# Patient Record
Sex: FEMALE | Race: BLACK OR AFRICAN AMERICAN | NOT HISPANIC OR LATINO | Employment: FULL TIME | URBAN - METROPOLITAN AREA
[De-identification: names, ages, dates, MRNs, and addresses within clinical notes are randomized per-mention and may not be internally consistent; named-entity substitution may affect disease eponyms.]

---

## 2017-01-01 DIAGNOSIS — E11.49 TYPE II OR UNSPECIFIED TYPE DIABETES MELLITUS WITH NEUROLOGICAL MANIFESTATIONS, NOT STATED AS UNCONTROLLED(250.60): Primary | ICD-10-CM

## 2017-01-02 RX ORDER — METFORMIN HYDROCHLORIDE 500 MG/1
TABLET ORAL
Qty: 30 TABLET | Refills: 0 | Status: SHIPPED | OUTPATIENT
Start: 2017-01-02 | End: 2017-01-29 | Stop reason: SDUPTHER

## 2017-01-06 ENCOUNTER — CLINICAL SUPPORT (OUTPATIENT)
Dept: OBSTETRICS AND GYNECOLOGY | Facility: CLINIC | Age: 52
End: 2017-01-06

## 2017-01-06 DIAGNOSIS — Z30.42 ON DEPO-PROVERA FOR CONTRACEPTION: Primary | ICD-10-CM

## 2017-01-06 PROCEDURE — 96372 THER/PROPH/DIAG INJ SC/IM: CPT | Mod: PBBFAC

## 2017-01-06 PROCEDURE — 99212 OFFICE O/P EST SF 10 MIN: CPT | Mod: PBBFAC,25

## 2017-01-06 PROCEDURE — 99999 PR PBB SHADOW E&M-EST. PATIENT-LVL II: CPT | Mod: PBBFAC,,,

## 2017-01-06 RX ADMIN — MEDROXYPROGESTERONE ACETATE 150 MG: 150 INJECTION, SUSPENSION INTRAMUSCULAR at 10:01

## 2017-01-06 NOTE — PROGRESS NOTES
Here for Depo Provera Injection, date due  1/2 - 1/16/17  . Last injection given 10/17/16  . Patient with no current complaints of pain prior to or after injection. Advised to wait 5 minutes. Return between March 24 - April 7 /2017 for next injection.  PATIENT SUPPLIED MEDICATION.  See medication Card for RX information  Order verified, inspected package,storage verified,expiration verified

## 2017-01-22 RX ORDER — HYDROCHLOROTHIAZIDE 25 MG/1
TABLET ORAL
Qty: 30 TABLET | Refills: 0 | Status: SHIPPED | OUTPATIENT
Start: 2017-01-22 | End: 2017-03-01 | Stop reason: SDUPTHER

## 2017-01-29 RX ORDER — METFORMIN HYDROCHLORIDE 500 MG/1
TABLET ORAL
Qty: 30 TABLET | Refills: 0 | Status: SHIPPED | OUTPATIENT
Start: 2017-01-29 | End: 2017-03-04 | Stop reason: SDUPTHER

## 2017-01-30 RX ORDER — POTASSIUM CHLORIDE 750 MG/1
TABLET, EXTENDED RELEASE ORAL
Qty: 60 TABLET | Refills: 0 | Status: SHIPPED | OUTPATIENT
Start: 2017-01-30 | End: 2017-05-16

## 2017-03-01 RX ORDER — HYDROCHLOROTHIAZIDE 25 MG/1
TABLET ORAL
Qty: 30 TABLET | Refills: 0 | Status: SHIPPED | OUTPATIENT
Start: 2017-03-01 | End: 2017-04-02 | Stop reason: SDUPTHER

## 2017-03-04 RX ORDER — METFORMIN HYDROCHLORIDE 500 MG/1
TABLET ORAL
Qty: 30 TABLET | Refills: 0 | Status: SHIPPED | OUTPATIENT
Start: 2017-03-04 | End: 2017-04-02 | Stop reason: SDUPTHER

## 2017-03-13 ENCOUNTER — TELEPHONE (OUTPATIENT)
Dept: INTERNAL MEDICINE | Facility: CLINIC | Age: 52
End: 2017-03-13

## 2017-03-13 NOTE — TELEPHONE ENCOUNTER
----- Message from Faviola Davis sent at 3/13/2017 10:36 AM CDT -----  Contact: Self [phone]  Doctor appointment and lab have been scheduled.  Please link lab orders to the lab appointment.  Date of doctor appointment:    Physical or EP:  05/16  Date of lab appointment:  05/09  Comments:

## 2017-03-22 RX ORDER — LISINOPRIL 10 MG/1
TABLET ORAL
Qty: 90 TABLET | Refills: 0 | Status: SHIPPED | OUTPATIENT
Start: 2017-03-22 | End: 2017-08-22 | Stop reason: SDUPTHER

## 2017-04-03 RX ORDER — HYDROCHLOROTHIAZIDE 25 MG/1
TABLET ORAL
Qty: 30 TABLET | Refills: 0 | Status: SHIPPED | OUTPATIENT
Start: 2017-04-03 | End: 2017-05-09 | Stop reason: SDUPTHER

## 2017-04-03 RX ORDER — METFORMIN HYDROCHLORIDE 500 MG/1
TABLET ORAL
Qty: 30 TABLET | Refills: 0 | Status: SHIPPED | OUTPATIENT
Start: 2017-04-03 | End: 2017-05-27 | Stop reason: SDUPTHER

## 2017-04-07 ENCOUNTER — CLINICAL SUPPORT (OUTPATIENT)
Dept: OBSTETRICS AND GYNECOLOGY | Facility: CLINIC | Age: 52
End: 2017-04-07
Payer: COMMERCIAL

## 2017-04-07 DIAGNOSIS — Z30.42 ON DEPO-PROVERA FOR CONTRACEPTION: Primary | ICD-10-CM

## 2017-04-07 PROCEDURE — 99999 PR PBB SHADOW E&M-EST. PATIENT-LVL II: CPT | Mod: PBBFAC,,,

## 2017-04-07 PROCEDURE — 96372 THER/PROPH/DIAG INJ SC/IM: CPT | Mod: S$GLB,,, | Performed by: OBSTETRICS & GYNECOLOGY

## 2017-04-07 RX ADMIN — MEDROXYPROGESTERONE ACETATE 150 MG: 150 INJECTION, SUSPENSION INTRAMUSCULAR at 09:04

## 2017-04-10 NOTE — PROGRESS NOTES
Here for Depo Provera Injection, date due 3/24 - 4/7/17     . Last injection given   1/6/17 . Patient with no current complaints of pain prior to or after injection. Advised to wait 5 minutes. Return between  June 23 - July 7 /2017 for next injection.  PATIENT SUPPLIED MEDICATION.  See medication Card for RX information  Order verified, inspected package,storage verified,expiration verified      Site - RB

## 2017-05-08 ENCOUNTER — LAB VISIT (OUTPATIENT)
Dept: LAB | Facility: HOSPITAL | Age: 52
End: 2017-05-08
Attending: INTERNAL MEDICINE
Payer: COMMERCIAL

## 2017-05-08 DIAGNOSIS — E11.00 DIABETES MELLITUS WITH HYPEROSMOLARITY: ICD-10-CM

## 2017-05-08 LAB
ESTIMATED AVG GLUCOSE: 134 MG/DL
HBA1C MFR BLD HPLC: 6.3 %

## 2017-05-08 PROCEDURE — 83036 HEMOGLOBIN GLYCOSYLATED A1C: CPT

## 2017-05-08 PROCEDURE — 36415 COLL VENOUS BLD VENIPUNCTURE: CPT | Mod: PO

## 2017-05-09 ENCOUNTER — TELEPHONE (OUTPATIENT)
Dept: INTERNAL MEDICINE | Facility: CLINIC | Age: 52
End: 2017-05-09

## 2017-05-09 RX ORDER — HYDROCHLOROTHIAZIDE 25 MG/1
TABLET ORAL
Qty: 30 TABLET | Refills: 0 | Status: SHIPPED | OUTPATIENT
Start: 2017-05-09 | End: 2017-05-27 | Stop reason: SDUPTHER

## 2017-05-09 NOTE — TELEPHONE ENCOUNTER
----- Message from Milagros Wolf MD sent at 5/8/2017  9:05 PM CDT -----  Please review your lab work and we will discuss at your pending office visit.  Milagros Mcgill

## 2017-05-16 ENCOUNTER — OFFICE VISIT (OUTPATIENT)
Dept: INTERNAL MEDICINE | Facility: CLINIC | Age: 52
End: 2017-05-16
Payer: COMMERCIAL

## 2017-05-16 ENCOUNTER — HOSPITAL ENCOUNTER (OUTPATIENT)
Dept: RADIOLOGY | Facility: HOSPITAL | Age: 52
Discharge: HOME | End: 2017-05-16
Attending: INTERNAL MEDICINE
Payer: COMMERCIAL

## 2017-05-16 VITALS
BODY MASS INDEX: 39.79 KG/M2 | SYSTOLIC BLOOD PRESSURE: 120 MMHG | RESPIRATION RATE: 16 BRPM | DIASTOLIC BLOOD PRESSURE: 76 MMHG | HEART RATE: 68 BPM | HEIGHT: 66 IN | WEIGHT: 247.56 LBS | TEMPERATURE: 98 F

## 2017-05-16 DIAGNOSIS — E53.8 B12 DEFICIENCY: ICD-10-CM

## 2017-05-16 DIAGNOSIS — E51.9 THIAMIN DEFICIENCY: ICD-10-CM

## 2017-05-16 DIAGNOSIS — E55.9 VITAMIN D DEFICIENCY: ICD-10-CM

## 2017-05-16 DIAGNOSIS — E87.6 HYPOKALEMIA: ICD-10-CM

## 2017-05-16 DIAGNOSIS — M79.642 PAIN IN LEFT HAND: ICD-10-CM

## 2017-05-16 DIAGNOSIS — M79.641 PAIN OF RIGHT HAND: ICD-10-CM

## 2017-05-16 DIAGNOSIS — I10 ESSENTIAL HYPERTENSION: ICD-10-CM

## 2017-05-16 DIAGNOSIS — E11.40 CONTROLLED TYPE 2 DIABETES MELLITUS WITH DIABETIC NEUROPATHY, WITHOUT LONG-TERM CURRENT USE OF INSULIN: Primary | ICD-10-CM

## 2017-05-16 PROCEDURE — 1160F RVW MEDS BY RX/DR IN RCRD: CPT | Mod: S$GLB,,, | Performed by: INTERNAL MEDICINE

## 2017-05-16 PROCEDURE — 99999 PR PBB SHADOW E&M-EST. PATIENT-LVL III: CPT | Mod: PBBFAC,,, | Performed by: INTERNAL MEDICINE

## 2017-05-16 PROCEDURE — 3044F HG A1C LEVEL LT 7.0%: CPT | Mod: S$GLB,,, | Performed by: INTERNAL MEDICINE

## 2017-05-16 PROCEDURE — 3078F DIAST BP <80 MM HG: CPT | Mod: S$GLB,,, | Performed by: INTERNAL MEDICINE

## 2017-05-16 PROCEDURE — 73130 X-RAY EXAM OF HAND: CPT | Mod: 26,50,, | Performed by: RADIOLOGY

## 2017-05-16 PROCEDURE — 73130 X-RAY EXAM OF HAND: CPT | Mod: 50,TC,PO

## 2017-05-16 PROCEDURE — 99214 OFFICE O/P EST MOD 30 MIN: CPT | Mod: S$GLB,,, | Performed by: INTERNAL MEDICINE

## 2017-05-16 PROCEDURE — 4010F ACE/ARB THERAPY RXD/TAKEN: CPT | Mod: S$GLB,,, | Performed by: INTERNAL MEDICINE

## 2017-05-16 PROCEDURE — 3074F SYST BP LT 130 MM HG: CPT | Mod: S$GLB,,, | Performed by: INTERNAL MEDICINE

## 2017-05-16 RX ORDER — MEDROXYPROGESTERONE ACETATE 150 MG/ML
INJECTION, SUSPENSION INTRAMUSCULAR
Refills: 2 | COMMUNITY
Start: 2017-04-06 | End: 2017-09-30 | Stop reason: SDUPTHER

## 2017-05-16 NOTE — PROGRESS NOTES
CC: f/u chronic  DM, HTN, and rhinitis sx    50 yo female presents for above  Gastric Sleeve 7/29/2013  Lost ~ 60-70 # initially  Weight 218 (12/2014)  Weight today 247     DM w/ neuropathy, tx w/ metformin  Denies increased thirst or urination  Denies increased numbness  A1C==>6.3    HTN, tx w/ HCTZ 25mg and lisinopril 10mg  Denies HA or dizziness  Hypokalemia 9/2013, @ 3.0===> update pending today  BP today==>110/80    Sinus pressure and congestion  Duration: wks    MEDS: In the MedCard.     REVIEW OF SYSTEMS:   No fever, chill, or night sweats  No chest pain, shortness of breath, PND, or Orthopnea.  No nausea, emesis or diarrhea  No change in bowel or bladder function.   No unusual headaches or focal deficits   ++ hand pain, hard to close hand, especially index finger  Remainder of review negative except as previously noted.     PAST MEDICAL HISTORY:   Hypertension,  Anemia,   Depression,   Sinus, rhinitis,   DM, neuropathy  Dysmetabolic syndrome,   Asthma,   Restless legs     PHYSICAL EXAM:   VS:Stable  GENERAL: She is alert and oriented, in no acute distress. She is well developed, well nourished, conversant and cooperative, pleasant as always.   EYES: Conjunctivae and lids unremarkable. Sclerae anicteric. Pupils are reactive.   ENT: Canal and TMs ++ cerumen, unable to visualize TM's ( pt asx).   Nasal mucosa, turbinates, injected w/clear left  exudate;   oropharynx injected w/o exudate    No stridor, No sinus tenderness  NECK: Supple. No thyromegaly noted.   RESPIRATORY: Efforts unlabored.   LUNGS: Clear to auscultation.   HEART: Regular rate and rhythm. No carotid bruits,   1+ pedal pulse. No edema.   GI: BS+, soft ,NT/ND  MUSCULOSKELETAL: Gait normal.   Hands:decreased ROM, tender over the digitis , 5th >   No clubbing, cyanosis or edema appreciated.   NEURO: WILDER. No tremor noted; , b/l+ Tinel's  SKIN: Warm and dry    IMPRESSION:   DM w/ neuropathy, A1C increased  Hypertension, stable.   Hand pain,  b/l  Allergic rhinitis, chronic  Obesity, gained weight   Cerumen impaction    PLAN:  Taking OTC potassium  Lab-   Vigorous hydration, water best  Continue present meds  Flonase/ocean spray  Mucinex  Hand xrays  Wrist splint to sleep in  Call prn  RTC - 3 mos, EPP w/ lab

## 2017-05-17 ENCOUNTER — TELEPHONE (OUTPATIENT)
Dept: INTERNAL MEDICINE | Facility: CLINIC | Age: 52
End: 2017-05-17

## 2017-05-19 ENCOUNTER — TELEPHONE (OUTPATIENT)
Dept: INTERNAL MEDICINE | Facility: CLINIC | Age: 52
End: 2017-05-19

## 2017-05-19 NOTE — TELEPHONE ENCOUNTER
----- Message from Milagros Wolf MD sent at 5/19/2017  4:11 PM CDT -----  Please note that your xray did not reveal the cause of your pain  If your symptoms are persisting , please advise so an Orthopedic Consult can be generated for further evaluation  Milagros Mcgill

## 2017-05-28 RX ORDER — HYDROCHLOROTHIAZIDE 25 MG/1
TABLET ORAL
Qty: 30 TABLET | Refills: 5 | Status: SHIPPED | OUTPATIENT
Start: 2017-05-28 | End: 2017-11-20 | Stop reason: SDUPTHER

## 2017-05-28 RX ORDER — METFORMIN HYDROCHLORIDE 500 MG/1
TABLET ORAL
Qty: 30 TABLET | Refills: 5 | Status: SHIPPED | OUTPATIENT
Start: 2017-05-28 | End: 2017-11-20 | Stop reason: SDUPTHER

## 2017-06-15 ENCOUNTER — OFFICE VISIT (OUTPATIENT)
Dept: INTERNAL MEDICINE | Facility: CLINIC | Age: 52
End: 2017-06-15
Payer: COMMERCIAL

## 2017-06-15 VITALS
DIASTOLIC BLOOD PRESSURE: 78 MMHG | WEIGHT: 250.69 LBS | SYSTOLIC BLOOD PRESSURE: 132 MMHG | RESPIRATION RATE: 16 BRPM | HEIGHT: 66 IN | HEART RATE: 78 BPM | TEMPERATURE: 98 F | BODY MASS INDEX: 40.29 KG/M2

## 2017-06-15 DIAGNOSIS — J01.00 ACUTE MAXILLARY SINUSITIS, RECURRENCE NOT SPECIFIED: Primary | ICD-10-CM

## 2017-06-15 DIAGNOSIS — I10 HTN, GOAL BELOW 130/80: ICD-10-CM

## 2017-06-15 DIAGNOSIS — J20.9 ACUTE BRONCHITIS, UNSPECIFIED ORGANISM: ICD-10-CM

## 2017-06-15 PROCEDURE — 99214 OFFICE O/P EST MOD 30 MIN: CPT | Mod: 25,S$GLB,, | Performed by: INTERNAL MEDICINE

## 2017-06-15 PROCEDURE — 96372 THER/PROPH/DIAG INJ SC/IM: CPT | Mod: S$GLB,,, | Performed by: INTERNAL MEDICINE

## 2017-06-15 PROCEDURE — 99999 PR PBB SHADOW E&M-EST. PATIENT-LVL III: CPT | Mod: PBBFAC,,, | Performed by: INTERNAL MEDICINE

## 2017-06-15 RX ORDER — LEVOCETIRIZINE DIHYDROCHLORIDE 5 MG/1
5 TABLET, FILM COATED ORAL NIGHTLY
Qty: 30 TABLET | Refills: 3 | Status: SHIPPED | OUTPATIENT
Start: 2017-06-15 | End: 2017-08-29 | Stop reason: SDUPTHER

## 2017-06-15 RX ORDER — AZITHROMYCIN 250 MG/1
TABLET, FILM COATED ORAL
Qty: 6 TABLET | Refills: 0 | Status: SHIPPED | OUTPATIENT
Start: 2017-06-15 | End: 2017-06-20

## 2017-06-15 RX ORDER — TRIAMCINOLONE ACETONIDE 40 MG/ML
40 INJECTION, SUSPENSION INTRA-ARTICULAR; INTRAMUSCULAR
Status: COMPLETED | OUTPATIENT
Start: 2017-06-15 | End: 2017-06-15

## 2017-06-15 RX ORDER — CODEINE PHOSPHATE AND GUAIFENESIN 10; 100 MG/5ML; MG/5ML
5 SOLUTION ORAL 3 TIMES DAILY PRN
Qty: 236 ML | Refills: 0 | Status: SHIPPED | OUTPATIENT
Start: 2017-06-15 | End: 2017-06-25

## 2017-06-15 RX ADMIN — TRIAMCINOLONE ACETONIDE 40 MG: 40 INJECTION, SUSPENSION INTRA-ARTICULAR; INTRAMUSCULAR at 10:06

## 2017-06-15 NOTE — PROGRESS NOTES
Subjective:       Patient ID: Miesha Domínguez is a 51 y.o. female.    Chief Complaint: Cough    HPI   Pt with HTN is here for evaluation of 4 days of worsening sinus/chest congestion, mild productive cough, post nasal drip. Minimal relief with Mucinex and Flonase.   Review of Systems   Constitutional: Negative for activity change, appetite change, chills, diaphoresis, fatigue, fever and unexpected weight change.   HENT: Positive for congestion, postnasal drip, rhinorrhea and sinus pressure. Negative for sneezing, sore throat, trouble swallowing and voice change.    Respiratory: Positive for cough. Negative for shortness of breath and wheezing.    Cardiovascular: Negative for chest pain, palpitations and leg swelling.   Gastrointestinal: Negative for abdominal pain, blood in stool, constipation, diarrhea, nausea and vomiting.   Genitourinary: Negative for dysuria.   Musculoskeletal: Negative for arthralgias and myalgias.   Skin: Negative for rash and wound.   Allergic/Immunologic: Negative for environmental allergies and food allergies.   Hematological: Negative for adenopathy. Does not bruise/bleed easily.       Objective:      Physical Exam   Constitutional: She is oriented to person, place, and time. She appears well-developed and well-nourished. No distress.   HENT:   Head: Normocephalic and atraumatic.   Right Ear: External ear normal.   Left Ear: External ear normal.   Nose: Mucosal edema and rhinorrhea present. Right sinus exhibits maxillary sinus tenderness. Left sinus exhibits maxillary sinus tenderness.   Mouth/Throat: Oropharynx is clear and moist. No oropharyngeal exudate.   Eyes: Conjunctivae and EOM are normal. Pupils are equal, round, and reactive to light. Right eye exhibits no discharge. Left eye exhibits no discharge. No scleral icterus.   Neck: Neck supple. No JVD present.   Cardiovascular: Normal rate, regular rhythm, normal heart sounds and intact distal pulses.    Pulmonary/Chest: Effort  normal and breath sounds normal. No respiratory distress. She has no wheezes. She has no rales.   Abdominal: Soft. Bowel sounds are normal. There is no tenderness.   Musculoskeletal: She exhibits no edema.   Lymphadenopathy:     She has no cervical adenopathy.   Neurological: She is alert and oriented to person, place, and time.   Skin: Skin is warm and dry. No rash noted. She is not diaphoretic. No pallor.       Assessment:       1. Acute maxillary sinusitis, recurrence not specified    2. Acute bronchitis, unspecified organism    3. HTN, goal below 130/80        Plan:    1. Rx Z-Pack, Xyzal, kenalog 40 mg IM and continue Flonase       No decongestants 2/2 HTN   2. Rx Cheratussin AC TID PRN   3. Stable, CPT

## 2017-07-05 ENCOUNTER — CLINICAL SUPPORT (OUTPATIENT)
Dept: OBSTETRICS AND GYNECOLOGY | Facility: CLINIC | Age: 52
End: 2017-07-05
Payer: COMMERCIAL

## 2017-07-05 DIAGNOSIS — R58 BLEEDING: Primary | ICD-10-CM

## 2017-07-05 PROCEDURE — 96372 THER/PROPH/DIAG INJ SC/IM: CPT | Mod: S$GLB,,, | Performed by: OBSTETRICS & GYNECOLOGY

## 2017-07-05 PROCEDURE — 99999 PR PBB SHADOW E&M-EST. PATIENT-LVL II: CPT | Mod: PBBFAC,,,

## 2017-07-05 RX ADMIN — MEDROXYPROGESTERONE ACETATE 150 MG: 150 INJECTION, SUSPENSION INTRAMUSCULAR at 08:07

## 2017-07-05 NOTE — PROGRESS NOTES
Here for Depo Provera Injection, date due  6/23 - 7/7/17   . Last injection given 4/7/17   . Patient with no current complaints of pain prior to or after injection. Advised to wait 5 minutes. Return between Sept 20 - Oct 4   /2017 for next injection.  PATIENT SUPPLIED MEDICATION.  See medication Card for RX information  Order verified, inspected package,storage verified,expiration verified      Site - LB

## 2017-08-21 ENCOUNTER — LAB VISIT (OUTPATIENT)
Dept: LAB | Facility: HOSPITAL | Age: 52
End: 2017-08-21
Attending: INTERNAL MEDICINE
Payer: COMMERCIAL

## 2017-08-21 DIAGNOSIS — E55.9 VITAMIN D DEFICIENCY: ICD-10-CM

## 2017-08-21 DIAGNOSIS — E87.6 HYPOKALEMIA: ICD-10-CM

## 2017-08-21 DIAGNOSIS — E11.40 CONTROLLED TYPE 2 DIABETES MELLITUS WITH DIABETIC NEUROPATHY, WITHOUT LONG-TERM CURRENT USE OF INSULIN: ICD-10-CM

## 2017-08-21 DIAGNOSIS — I10 ESSENTIAL HYPERTENSION: ICD-10-CM

## 2017-08-21 DIAGNOSIS — E51.9 THIAMIN DEFICIENCY: ICD-10-CM

## 2017-08-21 DIAGNOSIS — E53.8 B12 DEFICIENCY: ICD-10-CM

## 2017-08-21 LAB
25(OH)D3+25(OH)D2 SERPL-MCNC: 45 NG/ML
ALBUMIN SERPL BCP-MCNC: 3.3 G/DL
ALP SERPL-CCNC: 78 U/L
ALT SERPL W/O P-5'-P-CCNC: 15 U/L
ANION GAP SERPL CALC-SCNC: 11 MMOL/L
AST SERPL-CCNC: 17 U/L
BASOPHILS # BLD AUTO: 0.03 K/UL
BASOPHILS NFR BLD: 0.3 %
BILIRUB SERPL-MCNC: 0.6 MG/DL
BUN SERPL-MCNC: 15 MG/DL
CALCIUM SERPL-MCNC: 9.8 MG/DL
CHLORIDE SERPL-SCNC: 105 MMOL/L
CHOLEST/HDLC SERPL: 3.8 {RATIO}
CO2 SERPL-SCNC: 25 MMOL/L
CREAT SERPL-MCNC: 0.9 MG/DL
DIFFERENTIAL METHOD: ABNORMAL
EOSINOPHIL # BLD AUTO: 0.1 K/UL
EOSINOPHIL NFR BLD: 0.7 %
ERYTHROCYTE [DISTWIDTH] IN BLOOD BY AUTOMATED COUNT: 15.7 %
EST. GFR  (AFRICAN AMERICAN): >60 ML/MIN/1.73 M^2
EST. GFR  (NON AFRICAN AMERICAN): >60 ML/MIN/1.73 M^2
ESTIMATED AVG GLUCOSE: 126 MG/DL
GLUCOSE SERPL-MCNC: 91 MG/DL
HBA1C MFR BLD HPLC: 6 %
HCT VFR BLD AUTO: 38 %
HDL/CHOLESTEROL RATIO: 26.1 %
HDLC SERPL-MCNC: 222 MG/DL
HDLC SERPL-MCNC: 58 MG/DL
HGB BLD-MCNC: 12.7 G/DL
LDLC SERPL CALC-MCNC: 149 MG/DL
LYMPHOCYTES # BLD AUTO: 3.6 K/UL
LYMPHOCYTES NFR BLD: 33 %
MAGNESIUM SERPL-MCNC: 1.8 MG/DL
MCH RBC QN AUTO: 26.1 PG
MCHC RBC AUTO-ENTMCNC: 33.4 G/DL
MCV RBC AUTO: 78 FL
MONOCYTES # BLD AUTO: 0.7 K/UL
MONOCYTES NFR BLD: 5.9 %
NEUTROPHILS # BLD AUTO: 6.6 K/UL
NEUTROPHILS NFR BLD: 59.8 %
NONHDLC SERPL-MCNC: 164 MG/DL
PLATELET # BLD AUTO: 272 K/UL
PMV BLD AUTO: 11.4 FL
POTASSIUM SERPL-SCNC: 3.4 MMOL/L
PROT SERPL-MCNC: 8.1 G/DL
RBC # BLD AUTO: 4.87 M/UL
SODIUM SERPL-SCNC: 141 MMOL/L
TRIGL SERPL-MCNC: 75 MG/DL
TSH SERPL DL<=0.005 MIU/L-ACNC: 1.03 UIU/ML
VIT B12 SERPL-MCNC: 1444 PG/ML
WBC # BLD AUTO: 10.96 K/UL

## 2017-08-21 PROCEDURE — 80053 COMPREHEN METABOLIC PANEL: CPT

## 2017-08-21 PROCEDURE — 84443 ASSAY THYROID STIM HORMONE: CPT

## 2017-08-21 PROCEDURE — 82306 VITAMIN D 25 HYDROXY: CPT

## 2017-08-21 PROCEDURE — 83036 HEMOGLOBIN GLYCOSYLATED A1C: CPT

## 2017-08-21 PROCEDURE — 85025 COMPLETE CBC W/AUTO DIFF WBC: CPT

## 2017-08-21 PROCEDURE — 84425 ASSAY OF VITAMIN B-1: CPT

## 2017-08-21 PROCEDURE — 80061 LIPID PANEL: CPT

## 2017-08-21 PROCEDURE — 82607 VITAMIN B-12: CPT

## 2017-08-21 PROCEDURE — 83735 ASSAY OF MAGNESIUM: CPT

## 2017-08-21 PROCEDURE — 36415 COLL VENOUS BLD VENIPUNCTURE: CPT | Mod: PO

## 2017-08-22 ENCOUNTER — TELEPHONE (OUTPATIENT)
Dept: INTERNAL MEDICINE | Facility: CLINIC | Age: 52
End: 2017-08-22

## 2017-08-22 RX ORDER — LISINOPRIL 10 MG/1
10 TABLET ORAL DAILY
Qty: 90 TABLET | Refills: 3 | Status: SHIPPED | OUTPATIENT
Start: 2017-08-22 | End: 2018-08-01 | Stop reason: SDUPTHER

## 2017-08-22 NOTE — TELEPHONE ENCOUNTER
----- Message from Milagros Wolf MD sent at 8/21/2017  7:16 PM CDT -----  .Please review your lab work and we will discuss at your pending office visit.  Milagros Mcgill

## 2017-08-23 LAB — VIT B1 SERPL-MCNC: 73 UG/L (ref 38–122)

## 2017-08-25 ENCOUNTER — TELEPHONE (OUTPATIENT)
Dept: INTERNAL MEDICINE | Facility: CLINIC | Age: 52
End: 2017-08-25

## 2017-08-25 NOTE — TELEPHONE ENCOUNTER
----- Message from Milagros Wolf MD sent at 8/24/2017  7:48 PM CDT -----  Please review your lab work and we will discuss at your pending office visit.  Your potassium was low, please increase your potassium rich food intake,   bananas, OJ, and avocados are 3 common foods that contain extra potassium  Milagros Mcgill

## 2017-08-28 ENCOUNTER — TELEPHONE (OUTPATIENT)
Dept: INTERNAL MEDICINE | Facility: CLINIC | Age: 52
End: 2017-08-28

## 2017-08-28 ENCOUNTER — OFFICE VISIT (OUTPATIENT)
Dept: INTERNAL MEDICINE | Facility: CLINIC | Age: 52
End: 2017-08-28
Payer: COMMERCIAL

## 2017-08-28 VITALS
RESPIRATION RATE: 18 BRPM | SYSTOLIC BLOOD PRESSURE: 136 MMHG | DIASTOLIC BLOOD PRESSURE: 86 MMHG | BODY MASS INDEX: 39.28 KG/M2 | WEIGHT: 250.25 LBS | HEART RATE: 97 BPM | OXYGEN SATURATION: 97 % | TEMPERATURE: 98 F | HEIGHT: 67 IN

## 2017-08-28 DIAGNOSIS — I10 ESSENTIAL HYPERTENSION: ICD-10-CM

## 2017-08-28 DIAGNOSIS — E11.40 CONTROLLED TYPE 2 DIABETES MELLITUS WITH DIABETIC NEUROPATHY, WITHOUT LONG-TERM CURRENT USE OF INSULIN: ICD-10-CM

## 2017-08-28 DIAGNOSIS — E11.40 TYPE 2 DIABETES MELLITUS WITH DIABETIC NEUROPATHY, WITHOUT LONG-TERM CURRENT USE OF INSULIN: ICD-10-CM

## 2017-08-28 DIAGNOSIS — Z00.00 ANNUAL PHYSICAL EXAM: Primary | ICD-10-CM

## 2017-08-28 DIAGNOSIS — E78.5 HYPERLIPIDEMIA, UNSPECIFIED HYPERLIPIDEMIA TYPE: ICD-10-CM

## 2017-08-28 DIAGNOSIS — D64.9 ANEMIA, UNSPECIFIED TYPE: Primary | ICD-10-CM

## 2017-08-28 PROCEDURE — 99396 PREV VISIT EST AGE 40-64: CPT | Mod: S$GLB,,, | Performed by: INTERNAL MEDICINE

## 2017-08-28 PROCEDURE — 99999 PR PBB SHADOW E&M-EST. PATIENT-LVL III: CPT | Mod: PBBFAC,,, | Performed by: INTERNAL MEDICINE

## 2017-08-28 RX ORDER — ROSUVASTATIN CALCIUM 20 MG/1
20 TABLET, COATED ORAL DAILY
Qty: 90 TABLET | Refills: 1 | Status: SHIPPED | OUTPATIENT
Start: 2017-08-28 | End: 2018-02-21 | Stop reason: SDUPTHER

## 2017-08-28 NOTE — TELEPHONE ENCOUNTER
----- Message from Socorro Boyd sent at 8/28/2017 10:44 AM CDT -----  Contact:  [phone]  Doctor appointment and lab have been scheduled.  Please link lab orders to the lab appointment.  Date of doctor appointment:  none  Physical or EP:  3 months lab appt per pt  Date of lab appointment:  11-  Comments:     REMINDER to appt coordinator: ## delete this from the message after reading! ##     1) The lab appointment must be at least 3 days from now to allow time for the order to be entered and linked to the appointment.   2) Lab appointment should be scheduled at least 3 days before the doctor appointment.

## 2017-08-28 NOTE — PROGRESS NOTES
CC: Annual PE    51 year-old female presents for PE  Nonsmoker,   Rare social alcohol consumer.     FAMILY HISTORY:   Dad d. 64, lung cancer. He had glaucoma. Heavy Smoker   and worked w/ asbestos   Mom d. 73 of ovarian cancer, she had diabetes and hypertension.   Brother + hypertension, diabetes.   Sister + hypertension, diabetes. Colon polyps    SCREENING TESTS:   Cholesterol, reviewed,- B12 level too high raking 3 x weekly; potassium low @ 3.4,   Supplement 99 mg bid; LDL not @ goal  Colonoscopy, N/A ,No change in bowels or blood in stool   Dr. Charline MAGAÑA.  Mammo, gyn  BMD, on Depo provera  Eye exam, UTD  Dental exam, UTD    VACCINATIONS:  Tdap, 9/20/13   Pneumovax, 2016  Flu, yearly    MEDS: In the MedCard.     REVIEW OF SYSTEMS:   No fever, chill, or night sweats  No chest pain, shortness of breath, PND, or orthopnea.  + cough usually @ night, when lays down after large meal  No change in bowel or bladder function.   No unusual headaches or focal deficits   No weakness in arms or legs or slurred speech.   Remainder of review negative except as previously noted.       PHYSICAL EXAM:   VS: stable  GENERAL: She is alert and oriented, in no acute distress. She is well developed, well nourished, conversant and cooperative, pleasant patient.   EYES: Conjunctivae and lids unremarkable. Sclerae anicteric. Pupils are reactive.   ENT: Canal and TMs unremarkable. Nasal mucosa, turbinates,   oropharynx injected w/o exudate   NECK: Supple. No thyromegaly noted.   RESPIRATORY: Efforts unlabored.   LUNGS: Clear to auscultation.   HEART: Regular rate and rhythm. No carotid bruits, 1+ pedal pulse.   No edema.    ABDOMEN: Bowel sounds present, soft, nontender.   No hepatosplenomegaly.   MUSCULOSKELETAL: Gait normal.   No clubbing, cyanosis or edema appreciated.   NEURO: WILDER. No tremor noted  SKIN: Warm and dry  Feet: monofilament intact; vibratory decreased, no calluses or ulcers    IMPRESSION:   Annual PE  Family history of  ovarian cancer.   DM w/ neuropathy, asx  Hypertension, stable.   Dyslipidemia, LDL not @ goal.  Hypokalemia      PLAN:  Rx rosuvastatin 20mg  Continue present meds  Increase potassium 99 mg BID to 2 BID  Decrease B12 to weekly  Call prn  RTC 3 months

## 2017-08-29 DIAGNOSIS — J01.00 ACUTE MAXILLARY SINUSITIS, RECURRENCE NOT SPECIFIED: ICD-10-CM

## 2017-08-29 DIAGNOSIS — J20.9 ACUTE BRONCHITIS, UNSPECIFIED ORGANISM: ICD-10-CM

## 2017-08-29 RX ORDER — LEVOCETIRIZINE DIHYDROCHLORIDE 5 MG/1
5 TABLET, FILM COATED ORAL NIGHTLY
Qty: 90 TABLET | Refills: 2 | Status: SHIPPED | OUTPATIENT
Start: 2017-08-29 | End: 2018-12-05

## 2017-10-01 RX ORDER — MEDROXYPROGESTERONE ACETATE 150 MG/ML
INJECTION, SUSPENSION INTRAMUSCULAR
Qty: 1 SYRINGE | Refills: 1 | Status: SHIPPED | OUTPATIENT
Start: 2017-10-01 | End: 2017-10-02 | Stop reason: SDUPTHER

## 2017-10-02 ENCOUNTER — OFFICE VISIT (OUTPATIENT)
Dept: OBSTETRICS AND GYNECOLOGY | Facility: CLINIC | Age: 52
End: 2017-10-02
Attending: OBSTETRICS & GYNECOLOGY
Payer: COMMERCIAL

## 2017-10-02 ENCOUNTER — CLINICAL SUPPORT (OUTPATIENT)
Dept: OBSTETRICS AND GYNECOLOGY | Facility: CLINIC | Age: 52
End: 2017-10-02
Payer: COMMERCIAL

## 2017-10-02 ENCOUNTER — LAB VISIT (OUTPATIENT)
Dept: LAB | Facility: OTHER | Age: 52
End: 2017-10-02
Attending: OBSTETRICS & GYNECOLOGY
Payer: COMMERCIAL

## 2017-10-02 VITALS
WEIGHT: 249.81 LBS | SYSTOLIC BLOOD PRESSURE: 136 MMHG | BODY MASS INDEX: 40.15 KG/M2 | DIASTOLIC BLOOD PRESSURE: 84 MMHG | HEIGHT: 66 IN

## 2017-10-02 DIAGNOSIS — N95.1 MENOPAUSAL SYMPTOM: ICD-10-CM

## 2017-10-02 DIAGNOSIS — Z01.419 ENCOUNTER FOR GYNECOLOGICAL EXAMINATION WITHOUT ABNORMAL FINDING: Primary | ICD-10-CM

## 2017-10-02 DIAGNOSIS — Z12.31 SCREENING MAMMOGRAM, ENCOUNTER FOR: ICD-10-CM

## 2017-10-02 DIAGNOSIS — Z30.42 ENCOUNTER FOR SURVEILLANCE OF INJECTABLE CONTRACEPTIVE: ICD-10-CM

## 2017-10-02 LAB
ESTRADIOL SERPL-MCNC: 67 PG/ML
FSH SERPL-ACNC: 20 MIU/ML

## 2017-10-02 PROCEDURE — 99999 PR PBB SHADOW E&M-EST. PATIENT-LVL III: CPT | Mod: PBBFAC,,, | Performed by: OBSTETRICS & GYNECOLOGY

## 2017-10-02 PROCEDURE — 99396 PREV VISIT EST AGE 40-64: CPT | Mod: 25,S$GLB,, | Performed by: OBSTETRICS & GYNECOLOGY

## 2017-10-02 PROCEDURE — 99999 PR PBB SHADOW E&M-EST. PATIENT-LVL II: CPT | Mod: PBBFAC,,,

## 2017-10-02 PROCEDURE — 82670 ASSAY OF TOTAL ESTRADIOL: CPT

## 2017-10-02 PROCEDURE — 83001 ASSAY OF GONADOTROPIN (FSH): CPT

## 2017-10-02 PROCEDURE — 36415 COLL VENOUS BLD VENIPUNCTURE: CPT

## 2017-10-02 PROCEDURE — 96372 THER/PROPH/DIAG INJ SC/IM: CPT | Mod: S$GLB,,, | Performed by: OBSTETRICS & GYNECOLOGY

## 2017-10-02 RX ORDER — MEDROXYPROGESTERONE ACETATE 150 MG/ML
INJECTION, SUSPENSION INTRAMUSCULAR
Qty: 1 SYRINGE | Refills: 3 | Status: SHIPPED | OUTPATIENT
Start: 2017-10-02 | End: 2018-12-05

## 2017-10-02 RX ADMIN — MEDROXYPROGESTERONE ACETATE 150 MG: 150 INJECTION, SUSPENSION INTRAMUSCULAR at 11:10

## 2017-10-02 NOTE — PROGRESS NOTES
Subjective:       Patient ID: Miesha Domínguez is a 51 y.o. female.    Chief Complaint:  Annual Exam and Mammogram      History of Present Illness  HPI    Miesha Domínguez is a 51 y.o. female  here for her annual GYN exam. She reports having had hot flashes during the past year, but they have resolved.   She describes her periods as stopped with Depo x 5 years, mainly to control heavy bleeding,  Denies break through bleeding.   Denies vaginal itching or irritation.  Denies vaginal discharge.  She is sexually active. She has had 1 partner for 26 years .  She uses Depo-Provera for contraception.   History of abnormal pap: No  Last Pap: approximate date  and was normal  Last MMG: normal--routine follow-up in 12 months  Last Colonoscopy:  None  Denies domestic violence. She does feel safe at home.     Past Medical History:   Diagnosis Date    Asthma     as a child    HLD (hyperlipidemia)     HTN (hypertension)     Neuropathy     Type II or unspecified type diabetes mellitus without mention of complication, not stated as uncontrolled      Past Surgical History:   Procedure Laterality Date     SECTION      SLEEVE GASTROPLASTY       Social History     Social History    Marital status:      Spouse name: justin    Number of children: 1    Years of education: N/A     Occupational History     First Student     Social History Main Topics    Smoking status: Never Smoker    Smokeless tobacco: Never Used    Alcohol use Yes      Comment: occassional for Hollidays    Drug use: No    Sexual activity: Yes     Partners: Male     Birth control/ protection: Injection      Comment:  x 26 years: Spouse : Justin     Other Topics Concern    Not on file     Social History Narrative    SOCIAL HISTORY:     , spouse is in good health, .     for [employer],    Daughter,  @[employer], major [employer]; working @ [employer]  "   + exercise, walk, elliptical, Rebecca     Family History   Problem Relation Age of Onset    Cancer Father      d.64 lung -smoker    Hypertension Mother     Diabetes Mother     Ovarian cancer Mother 74    Diabetes Sister     Hypertension Sister     Hypertension Brother     Diabetes Brother     Breast cancer Neg Hx     Colon cancer Neg Hx     Stroke Neg Hx      OB History      Para Term  AB Living    1 1 1     1    SAB TAB Ectopic Multiple Live Births            1          /84   Ht 5' 6" (1.676 m)   Wt 113.3 kg (249 lb 12.5 oz)   BMI 40.32 kg/m²         GYN & OB History  No LMP recorded. Patient has had an injection.   Date of Last Pap: 2016    OB History    Para Term  AB Living   1 1 1     1   SAB TAB Ectopic Multiple Live Births           1      # Outcome Date GA Lbr Jason/2nd Weight Sex Delivery Anes PTL Lv   1 Term      CS-LTranv Spinal N SHIREEN          Review of Systems  Review of Systems   Constitutional: Negative for activity change, appetite change, fatigue and unexpected weight change.   HENT: Negative.    Eyes: Negative for visual disturbance.   Respiratory: Negative for shortness of breath and wheezing.    Cardiovascular: Negative for chest pain, palpitations and leg swelling.   Gastrointestinal: Positive for bloating. Negative for abdominal pain and blood in stool.   Endocrine: Positive for diabetes and hot flashes. Negative for hair loss.   Genitourinary: Negative for decreased libido, dyspareunia and menorrhagia.   Musculoskeletal: Negative for back pain and joint swelling.   Skin:  Negative for no acne and hair changes.   Neurological: Negative for headaches.   Hematological: Does not bruise/bleed easily.   Psychiatric/Behavioral: Negative for depression and sleep disturbance. The patient is not nervous/anxious.    Breast: Negative for breast pain and nipple discharge          Objective:    Physical Exam:   Constitutional: She is oriented to person, " place, and time. She appears well-developed and well-nourished.    HENT:   Head: Normocephalic and atraumatic.    Eyes: EOM are normal. Pupils are equal, round, and reactive to light.    Neck: Normal range of motion. Neck supple.    Cardiovascular: Normal rate and regular rhythm.     Pulmonary/Chest: Effort normal and breath sounds normal.   BREASTS: Symmetrical, no skin changes or visible lesions.  No palpable masses, nipple discharge bilaterally.          Abdominal: Soft. Bowel sounds are normal.     Genitourinary: Pelvic exam was performed with patient supine.   Genitourinary Comments: PELVIC: Normal external genitalia without lesions.  Normal hair distribution.  Adequate perineal body, normal urethral meatus.  Vagina moist and well rugated without lesions or discharge.  Cervix pink, without lesions, discharge or tenderness.  No significant cystocele or rectocele.  Bimanual exam shows uterus to be NOT PALPABLE SECONDARY TO HABITUS, and nontender.  Adnexa without masses or tenderness.               Musculoskeletal: Normal range of motion and moves all extremeties.       Neurological: She is alert and oriented to person, place, and time.    Skin: Skin is warm and dry.    Psychiatric: She has a normal mood and affect.          Assessment:        1. Encounter for gynecological examination without abnormal finding    2. Encounter for surveillance of injectable contraceptive    3. Menopausal symptom    4. Screening mammogram, encounter for               Plan:        1. Encounter for gynecological examination without abnormal finding  COUNSELING:  The patient was counseled today on osteoporosis prevention, calcium supplementation, and regular weight bearing exercise. The patient was also counseled today on ACS PAP guidelines, with recommendations for yearly pelvic exams unless their uterus, cervix, and ovaries were removed for benign reasons; in that case, examinations every 3-5 years are recommended. The patient was  also counseled regarding monthly breast self-examination, routine STD screening for at-risk populations, prophylactic immunizations for transmitted infections such as HPV, Pertussis, or Influenza as appropriate, and yearly mammograms when indicated by ACS guidelines. She was advised to see her primary care physician for all other health maintenance.   FOLLOW-UP with me for next routine visit.           2. Encounter for surveillance of injectable contraceptive    - medroxyPROGESTERone (DEPO-PROVERA) 150 mg/mL Syrg; INJECT 1 ML INTO MUSCLE EVERY 3 MONTHS  Dispense: 1 Syringe; Refill: 3    3. Menopausal symptom    - Follicle stimulating hormone; Future  - Estradiol; Future    4. Screening mammogram, encounter for    - Mammo Digital Screening Bilat with CAD; Future       Return in about 1 year (around 10/2/2018).

## 2017-10-02 NOTE — PROGRESS NOTES
Here for Depo Provera Injection, date due 9/20 - 10/4/17  . Last injection given  7/5/17   . Patient with no current complaints of pain prior to or after injection. Advised to wait 5 minutes. Return between  December 18 - January 1 /2017 for next injection.  PATIENT SUPPLIED MEDICATION.  See medication Card for RX information  Order verified, inspected package,storage verified,expiration verified        Site -  RB

## 2017-10-03 ENCOUNTER — HOSPITAL ENCOUNTER (OUTPATIENT)
Dept: RADIOLOGY | Facility: OTHER | Age: 52
Discharge: HOME | End: 2017-10-03
Attending: OBSTETRICS & GYNECOLOGY
Payer: COMMERCIAL

## 2017-10-03 DIAGNOSIS — Z12.31 SCREENING MAMMOGRAM, ENCOUNTER FOR: ICD-10-CM

## 2017-10-03 PROCEDURE — 77063 BREAST TOMOSYNTHESIS BI: CPT | Mod: 26,,, | Performed by: RADIOLOGY

## 2017-10-03 PROCEDURE — 77067 SCR MAMMO BI INCL CAD: CPT | Mod: TC

## 2017-10-03 PROCEDURE — 77067 SCR MAMMO BI INCL CAD: CPT | Mod: 26,,, | Performed by: RADIOLOGY

## 2017-10-25 ENCOUNTER — LAB VISIT (OUTPATIENT)
Dept: LAB | Facility: HOSPITAL | Age: 52
End: 2017-10-25
Attending: INTERNAL MEDICINE
Payer: COMMERCIAL

## 2017-10-25 ENCOUNTER — OFFICE VISIT (OUTPATIENT)
Dept: INTERNAL MEDICINE | Facility: CLINIC | Age: 52
End: 2017-10-25
Payer: COMMERCIAL

## 2017-10-25 VITALS
TEMPERATURE: 99 F | BODY MASS INDEX: 40.52 KG/M2 | DIASTOLIC BLOOD PRESSURE: 82 MMHG | HEART RATE: 92 BPM | RESPIRATION RATE: 16 BRPM | SYSTOLIC BLOOD PRESSURE: 136 MMHG | WEIGHT: 258.19 LBS | OXYGEN SATURATION: 98 % | HEIGHT: 67 IN

## 2017-10-25 DIAGNOSIS — I49.8 PERIODIC HEART FLUTTER: Primary | ICD-10-CM

## 2017-10-25 DIAGNOSIS — R05.9 COUGH: ICD-10-CM

## 2017-10-25 DIAGNOSIS — I10 ESSENTIAL HYPERTENSION: ICD-10-CM

## 2017-10-25 DIAGNOSIS — I49.8 PERIODIC HEART FLUTTER: ICD-10-CM

## 2017-10-25 DIAGNOSIS — E87.6 HYPOKALEMIA: ICD-10-CM

## 2017-10-25 DIAGNOSIS — E78.5 HYPERLIPIDEMIA, UNSPECIFIED HYPERLIPIDEMIA TYPE: ICD-10-CM

## 2017-10-25 DIAGNOSIS — E11.40 CONTROLLED TYPE 2 DIABETES MELLITUS WITH DIABETIC NEUROPATHY, WITHOUT LONG-TERM CURRENT USE OF INSULIN: ICD-10-CM

## 2017-10-25 LAB
ANION GAP SERPL CALC-SCNC: 7 MMOL/L
BUN SERPL-MCNC: 16 MG/DL
CALCIUM SERPL-MCNC: 9.4 MG/DL
CHLORIDE SERPL-SCNC: 108 MMOL/L
CO2 SERPL-SCNC: 27 MMOL/L
CREAT SERPL-MCNC: 0.9 MG/DL
EST. GFR  (AFRICAN AMERICAN): >60 ML/MIN/1.73 M^2
EST. GFR  (NON AFRICAN AMERICAN): >60 ML/MIN/1.73 M^2
GLUCOSE SERPL-MCNC: 84 MG/DL
MAGNESIUM SERPL-MCNC: 1.7 MG/DL
POTASSIUM SERPL-SCNC: 3.7 MMOL/L
SODIUM SERPL-SCNC: 142 MMOL/L

## 2017-10-25 PROCEDURE — 99999 PR PBB SHADOW E&M-EST. PATIENT-LVL III: CPT | Mod: PBBFAC,,, | Performed by: INTERNAL MEDICINE

## 2017-10-25 PROCEDURE — 99214 OFFICE O/P EST MOD 30 MIN: CPT | Mod: S$GLB,,, | Performed by: INTERNAL MEDICINE

## 2017-10-25 PROCEDURE — 93010 ELECTROCARDIOGRAM REPORT: CPT | Mod: S$GLB,,, | Performed by: INTERNAL MEDICINE

## 2017-10-25 PROCEDURE — 36415 COLL VENOUS BLD VENIPUNCTURE: CPT | Mod: PO

## 2017-10-25 PROCEDURE — 80048 BASIC METABOLIC PNL TOTAL CA: CPT

## 2017-10-25 PROCEDURE — 93005 ELECTROCARDIOGRAM TRACING: CPT | Mod: S$GLB,,, | Performed by: INTERNAL MEDICINE

## 2017-10-25 PROCEDURE — 83735 ASSAY OF MAGNESIUM: CPT

## 2017-10-25 NOTE — PROGRESS NOTES
CC:   Chief Complaint   Patient presents with    Cough     pt reports a fluttering in her throat which makes her cough; and possible heart palpatations.        51 y.o. 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)  Initial flutters in throat and ~sternal notch  Frequency:intermittent  Assoc: Mild cough comes after the flutter, mild HA now- left frontal  Walking in [facility] w/o chest pain or SOB or REYES or lightheaded  Spouse w/ BRIGIDA and w/o mask and pt up last night as he was struggling to breathe and pt was observing him   Denied excess caffeine, usually drinks a cup of coffee in AM  Denied decongestant use  Denied unusual stress  Denied angina, SOB or lightheadedness  + hypoKalemia on last lab, tx potassium 99- 4 daily  Tx: n/a    Co-morbidites:  HTN, tx lisinopril and HCTZ, denied HA or dizziness, BP==.>136/82  DM, tx metformin ,denied hypoglycemia or increased thirst  HLD, tx rosuvastatin 20mg, denied chest pain or claudication        MEDCARD:Reviewed  ROS:  No fever, chills , or night sweats  No visual disturbance or itchy/watery eyes  No ear or sinus pain/pressure  No dysphagia or early satiety  No chest pain or palpitations  No cough or wheezing  No GERD or abdominal pain  No change in bowel or bladder function  No blood in stool or urine  No dysuria or nocturia  No joint swelling or redness  No skin rashes or blisters  No cold or heat intolerance  No increased thirst or urination  No HA or focal deficits  Remainder of review negative except as previously noted    PMHX: Reviewed  PSHX: Reviewed  SHX: Reviewed  FHX: Reviewed    PE:  VSS  GEN: WDWN, A&O, NAD, conversant and co-operative; pleasant as always  EYES: Conj/lids unremarkable, sclera anicteric pupils reactive, EOMI  ENT: Hearing intact; canals w/o cerumen,  TM's unremarkable  Nasal mucosa/turbinates w/o exudate or edema  O/P w/o exudate or edema, mucus membranes moist;  Sinus  nontender  NECK: Supple w/o lymphadenopathy or thyromegaly  RESP: Efforts unlabored, lungs CTAP  CV: Heart RRR, no carotid bruits, 1+ pedal pulses, no LE edema  GI:BS+. Soft, NT/ND, -HSM noted  MSK: Gait normal. No CCE noted  NEURO: WILDER. No tremor noted  SKIN: Warm and dry    IMPRESSION:  Heart palpitations/flutters  Cough, that comes after fluttering  HTN, stable  HypoK  DM, asx  HLD, asx    PLAN:  EKG- NSR  24 hour Holter  Lab-  ++ potassium rich foods  Avoid caffeine  Avoid stress  Call prn  ED acute change in status

## 2017-10-26 ENCOUNTER — TELEPHONE (OUTPATIENT)
Dept: INTERNAL MEDICINE | Facility: CLINIC | Age: 52
End: 2017-10-26

## 2017-10-26 NOTE — TELEPHONE ENCOUNTER
----- Message from Milagros Wolf MD sent at 10/25/2017  9:20 PM CDT -----  Please note that your potassium and magnesium were in the normal range  Please continue your 4 potassium tablets daily  Milagros Mcgill

## 2017-11-02 ENCOUNTER — CLINICAL SUPPORT (OUTPATIENT)
Dept: CARDIOLOGY | Facility: CLINIC | Age: 52
End: 2017-11-02
Payer: COMMERCIAL

## 2017-11-02 DIAGNOSIS — I49.8 PERIODIC HEART FLUTTER: ICD-10-CM

## 2017-11-02 PROCEDURE — 93224 XTRNL ECG REC UP TO 48 HRS: CPT | Mod: S$GLB,,, | Performed by: INTERNAL MEDICINE

## 2017-11-02 NOTE — PROGRESS NOTES
Verified pt name and .  Pt signed consent stated that the monitor will be returned to 8th floor Cardiology at the [facility].  Pt prepped and EKG leads placed.  Pt to be on holter monitor for 24 hours.  Instructions, diary and extra EKG pads provided.

## 2017-11-05 ENCOUNTER — TELEPHONE (OUTPATIENT)
Dept: INTERNAL MEDICINE | Facility: CLINIC | Age: 52
End: 2017-11-05

## 2017-11-05 DIAGNOSIS — R00.2 HEART PALPITATIONS: Primary | ICD-10-CM

## 2017-11-05 NOTE — TELEPHONE ENCOUNTER
Mariam Msg    Please note that your 24 hour Holter did not reveal any worrisome findings  Recommend a Cardiology Consult to see if further testing is needed.  A consult will be placed and our referral co ordinator, Lisy, will call to set up an appointment

## 2017-11-06 NOTE — TELEPHONE ENCOUNTER
----- Message from Milagros Wolf MD sent at 11/5/2017  2:56 PM CST -----  Please note that your 24 hour Holter did not reveal any worrisome findings  Recommend a Cardiology Consult to see if further testing is needed.  A consult will be placed and our referral co ordinator, Lisy, will call to set up an appointment  Milagros Mcgill

## 2017-11-07 ENCOUNTER — OFFICE VISIT (OUTPATIENT)
Dept: CARDIOLOGY | Facility: CLINIC | Age: 52
End: 2017-11-07
Payer: COMMERCIAL

## 2017-11-07 VITALS
DIASTOLIC BLOOD PRESSURE: 86 MMHG | HEART RATE: 86 BPM | HEIGHT: 67 IN | WEIGHT: 254.88 LBS | SYSTOLIC BLOOD PRESSURE: 130 MMHG | BODY MASS INDEX: 40 KG/M2

## 2017-11-07 DIAGNOSIS — R00.2 HEART PALPITATIONS: Primary | ICD-10-CM

## 2017-11-07 DIAGNOSIS — I10 ESSENTIAL HYPERTENSION: ICD-10-CM

## 2017-11-07 DIAGNOSIS — E11.42 DIABETIC PERIPHERAL NEUROPATHY: ICD-10-CM

## 2017-11-07 DIAGNOSIS — E87.6 HYPOKALEMIA: ICD-10-CM

## 2017-11-07 DIAGNOSIS — E11.40 CONTROLLED TYPE 2 DIABETES MELLITUS WITH DIABETIC NEUROPATHY, WITHOUT LONG-TERM CURRENT USE OF INSULIN: ICD-10-CM

## 2017-11-07 DIAGNOSIS — E78.2 MIXED HYPERLIPIDEMIA: ICD-10-CM

## 2017-11-07 DIAGNOSIS — E66.01 MORBID OBESITY: ICD-10-CM

## 2017-11-07 PROCEDURE — 99999 PR PBB SHADOW E&M-EST. PATIENT-LVL III: CPT | Mod: PBBFAC,,, | Performed by: INTERNAL MEDICINE

## 2017-11-07 PROCEDURE — 99204 OFFICE O/P NEW MOD 45 MIN: CPT | Mod: S$GLB,,, | Performed by: INTERNAL MEDICINE

## 2017-11-07 NOTE — LETTER
November 7, 2017      Milagros Wolf MD  [address]  [place] [address]           [place] - Cardiology  [address] [facility] [facility] [address]  Phone: [phone]          Patient: Miesha Domínguez   MR Number: [id number]   YOB: 1965   Date of Visit: 11/7/2017       Dear Dr. Milagros Wolf:    Thank you for referring Miesha Domínguez to me for evaluation. Attached you will find relevant portions of my assessment and plan of care.    If you have questions, please do not hesitate to call me. I look forward to following Miesha Domínguez along with you.    Sincerely,    Beverley To MD    Enclosure  CC:  No Recipients    If you would like to receive this communication electronically, please contact [email or web address] or [phone] to request more information on [employer] Link access.    For providers and/or their staff who would like to refer a patient to [facility], please contact us through our one-stop-shop provider referral line, [facility], at [phone].    If you feel you have received this communication in error or would no longer like to receive these types of communications, please e-mail [email or web address]

## 2017-11-07 NOTE — PROGRESS NOTES
Subjective:   Patient ID:  Miesha Domínguez is a 51 y.o. female who presents for evaulation of Palpitations      HPI: About 3 weeks ago patient experienced fluttering in her neck that lasted few seconds and occurred after being upset with couple of kids on the bus(she is a ).  IT has since resolved and did not recur.  Patient states she drinksa lot of coffee and used inhalers for sinus infection.    She is now feeling well and has no symptoms.    ECG is normal.  Holter is unremarkable.    The 10-year CVD risk score (Ludyino, et al., 2008) is: 12.5%    Values used to calculate the score:      Age: 51 years      Sex: Female      Diabetic: Yes      Tobacco smoker: No      Systolic Blood Pressure: 130 mmHg      Is BP treated: Yes      HDL Cholesterol: 58 mg/dL      Total Cholesterol: 222 mg/dL    Past Medical History:   Diagnosis Date    Asthma     as a child    HLD (hyperlipidemia)     HTN (hypertension)     Neuropathy     Type II or unspecified type diabetes mellitus without mention of complication, not stated as uncontrolled        Past Surgical History:   Procedure Laterality Date     SECTION      SLEEVE GASTROPLASTY         Social History     Social History    Marital status:      Spouse name: himanshu    Number of children: 1    Years of education: N/A     Occupational History     First Student     Social History Main Topics    Smoking status: Never Smoker    Smokeless tobacco: Never Used    Alcohol use Yes      Comment: occassional for Hollidays    Drug use: No    Sexual activity: Yes     Partners: Male     Birth control/ protection: Injection      Comment:  x 26 years: Spouse : Himanshu     Other Topics Concern    None     Social History Narrative    SOCIAL HISTORY:     , spouse is in good health, .     for [employer],    Daughter,  @[employer], major [employer]; working @ [employer]     "+ exercise, walk, elliptical, Rebecca       Review of patient's allergies indicates:  No Known Allergies    Lab Results   Component Value Date     10/25/2017    K 3.7 10/25/2017     10/25/2017    CO2 27 10/25/2017    BUN 16 10/25/2017    CREATININE 0.9 10/25/2017    GLU 84 10/25/2017    HGBA1C 6.0 (H) 08/21/2017    MG 1.7 10/25/2017    AST 17 08/21/2017    ALT 15 08/21/2017    ALBUMIN 3.3 (L) 08/21/2017    PROT 8.1 08/21/2017    BILITOT 0.6 08/21/2017    WBC 10.96 08/21/2017    HGB 12.7 08/21/2017    HCT 38.0 08/21/2017    MCV 78 (L) 08/21/2017     08/21/2017    INR 0.9 07/08/2013    TSH 1.028 08/21/2017    CHOL 222 (H) 08/21/2017    HDL 58 08/21/2017    LDLCALC 149.0 08/21/2017    TRIG 75 08/21/2017       Review of Systems   Constitution: Positive for malaise/fatigue and weight gain.   HENT: Negative.    Eyes: Negative.    Cardiovascular: Positive for palpitations. Negative for chest pain, claudication, dyspnea on exertion, irregular heartbeat, leg swelling, near-syncope and syncope.   Respiratory: Negative.  Negative for cough, shortness of breath, snoring and wheezing.    Endocrine: Negative.  Negative for cold intolerance, heat intolerance, polydipsia, polyphagia and polyuria.   Skin: Negative.    Musculoskeletal: Positive for arthritis and muscle cramps.   Gastrointestinal: Negative.    Genitourinary: Negative.    Neurological: Negative.    Psychiatric/Behavioral: Negative.        Objective:   Physical Exam   Constitutional: She is oriented to person, place, and time. She appears well-developed and well-nourished.   /86   Pulse 86   Ht 5' 7" (1.702 m)   Wt 115.6 kg (254 lb 13.6 oz)   BMI 39.92 kg/m²      HENT:   Head: Normocephalic.   Eyes: Pupils are equal, round, and reactive to light.   Neck: Normal range of motion. Neck supple. Carotid bruit is not present. No thyromegaly present.   Cardiovascular: Normal rate, regular rhythm, normal heart sounds and intact distal pulses.  Exam " reveals no gallop and no friction rub.    No murmur heard.  Pulses:       Carotid pulses are 2+ on the right side, and 2+ on the left side.       Radial pulses are 2+ on the right side, and 2+ on the left side.        Femoral pulses are 2+ on the right side, and 2+ on the left side.       Popliteal pulses are 2+ on the right side, and 2+ on the left side.        Dorsalis pedis pulses are 2+ on the right side, and 2+ on the left side.        Posterior tibial pulses are 2+ on the right side, and 2+ on the left side.   Pulmonary/Chest: Effort normal and breath sounds normal. No respiratory distress. She has no wheezes. She has no rales. She exhibits no tenderness.   Abdominal: Soft. Bowel sounds are normal.   obese   Musculoskeletal: Normal range of motion. She exhibits no edema.   Neurological: She is alert and oriented to person, place, and time.   Skin: Skin is warm and dry.   Psychiatric: She has a normal mood and affect.   Nursing note and vitals reviewed.      Current Outpatient Prescriptions   Medication Sig    CALCIUM CITRATE/VITAMIN D3 (CALCIUM CITRATE + D ORAL) Take 2 tablets by mouth 2 (two) times daily. Calcium citrate 400m g vit d 500 mg    cyanocobalamin, vitamin B-12, (VITAMIN B-12) 2,500 mcg Subl Place 1 tablet under the tongue once a week. Pt taking every other week    hydrochlorothiazide (HYDRODIURIL) 25 MG tablet TAKE ONE TABLET BY MOUTH ONCE DAILY    levocetirizine (XYZAL) 5 MG tablet Take 1 tablet (5 mg total) by mouth every evening.    lisinopril 10 MG tablet Take 1 tablet (10 mg total) by mouth once daily.    medroxyPROGESTERone (DEPO-PROVERA) 150 mg/mL Syrg INJECT 1 ML INTO MUSCLE EVERY 3 MONTHS    metformin (GLUCOPHAGE) 500 MG tablet TAKE ONE TABLET BY MOUTH ONCE DAILY WITH  BREAKFAST    MULTIVITAMIN ORAL Take 1 tablet by mouth once daily. Centrum adult chews ( flavor burst)     potassium 99 mg Tab 2 tablets twice daily (Patient taking differently: Pt taking 4 pills daily.)     rosuvastatin (CRESTOR) 20 MG tablet Take 1 tablet (20 mg total) by mouth once daily.     Current Facility-Administered Medications   Medication    medroxyPROGESTERone (DEPO-PROVERA) syringe 150 mg       Assessment and Plan:     Problem List Items Addressed This Visit        Cardiology Problems    HTN (hypertension)    Relevant Orders    Cardiac treadmill stress test    HLD (hyperlipidemia)    Relevant Orders    Cardiac treadmill stress test       Other    Morbid obesity    Diabetic peripheral neuropathy    Controlled type 2 diabetes mellitus with neurological manifestations    Relevant Orders    Cardiac treadmill stress test    Hypokalemia    Heart palpitations - Primary          Patient's Medications   New Prescriptions    No medications on file   Previous Medications    CALCIUM CITRATE/VITAMIN D3 (CALCIUM CITRATE + D ORAL)    Take 2 tablets by mouth 2 (two) times daily. Calcium citrate 400m g vit d 500 mg    CYANOCOBALAMIN, VITAMIN B-12, (VITAMIN B-12) 2,500 MCG SUBL    Place 1 tablet under the tongue once a week. Pt taking every other week    HYDROCHLOROTHIAZIDE (HYDRODIURIL) 25 MG TABLET    TAKE ONE TABLET BY MOUTH ONCE DAILY    LEVOCETIRIZINE (XYZAL) 5 MG TABLET    Take 1 tablet (5 mg total) by mouth every evening.    LISINOPRIL 10 MG TABLET    Take 1 tablet (10 mg total) by mouth once daily.    MEDROXYPROGESTERONE (DEPO-PROVERA) 150 MG/ML SYRG    INJECT 1 ML INTO MUSCLE EVERY 3 MONTHS    METFORMIN (GLUCOPHAGE) 500 MG TABLET    TAKE ONE TABLET BY MOUTH ONCE DAILY WITH  BREAKFAST    MULTIVITAMIN ORAL    Take 1 tablet by mouth once daily. Centrum adult chews ( flavor burst)     POTASSIUM 99 MG TAB    2 tablets twice daily    ROSUVASTATIN (CRESTOR) 20 MG TABLET    Take 1 tablet (20 mg total) by mouth once daily.   Modified Medications    No medications on file   Discontinued Medications    No medications on file   Suspect anxiety and antihistamine combination and low potasium level played a role in these  symptoms.  Agree with statins due to increase CVD risk.  Continue on the present regimen.  Last stress echo in 2013  Schedule stress ETT and review.  Risk factors modifications discussed.  Weight loss, exercise and possible BRIGIDA work up ( defer to PCP)  Follow  Up with PCP and cardiology as needed.    No Follow-up on file.

## 2017-11-20 RX ORDER — METFORMIN HYDROCHLORIDE 500 MG/1
TABLET ORAL
Qty: 30 TABLET | Refills: 5 | Status: SHIPPED | OUTPATIENT
Start: 2017-11-20 | End: 2017-11-28 | Stop reason: SDUPTHER

## 2017-11-20 RX ORDER — HYDROCHLOROTHIAZIDE 25 MG/1
TABLET ORAL
Qty: 30 TABLET | Refills: 5 | Status: SHIPPED | OUTPATIENT
Start: 2017-11-20 | End: 2018-08-01 | Stop reason: SDUPTHER

## 2017-11-21 ENCOUNTER — CLINICAL SUPPORT (OUTPATIENT)
Dept: CARDIOLOGY | Facility: CLINIC | Age: 52
End: 2017-11-21
Attending: INTERNAL MEDICINE
Payer: COMMERCIAL

## 2017-11-21 ENCOUNTER — LAB VISIT (OUTPATIENT)
Dept: LAB | Facility: HOSPITAL | Age: 52
End: 2017-11-21
Attending: INTERNAL MEDICINE
Payer: COMMERCIAL

## 2017-11-21 DIAGNOSIS — D64.9 ANEMIA, UNSPECIFIED TYPE: ICD-10-CM

## 2017-11-21 DIAGNOSIS — E11.40 TYPE 2 DIABETES MELLITUS WITH DIABETIC NEUROPATHY, WITHOUT LONG-TERM CURRENT USE OF INSULIN: ICD-10-CM

## 2017-11-21 DIAGNOSIS — E78.2 MIXED HYPERLIPIDEMIA: ICD-10-CM

## 2017-11-21 DIAGNOSIS — I10 ESSENTIAL HYPERTENSION: ICD-10-CM

## 2017-11-21 DIAGNOSIS — E78.5 HYPERLIPIDEMIA, UNSPECIFIED HYPERLIPIDEMIA TYPE: ICD-10-CM

## 2017-11-21 DIAGNOSIS — E11.40 CONTROLLED TYPE 2 DIABETES MELLITUS WITH DIABETIC NEUROPATHY, WITHOUT LONG-TERM CURRENT USE OF INSULIN: ICD-10-CM

## 2017-11-21 LAB
ALBUMIN SERPL BCP-MCNC: 3.1 G/DL
ALP SERPL-CCNC: 69 U/L
ALT SERPL W/O P-5'-P-CCNC: 22 U/L
ANION GAP SERPL CALC-SCNC: 7 MMOL/L
AST SERPL-CCNC: 25 U/L
BASOPHILS # BLD AUTO: 0.04 K/UL
BASOPHILS NFR BLD: 0.4 %
BILIRUB SERPL-MCNC: 0.5 MG/DL
BUN SERPL-MCNC: 12 MG/DL
CALCIUM SERPL-MCNC: 9.5 MG/DL
CHLORIDE SERPL-SCNC: 108 MMOL/L
CHOLEST SERPL-MCNC: 134 MG/DL
CHOLEST/HDLC SERPL: 2.9 {RATIO}
CO2 SERPL-SCNC: 26 MMOL/L
CREAT SERPL-MCNC: 0.8 MG/DL
DIASTOLIC DYSFUNCTION: NO
DIFFERENTIAL METHOD: ABNORMAL
EOSINOPHIL # BLD AUTO: 0.1 K/UL
EOSINOPHIL NFR BLD: 1.2 %
ERYTHROCYTE [DISTWIDTH] IN BLOOD BY AUTOMATED COUNT: 15.6 %
EST. GFR  (AFRICAN AMERICAN): >60 ML/MIN/1.73 M^2
EST. GFR  (NON AFRICAN AMERICAN): >60 ML/MIN/1.73 M^2
ESTIMATED AVG GLUCOSE: 131 MG/DL
GLUCOSE SERPL-MCNC: 101 MG/DL
HBA1C MFR BLD HPLC: 6.2 %
HCT VFR BLD AUTO: 38.3 %
HDLC SERPL-MCNC: 46 MG/DL
HDLC SERPL: 34.3 %
HGB BLD-MCNC: 12.3 G/DL
IMM GRANULOCYTES # BLD AUTO: 0.02 K/UL
IMM GRANULOCYTES NFR BLD AUTO: 0.2 %
IRON SERPL-MCNC: 70 UG/DL
LDLC SERPL CALC-MCNC: 76.4 MG/DL
LYMPHOCYTES # BLD AUTO: 3.1 K/UL
LYMPHOCYTES NFR BLD: 31.4 %
MCH RBC QN AUTO: 25.9 PG
MCHC RBC AUTO-ENTMCNC: 32.1 G/DL
MCV RBC AUTO: 81 FL
MONOCYTES # BLD AUTO: 0.7 K/UL
MONOCYTES NFR BLD: 7.2 %
NEUTROPHILS # BLD AUTO: 5.8 K/UL
NEUTROPHILS NFR BLD: 59.6 %
NONHDLC SERPL-MCNC: 88 MG/DL
NRBC BLD-RTO: 0 /100 WBC
PLATELET # BLD AUTO: 247 K/UL
PMV BLD AUTO: 11.4 FL
POTASSIUM SERPL-SCNC: 3.9 MMOL/L
PROT SERPL-MCNC: 7.5 G/DL
RBC # BLD AUTO: 4.75 M/UL
SATURATED IRON: 20 %
SODIUM SERPL-SCNC: 141 MMOL/L
TOTAL IRON BINDING CAPACITY: 346 UG/DL
TRANSFERRIN SERPL-MCNC: 234 MG/DL
TRIGL SERPL-MCNC: 58 MG/DL
WBC # BLD AUTO: 9.73 K/UL

## 2017-11-21 PROCEDURE — 83036 HEMOGLOBIN GLYCOSYLATED A1C: CPT

## 2017-11-21 PROCEDURE — 85025 COMPLETE CBC W/AUTO DIFF WBC: CPT

## 2017-11-21 PROCEDURE — 83540 ASSAY OF IRON: CPT

## 2017-11-21 PROCEDURE — 93015 CV STRESS TEST SUPVJ I&R: CPT | Mod: S$GLB,,, | Performed by: INTERNAL MEDICINE

## 2017-11-21 PROCEDURE — 36415 COLL VENOUS BLD VENIPUNCTURE: CPT | Mod: PO

## 2017-11-21 PROCEDURE — 80061 LIPID PANEL: CPT

## 2017-11-21 PROCEDURE — 80053 COMPREHEN METABOLIC PANEL: CPT

## 2017-11-22 ENCOUNTER — TELEPHONE (OUTPATIENT)
Dept: CARDIOLOGY | Facility: CLINIC | Age: 52
End: 2017-11-22

## 2017-11-22 ENCOUNTER — TELEPHONE (OUTPATIENT)
Dept: INTERNAL MEDICINE | Facility: CLINIC | Age: 52
End: 2017-11-22

## 2017-11-22 NOTE — TELEPHONE ENCOUNTER
----- Message from Beverley To MD sent at 11/21/2017  4:45 PM CST -----  Ms. Domínugez, please review results of your stress test. It was normal. Great news.

## 2017-11-22 NOTE — TELEPHONE ENCOUNTER
----- Message from Milagros Wolf MD sent at 11/21/2017 10:52 PM CST -----  Please review your lab work and we will discuss at your pending office visit.  Milagros Mcgill

## 2017-11-28 ENCOUNTER — OFFICE VISIT (OUTPATIENT)
Dept: INTERNAL MEDICINE | Facility: CLINIC | Age: 52
End: 2017-11-28
Payer: COMMERCIAL

## 2017-11-28 VITALS
RESPIRATION RATE: 16 BRPM | WEIGHT: 252.63 LBS | HEART RATE: 82 BPM | DIASTOLIC BLOOD PRESSURE: 83 MMHG | TEMPERATURE: 99 F | HEIGHT: 67 IN | SYSTOLIC BLOOD PRESSURE: 131 MMHG | BODY MASS INDEX: 39.65 KG/M2

## 2017-11-28 DIAGNOSIS — E11.40 CONTROLLED TYPE 2 DIABETES MELLITUS WITH DIABETIC NEUROPATHY, WITHOUT LONG-TERM CURRENT USE OF INSULIN: Primary | ICD-10-CM

## 2017-11-28 DIAGNOSIS — I10 ESSENTIAL HYPERTENSION: ICD-10-CM

## 2017-11-28 DIAGNOSIS — E78.2 MIXED HYPERLIPIDEMIA: ICD-10-CM

## 2017-11-28 PROCEDURE — 90662 IIV NO PRSV INCREASED AG IM: CPT | Mod: S$GLB,,, | Performed by: INTERNAL MEDICINE

## 2017-11-28 PROCEDURE — 90472 IMMUNIZATION ADMIN EACH ADD: CPT | Mod: S$GLB,,, | Performed by: INTERNAL MEDICINE

## 2017-11-28 PROCEDURE — 90686 IIV4 VACC NO PRSV 0.5 ML IM: CPT | Mod: S$GLB,,, | Performed by: INTERNAL MEDICINE

## 2017-11-28 PROCEDURE — 90471 IMMUNIZATION ADMIN: CPT | Mod: S$GLB,,, | Performed by: INTERNAL MEDICINE

## 2017-11-28 PROCEDURE — 99214 OFFICE O/P EST MOD 30 MIN: CPT | Mod: 25,S$GLB,, | Performed by: INTERNAL MEDICINE

## 2017-11-28 PROCEDURE — 99999 PR PBB SHADOW E&M-EST. PATIENT-LVL III: CPT | Mod: PBBFAC,,, | Performed by: INTERNAL MEDICINE

## 2017-11-28 RX ORDER — METFORMIN HYDROCHLORIDE 500 MG/1
500 TABLET ORAL 2 TIMES DAILY WITH MEALS
Qty: 180 TABLET | Refills: 3 | Status: SHIPPED | OUTPATIENT
Start: 2017-11-28 | End: 2018-06-20 | Stop reason: SDUPTHER

## 2017-11-28 NOTE — PROGRESS NOTES
51 y.o. femalepresents in f/u to diabetes, hypertension, and dyslipidemia  DM w/ neuro tx w/metfomrin 500mg ( pt increased to BID)  Denies increased thirst, urination, or hypoglycemia episodes  A1C ==>6.2    HTN tx w/HCTZ and lisinopril  Denies HA or dizziness  BP today==>131/83    Dyslipidemia tx w/ rosuvastatin 20mg  Denies unusual muscle pain or chest pain  LDL==>76.2    DUB w/ iron defc anemia  tx Medroxy progesterone and iron diet    ROS:  No fever, chills, or night sweats  No blurry vision or visual disturbance  No dysphagia or early satiety  No palpitations or tachycardia  No cough or wheezing  No GERD or abdominal pain  No numbness or focal deficits  Remainder of review negative except as previously noted    PAST MEDICAL HX: Reviewed  PAST SURGICAL HX: Reviewed  SOCIAL HX: Reviewed  FAMILY HX: Reviewed    PHYSICAL EXAM:  VS: As noted  GENERAL: WDWN, A&O, NAD, conversant and co-operative  EYES: Conj/lids unremarkable, sclera anicteric,  NECK: Supple w/o lymphadenopathy or thyromegaly  RESPIRATORY: Efforts are unlabored. Lungs CTAP  CARDIOVASCULAR: Heart RRR. No carotid bruits noted. 1+ pedal pulses. No LE edema  MUSCULOSKELETAL: Gait normal. No CCE  NEUROLOGIC: WILDER. No tremor noted  SKIN: Warm and dry    IMPRESSION:  DM, increased A1C-  HLD, @ goal  HTN, stable  Hx Anemia, iron defic 2/2 DUB    PLAN:  Flu vaccine  Metformin 500mg BID  Diabetic diet  Low salt, low fat diet  Exercise   Iron rich foods  Call prn   RTC 6 mos

## 2017-12-28 ENCOUNTER — CLINICAL SUPPORT (OUTPATIENT)
Dept: OBSTETRICS AND GYNECOLOGY | Facility: CLINIC | Age: 52
End: 2017-12-28
Payer: COMMERCIAL

## 2017-12-28 PROCEDURE — 96372 THER/PROPH/DIAG INJ SC/IM: CPT | Mod: S$GLB,,, | Performed by: OBSTETRICS & GYNECOLOGY

## 2017-12-28 PROCEDURE — 99999 PR PBB SHADOW E&M-EST. PATIENT-LVL II: CPT | Mod: PBBFAC,,,

## 2017-12-28 RX ADMIN — MEDROXYPROGESTERONE ACETATE 150 MG: 150 INJECTION, SUSPENSION INTRAMUSCULAR at 10:12

## 2017-12-28 NOTE — PROGRESS NOTES
Here for Depo Provera Injection, date due  12/18 - 1/1/18 . Last injection given  10/2/17 . Patient with no current complaints of pain prior to or after injection. Advised to wait 5 minutes. Return between March 15 - 29 /2018 for next injection.  PATIENT SUPPLIED MEDICATION.  See medication Card for RX information  Order verified, inspected package,storage verified,expiration verified        Site - [facility]

## 2018-02-21 RX ORDER — ROSUVASTATIN CALCIUM 20 MG/1
TABLET, COATED ORAL
Qty: 90 TABLET | Refills: 1 | Status: SHIPPED | OUTPATIENT
Start: 2018-02-21 | End: 2018-08-20 | Stop reason: SDUPTHER

## 2018-03-29 ENCOUNTER — CLINICAL SUPPORT (OUTPATIENT)
Dept: OBSTETRICS AND GYNECOLOGY | Facility: CLINIC | Age: 53
End: 2018-03-29
Payer: COMMERCIAL

## 2018-03-29 PROCEDURE — 99999 PR PBB SHADOW E&M-EST. PATIENT-LVL II: CPT | Mod: PBBFAC,,,

## 2018-03-29 PROCEDURE — 96372 THER/PROPH/DIAG INJ SC/IM: CPT | Mod: S$GLB,,, | Performed by: OBSTETRICS & GYNECOLOGY

## 2018-03-29 RX ADMIN — MEDROXYPROGESTERONE ACETATE 150 MG: 150 INJECTION, SUSPENSION INTRAMUSCULAR at 09:03

## 2018-03-29 NOTE — PROGRESS NOTES
Here for Depo Provera Injection, date due   3/15 - 3/29/18 . Last injection given   12/28/17 . Patient with no current complaints of pain prior to or after injection. Advised to wait 5 minutes. Return between  June 14 - 28 /2018 for next injection.  PATIENT SUPPLIED MEDICATION.  See medication Card for RX information  Order verified, inspected package,storage verified,expiration verified        Site -  [facility]

## 2018-05-04 DIAGNOSIS — Z12.11 COLON CANCER SCREENING: ICD-10-CM

## 2018-05-07 ENCOUNTER — OFFICE VISIT (OUTPATIENT)
Dept: URGENT CARE | Facility: CLINIC | Age: 53
End: 2018-05-07
Payer: COMMERCIAL

## 2018-05-07 VITALS
BODY MASS INDEX: 39.55 KG/M2 | RESPIRATION RATE: 18 BRPM | DIASTOLIC BLOOD PRESSURE: 84 MMHG | HEART RATE: 104 BPM | TEMPERATURE: 98 F | OXYGEN SATURATION: 98 % | HEIGHT: 67 IN | WEIGHT: 252 LBS | SYSTOLIC BLOOD PRESSURE: 126 MMHG
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
## 2018-05-07 DIAGNOSIS — L08.9 INFECTED INSECT BITE OF LEFT LOWER EXTREMITY, INITIAL ENCOUNTER: Primary | ICD-10-CM

## 2018-05-07 DIAGNOSIS — S80.862A INFECTED INSECT BITE OF LEFT LOWER EXTREMITY, INITIAL ENCOUNTER: Primary | ICD-10-CM

## 2018-05-07 PROCEDURE — 99213 OFFICE O/P EST LOW 20 MIN: CPT | Mod: S$GLB,,, | Performed by: FAMILY MEDICINE

## 2018-05-07 PROCEDURE — 3074F SYST BP LT 130 MM HG: CPT | Mod: CPTII,S$GLB,, | Performed by: FAMILY MEDICINE

## 2018-05-07 PROCEDURE — 3008F BODY MASS INDEX DOCD: CPT | Mod: CPTII,S$GLB,, | Performed by: FAMILY MEDICINE

## 2018-05-07 PROCEDURE — 3079F DIAST BP 80-89 MM HG: CPT | Mod: CPTII,S$GLB,, | Performed by: FAMILY MEDICINE

## 2018-05-07 RX ORDER — SULFAMETHOXAZOLE AND TRIMETHOPRIM 800; 160 MG/1; MG/1
1 TABLET ORAL 2 TIMES DAILY
Qty: 10 TABLET | Refills: 0 | Status: SHIPPED | OUTPATIENT
Start: 2018-05-07 | End: 2018-05-12

## 2018-05-07 RX ORDER — TRIAMCINOLONE ACETONIDE 5 MG/G
CREAM TOPICAL 2 TIMES DAILY
Qty: 15 G | Refills: 1 | Status: SHIPPED | OUTPATIENT
Start: 2018-05-07 | End: 2018-12-05

## 2018-05-07 NOTE — PROGRESS NOTES
"Subjective:       Patient ID: Miesha Domínguez is a 52 y.o. female.    Vitals:  height is 5' 7" (1.702 m) and weight is 114.3 kg (252 lb). Her temperature is 97.8 °F (36.6 °C). Her blood pressure is 126/84 and her pulse is 104. Her respiration is 18 and oxygen saturation is 98%.     Chief Complaint: Insect Bite    Insect bit on left leg.      Insect Bite   This is a new problem. Episode onset: 2 days. The problem occurs constantly. The problem has been gradually worsening. Pertinent negatives include no chills, fever, rash or sore throat. Nothing aggravates the symptoms. She has tried nothing for the symptoms.     Review of Systems   Constitution: Negative for chills and fever.   HENT: Negative for sore throat.    Respiratory: Negative for shortness of breath.    Skin: Positive for itching. Negative for rash.   Musculoskeletal: Negative for joint pain.       Objective:      Physical Exam   Musculoskeletal:        Legs:      Assessment:       1. Infected insect bite of left lower extremity, initial encounter        Plan:         Infected insect bite of left lower extremity, initial encounter  -     sulfamethoxazole-trimethoprim 800-160mg (BACTRIM DS) 800-160 mg Tab; Take 1 tablet by mouth 2 (two) times daily.  Dispense: 10 tablet; Refill: 0  -     triamcinolone acetonide 0.5% (KENALOG) 0.5 % Crea; Apply topically 2 (two) times daily.  Dispense: 15 g; Refill: 1      Follow Up Comments   Make sure that you follow up with your primary care doctor in the next 2-5 days if needed .  Return to the Urgent Care if signs or symptoms change and certainly if you have worsening symptoms go to the nearest emergency department for further evaluation.        "

## 2018-05-07 NOTE — PATIENT INSTRUCTIONS
Insect Bite  Insects most often bite to protect themselves or their nests. Certain bugs, like fleas and mosquitoes, bite to feed. In some cases, the actual bite causes no pain. An itchy red welt or swelling may develop at the site of the bite. Most insect bites do not cause illness. And the itching and swelling most often go away without treatment. However, an infection can develop if the bite is scratched and the skin broken. Rarely, a person may have an allergic reaction to an insect bite.  If a stinger is visible at the bite spot, remove it as quickly as possible, as this can decrease the amount of venom that gets into your body. Scrape it out with a dull edge, such as the edge of a credit card. Try not to squeeze it. Do not try to dig it out, as you may damage the skin and also increase the chance of infection.     To help reduce swelling and itching, apply a cold pack or ice in a zip-top plastic bag wrapped in a thin towel.   Home care  · Your healthcare provider may prescribe over-the-counter medicines to help relieve itching and swelling. Use each medicine according to the directions on the package. If the bite becomes infected, you will need an antibiotic. This may be in pill form taken by mouth or as an ointment or cream put directly on the skin. Be sure to use them exactly as prescribed.  · Bite symptoms usually go away on their own within a week or two.  · To help prevent infection, avoid scratching or picking at the bite.  · To help relieve itching and swelling, apply ice in a zip-top plastic bag wrapped in a thin towel to the bites. Do this for up to 10 minutes at a time. Avoid hot showers or baths as these tend to make itching worse.  · An over-the-counter anti-itch medicine such as calamine lotion or an antihistamine cream may be helpful.  · If you suspect you have insects in your home, talk to a licensed pest-control professional. He or she can inspect your home and tell you how to get rid of bugs  safely.  Follow-up care  Follow up with your healthcare provider, or as advised.  Call 911  Call 911 if any of these occur:  · Trouble breathing or swallowing  · Wheezing  · Feeling like your throat is closing up  · Fainting, loss of consciousness  · Swelling around the face or mouth  When to seek medical advice  Call your healthcare provider right away if any of these occur:  · Fever of 100.4°F (38°C) or higher, or as directed by your healthcare provider  · Signs of infection, such as increased swelling and pain, warmth, red streaks, or drainage from the skin  · Signs of allergic reaction, such as hives, a spreading rash, or throat itching  Date Last Reviewed: 10/1/2016  © 6632-4290 [employer]. [address] [place] [address], [place] [address]. All rights reserved. This information is not intended as a substitute for professional medical care. Always follow your healthcare professional's instructions.      Miesha was seen today for insect bite.    Diagnoses and all orders for this visit:    Infected insect bite of left lower extremity, initial encounter  -     sulfamethoxazole-trimethoprim 800-160mg (BACTRIM DS) 800-160 mg Tab; Take 1 tablet by mouth 2 (two) times daily.  -     triamcinolone acetonide 0.5% (KENALOG) 0.5 % Crea; Apply topically 2 (two) times daily.            Follow Up Comments   Make sure that you follow up with your primary care doctor in the next 2-5 days if needed .  Return to the Urgent Care if signs or symptoms change and certainly if you have worsening symptoms go to the nearest emergency department for further evaluation.     Ariana Edwards MD

## 2018-06-04 ENCOUNTER — CLINICAL SUPPORT (OUTPATIENT)
Dept: OCCUPATIONAL MEDICINE | Facility: CLINIC | Age: 53
End: 2018-06-04

## 2018-06-04 ENCOUNTER — TELEPHONE (OUTPATIENT)
Dept: INTERNAL MEDICINE | Facility: CLINIC | Age: 53
End: 2018-06-04

## 2018-06-04 DIAGNOSIS — Z02.1 ENCOUNTER FOR PRE-EMPLOYMENT EXAMINATION: ICD-10-CM

## 2018-06-04 DIAGNOSIS — Z00.00 ANNUAL PHYSICAL EXAM: Primary | ICD-10-CM

## 2018-06-04 PROCEDURE — 99499 UNLISTED E&M SERVICE: CPT | Mod: S$GLB,,, | Performed by: PREVENTIVE MEDICINE

## 2018-06-04 NOTE — TELEPHONE ENCOUNTER
----- Message from Alison Hooper sent at 6/4/2018 12:27 PM CDT -----  Contact: self  Doctor appointment and lab have been scheduled.  Please link lab orders to the lab appointment.  Date of doctor appointment:  06/25  Physical or EP:  Physical  Date of lab appointment:  06/18  Comments:

## 2018-06-05 ENCOUNTER — TELEPHONE (OUTPATIENT)
Dept: INTERNAL MEDICINE | Facility: CLINIC | Age: 53
End: 2018-06-05

## 2018-06-19 ENCOUNTER — LAB VISIT (OUTPATIENT)
Dept: LAB | Facility: HOSPITAL | Age: 53
End: 2018-06-19
Attending: INTERNAL MEDICINE
Payer: COMMERCIAL

## 2018-06-19 DIAGNOSIS — Z00.00 ANNUAL PHYSICAL EXAM: ICD-10-CM

## 2018-06-19 LAB
ALBUMIN SERPL BCP-MCNC: 3.4 G/DL
ALP SERPL-CCNC: 80 U/L
ALT SERPL W/O P-5'-P-CCNC: 19 U/L
ANION GAP SERPL CALC-SCNC: 10 MMOL/L
AST SERPL-CCNC: 19 U/L
BASOPHILS # BLD AUTO: 0.05 K/UL
BASOPHILS NFR BLD: 0.5 %
BILIRUB SERPL-MCNC: 0.7 MG/DL
BUN SERPL-MCNC: 14 MG/DL
CALCIUM SERPL-MCNC: 9.5 MG/DL
CHLORIDE SERPL-SCNC: 104 MMOL/L
CHOLEST SERPL-MCNC: 129 MG/DL
CHOLEST/HDLC SERPL: 2.4 {RATIO}
CO2 SERPL-SCNC: 26 MMOL/L
CREAT SERPL-MCNC: 0.8 MG/DL
DIFFERENTIAL METHOD: ABNORMAL
EOSINOPHIL # BLD AUTO: 0.1 K/UL
EOSINOPHIL NFR BLD: 1.1 %
ERYTHROCYTE [DISTWIDTH] IN BLOOD BY AUTOMATED COUNT: 15.6 %
EST. GFR  (AFRICAN AMERICAN): >60 ML/MIN/1.73 M^2
EST. GFR  (NON AFRICAN AMERICAN): >60 ML/MIN/1.73 M^2
ESTIMATED AVG GLUCOSE: 160 MG/DL
GLUCOSE SERPL-MCNC: 151 MG/DL
HBA1C MFR BLD HPLC: 7.2 %
HCT VFR BLD AUTO: 39.7 %
HDLC SERPL-MCNC: 53 MG/DL
HDLC SERPL: 41.1 %
HGB BLD-MCNC: 12.6 G/DL
IMM GRANULOCYTES # BLD AUTO: 0.04 K/UL
IMM GRANULOCYTES NFR BLD AUTO: 0.4 %
LDLC SERPL CALC-MCNC: 60.6 MG/DL
LYMPHOCYTES # BLD AUTO: 3.6 K/UL
LYMPHOCYTES NFR BLD: 32.5 %
MCH RBC QN AUTO: 26.3 PG
MCHC RBC AUTO-ENTMCNC: 31.7 G/DL
MCV RBC AUTO: 83 FL
MONOCYTES # BLD AUTO: 0.8 K/UL
MONOCYTES NFR BLD: 7.1 %
NEUTROPHILS # BLD AUTO: 6.5 K/UL
NEUTROPHILS NFR BLD: 58.4 %
NONHDLC SERPL-MCNC: 76 MG/DL
NRBC BLD-RTO: 0 /100 WBC
PLATELET # BLD AUTO: 239 K/UL
PMV BLD AUTO: 11.4 FL
POTASSIUM SERPL-SCNC: 3.6 MMOL/L
PROT SERPL-MCNC: 7.6 G/DL
RBC # BLD AUTO: 4.79 M/UL
SODIUM SERPL-SCNC: 140 MMOL/L
TRIGL SERPL-MCNC: 77 MG/DL
TSH SERPL DL<=0.005 MIU/L-ACNC: 1.71 UIU/ML
WBC # BLD AUTO: 11.06 K/UL

## 2018-06-19 PROCEDURE — 83036 HEMOGLOBIN GLYCOSYLATED A1C: CPT

## 2018-06-19 PROCEDURE — 85025 COMPLETE CBC W/AUTO DIFF WBC: CPT

## 2018-06-19 PROCEDURE — 80053 COMPREHEN METABOLIC PANEL: CPT

## 2018-06-19 PROCEDURE — 36415 COLL VENOUS BLD VENIPUNCTURE: CPT | Mod: PO

## 2018-06-19 PROCEDURE — 80061 LIPID PANEL: CPT

## 2018-06-19 PROCEDURE — 84443 ASSAY THYROID STIM HORMONE: CPT

## 2018-06-20 ENCOUNTER — OFFICE VISIT (OUTPATIENT)
Dept: INTERNAL MEDICINE | Facility: CLINIC | Age: 53
End: 2018-06-20
Payer: COMMERCIAL

## 2018-06-20 VITALS
RESPIRATION RATE: 16 BRPM | TEMPERATURE: 99 F | HEIGHT: 66 IN | DIASTOLIC BLOOD PRESSURE: 86 MMHG | HEART RATE: 93 BPM | SYSTOLIC BLOOD PRESSURE: 124 MMHG | WEIGHT: 263.88 LBS | BODY MASS INDEX: 42.41 KG/M2 | OXYGEN SATURATION: 99 %

## 2018-06-20 DIAGNOSIS — I10 ESSENTIAL HYPERTENSION: ICD-10-CM

## 2018-06-20 DIAGNOSIS — M79.641 PAIN IN BOTH HANDS: ICD-10-CM

## 2018-06-20 DIAGNOSIS — M79.642 PAIN IN BOTH HANDS: ICD-10-CM

## 2018-06-20 DIAGNOSIS — Z12.11 COLON CANCER SCREENING: ICD-10-CM

## 2018-06-20 DIAGNOSIS — E78.2 MIXED HYPERLIPIDEMIA: ICD-10-CM

## 2018-06-20 PROCEDURE — 99214 OFFICE O/P EST MOD 30 MIN: CPT | Mod: S$GLB,,, | Performed by: INTERNAL MEDICINE

## 2018-06-20 PROCEDURE — 3045F PR MOST RECENT HEMOGLOBIN A1C LEVEL 7.0-9.0%: CPT | Mod: CPTII,S$GLB,, | Performed by: INTERNAL MEDICINE

## 2018-06-20 PROCEDURE — 3008F BODY MASS INDEX DOCD: CPT | Mod: CPTII,S$GLB,, | Performed by: INTERNAL MEDICINE

## 2018-06-20 PROCEDURE — 99999 PR PBB SHADOW E&M-EST. PATIENT-LVL III: CPT | Mod: PBBFAC,,, | Performed by: INTERNAL MEDICINE

## 2018-06-20 PROCEDURE — 3074F SYST BP LT 130 MM HG: CPT | Mod: CPTII,S$GLB,, | Performed by: INTERNAL MEDICINE

## 2018-06-20 PROCEDURE — 3079F DIAST BP 80-89 MM HG: CPT | Mod: CPTII,S$GLB,, | Performed by: INTERNAL MEDICINE

## 2018-06-20 RX ORDER — METFORMIN HYDROCHLORIDE 1000 MG/1
1000 TABLET ORAL 2 TIMES DAILY WITH MEALS
Qty: 180 TABLET | Refills: 3 | Status: SHIPPED | OUTPATIENT
Start: 2018-06-20 | End: 2019-02-01

## 2018-06-21 NOTE — PROGRESS NOTES
52 y.o. femalepresents in f/u to diabetes, hypertension, and dyslipidemia  DM w/ neuro tx w/metfomrin 500mg  BID  Denied increased thirst, urination, or hypoglycemia episodes  A1C ==>7.2 ( was 6.2)  Pt reported ++ soda drinks    HTN tx w/HCTZ and lisinopril  Denies HA or dizziness  BP today==>124/86    Dyslipidemia tx w/ rosuvastatin 20mg  Denies unusual muscle pain or chest pain  LDL==>60.6    DUB w/ iron defc anemia  tx Medroxy progesterone and iron diet  H/H- normal range    ROS:  No fever, chills, or night sweats  No blurry vision or visual disturbance  No dysphagia or early satiety  No palpitations or tachycardia  No cough or wheezing  No GERD or abdominal pain  No numbness or focal deficits, except hands,   right hand > left, w/ right hand having paresthesia/pain in Median nn distribution  And left shoulder pain, not w/ ROM,but with certain movements and pin point areas  Remainder of review negative except as previously noted    PAST MEDICAL HX: Reviewed  PAST SURGICAL HX: Reviewed  SOCIAL HX: Reviewed  FAMILY HX: Reviewed    PHYSICAL EXAM:  VS: As noted  GENERAL: WDWN, A&O, NAD, conversant and co-operative; pleasant as always  EYES: Conj/lids unremarkable, sclera anicteric,  NECK: Supple w/o lymphadenopathy or thyromegaly  RESPIRATORY: Efforts are unlabored. Lungs CTAP  CARDIOVASCULAR: Heart RRR. No carotid bruits noted. 1+ pedal pulses. No LE edema  MUSCULOSKELETAL: Gait normal. No CCE  Neck: good ROM, non tender over the neck and upper shoulders  Shoulders Good ROM  NEUROLOGIC: WILDER. No tremor noted  - Tinel's ,neg Phallen's  SKIN: Warm and dry    IMPRESSION:  DM, increased A1C-  HLD, @ goal  HTN, stable  Hx Anemia, iron defic 2/2 DUB  Left shoulder pain  Hand pain, possible CTS    PLAN:  Consult Ortho  Metformin 1,000mg BID  Diabetic diet  Low salt, low fat diet  Exercise   Iron rich foods  C-scope  Call prn   RTC 6 mos

## 2018-06-26 DIAGNOSIS — Z12.11 SPECIAL SCREENING FOR MALIGNANT NEOPLASMS, COLON: Primary | ICD-10-CM

## 2018-06-26 RX ORDER — POLYETHYLENE GLYCOL 3350, SODIUM SULFATE ANHYDROUS, SODIUM BICARBONATE, SODIUM CHLORIDE, POTASSIUM CHLORIDE 236; 22.74; 6.74; 5.86; 2.97 G/4L; G/4L; G/4L; G/4L; G/4L
4 POWDER, FOR SOLUTION ORAL ONCE
Qty: 4000 ML | Refills: 0 | Status: SHIPPED | OUTPATIENT
Start: 2018-06-26 | End: 2018-06-26

## 2018-07-05 ENCOUNTER — ANESTHESIA EVENT (OUTPATIENT)
Dept: ENDOSCOPY | Facility: HOSPITAL | Age: 53
End: 2018-07-05
Payer: COMMERCIAL

## 2018-07-05 NOTE — ANESTHESIA PREPROCEDURE EVALUATION
Past Medical History:   Diagnosis Date    Asthma     as a child    HLD (hyperlipidemia)     HTN (hypertension)     Neuropathy     Type II or unspecified type diabetes mellitus without mention of complication, not stated as uncontrolled      Past Surgical History:   Procedure Laterality Date     SECTION      SLEEVE GASTROPLASTY       Patient Active Problem List   Diagnosis    HTN (hypertension)    HLD (hyperlipidemia)    Morbid obesity    Carpal tunnel syndrome    Diabetic peripheral neuropathy    Chronic allergic rhinitis    Uncontrolled type 2 diabetes mellitus with diabetic neuropathy, without long-term current use of insulin    Hypokalemia    Thiamine deficiency    Vitamin D deficiency    Heart palpitations    Infected insect bite of left leg     2D Echo:  No results found for this or any previous visit.    Please See ROS/PMH and Active Problem List above                                                                                                              2018  Miesha Domínguez is a 52 y.o., female.    Anesthesia Evaluation    I have reviewed the Patient Summary Reports.     I have reviewed the Medications.     Review of Systems  Anesthesia Hx:  No problems with previous Anesthesia Denies Hx of Anesthetic complications  Neg history of prior surgery. Denies Family Hx of Anesthesia complications.   Denies Personal Hx of Anesthesia complications.   Social:  Non-Smoker    Hematology/Oncology:  Hematology Normal   Oncology Normal     EENT/Dental:EENT/Dental Normal   Cardiovascular:   Exercise tolerance: good Hypertension ECG has been reviewed.    Pulmonary:   Asthma Asthma as a child   Renal/:  Renal/ Normal     Hepatic/GI:  Hepatic/GI Normal    Musculoskeletal:  Musculoskeletal Normal    Neurological:  Neurology Normal Denies Neuromuscular Disease.     Endocrine:   Diabetes, well controlled    Dermatological:  Skin Normal    Psych:  Psychiatric Normal            Physical Exam  General:  Well nourished    Airway/Jaw/Neck:  Airway Findings: Mouth Opening: Normal Tongue: Normal  General Airway Assessment: Adult  Mallampati: III  Improves to II with phonation.  TM Distance: Normal, at least 6 cm  Jaw/Neck Findings:     Neck ROM: Normal ROM     Eyes/Ears/Nose:  EYES/EARS/NOSE FINDINGS: Normal   Dental:  Dental Findings: In tact   Chest/Lungs:  Chest/Lungs Findings: Clear to auscultation, Normal Respiratory Rate     Heart/Vascular:  Heart Findings: Rate: Normal  Rhythm: Regular Rhythm  Sounds: Normal  Heart murmur: negative Vascular Findings: Normal    Abdomen:  Abdomen Findings:  Soft, Nontender, Normal     Musculoskeletal:  Musculoskeletal Findings: Normal   Skin:  Skin Findings: Normal    Mental Status:  Mental Status Findings:  Cooperative, Alert and Oriented         Anesthesia Plan  Type of Anesthesia, risks & benefits discussed:  Anesthesia Type:  general  Patient's Preference:   Intra-op Monitoring Plan: standard ASA monitors  Intra-op Monitoring Plan Comments:   Post Op Pain Control Plan:   Post Op Pain Control Plan Comments:   Induction:   IV  Beta Blocker:  Patient is not currently on a Beta-Blocker (No further documentation required).       Informed Consent: Patient understands risks and agrees with Anesthesia plan.  Questions answered. Anesthesia consent signed with patient.  ASA Score: 2     Day of Surgery Review of History & Physical:    H&P update referred to the provider.         Ready For Surgery From Anesthesia Perspective.

## 2018-07-06 ENCOUNTER — HOSPITAL ENCOUNTER (OUTPATIENT)
Facility: HOSPITAL | Age: 53
Discharge: HOME | End: 2018-07-06
Attending: INTERNAL MEDICINE | Admitting: INTERNAL MEDICINE
Payer: COMMERCIAL

## 2018-07-06 ENCOUNTER — ANESTHESIA (OUTPATIENT)
Dept: ENDOSCOPY | Facility: HOSPITAL | Age: 53
End: 2018-07-06
Payer: COMMERCIAL

## 2018-07-06 VITALS
TEMPERATURE: 98 F | WEIGHT: 260 LBS | BODY MASS INDEX: 40.81 KG/M2 | DIASTOLIC BLOOD PRESSURE: 92 MMHG | SYSTOLIC BLOOD PRESSURE: 146 MMHG | RESPIRATION RATE: 16 BRPM | HEART RATE: 86 BPM | OXYGEN SATURATION: 97 % | HEIGHT: 67 IN

## 2018-07-06 DIAGNOSIS — Z12.11 ENCOUNTER FOR SCREENING COLONOSCOPY: ICD-10-CM

## 2018-07-06 DIAGNOSIS — Z12.11 SCREENING FOR COLON CANCER: Primary | ICD-10-CM

## 2018-07-06 LAB
B-HCG UR QL: NEGATIVE
CTP QC/QA: YES
POCT GLUCOSE: 104 MG/DL (ref 70–110)

## 2018-07-06 PROCEDURE — G0121 COLON CA SCRN NOT HI RSK IND: HCPCS | Performed by: INTERNAL MEDICINE

## 2018-07-06 PROCEDURE — G0121 COLON CA SCRN NOT HI RSK IND: HCPCS | Mod: ,,, | Performed by: INTERNAL MEDICINE

## 2018-07-06 PROCEDURE — 37000008 HC ANESTHESIA 1ST 15 MINUTES: Performed by: INTERNAL MEDICINE

## 2018-07-06 PROCEDURE — 81025 URINE PREGNANCY TEST: CPT | Performed by: INTERNAL MEDICINE

## 2018-07-06 PROCEDURE — 25000003 PHARM REV CODE 250: Performed by: INTERNAL MEDICINE

## 2018-07-06 PROCEDURE — 63600175 PHARM REV CODE 636 W HCPCS: Performed by: NURSE ANESTHETIST, CERTIFIED REGISTERED

## 2018-07-06 PROCEDURE — 37000009 HC ANESTHESIA EA ADD 15 MINS: Performed by: INTERNAL MEDICINE

## 2018-07-06 PROCEDURE — 82962 GLUCOSE BLOOD TEST: CPT | Performed by: INTERNAL MEDICINE

## 2018-07-06 PROCEDURE — E9220 PRA ENDO ANESTHESIA: HCPCS | Mod: ,,, | Performed by: NURSE ANESTHETIST, CERTIFIED REGISTERED

## 2018-07-06 RX ORDER — SODIUM CHLORIDE 9 MG/ML
INJECTION, SOLUTION INTRAVENOUS CONTINUOUS
Status: DISCONTINUED | OUTPATIENT
Start: 2018-07-06 | End: 2018-07-06 | Stop reason: HOSPADM

## 2018-07-06 RX ORDER — LIDOCAINE HCL/PF 100 MG/5ML
SYRINGE (ML) INTRAVENOUS
Status: DISCONTINUED | OUTPATIENT
Start: 2018-07-06 | End: 2018-07-06

## 2018-07-06 RX ORDER — PROPOFOL 10 MG/ML
VIAL (ML) INTRAVENOUS CONTINUOUS PRN
Status: DISCONTINUED | OUTPATIENT
Start: 2018-07-06 | End: 2018-07-06

## 2018-07-06 RX ORDER — PROPOFOL 10 MG/ML
VIAL (ML) INTRAVENOUS
Status: DISCONTINUED | OUTPATIENT
Start: 2018-07-06 | End: 2018-07-06

## 2018-07-06 RX ADMIN — PROPOFOL 200 MCG/KG/MIN: 10 INJECTION, EMULSION INTRAVENOUS at 08:07

## 2018-07-06 RX ADMIN — LIDOCAINE HYDROCHLORIDE 50 MG: 20 INJECTION, SOLUTION INTRAVENOUS at 08:07

## 2018-07-06 RX ADMIN — SODIUM CHLORIDE: 0.9 INJECTION, SOLUTION INTRAVENOUS at 07:07

## 2018-07-06 RX ADMIN — PROPOFOL 70 MG: 10 INJECTION, EMULSION INTRAVENOUS at 08:07

## 2018-07-06 NOTE — DISCHARGE INSTRUCTIONS
Colonoscopy     A camera attached to a flexible tube with a viewing lens is used to take video pictures.     Colonoscopy is a test to view the inside of your lower digestive tract (colon and rectum). Sometimes it can show the last part of the small intestine (ileum). During the test, small pieces of tissue may be removed for testing. This is called a biopsy. Small growths, such as polyps, may also be removed.   Why is colonoscopy done?  The test is done to help look for colon cancer. And it can help find the source of abdominal pain, bleeding, and changes in bowel habits. It may be needed once a year, depending on factors such as your:  · Age  · Health history  · Family health history  · Symptoms  · Results from any prior colonoscopy  Risks and possible complications  These include:  · Bleeding               · A puncture or tear in the colon   · Risks of anesthesia  · A cancer lesion not being seen  Getting ready   To prepare for the test:  · Talk with your healthcare provider about the risks of the test (see below). Also ask your healthcare provider about alternatives to the test.  · Tell your healthcare provider about any medicines you take. Also tell him or her about any health conditions you may have.  · Make sure your rectum and colon are empty for the test. Follow the diet and bowel prep instructions exactly. If you dont, the test may need to be rescheduled.  · Plan for a friend or family member to drive you home after the test.     Colonoscopy provides an inside view of the entire colon.     You may discuss the results with your doctor right away or at a future visit.  During the test   The test is usually done in the hospital on an outpatient basis. This means you go home the same day. The procedure takes about 30 minutes. During that time:  · You are given relaxing (sedating) medicine through an IV line. You may be drowsy, or fully asleep.  · The healthcare provider will first give you a physical exam to  check for anal and rectal problems.  · Then the anus is lubricated and the scope inserted.  · If you are awake, you may have a feeling similar to needing to have a bowel movement. You may also feel pressure as air is pumped into the colon. Its OK to pass gas during the procedure.  · Biopsy, polyp removal, or other treatments may be done during the test.  After the test   You may have gas right after the test. It can help to try to pass it to help prevent later bloating. Your healthcare provider may discuss the results with you right away. Or you may need to schedule a follow-up visit to talk about the results. After the test, you can go back to your normal eating and other activities. You may be tired from the sedation and need to rest for a few hours.  Date Last Reviewed: 11/1/2016 © 2000-2017 [employer] [address] [place] [address], [place] [address]. All rights reserved. This information is not intended as a substitute for professional medical care. Always follow your healthcare professional's instructions.

## 2018-07-06 NOTE — [employer] PATIENT INSTRUCTIONS
Discharge Summary/Instructions after an Endoscopic Procedure  Patient Name: Miesha Domínguez  Patient MRN: [id number]  Patient YOB: 1965 Friday, July 06, 2018  Ramez Ramírez MD  RESTRICTIONS:  During your procedure today, you received medications for sedation.  These   medications may affect your judgment, balance and coordination.  Therefore,   for 24 hours, you have the following restrictions:   - DO NOT drive a car, operate machinery, make legal/financial decisions,   sign important papers or drink alcohol.    ACTIVITY:  Today: no heavy lifting, straining or running due to procedural   sedation/anesthesia.  The following day: return to full activity including work.  DIET:  Eat and drink normally unless instructed otherwise.     TREATMENT FOR COMMON SIDE EFFECTS:  - Mild abdominal pain, nausea, belching, bloating or excessive gas:  rest,   eat lightly and use a heating pad.  - Sore Throat: treat with throat lozenges and/or gargle with warm salt   water.  - Because air was used during the procedure, expelling large amounts of air   from your rectum or belching is normal.  - If a bowel prep was taken, you may not have a bowel movement for 1-3 days.    This is normal.  SYMPTOMS TO WATCH FOR AND REPORT TO YOUR PHYSICIAN:  1. Abdominal pain or bloating, other than gas cramps.  2. Chest pain.  3. Back pain.  4. Signs of infection such as: chills or fever occurring within 24 hours   after the procedure.  5. Rectal bleeding, which would show as bright red, maroon, or black stools.   (A tablespoon of blood from the rectum is not serious, especially if   hemorrhoids are present.)  6. Vomiting.  7. Weakness or dizziness.  GO DIRECTLY TO THE NEAREST EMERGENCY ROOM IF YOU HAVE ANY OF THE FOLLOWING:      Difficulty breathing              Chills and/or fever over 101 F   Persistent vomiting and/or vomiting blood   Severe abdominal pain   Severe chest pain   Black, tarry stools   Bleeding- more than one  tablespoon   Any other symptom or condition that you feel may need urgent attention  Your doctor recommends these additional instructions:  If any biopsies were taken, your doctors clinic will contact you in 1 to 2   weeks with any results.  - Discharge patient to home.   - Patient has a contact number available for emergencies.  The signs and   symptoms of potential delayed complications were discussed with the   patient.  Return to normal activities tomorrow.  Written discharge   instructions were provided to the patient.   - Resume previous diet.   - Continue present medications.   - Return to referring physician as previously scheduled.   - Repeat colonoscopy in 10 years for screening purposes.  For questions, problems or results please call your physician - Ramez Ramírez MD at Work:  [phone].  [facility], EMERGENCY ROOM PHONE NUMBER: [phone]  IF A COMPLICATION OR EMERGENCY SITUATION ARISES AND YOU ARE UNABLE TO REACH   YOUR PHYSICIAN - GO DIRECTLY TO THE EMERGENCY ROOM.  Ramez Ramírez MD  7/6/2018 8:23:42 AM  This report has been verified and signed electronically.  [employer]

## 2018-07-06 NOTE — H&P
Short Stay Endoscopy History and Physical    PCP - Danni Wolf MD  Referring Physician - Milagros Wolf MD   [facility]  [place] [id number]    Procedure - Colonoscopy  ASA - per anesthesia  Mallampati - per anesthesia  History of Anesthesia problems - no  Family history Anesthesia problems -  no   Plan of anesthesia - General    HPI  52 y.o. female  Reason for procedure: screening      ROS:  Constitutional: No fevers, chills, No weight loss  CV: No chest pain  Pulm: No cough, No shortness of breath  GI: see HPI    Medical History:  has a past medical history of Asthma; HLD (hyperlipidemia); HTN (hypertension); Neuropathy; and Type II or unspecified type diabetes mellitus without mention of complication, not stated as uncontrolled.    Surgical History:  has a past surgical history that includes  section () and Sleeve Gastroplasty ().    Family History: family history includes Cancer in her father; Diabetes in her brother, mother, and sister; Hypertension in her brother, mother, and sister; Ovarian cancer (age of onset: 74) in her mother.. Otherwise no colon cancer, inflammatory bowel disease, or GI malignancies.    Social History:  reports that she has never smoked. She has never used smokeless tobacco. She reports that she drinks alcohol. She reports that she does not use drugs.    Review of patient's allergies indicates:  No Known Allergies    Medications:   Facility-Administered Medications Prior to Admission   Medication Dose Route Frequency Provider Last Rate Last Dose    medroxyPROGESTERone (DEPO-PROVERA) syringe 150 mg  150 mg Intramuscular Q90 Days Catrina Olivier MD   150 mg at 18 0942     Prescriptions Prior to Admission   Medication Sig Dispense Refill Last Dose    CALCIUM CITRATE/VITAMIN D3 (CALCIUM CITRATE + D ORAL) Take 2 tablets by mouth 2 (two) times daily. Calcium citrate 400m g vit d 500 mg   2018 at Unknown  time    cyanocobalamin, vitamin B-12, (VITAMIN B-12) 2,500 mcg Subl Place 1 tablet under the tongue once a week. Pt taking every other week   7/5/2018 at Unknown time    hydroCHLOROthiazide (HYDRODIURIL) 25 MG tablet TAKE ONE TABLET BY MOUTH ONCE DAILY 30 tablet 5 Past Week at Unknown time    lisinopril 10 MG tablet Take 1 tablet (10 mg total) by mouth once daily. 90 tablet 3 7/5/2018 at Unknown time    metFORMIN (GLUCOPHAGE) 1000 MG tablet Take 1 tablet (1,000 mg total) by mouth 2 (two) times daily with meals. 180 tablet 3 7/5/2018 at Unknown time    MULTIVITAMIN ORAL Take 1 tablet by mouth once daily. Centrum adult chews ( flavor burst)    7/5/2018 at Unknown time    potassium 99 mg Tab 3 tablets twice daily   7/5/2018 at Unknown time    rosuvastatin (CRESTOR) 20 MG tablet TAKE 1 TABLET BY MOUTH EVERY DAY 90 tablet 1 7/5/2018 at Unknown time    levocetirizine (XYZAL) 5 MG tablet Take 1 tablet (5 mg total) by mouth every evening. 90 tablet 2 More than a month at Unknown time    medroxyPROGESTERone (DEPO-PROVERA) 150 mg/mL Syrg INJECT 1 ML INTO MUSCLE EVERY 3 MONTHS 1 Syringe 3 Taking    triamcinolone acetonide 0.5% (KENALOG) 0.5 % Crea Apply topically 2 (two) times daily. 15 g 1        Physical Exam:    Vital Signs:   Vitals:    07/06/18 0720   BP: 139/83   Pulse: 88   Resp: 16   Temp: 97.9 °F (36.6 °C)       General Appearance: Well appearing in no acute distress  Lungs: CTA anteriorly  Heart:  Regular rate, S1, S2 normal, no murmurs heard.  Abdomen: Soft, non tender, non distended with normal bowel sounds, no masses  Extremities: No edema    Labs:  Lab Results   Component Value Date    WBC 11.06 06/19/2018    HGB 12.6 06/19/2018    HCT 39.7 06/19/2018     06/19/2018    CHOL 129 06/19/2018    TRIG 77 06/19/2018    HDL 53 06/19/2018    ALT 19 06/19/2018    AST 19 06/19/2018     06/19/2018    K 3.6 06/19/2018     06/19/2018    CREATININE 0.8 06/19/2018    BUN 14 06/19/2018    CO2 26  06/19/2018    TSH 1.713 06/19/2018    INR 0.9 07/08/2013    HGBA1C 7.2 (H) 06/19/2018       I have explained the risks and benefits of this endoscopic procedure to the patient including but not limited to bleeding, inflammation, infection, perforation, and death.      Ramez Ramírez MD

## 2018-07-06 NOTE — ANESTHESIA POSTPROCEDURE EVALUATION
"Anesthesia Post Evaluation    Patient: Miesha Domínguez    Procedure(s) Performed: Procedure(s) (LRB):  COLONOSCOPY (N/A)    Final Anesthesia Type: general  Patient location during evaluation: PACU  Patient participation: Yes- Able to Participate  Level of consciousness: awake and alert  Post-procedure vital signs: reviewed and stable  Pain management: adequate  Airway patency: patent  PONV status at discharge: No PONV  Anesthetic complications: no      Cardiovascular status: blood pressure returned to baseline  Respiratory status: unassisted  Hydration status: euvolemic  Follow-up not needed.        Visit Vitals  BP (!) 146/92   Pulse 86   Temp 36.7 °C (98.1 °F)   Resp 16   Ht 5' 7" (1.702 m)   Wt 117.9 kg (260 lb)   SpO2 97%   Breastfeeding? No   BMI 40.72 kg/m²       Pain/Melisa Score: Pain Assessment Performed: Yes (7/6/2018  8:57 AM)  Presence of Pain: denies (7/6/2018  8:57 AM)  Melisa Score: 10 (7/6/2018  8:57 AM)      "

## 2018-07-06 NOTE — TRANSFER OF CARE
"Anesthesia Transfer of Care Note    Patient: Miesha Domínguez    Procedure(s) Performed: Procedure(s) (LRB):  COLONOSCOPY (N/A)    Patient location: GI    Anesthesia Type: general    Transport from OR: Transported from OR on room air with adequate spontaneous ventilation    Post pain: adequate analgesia    Post assessment: no apparent anesthetic complications and tolerated procedure well    Post vital signs: stable    Level of consciousness: awake, alert and oriented    Nausea/Vomiting: no nausea/vomiting    Complications: none    Transfer of care protocol was followed      Last vitals:   Visit Vitals  /83   Pulse 88   Temp 36.6 °C (97.9 °F)   Resp 16   Ht 5' 7" (1.702 m)   Wt 117.9 kg (260 lb)   SpO2 98%   Breastfeeding? No   BMI 40.72 kg/m²     "

## 2018-07-12 ENCOUNTER — OFFICE VISIT (OUTPATIENT)
Dept: ORTHOPEDICS | Facility: CLINIC | Age: 53
End: 2018-07-12
Payer: COMMERCIAL

## 2018-07-12 VITALS — HEIGHT: 67 IN | BODY MASS INDEX: 40.81 KG/M2 | WEIGHT: 260 LBS

## 2018-07-12 DIAGNOSIS — G56.00 CARPAL TUNNEL SYNDROME, UNSPECIFIED LATERALITY: Primary | ICD-10-CM

## 2018-07-12 PROCEDURE — 20526 THER INJECTION CARP TUNNEL: CPT | Mod: 50,S$GLB,, | Performed by: ORTHOPAEDIC SURGERY

## 2018-07-12 PROCEDURE — 99203 OFFICE O/P NEW LOW 30 MIN: CPT | Mod: 25,S$GLB,, | Performed by: ORTHOPAEDIC SURGERY

## 2018-07-12 PROCEDURE — 3008F BODY MASS INDEX DOCD: CPT | Mod: CPTII,S$GLB,, | Performed by: ORTHOPAEDIC SURGERY

## 2018-07-12 PROCEDURE — 99999 PR PBB SHADOW E&M-EST. PATIENT-LVL II: CPT | Mod: PBBFAC,,, | Performed by: ORTHOPAEDIC SURGERY

## 2018-07-12 RX ORDER — TRIAMCINOLONE ACETONIDE 40 MG/ML
40 INJECTION, SUSPENSION INTRA-ARTICULAR; INTRAMUSCULAR
Status: COMPLETED | OUTPATIENT
Start: 2018-07-12 | End: 2018-07-12

## 2018-07-12 RX ADMIN — TRIAMCINOLONE ACETONIDE 40 MG: 40 INJECTION, SUSPENSION INTRA-ARTICULAR; INTRAMUSCULAR at 01:07

## 2018-07-12 NOTE — PROGRESS NOTES
INITIAL VISIT HISTORY:  A 52-year-old female presents for evaluation of   bilateral hand symptoms for the past several months.  She has been having some   nocturnal symptoms including numbness both hands and also some stiffness of the   fingers, especially in the morning.  Specifically, she is having pain and   stiffness in the right index finger.    PAST MEDICAL HISTORY:  Significant for asthma, hypertension, type 2 diabetes.    PAST SURGICAL HISTORY:  Includes , sleeve gastroplasty and colonoscopy.    FAMILY HISTORY:  Positive for diabetes, hypertension and cancer.    SOCIAL HISTORY:  The patient does not smoke.  Drinks occasionally.    REVIEW OF SYSTEMS:  Negative fever, chills, rashes.    CURRENT MEDICATIONS:  Reviewed on chart.    ALLERGIES:  None.    PHYSICAL EXAMINATION:  GENERAL:  Well-developed, well-nourished female in no acute distress, alert and   oriented x3.  MUSCULOSKELETAL:  Examination of upper extremities, hands demonstrating some   mild swelling volar compartment wrist, bilateral.  Positive Tinel sign,   bilateral.  Range of motion in wrist and fingers full.   strength slightly   decreased, especially on the right hand.  She has some limited flexion of the   right index finger and tenderness over the A1 pulley.  There is no discrete   triggering, but she has pain with any forced flexion to the digit.  Sensation   intact.    IMPRESSION:  1.  Probable bilateral carpal tunnel syndrome.  2.  Early triggering, right index finger.    PLAN:  I explained the nature of problem to the patient.  Recommended that we   try some injections today.  After pause for timeout, she identified each wrist   injected bilaterally with combination of Kenalog 20 mg, 0.5 mL Xylocaine,   sterile technique.  She tolerated the procedure well without complications.    I have also recommended she change her wrist splint from daytime use to   nighttime use and we have also ordered a nerve conduction study of both  hands to   check for carpal tunnel syndrome.    I think the injection will help the triggering as well, which is early, but we   will keep an eye on that.  Follow up after the nerve test is complete.      BISI  dd: 07/12/2018 13:40:38 (CDT)  td: 07/13/2018 08:37:32 (CDT)  Doc ID   #[id number]  Job ID #[id number]    CC:

## 2018-07-12 NOTE — LETTER
July 12, 2018        Milagros Wolf MD  [address] [facility] [place] [address]             [facility]  [address] [place] [address] [address]  [place] [address]  Phone: [phone]   Patient: Miesha Domínguez   MR Number: [id number]   YOB: 1965   Date of Visit: 7/12/2018       Dear Dr. Wolf:    Thank you for referring Miesha Domínguez to me for evaluation. Below are the relevant portions of my assessment and plan of care.            If you have questions, please do not hesitate to call me. I look forward to following Miesha along with you.    Sincerely,      Stevo Dasilva Jr., MD           CC  No Recipients

## 2018-07-13 ENCOUNTER — TELEPHONE (OUTPATIENT)
Dept: ENDOSCOPY | Facility: HOSPITAL | Age: 53
End: 2018-07-13

## 2018-07-24 ENCOUNTER — DOCUMENTATION ONLY (OUTPATIENT)
Dept: ORTHOPEDICS | Facility: CLINIC | Age: 53
End: 2018-07-24

## 2018-07-24 NOTE — PROGRESS NOTES
[facility] faxed document over stating they called to schedule nerve test but pt states not wanting to have procedure done due to pain.

## 2018-08-01 RX ORDER — LISINOPRIL 10 MG/1
TABLET ORAL
Qty: 90 TABLET | Refills: 3 | Status: SHIPPED | OUTPATIENT
Start: 2018-08-01 | End: 2019-02-01 | Stop reason: ALTCHOICE

## 2018-08-01 RX ORDER — HYDROCHLOROTHIAZIDE 25 MG/1
TABLET ORAL
Qty: 90 TABLET | Refills: 3 | Status: SHIPPED | OUTPATIENT
Start: 2018-08-01 | End: 2019-10-31 | Stop reason: SDUPTHER

## 2018-08-20 RX ORDER — ROSUVASTATIN CALCIUM 20 MG/1
TABLET, COATED ORAL
Qty: 90 TABLET | Refills: 1 | Status: SHIPPED | OUTPATIENT
Start: 2018-08-20 | End: 2019-02-22 | Stop reason: SDUPTHER

## 2018-12-05 ENCOUNTER — OFFICE VISIT (OUTPATIENT)
Dept: INTERNAL MEDICINE | Facility: CLINIC | Age: 53
End: 2018-12-05
Payer: COMMERCIAL

## 2018-12-05 VITALS
SYSTOLIC BLOOD PRESSURE: 126 MMHG | HEIGHT: 67 IN | WEIGHT: 258.63 LBS | TEMPERATURE: 99 F | DIASTOLIC BLOOD PRESSURE: 82 MMHG | BODY MASS INDEX: 40.59 KG/M2 | HEART RATE: 78 BPM

## 2018-12-05 DIAGNOSIS — B97.89 VIRAL SINUSITIS: Primary | ICD-10-CM

## 2018-12-05 DIAGNOSIS — J32.9 VIRAL SINUSITIS: Primary | ICD-10-CM

## 2018-12-05 DIAGNOSIS — J20.9 ACUTE BRONCHITIS, UNSPECIFIED ORGANISM: ICD-10-CM

## 2018-12-05 DIAGNOSIS — I10 HTN, GOAL BELOW 130/80: ICD-10-CM

## 2018-12-05 PROCEDURE — 96372 THER/PROPH/DIAG INJ SC/IM: CPT | Mod: S$GLB,,, | Performed by: INTERNAL MEDICINE

## 2018-12-05 PROCEDURE — 99214 OFFICE O/P EST MOD 30 MIN: CPT | Mod: 25,S$GLB,, | Performed by: INTERNAL MEDICINE

## 2018-12-05 PROCEDURE — 99999 PR PBB SHADOW E&M-EST. PATIENT-LVL III: CPT | Mod: PBBFAC,,, | Performed by: INTERNAL MEDICINE

## 2018-12-05 PROCEDURE — 3079F DIAST BP 80-89 MM HG: CPT | Mod: CPTII,S$GLB,, | Performed by: INTERNAL MEDICINE

## 2018-12-05 PROCEDURE — 3074F SYST BP LT 130 MM HG: CPT | Mod: CPTII,S$GLB,, | Performed by: INTERNAL MEDICINE

## 2018-12-05 PROCEDURE — 3008F BODY MASS INDEX DOCD: CPT | Mod: CPTII,S$GLB,, | Performed by: INTERNAL MEDICINE

## 2018-12-05 RX ORDER — LEVOCETIRIZINE DIHYDROCHLORIDE 5 MG/1
5 TABLET, FILM COATED ORAL NIGHTLY
Qty: 30 TABLET | Refills: 11 | Status: SHIPPED | OUTPATIENT
Start: 2018-12-05 | End: 2020-09-24

## 2018-12-05 RX ORDER — CODEINE PHOSPHATE AND GUAIFENESIN 10; 100 MG/5ML; MG/5ML
5 SOLUTION ORAL 3 TIMES DAILY PRN
Qty: 236 ML | Refills: 0 | Status: SHIPPED | OUTPATIENT
Start: 2018-12-05 | End: 2018-12-15

## 2018-12-05 RX ORDER — TRIAMCINOLONE ACETONIDE 40 MG/ML
40 INJECTION, SUSPENSION INTRA-ARTICULAR; INTRAMUSCULAR
Status: COMPLETED | OUTPATIENT
Start: 2018-12-05 | End: 2018-12-05

## 2018-12-05 RX ADMIN — TRIAMCINOLONE ACETONIDE 40 MG: 40 INJECTION, SUSPENSION INTRA-ARTICULAR; INTRAMUSCULAR at 09:12

## 2018-12-05 NOTE — PROGRESS NOTES
Subjective:       Patient ID: Miesha Domínguez is a 52 y.o. female.    Chief Complaint: Cough (nyquil not working)    HPI   Pt with HTN is here for evaluation of 1 wk of persistent sinus/chest congestion, mild productive cough, post nasal drip, sore throat. Minimal relief with Mucinex, Nyquil and Flonase.   Review of Systems   Constitutional: Positive for fatigue. Negative for activity change, appetite change, chills, diaphoresis, fever and unexpected weight change.   HENT: Positive for congestion, postnasal drip, rhinorrhea, sinus pressure, sinus pain and sore throat. Negative for sneezing, trouble swallowing and voice change.    Respiratory: Positive for cough. Negative for shortness of breath and wheezing.    Cardiovascular: Negative for chest pain, palpitations and leg swelling.   Gastrointestinal: Negative for abdominal pain, blood in stool, constipation, diarrhea, nausea and vomiting.   Genitourinary: Negative for dysuria.   Musculoskeletal: Negative for arthralgias and myalgias.   Skin: Negative for rash and wound.   Allergic/Immunologic: Negative for environmental allergies and food allergies.   Hematological: Negative for adenopathy. Does not bruise/bleed easily.       Objective:      Physical Exam   Constitutional: She is oriented to person, place, and time. She appears well-developed and well-nourished. No distress.   HENT:   Head: Normocephalic and atraumatic.   Right Ear: External ear normal.   Left Ear: External ear normal.   Nose: Mucosal edema and rhinorrhea present.   Mouth/Throat: Oropharynx is clear and moist. No oropharyngeal exudate.   Eyes: Conjunctivae and EOM are normal. Pupils are equal, round, and reactive to light. Right eye exhibits no discharge. Left eye exhibits no discharge. No scleral icterus.   Neck: Neck supple. No JVD present.   Cardiovascular: Normal rate, regular rhythm, normal heart sounds and intact distal pulses.   Pulmonary/Chest: Effort normal and breath sounds normal. No  respiratory distress. She has no wheezes. She has no rales.   Musculoskeletal: She exhibits no edema.   Lymphadenopathy:     She has no cervical adenopathy.   Neurological: She is alert and oriented to person, place, and time.   Skin: Skin is warm and dry. No rash noted. She is not diaphoretic. No pallor.       Assessment:       1. Viral sinusitis    2. Acute bronchitis, unspecified organism    3. HTN, goal below 130/80        Plan:    1. Rx Xyzal, Kenalog 40 mg IM and continue Flonase       No decongestants 2/2 HTN   2. Rx Cheratussin AC TID PRN   3. Stable, CPT

## 2019-01-04 DIAGNOSIS — E11.9 TYPE 2 DIABETES MELLITUS WITHOUT COMPLICATION: ICD-10-CM

## 2019-01-25 ENCOUNTER — TELEPHONE (OUTPATIENT)
Dept: INTERNAL MEDICINE | Facility: CLINIC | Age: 54
End: 2019-01-25

## 2019-01-25 DIAGNOSIS — I10 ESSENTIAL HYPERTENSION: ICD-10-CM

## 2019-01-25 DIAGNOSIS — E78.2 MIXED HYPERLIPIDEMIA: Primary | ICD-10-CM

## 2019-01-25 NOTE — TELEPHONE ENCOUNTER
----- Message from Wild Tesfaye sent at 1/25/2019  2:20 PM CST -----  Doctor appointment and lab have been scheduled.  Please link lab orders to the lab appointment.  Date of doctor appointment:  2/1  Physical or EP:  Physical  Date of lab appointment:  1/29  Comments:

## 2019-01-29 ENCOUNTER — LAB VISIT (OUTPATIENT)
Dept: LAB | Facility: HOSPITAL | Age: 54
End: 2019-01-29
Attending: INTERNAL MEDICINE
Payer: COMMERCIAL

## 2019-01-29 DIAGNOSIS — E11.9 TYPE 2 DIABETES MELLITUS WITHOUT COMPLICATION: ICD-10-CM

## 2019-01-29 DIAGNOSIS — E78.2 MIXED HYPERLIPIDEMIA: ICD-10-CM

## 2019-01-29 DIAGNOSIS — I10 ESSENTIAL HYPERTENSION: ICD-10-CM

## 2019-01-29 LAB
ALBUMIN SERPL BCP-MCNC: 3.6 G/DL
ALP SERPL-CCNC: 103 U/L
ALT SERPL W/O P-5'-P-CCNC: 18 U/L
ANION GAP SERPL CALC-SCNC: 12 MMOL/L
AST SERPL-CCNC: 19 U/L
BILIRUB SERPL-MCNC: 0.6 MG/DL
BUN SERPL-MCNC: 14 MG/DL
CALCIUM SERPL-MCNC: 9.7 MG/DL
CHLORIDE SERPL-SCNC: 106 MMOL/L
CHOLEST SERPL-MCNC: 151 MG/DL
CHOLEST/HDLC SERPL: 2.4 {RATIO}
CO2 SERPL-SCNC: 23 MMOL/L
CREAT SERPL-MCNC: 0.7 MG/DL
EST. GFR  (AFRICAN AMERICAN): >60 ML/MIN/1.73 M^2
EST. GFR  (NON AFRICAN AMERICAN): >60 ML/MIN/1.73 M^2
ESTIMATED AVG GLUCOSE: 131 MG/DL
GLUCOSE SERPL-MCNC: 92 MG/DL
HBA1C MFR BLD HPLC: 6.2 %
HDLC SERPL-MCNC: 64 MG/DL
HDLC SERPL: 42.4 %
LDLC SERPL CALC-MCNC: 74.8 MG/DL
NONHDLC SERPL-MCNC: 87 MG/DL
POTASSIUM SERPL-SCNC: 4.1 MMOL/L
PROT SERPL-MCNC: 8 G/DL
SODIUM SERPL-SCNC: 141 MMOL/L
TRIGL SERPL-MCNC: 61 MG/DL

## 2019-01-29 PROCEDURE — 80061 LIPID PANEL: CPT

## 2019-01-29 PROCEDURE — 83036 HEMOGLOBIN GLYCOSYLATED A1C: CPT

## 2019-01-29 PROCEDURE — 80053 COMPREHEN METABOLIC PANEL: CPT

## 2019-01-29 PROCEDURE — 36415 COLL VENOUS BLD VENIPUNCTURE: CPT | Mod: PO

## 2019-02-01 ENCOUNTER — TELEPHONE (OUTPATIENT)
Dept: INTERNAL MEDICINE | Facility: CLINIC | Age: 54
End: 2019-02-01

## 2019-02-01 ENCOUNTER — OFFICE VISIT (OUTPATIENT)
Dept: INTERNAL MEDICINE | Facility: CLINIC | Age: 54
End: 2019-02-01
Payer: COMMERCIAL

## 2019-02-01 VITALS
HEIGHT: 67 IN | HEART RATE: 84 BPM | BODY MASS INDEX: 40.45 KG/M2 | RESPIRATION RATE: 18 BRPM | OXYGEN SATURATION: 97 % | WEIGHT: 257.69 LBS | DIASTOLIC BLOOD PRESSURE: 78 MMHG | SYSTOLIC BLOOD PRESSURE: 120 MMHG | TEMPERATURE: 99 F

## 2019-02-01 DIAGNOSIS — E55.9 VITAMIN D DEFICIENCY: ICD-10-CM

## 2019-02-01 DIAGNOSIS — E53.8 DISORDER OF VITAMIN B12: ICD-10-CM

## 2019-02-01 DIAGNOSIS — I10 ESSENTIAL HYPERTENSION: ICD-10-CM

## 2019-02-01 DIAGNOSIS — E51.9 THIAMINE DEFICIENCY: ICD-10-CM

## 2019-02-01 DIAGNOSIS — E78.2 MIXED HYPERLIPIDEMIA: ICD-10-CM

## 2019-02-01 PROCEDURE — 3074F PR MOST RECENT SYSTOLIC BLOOD PRESSURE < 130 MM HG: ICD-10-PCS | Mod: CPTII,S$GLB,, | Performed by: INTERNAL MEDICINE

## 2019-02-01 PROCEDURE — 3078F PR MOST RECENT DIASTOLIC BLOOD PRESSURE < 80 MM HG: ICD-10-PCS | Mod: CPTII,S$GLB,, | Performed by: INTERNAL MEDICINE

## 2019-02-01 PROCEDURE — 3074F SYST BP LT 130 MM HG: CPT | Mod: CPTII,S$GLB,, | Performed by: INTERNAL MEDICINE

## 2019-02-01 PROCEDURE — 99999 PR PBB SHADOW E&M-EST. PATIENT-LVL III: ICD-10-PCS | Mod: PBBFAC,,, | Performed by: INTERNAL MEDICINE

## 2019-02-01 PROCEDURE — 3008F PR BODY MASS INDEX (BMI) DOCUMENTED: ICD-10-PCS | Mod: CPTII,S$GLB,, | Performed by: INTERNAL MEDICINE

## 2019-02-01 PROCEDURE — 3044F HG A1C LEVEL LT 7.0%: CPT | Mod: CPTII,S$GLB,, | Performed by: INTERNAL MEDICINE

## 2019-02-01 PROCEDURE — 3008F BODY MASS INDEX DOCD: CPT | Mod: CPTII,S$GLB,, | Performed by: INTERNAL MEDICINE

## 2019-02-01 PROCEDURE — 90471 FLU VACCINE (QUAD) GREATER THAN OR EQUAL TO 3YO PRESERVATIVE FREE IM: ICD-10-PCS | Mod: S$GLB,,, | Performed by: INTERNAL MEDICINE

## 2019-02-01 PROCEDURE — 99214 OFFICE O/P EST MOD 30 MIN: CPT | Mod: 25,S$GLB,, | Performed by: INTERNAL MEDICINE

## 2019-02-01 PROCEDURE — 90686 IIV4 VACC NO PRSV 0.5 ML IM: CPT | Mod: S$GLB,,, | Performed by: INTERNAL MEDICINE

## 2019-02-01 PROCEDURE — 99999 PR PBB SHADOW E&M-EST. PATIENT-LVL III: CPT | Mod: PBBFAC,,, | Performed by: INTERNAL MEDICINE

## 2019-02-01 PROCEDURE — 99214 PR OFFICE/OUTPT VISIT, EST, LEVL IV, 30-39 MIN: ICD-10-PCS | Mod: 25,S$GLB,, | Performed by: INTERNAL MEDICINE

## 2019-02-01 PROCEDURE — 3044F PR MOST RECENT HEMOGLOBIN A1C LEVEL <7.0%: ICD-10-PCS | Mod: CPTII,S$GLB,, | Performed by: INTERNAL MEDICINE

## 2019-02-01 PROCEDURE — 90471 IMMUNIZATION ADMIN: CPT | Mod: S$GLB,,, | Performed by: INTERNAL MEDICINE

## 2019-02-01 PROCEDURE — 3078F DIAST BP <80 MM HG: CPT | Mod: CPTII,S$GLB,, | Performed by: INTERNAL MEDICINE

## 2019-02-01 PROCEDURE — 90686 FLU VACCINE (QUAD) GREATER THAN OR EQUAL TO 3YO PRESERVATIVE FREE IM: ICD-10-PCS | Mod: S$GLB,,, | Performed by: INTERNAL MEDICINE

## 2019-02-01 RX ORDER — METFORMIN HYDROCHLORIDE 1000 MG/1
1000 TABLET ORAL
Qty: 90 TABLET | Refills: 3
Start: 2019-02-01 | End: 2019-10-01 | Stop reason: SDUPTHER

## 2019-02-01 RX ORDER — AMLODIPINE BESYLATE 5 MG/1
5 TABLET ORAL DAILY
Qty: 90 TABLET | Refills: 1 | Status: SHIPPED | OUTPATIENT
Start: 2019-02-01 | End: 2019-08-03 | Stop reason: SDUPTHER

## 2019-02-01 NOTE — TELEPHONE ENCOUNTER
----- Message from Milagros Wolf MD sent at 1/31/2019  4:31 PM CST -----  Please review your lab work and we will discuss at your pending office visit.  Milagros Mcgill

## 2019-02-01 NOTE — PROGRESS NOTES
53 y.o. femalepresents in f/u to diabetes, hypertension, and dyslipidemia  DM w/ neuro tx w/metfomrin 1,000mg(pt was taking  BID  Till developed diarrhea then decreased to one daily   After ~ one month), decreased intake soda drinks  Denied increased thirst, urination, or hypoglycemia episodes  A1C ==>6.2 ( was 7.2)    HTN tx w/HCTZ( ave 1/qoweek) and lisinopril  Denied HA or dizziness  ++ petra horse in legs, increased banana and OJ  BP today==>120/78    Dyslipidemia tx w/ rosuvastatin 20mg  Denies unusual muscle pain or chest pain  LDL==>74.8    DUB w/ iron defc anemia  tx Medroxy progesterone ( pt stopped last injection , 4/2018) and iron rich diet and occ Geritol  H/H- normal range    LUE, throbbing in upper arm  Exacerbates w/ driving ( )  Tx: hot shower/heating pad- temporary relief  ++ CTS in left, tx: wears support at night on both hands    ROS:  No fever, chills, or night sweats  + PND, occ off balance, + face congestion and earspressure( hx ++ear wax)  No blurry vision or visual disturbance  No dysphagia or early satiety  No palpitations or tachycardia  No cough or wheezing  No GERD or abdominal pain  No numbness or focal deficits, except hands, and LUE   exacerbated and right improved  Remainder of review negative except as previously noted    PAST MEDICAL HX: Reviewed  PAST SURGICAL HX: Reviewed  SOCIAL HX: Reviewed  FAMILY HX: Reviewed    PHYSICAL EXAM:  VS: As noted  GENERAL: WDWN, A&O, NAD, conversant and co-operative; pleasant as always  EYES: Conj/lids unremarkable, sclera anicteric,  NECK: Supple w/o lymphadenopathy or thyromegaly  RESPIRATORY: Efforts are unlabored. Lungs CTAP  CARDIOVASCULAR: Heart RRR. No carotid bruits noted. 1+ pedal pulses. No LE edema  MUSCULOSKELETAL: Gait normal. No CCE  Neck: good ROM, non tender over the neck and upper shoulders  Shoulders Good ROM  NEUROLOGIC: WILDER. No tremor noted   +  Tinel's   SKIN: Warm and dry    IMPRESSION:  DM w/ neuropathy,  decreased A1C-  HLD, @ goal  HTN, stable  CTS, left > right    PLAN:  Continue wrist splint use  Flu vaccine  D/c ACE-I  Rx amlodipine 5mg  Diabetic diet  Low salt, low fat diet  Exercise   Iron rich foods  Call prn   RTC 6 mos

## 2019-02-11 ENCOUNTER — TELEPHONE (OUTPATIENT)
Dept: INTERNAL MEDICINE | Facility: CLINIC | Age: 54
End: 2019-02-11

## 2019-02-11 NOTE — TELEPHONE ENCOUNTER
----- Message from Moira López sent at 2/11/2019  2:36 PM CST -----  Contact: self/[phone]  Patient called in regards needing to talk with Dr Mcgill about medication Contraiv to help her with her issue. Please call and advise. Thank you

## 2019-02-11 NOTE — TELEPHONE ENCOUNTER
Spoke to pt. Pt advised that Dr. Mcgill does not prescribe contrave, which is a weight loss medication. Pt advised that she would have to see bariatric medicine.  Pt stated that she has had bariatric surgery before. She stated that her current insurance does not cover bariatric medicine.

## 2019-02-20 ENCOUNTER — OFFICE VISIT (OUTPATIENT)
Dept: FAMILY MEDICINE | Facility: CLINIC | Age: 54
End: 2019-02-20
Payer: COMMERCIAL

## 2019-02-20 ENCOUNTER — TELEPHONE (OUTPATIENT)
Dept: INTERNAL MEDICINE | Facility: CLINIC | Age: 54
End: 2019-02-20

## 2019-02-20 VITALS
DIASTOLIC BLOOD PRESSURE: 76 MMHG | TEMPERATURE: 98 F | WEIGHT: 260.13 LBS | BODY MASS INDEX: 41.8 KG/M2 | SYSTOLIC BLOOD PRESSURE: 124 MMHG | HEIGHT: 66 IN

## 2019-02-20 DIAGNOSIS — E66.01 MORBID OBESITY WITH BMI OF 40.0-44.9, ADULT: Primary | ICD-10-CM

## 2019-02-20 PROCEDURE — 99999 PR PBB SHADOW E&M-EST. PATIENT-LVL III: ICD-10-PCS | Mod: PBBFAC,,, | Performed by: INTERNAL MEDICINE

## 2019-02-20 PROCEDURE — 99214 OFFICE O/P EST MOD 30 MIN: CPT | Mod: S$GLB,,, | Performed by: INTERNAL MEDICINE

## 2019-02-20 PROCEDURE — 99999 PR PBB SHADOW E&M-EST. PATIENT-LVL III: CPT | Mod: PBBFAC,,, | Performed by: INTERNAL MEDICINE

## 2019-02-20 PROCEDURE — 99214 PR OFFICE/OUTPT VISIT, EST, LEVL IV, 30-39 MIN: ICD-10-PCS | Mod: S$GLB,,, | Performed by: INTERNAL MEDICINE

## 2019-02-20 PROCEDURE — 3074F SYST BP LT 130 MM HG: CPT | Mod: CPTII,S$GLB,, | Performed by: INTERNAL MEDICINE

## 2019-02-20 PROCEDURE — 3078F PR MOST RECENT DIASTOLIC BLOOD PRESSURE < 80 MM HG: ICD-10-PCS | Mod: CPTII,S$GLB,, | Performed by: INTERNAL MEDICINE

## 2019-02-20 PROCEDURE — 3008F BODY MASS INDEX DOCD: CPT | Mod: CPTII,S$GLB,, | Performed by: INTERNAL MEDICINE

## 2019-02-20 PROCEDURE — 3074F PR MOST RECENT SYSTOLIC BLOOD PRESSURE < 130 MM HG: ICD-10-PCS | Mod: CPTII,S$GLB,, | Performed by: INTERNAL MEDICINE

## 2019-02-20 PROCEDURE — 3078F DIAST BP <80 MM HG: CPT | Mod: CPTII,S$GLB,, | Performed by: INTERNAL MEDICINE

## 2019-02-20 PROCEDURE — 3008F PR BODY MASS INDEX (BMI) DOCUMENTED: ICD-10-PCS | Mod: CPTII,S$GLB,, | Performed by: INTERNAL MEDICINE

## 2019-02-20 NOTE — PROGRESS NOTES
Subjective:        Patient ID: Miesha Domínguez is a 53 y.o. female.    Chief Complaint: Weight Loss (Contrave)    HPI   Miesha Domínguez presents to discuss weight loss medication.  Pt had gastric sleeve surgery 6 years ago.  She feels in the past 1.5 years she has gradually been gaining the weight back due to food cravings and snacking.  She still eats a high protein diet, takes her bariatric vitamins.  She recently cut out cold drinks because her A1c was high.  She exercises at the gym regularly (walking on treadmill, elliptical, weight training).  She endorses snacking.    In the past she has tried the following meds/diets: SlimFast, Metabolite, Adipex (palpitations, increased thirst).    LMP 4/2018    Review of Systems   Constitutional: Negative for activity change and unexpected weight change.   HENT: Negative for hearing loss, rhinorrhea and trouble swallowing.    Eyes: Negative for discharge and visual disturbance.   Respiratory: Negative for chest tightness and wheezing.    Cardiovascular: Negative for chest pain and palpitations.   Gastrointestinal: Negative for blood in stool, constipation, diarrhea and vomiting.   Endocrine: Negative for polydipsia.   Genitourinary: Negative for difficulty urinating, dysuria, hematuria and menstrual problem.   Musculoskeletal: Negative for arthralgias, joint swelling and neck pain.   Neurological: Negative for weakness and headaches.   Psychiatric/Behavioral: Negative for confusion and dysphoric mood.           Objective:        Vitals:    02/20/19 0936   BP: 124/76   Temp: 97.7 °F (36.5 °C)     Physical Exam   Constitutional: She is oriented to person, place, and time. She appears well-developed and well-nourished. No distress.   Cardiovascular: Normal rate, regular rhythm and normal heart sounds.   No murmur heard.  Pulmonary/Chest: Effort normal and breath sounds normal. No respiratory distress.   Neurological: She is alert and oriented to person, place, and time.    Skin: Skin is warm and dry.   Psychiatric: She has a normal mood and affect. Her behavior is normal.   Vitals reviewed.          Assessment:         1. Morbid obesity with BMI of 40.0-44.9, adult              Plan:         Miesha was seen today for weight loss.    Diagnoses and all orders for this visit:    Morbid obesity with BMI of 40.0-44.9, adult: We discussed different options for weight loss medication including Adipex, Diethylpropion, Contrave, Qsymia.  - UPT negative.  Will start with Contrave, titrate up as tolerated.  Reviewed potential SEs including risk of getting pregnant on weight loss medication.  Pt is using condoms for bc, understands if she thinks she is or does get pregnant, she needs to stop taking the medication.  - Continue with regular exercise  - Follow up in 6 weeks, sooner if any significant SEs from mediction  -     naltrexone-bupropion (CONTRAVE) 8-90 mg TbSR; Take 1 tab PO qAM x 1 week then 1 tab BID x 1 week then 2 tabs qAM and 1 tab qPM x 1 week then 2 tabs BID  -     POCT urine pregnancy          Follow-up in about 6 weeks (around 4/3/2019) for weight loss.    Patient Instructions   CONTRAVE:  - Start 1 tab every morning for 1 week then  - 1 tab in the morning and 1 tab in the evening for 1 week then  - 2 tabs in the morning and 1 tab in the evening for 1 week then  - 2 tabs in the morning and 2 tabs in the evening

## 2019-02-20 NOTE — PATIENT INSTRUCTIONS
CONTRAVE:  - Start 1 tab every morning for 1 week then  - 1 tab in the morning and 1 tab in the evening for 1 week then  - 2 tabs in the morning and 1 tab in the evening for 1 week then  - 2 tabs in the morning and 2 tabs in the evening

## 2019-02-22 RX ORDER — ROSUVASTATIN CALCIUM 20 MG/1
20 TABLET, COATED ORAL DAILY
Qty: 90 TABLET | Refills: 1 | Status: SHIPPED | OUTPATIENT
Start: 2019-02-22 | End: 2019-08-21 | Stop reason: SDUPTHER

## 2019-04-04 ENCOUNTER — OFFICE VISIT (OUTPATIENT)
Dept: FAMILY MEDICINE | Facility: CLINIC | Age: 54
End: 2019-04-04
Payer: COMMERCIAL

## 2019-04-04 VITALS
DIASTOLIC BLOOD PRESSURE: 82 MMHG | BODY MASS INDEX: 40.18 KG/M2 | TEMPERATURE: 98 F | SYSTOLIC BLOOD PRESSURE: 108 MMHG | WEIGHT: 250 LBS | HEIGHT: 66 IN | RESPIRATION RATE: 20 BRPM

## 2019-04-04 DIAGNOSIS — E66.01 MORBID OBESITY WITH BMI OF 40.0-44.9, ADULT: Primary | ICD-10-CM

## 2019-04-04 PROCEDURE — 99999 PR PBB SHADOW E&M-EST. PATIENT-LVL III: CPT | Mod: PBBFAC,,, | Performed by: INTERNAL MEDICINE

## 2019-04-04 PROCEDURE — 3079F PR MOST RECENT DIASTOLIC BLOOD PRESSURE 80-89 MM HG: ICD-10-PCS | Mod: CPTII,S$GLB,, | Performed by: INTERNAL MEDICINE

## 2019-04-04 PROCEDURE — 99213 PR OFFICE/OUTPT VISIT, EST, LEVL III, 20-29 MIN: ICD-10-PCS | Mod: S$GLB,,, | Performed by: INTERNAL MEDICINE

## 2019-04-04 PROCEDURE — 99213 OFFICE O/P EST LOW 20 MIN: CPT | Mod: S$GLB,,, | Performed by: INTERNAL MEDICINE

## 2019-04-04 PROCEDURE — 3008F BODY MASS INDEX DOCD: CPT | Mod: CPTII,S$GLB,, | Performed by: INTERNAL MEDICINE

## 2019-04-04 PROCEDURE — 3074F PR MOST RECENT SYSTOLIC BLOOD PRESSURE < 130 MM HG: ICD-10-PCS | Mod: CPTII,S$GLB,, | Performed by: INTERNAL MEDICINE

## 2019-04-04 PROCEDURE — 99999 PR PBB SHADOW E&M-EST. PATIENT-LVL III: ICD-10-PCS | Mod: PBBFAC,,, | Performed by: INTERNAL MEDICINE

## 2019-04-04 PROCEDURE — 3074F SYST BP LT 130 MM HG: CPT | Mod: CPTII,S$GLB,, | Performed by: INTERNAL MEDICINE

## 2019-04-04 PROCEDURE — 3008F PR BODY MASS INDEX (BMI) DOCUMENTED: ICD-10-PCS | Mod: CPTII,S$GLB,, | Performed by: INTERNAL MEDICINE

## 2019-04-04 PROCEDURE — 3079F DIAST BP 80-89 MM HG: CPT | Mod: CPTII,S$GLB,, | Performed by: INTERNAL MEDICINE

## 2019-04-04 NOTE — PROGRESS NOTES
Subjective:        Patient ID: Miesha Domínguez is a 53 y.o. female.    Chief Complaint: Weight Check    HPI   Miesha Domínguez presents for f/u weight loss.  Pt has been taking Contrave for 6 weeks.  She reports a bad taste in her mouth for a few hours after taking the medication.  It usually resolves with drinking water or eating something.  No other adverse effects noted.  She drinks ~2 16oz bottles of water/day.  She has been walking or going to the gym daily.    Review of Systems  as per HPI      Objective:        Vitals:    04/04/19 0931   BP: 108/82   Resp: 20   Temp: 97.8 °F (36.6 °C)     Physical Exam   Constitutional: She is oriented to person, place, and time. She appears well-developed and well-nourished. No distress.   Cardiovascular: Normal rate, regular rhythm and normal heart sounds.   No murmur heard.  Pulmonary/Chest: Effort normal and breath sounds normal. No respiratory distress.   Neurological: She is alert and oriented to person, place, and time.   Skin: Skin is warm and dry.   Psychiatric: She has a normal mood and affect. Her behavior is normal.   Vitals reviewed.          Assessment:         1. Morbid obesity with BMI of 40.0-44.9, adult              Plan:         Miesha was seen today for weight check.    Diagnoses and all orders for this visit:    Morbid obesity with BMI of 40.0-44.9, adult: Pt has lost 10# since starting Contrave.  Side effect is currently tolerable, will continue current dose of 2 tabs BID.  Continue with regular physical activity.  Follow up in 4 months.  - Pt used a coupon for her initial 2 Rxs.  She will let me know if she's not able to continue getting the medication at the lower cost; if not, we had talked about switching to Qsymia.  -     naltrexone-bupropion (CONTRAVE) 8-90 mg TbSR; Take 2 tablets by mouth 2 (two) times daily.          Follow up in about 4 months (around 8/4/2019) for weight loss.

## 2019-04-14 ENCOUNTER — PATIENT MESSAGE (OUTPATIENT)
Dept: FAMILY MEDICINE | Facility: CLINIC | Age: 54
End: 2019-04-14

## 2019-04-14 DIAGNOSIS — E66.01 MORBID OBESITY: Primary | ICD-10-CM

## 2019-05-02 DIAGNOSIS — E66.01 MORBID OBESITY: ICD-10-CM

## 2019-05-03 RX ORDER — PHENTERMINE AND TOPIRAMATE 3.75; 23 MG/1; MG/1
CAPSULE, EXTENDED RELEASE ORAL
Qty: 14 CAPSULE | Refills: 0 | OUTPATIENT
Start: 2019-05-03

## 2019-05-29 ENCOUNTER — PATIENT MESSAGE (OUTPATIENT)
Dept: ADMINISTRATIVE | Facility: HOSPITAL | Age: 54
End: 2019-05-29

## 2019-05-30 ENCOUNTER — LAB VISIT (OUTPATIENT)
Dept: LAB | Facility: HOSPITAL | Age: 54
End: 2019-05-30
Attending: INTERNAL MEDICINE
Payer: COMMERCIAL

## 2019-05-30 ENCOUNTER — OFFICE VISIT (OUTPATIENT)
Dept: PRIMARY CARE CLINIC | Facility: CLINIC | Age: 54
End: 2019-05-30
Payer: COMMERCIAL

## 2019-05-30 VITALS
OXYGEN SATURATION: 99 % | DIASTOLIC BLOOD PRESSURE: 80 MMHG | HEART RATE: 78 BPM | SYSTOLIC BLOOD PRESSURE: 140 MMHG | WEIGHT: 248.44 LBS | TEMPERATURE: 98 F | BODY MASS INDEX: 41.39 KG/M2 | HEIGHT: 65 IN

## 2019-05-30 DIAGNOSIS — N95.1 PERIMENOPAUSAL: ICD-10-CM

## 2019-05-30 DIAGNOSIS — N92.6 IRREGULAR PERIODS: ICD-10-CM

## 2019-05-30 DIAGNOSIS — E66.01 MORBID OBESITY WITH BMI OF 40.0-44.9, ADULT: Primary | ICD-10-CM

## 2019-05-30 LAB
ESTRADIOL SERPL-MCNC: 23 PG/ML
FSH SERPL-ACNC: 87.2 MIU/ML
LH SERPL-ACNC: 32.3 MIU/ML

## 2019-05-30 PROCEDURE — 3008F BODY MASS INDEX DOCD: CPT | Mod: CPTII,S$GLB,, | Performed by: INTERNAL MEDICINE

## 2019-05-30 PROCEDURE — 82670 ASSAY OF TOTAL ESTRADIOL: CPT

## 2019-05-30 PROCEDURE — 36415 COLL VENOUS BLD VENIPUNCTURE: CPT | Mod: PN

## 2019-05-30 PROCEDURE — 99999 PR PBB SHADOW E&M-EST. PATIENT-LVL IV: ICD-10-PCS | Mod: PBBFAC,,, | Performed by: INTERNAL MEDICINE

## 2019-05-30 PROCEDURE — 3077F SYST BP >= 140 MM HG: CPT | Mod: CPTII,S$GLB,, | Performed by: INTERNAL MEDICINE

## 2019-05-30 PROCEDURE — 99214 OFFICE O/P EST MOD 30 MIN: CPT | Mod: S$GLB,,, | Performed by: INTERNAL MEDICINE

## 2019-05-30 PROCEDURE — 3079F DIAST BP 80-89 MM HG: CPT | Mod: CPTII,S$GLB,, | Performed by: INTERNAL MEDICINE

## 2019-05-30 PROCEDURE — 83002 ASSAY OF GONADOTROPIN (LH): CPT

## 2019-05-30 PROCEDURE — 99999 PR PBB SHADOW E&M-EST. PATIENT-LVL IV: CPT | Mod: PBBFAC,,, | Performed by: INTERNAL MEDICINE

## 2019-05-30 PROCEDURE — 3077F PR MOST RECENT SYSTOLIC BLOOD PRESSURE >= 140 MM HG: ICD-10-PCS | Mod: CPTII,S$GLB,, | Performed by: INTERNAL MEDICINE

## 2019-05-30 PROCEDURE — 83001 ASSAY OF GONADOTROPIN (FSH): CPT

## 2019-05-30 PROCEDURE — 3079F PR MOST RECENT DIASTOLIC BLOOD PRESSURE 80-89 MM HG: ICD-10-PCS | Mod: CPTII,S$GLB,, | Performed by: INTERNAL MEDICINE

## 2019-05-30 PROCEDURE — 99214 PR OFFICE/OUTPT VISIT, EST, LEVL IV, 30-39 MIN: ICD-10-PCS | Mod: S$GLB,,, | Performed by: INTERNAL MEDICINE

## 2019-05-30 PROCEDURE — 3008F PR BODY MASS INDEX (BMI) DOCUMENTED: ICD-10-PCS | Mod: CPTII,S$GLB,, | Performed by: INTERNAL MEDICINE

## 2019-05-30 RX ORDER — TOPIRAMATE 100 MG/1
100 TABLET, FILM COATED ORAL 2 TIMES DAILY
Qty: 60 TABLET | Refills: 0 | Status: SHIPPED | OUTPATIENT
Start: 2019-05-30 | End: 2019-07-22 | Stop reason: SDUPTHER

## 2019-05-30 RX ORDER — PHENTERMINE HYDROCHLORIDE 15 MG/1
15 CAPSULE ORAL EVERY MORNING
Qty: 30 CAPSULE | Refills: 0 | Status: SHIPPED | OUTPATIENT
Start: 2019-05-30 | End: 2019-07-30

## 2019-05-30 NOTE — PROGRESS NOTES
"Subjective:        Patient ID: Miesha Domínguez is a 53 y.o. female.    Chief Complaint: Weight Loss (follow up)    HPI   Miesha Domínguez presents for f/u weight loss.  Pt switched from Contrave to Qsymia ~6 weeks ago due to not tolerating bad taste in mouth from the former.  She denies adverse effects from the Qsymia.  She is drinking a lot more water and notices her appetite is decreased.  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    Pt also notes having a period last month for the first time in 2 years.  Pt had been on depo for contraception and menorrhagia.  Her last dose of depo was 4/2017.  Last month she had spotting on and off for a few days followed by a "regular" period flow.  She endorses hot flashes and sweats.    Review of Systems  as per HPI      Objective:        Vitals:    05/30/19 0934   BP: (!) 140/80   Pulse: 78   Temp: 98.3 °F (36.8 °C)     Physical Exam   Constitutional: She is oriented to person, place, and time. She appears well-developed and well-nourished. No distress.   Cardiovascular: Normal rate, regular rhythm and normal heart sounds.   Pulmonary/Chest: Effort normal and breath sounds normal. No respiratory distress.   Neurological: She is alert and oriented to person, place, and time.   Skin: Skin is warm and dry.   Vitals reviewed.          Assessment:         1. Morbid obesity with BMI of 40.0-44.9, adult    2. Irregular periods    3. Perimenopausal              Plan:         Miesha was seen today for weight loss.    Diagnoses and all orders for this visit:    Morbid obesity with BMI of 40.0-44.9, adult: Finish current dose of Qsymia then continue titrating up.  She will establish care with bariatric medicine in 2 months, sooner if any side effects or issues.  -     phentermine-topiramate (QSYMIA) 11.25-69 mg CM24; Take 1 capsule by mouth every morning. for 14 days  -     phentermine 15 MG capsule; Take 1 capsule (15 mg " total) by mouth every morning.  -     topiramate (TOPAMAX) 100 MG tablet; Take 1 tablet (100 mg total) by mouth 2 (two) times daily.  -     Ambulatory consult to Bariatric Medicine    Irregular periods  Perimenopausal: Pt has appt with gyn next week.  Will check hormones today.  -     Estradiol; Future  -     Follicle stimulating hormone; Future  -     Luteinizing hormone; Future        Follow up in about 2 months (around 7/30/2019) for weight loss with Dr. Christine.

## 2019-06-03 ENCOUNTER — OFFICE VISIT (OUTPATIENT)
Dept: OBSTETRICS AND GYNECOLOGY | Facility: CLINIC | Age: 54
End: 2019-06-03
Attending: OBSTETRICS & GYNECOLOGY
Payer: COMMERCIAL

## 2019-06-03 VITALS
WEIGHT: 250.69 LBS | SYSTOLIC BLOOD PRESSURE: 130 MMHG | DIASTOLIC BLOOD PRESSURE: 80 MMHG | HEIGHT: 65 IN | BODY MASS INDEX: 41.77 KG/M2

## 2019-06-03 DIAGNOSIS — Z12.4 PAP SMEAR FOR CERVICAL CANCER SCREENING: ICD-10-CM

## 2019-06-03 DIAGNOSIS — Z01.419 ENCOUNTER FOR GYNECOLOGICAL EXAMINATION WITHOUT ABNORMAL FINDING: Primary | ICD-10-CM

## 2019-06-03 DIAGNOSIS — N95.0 POSTMENOPAUSAL BLEEDING: ICD-10-CM

## 2019-06-03 DIAGNOSIS — Z12.31 SCREENING MAMMOGRAM, ENCOUNTER FOR: ICD-10-CM

## 2019-06-03 PROCEDURE — 3079F PR MOST RECENT DIASTOLIC BLOOD PRESSURE 80-89 MM HG: ICD-10-PCS | Mod: CPTII,S$GLB,, | Performed by: OBSTETRICS & GYNECOLOGY

## 2019-06-03 PROCEDURE — 99396 PR PREVENTIVE VISIT,EST,40-64: ICD-10-PCS | Mod: S$GLB,,, | Performed by: OBSTETRICS & GYNECOLOGY

## 2019-06-03 PROCEDURE — 3075F PR MOST RECENT SYSTOLIC BLOOD PRESS GE 130-139MM HG: ICD-10-PCS | Mod: CPTII,S$GLB,, | Performed by: OBSTETRICS & GYNECOLOGY

## 2019-06-03 PROCEDURE — 99396 PREV VISIT EST AGE 40-64: CPT | Mod: S$GLB,,, | Performed by: OBSTETRICS & GYNECOLOGY

## 2019-06-03 PROCEDURE — 3075F SYST BP GE 130 - 139MM HG: CPT | Mod: CPTII,S$GLB,, | Performed by: OBSTETRICS & GYNECOLOGY

## 2019-06-03 PROCEDURE — 3079F DIAST BP 80-89 MM HG: CPT | Mod: CPTII,S$GLB,, | Performed by: OBSTETRICS & GYNECOLOGY

## 2019-06-03 PROCEDURE — 87624 HPV HI-RISK TYP POOLED RSLT: CPT

## 2019-06-03 PROCEDURE — 99999 PR PBB SHADOW E&M-EST. PATIENT-LVL III: CPT | Mod: PBBFAC,,, | Performed by: OBSTETRICS & GYNECOLOGY

## 2019-06-03 PROCEDURE — 88175 CYTOPATH C/V AUTO FLUID REDO: CPT

## 2019-06-03 PROCEDURE — 99999 PR PBB SHADOW E&M-EST. PATIENT-LVL III: ICD-10-PCS | Mod: PBBFAC,,, | Performed by: OBSTETRICS & GYNECOLOGY

## 2019-06-03 NOTE — PROGRESS NOTES
Subjective:       Patient ID: Miesha Domínguez is a 53 y.o. female.    Chief Complaint:  Well Woman and Vaginal Bleeding      History of Present Illness  HPI    Miesha Domínguez is a 53 y.o. female  here for her annual GYN exam.  She is also concerned about having had a period in April after not having a period for at least 2 years. She last had a Depo Provera injection in 2018. She does report having night sweats /hot flashes for the past 6-9 months.She had hormone levels done recently which showed:    FSH: 87  Estradiol : 23    She describes her periods as stopped at least 2 years ago,    denies break through bleeding.   denies vaginal itching or irritation.  Denies vaginal discharge.  She is sexually active. She has had 1 partner for 31 years .     History of abnormal pap: No  Last Pap: approximate date  and was normal  Last MMG: normal--routine follow-up in 12 months  Last Colonoscopy:  colonoscopy 1 years ago without abnormalities.  denies domestic violence. She does feel safe at home.     Past Medical History:   Diagnosis Date    Asthma     as a child    HLD (hyperlipidemia)     HTN (hypertension)     Neuropathy     Type II or unspecified type diabetes mellitus without mention of complication, not stated as uncontrolled      Past Surgical History:   Procedure Laterality Date     SECTION      COLONOSCOPY N/A 2018    Performed by Ramez [facility]rez [facility] at [facility] ENDO (4TH FLR)    EGD (ESOPHAGOGASTRODUODENOSCOPY) N/A 2013    Performed by Sami Francis MD at [facility] OR 2ND FLR    GASTRECTOMY, SLEEVE, LAPAROSCOPIC N/A 2013    Performed by [facility] at [facility] OR 2ND FLR    SLEEVE GASTROPLASTY       Social History     Socioeconomic History    Marital status:      Spouse name: himanshu    Number of children: 1    Years of education: Not on file    Highest education level: Not on file   Occupational History    Occupation:  "     Employer: [employer]   Social Needs    Financial resource strain: Not on file    Food insecurity:     Worry: Not on file     Inability: Not on file    Transportation needs:     Medical: Not on file     Non-medical: Not on file   Tobacco Use    Smoking status: Never Smoker    Smokeless tobacco: Never Used   Substance and Sexual Activity    Alcohol use: Yes     Comment: occassional for Hollidays    Drug use: No    Sexual activity: Yes     Partners: Male     Birth control/protection: None     Comment:  x 28 years: Spouse : Justin   Lifestyle    Physical activity:     Days per week: Not on file     Minutes per session: Not on file    Stress: Not on file   Relationships    Social connections:     Talks on phone: Not on file     Gets together: Not on file     Attends Spiritism service: Not on file     Active member of club or organization: Not on file     Attends meetings of clubs or organizations: Not on file     Relationship status: Not on file   Other Topics Concern    Not on file   Social History Narrative    SOCIAL HISTORY:     , spouse is in good health, .     for [employer],    Daughter,  @[employer], major [employer]; working @ [employer]    + exercise, walk, elliptical, Rebecca     Family History   Problem Relation Age of Onset    Cancer Father         d.64 lung -smoker    Hypertension Mother     Diabetes Mother     Ovarian cancer Mother 74    Diabetes Sister     Hypertension Sister     Hypertension Brother     Diabetes Brother     Breast cancer Neg Hx     Colon cancer Neg Hx     Stroke Neg Hx     Heart attack Neg Hx     Heart disease Neg Hx     Heart failure Neg Hx     Hyperlipidemia Neg Hx      OB History        1    Para   1    Term   1            AB        Living   1       SAB        TAB        Ectopic        Multiple        Live Births   1                 /80   Ht 5' 5" (1.651 m)   Wt 113.7 " kg (250 lb 10.6 oz)   LMP 2019   BMI 41.71 kg/m²         GYN & OB History  Patient's last menstrual period was 2019.   Date of Last Pap: 2016    OB History    Para Term  AB Living   1 1 1     1   SAB TAB Ectopic Multiple Live Births           1      # Outcome Date GA Lbr Jason/2nd Weight Sex Delivery Anes PTL Lv   1 Term      CS-LTranv Spinal N SHIREEN       Review of Systems  Review of Systems   Constitutional: Negative for activity change, appetite change, fatigue and unexpected weight change.   HENT: Negative.    Eyes: Negative for visual disturbance.   Respiratory: Negative for shortness of breath and wheezing.    Cardiovascular: Negative for chest pain, palpitations and leg swelling.   Gastrointestinal: Negative for abdominal pain, bloating and blood in stool.   Endocrine: Positive for diabetes and hot flashes. Negative for hair loss.   Genitourinary: Positive for hot flashes and postmenopausal bleeding. Negative for decreased libido and dyspareunia.   Musculoskeletal: Negative for back pain and joint swelling.   Integumentary:  Negative for acne, hair changes and nipple discharge.   Neurological: Negative for headaches.   Hematological: Does not bruise/bleed easily.   Psychiatric/Behavioral: Negative for depression and sleep disturbance. The patient is not nervous/anxious.    Breast: Negative for mastodynia and nipple discharge          Objective:      Physical Exam:   Constitutional: She is oriented to person, place, and time. She appears well-developed and well-nourished.    HENT:   Head: Normocephalic and atraumatic.    Eyes: Pupils are equal, round, and reactive to light. EOM are normal.    Neck: Normal range of motion. Neck supple.    Cardiovascular: Normal rate and regular rhythm.     Pulmonary/Chest: Effort normal and breath sounds normal.   BREASTS:  no mass, no tenderness, no deformity and no retraction. Right breast exhibits no inverted nipple, no mass, no nipple discharge,  no skin change, no tenderness, no bleeding and no swelling. Left breast exhibits no inverted nipple, no mass, no nipple discharge, no skin change, no tenderness, no bleeding and no swelling. Breasts are symmetrical.              Abdominal: Soft. Bowel sounds are normal.     Genitourinary: Pelvic exam was performed with patient supine.   Genitourinary Comments: PELVIC: Normal external genitalia without lesions.  Normal hair distribution.  Adequate perineal body, normal urethral meatus.  Vagina moist and well rugated without lesions or discharge.  Cervix pink, without lesions, discharge or tenderness.  No significant cystocele or rectocele.  Bimanual exam shows uterus to be NOT READILY PALPABLE SECONDARY TO HABITUS,  mobile and nontender.  Adnexa without masses or tenderness.               Musculoskeletal: Normal range of motion and moves all extremeties.       Neurological: She is alert and oriented to person, place, and time.    Skin: Skin is warm and dry.    Psychiatric: She has a normal mood and affect.              Assessment:        1. Encounter for gynecological examination without abnormal finding    2. Pap smear for cervical cancer screening    3. Screening mammogram, encounter for    4. Postmenopausal bleeding                Plan:        1. Encounter for gynecological examination without abnormal finding  COUNSELING:  The patient was counseled today on regular weight bearing exercise. Patient was counseled today on the new ACS guidelines for cervical cytology screening as well as the current recommendations for breast cancer screening. Counseling session lasted approximately 10 minutes, and all her questions were answered. She was advised to see her primary care physician for all other health maintenance.   FOLLOW-UP with me for next routine visit.         2. Pap smear for cervical cancer screening      - Liquid-based pap smear, screening  - HPV High Risk Genotypes, PCR    3. Screening mammogram, encounter  for      - Mammo Digital Screening Bilat w/ [facility]; Future    4. Postmenopausal bleeding  DISCUSSED NEED FOR ENDOMETRIAL SAMPLING.  - US Pelvis Comp with Transvag NON-OB (xpd; Future       Follow up in about 1 year (around 6/3/2020).

## 2019-06-03 NOTE — PATIENT INSTRUCTIONS
Endometrial Biopsy    Endometrial biopsy is a procedure used to study the endometrium (lining of the uterus). It is usually done in your health care providers office. During the biopsy, small tissue samples are taken from inside the uterus. These are then sent to a lab for study. If any problems are found, you and your health care provider will discuss treatment options. The biopsy usually takes only a few minutes, and you can often go back to your normal routine as soon as the procedure is over.  Reasons for the procedure  Endometrial biopsy may help pinpoint the cause of certain problems. These include:  · Bleeding after menopause  · Heavy or irregular menstrual periods  · Bleeding associated with hormone therapy  · Prolonged bleeding  · Abnormal Pap test results  · Having certain types of cancer  · Trouble getting pregnant (fertility problems)  What are the risks?  Problems with endometrial biopsy are rare, but can include:  · Bleeding  · Infection  · Damage to the uterine wall (very rare)  Getting ready for the procedure  Your health care provider will ask about your health and any medicines you take, like blood thinners. Before your biopsy, you may have tests to make sure youre not pregnant or have an infection. You may also be asked to sign a consent form. A day or 2 before the procedure:   · Avoid using creams or other vaginal medicines.  · Avoid douching.  · Ask your health care provider if you should take pain medicines shortly before the test.  During the biopsy  During the biopsy, you will likely experience the following:  · You will be asked to lie on an exam table with your knees bent, just as you do for a Pap test.  · You may have a brief pelvic exam. An instrument called a speculum is then inserted into the vagina to hold it open.  · An antiseptic solution may be applied to the cervix. The cervix may also be numbed with an anesthetic or dilated to widen the opening.  · A small tube is passed  through the cervix into the uterus.  · It is normal to feel some cramping when the tube is inserted. But tell your health care provider if you have severe cramping or are very uncomfortable.  · Using mild suction, samples are taken from the uterine lining. You may feel pinching or additional cramping when this is done.  · The tube and speculum are then removed and the samples are sent to a lab for study.  After the procedure  After the procedure, you may experience the following:  · If you feel lightheaded or dizzy, you can rest on the table until youre ready to get dressed.  · For a few hours, you may feel some mild cramping. This can usually be relieved with over-the-counter pain medicines.  · You may have some bleeding for a few days. Use pads instead of tampons.  · Dont douche or use any vaginal medicines unless your health care provider says its OK.  · Ask your health care provider when its OK to have sex again.  Follow-up care  It will take about a week for the biopsy results to come back from the lab. Then you and your health care provider can discuss the results. These may show that no treatment is required. Or, you may be scheduled for a follow-up appointment and more tests. If your biopsy was done for fertility problems, be sure to record the day when your next period begins.     Call your health care provider   Contact your health care provider if you have any of the following:  · Heavy bleeding (more than a pad an hour for 2 hours).  · Severe cramping or increasing pain.  · Fever over 100.4°F (38.0°C)  · Foul-smelling or unusual vaginal discharge.   Date Last Reviewed: 5/10/2015  © 6135-0931 [employer]. [address] [place] [address], [place] [address]. All rights reserved. This information is not intended as a substitute for professional medical care. Always follow your healthcare professional's instructions.

## 2019-06-04 ENCOUNTER — OCCUPATIONAL HEALTH (OUTPATIENT)
Dept: URGENT CARE | Facility: CLINIC | Age: 54
End: 2019-06-04

## 2019-06-04 ENCOUNTER — TELEPHONE (OUTPATIENT)
Dept: PRIMARY CARE CLINIC | Facility: CLINIC | Age: 54
End: 2019-06-04

## 2019-06-04 DIAGNOSIS — Z02.89 ENCOUNTER FOR EXAMINATION REQUIRED BY DEPARTMENT OF TRANSPORTATION (DOT): Primary | ICD-10-CM

## 2019-06-04 PROCEDURE — 99499 PHYSICAL, RECERT DOT/CDL: ICD-10-PCS | Mod: S$GLB,,, | Performed by: NURSE PRACTITIONER

## 2019-06-04 PROCEDURE — 99499 UNLISTED E&M SERVICE: CPT | Mod: S$GLB,,, | Performed by: NURSE PRACTITIONER

## 2019-06-04 NOTE — TELEPHONE ENCOUNTER
----- Message from Lorenza Xiao sent at 6/4/2019 12:49 PM CDT -----  Contact: [facility]   Pharmacy is calling to clarify an RX.  RX name:  phentermine-topiramate (QSYMIA) 11.25-69 mg CM24 & topiramate (TOPAMAX) 100 MG tablet  What do they need to clarify:  Is the patient supposed to be prescribed both medications  Comments:

## 2019-06-04 NOTE — TELEPHONE ENCOUNTER
Spoke with Eleni at [facility] and read directly from Dr. Mary's note from 5/30/19 what the patient is supposed to be taking.

## 2019-06-06 LAB
HPV HR 12 DNA CVX QL NAA+PROBE: NEGATIVE
HPV16 AG SPEC QL: NEGATIVE
HPV18 DNA SPEC QL NAA+PROBE: NEGATIVE

## 2019-06-11 ENCOUNTER — HOSPITAL ENCOUNTER (OUTPATIENT)
Dept: RADIOLOGY | Facility: OTHER | Age: 54
Discharge: HOME | End: 2019-06-11
Attending: OBSTETRICS & GYNECOLOGY
Payer: COMMERCIAL

## 2019-06-11 DIAGNOSIS — Z12.31 SCREENING MAMMOGRAM, ENCOUNTER FOR: ICD-10-CM

## 2019-06-11 DIAGNOSIS — N95.0 POSTMENOPAUSAL BLEEDING: ICD-10-CM

## 2019-06-11 PROCEDURE — 76830 US PELVIS COMP WITH TRANSVAG NON-OB (XPD): ICD-10-PCS | Mod: 26,,, | Performed by: RADIOLOGY

## 2019-06-11 PROCEDURE — 76830 TRANSVAGINAL US NON-OB: CPT | Mod: 26,,, | Performed by: RADIOLOGY

## 2019-06-11 PROCEDURE — 76856 US EXAM PELVIC COMPLETE: CPT | Mod: 26,,, | Performed by: RADIOLOGY

## 2019-06-11 PROCEDURE — 77067 SCR MAMMO BI INCL CAD: CPT | Mod: 26,,, | Performed by: RADIOLOGY

## 2019-06-11 PROCEDURE — 77067 SCR MAMMO BI INCL CAD: CPT | Mod: TC

## 2019-06-11 PROCEDURE — 76830 TRANSVAGINAL US NON-OB: CPT | Mod: TC

## 2019-06-11 PROCEDURE — 77063 MAMMO DIGITAL SCREENING BILAT WITH TOMOSYNTHESIS_CAD: ICD-10-PCS | Mod: 26,,, | Performed by: RADIOLOGY

## 2019-06-11 PROCEDURE — 76856 US PELVIS COMP WITH TRANSVAG NON-OB (XPD): ICD-10-PCS | Mod: 26,,, | Performed by: RADIOLOGY

## 2019-06-11 PROCEDURE — 77067 MAMMO DIGITAL SCREENING BILAT WITH TOMOSYNTHESIS_CAD: ICD-10-PCS | Mod: 26,,, | Performed by: RADIOLOGY

## 2019-06-11 PROCEDURE — 77063 BREAST TOMOSYNTHESIS BI: CPT | Mod: 26,,, | Performed by: RADIOLOGY

## 2019-06-13 ENCOUNTER — PROCEDURE VISIT (OUTPATIENT)
Dept: OBSTETRICS AND GYNECOLOGY | Facility: CLINIC | Age: 54
End: 2019-06-13
Attending: OBSTETRICS & GYNECOLOGY
Payer: COMMERCIAL

## 2019-06-13 VITALS
WEIGHT: 252.88 LBS | HEIGHT: 65 IN | DIASTOLIC BLOOD PRESSURE: 70 MMHG | SYSTOLIC BLOOD PRESSURE: 130 MMHG | BODY MASS INDEX: 42.13 KG/M2

## 2019-06-13 DIAGNOSIS — N76.0 VULVOVAGINITIS: ICD-10-CM

## 2019-06-13 DIAGNOSIS — N95.0 POSTMENOPAUSAL BLEEDING: Primary | ICD-10-CM

## 2019-06-13 LAB
B-HCG UR QL: NEGATIVE
CTP QC/QA: YES

## 2019-06-13 PROCEDURE — 81025 POCT URINE PREGNANCY: ICD-10-PCS | Mod: S$GLB,,, | Performed by: OBSTETRICS & GYNECOLOGY

## 2019-06-13 PROCEDURE — 88305 TISSUE EXAM BY PATHOLOGIST: CPT | Performed by: PATHOLOGY

## 2019-06-13 PROCEDURE — 88305 TISSUE EXAM BY PATHOLOGIST: CPT | Mod: 26,,, | Performed by: PATHOLOGY

## 2019-06-13 PROCEDURE — 58100 BIOPSY OF UTERUS LINING: CPT | Mod: S$GLB,,, | Performed by: OBSTETRICS & GYNECOLOGY

## 2019-06-13 PROCEDURE — 88305 TISSUE SPECIMEN TO PATHOLOGY, OBSTETRICS/GYNECOLOGY: ICD-10-PCS | Mod: 26,,, | Performed by: PATHOLOGY

## 2019-06-13 PROCEDURE — 58100 ENDOMETRIAL BIOPSY: ICD-10-PCS | Mod: S$GLB,,, | Performed by: OBSTETRICS & GYNECOLOGY

## 2019-06-13 PROCEDURE — 81025 URINE PREGNANCY TEST: CPT | Mod: S$GLB,,, | Performed by: OBSTETRICS & GYNECOLOGY

## 2019-06-13 RX ORDER — FLUCONAZOLE 150 MG/1
150 TABLET ORAL ONCE
Qty: 2 TABLET | Refills: 0 | Status: SHIPPED | OUTPATIENT
Start: 2019-06-13 | End: 2019-06-13

## 2019-06-13 NOTE — PROCEDURES
Endometrial biopsy  Date/Time: 2019 9:44 AM  Performed by: Catrina Olivier MD  Authorized by: Catrina Olivier MD   Preparation: Patient was prepped and draped in the usual sterile fashion.  Local anesthesia used: no    Anesthesia:  Local anesthesia used: no    Sedation:  Patient sedated: no    Patient tolerance: Patient tolerated the procedure well with no immediate complications  Comments: CC: ENDOMETRIAL BIOPSLUIS EDUARDO Domínguez is a 53 y.o. female  presents for an endometrial biopsy secondary to perimenopausal bleeding. Has been on Depo for many years for menorrhagia, no cycle in years. Stopped Depo May 2018. Had bleeding April 5 x 7 days.   FSH 87and Estradiol 23    U/S:US PELVIS COMP WITH TRANSVAG NON-OB (XPD)    CLINICAL HISTORY:  Postmenopausal bleeding    TECHNIQUE:  Transabdominal sonography of the pelvis was performed, followed by transvaginal sonography to better evaluate the uterus and ovaries.    COMPARISON:  None.    FINDINGS:  There is a smooth marginated anteverted uterus measuring 7.1 x 3.4 x 4.3 cm.  No uterine masses to suggest fibroids.  Endometrial stripe measures 6.1 mm and is within normal limits.    The left ovary measures 2.4 x 1.3 x 2.9 cm.  No cystic or solid masses or abnormal adnexal fluid collections.    The right ovary was not visualized on the current examination.  There is no significant free fluid in the cul-de-sac.    Impression      1. Nonvisualization of the right ovary on this examination.  2. Pelvic ultrasound otherwise is unremarkable.      Electronically signed by: Wellington Carpenter MD  Date: 2019  Time: 10:00         Last Resulted: 19 10:00 Order Details View Encounter Lab and Collection Details Routing Result History        External Result Report     External Result Report  Narrative       EXAMINATION:  US PELVIS COMP WITH TRANSVAG NON-OB (XPD)    CLINICAL HISTORY:  Postmenopausal bleeding    TECHNIQUE:  Transabdominal sonography of the  pelvis was performed, followed by transvaginal sonography to better evaluate the uterus and ovaries.    COMPARISON:  None.    FINDINGS:  There is a smooth marginated anteverted uterus measuring 7.1 x 3.4 x 4.3 cm.  No uterine masses to suggest fibroids.  Endometrial stripe measures 6.1 mm and is within normal limits.    The left ovary measures 2.4 x 1.3 x 2.9 cm.  No cystic or solid masses or abnormal adnexal fluid collections.    The right ovary was not visualized on the current examination.  There is no significant free fluid in the cul-de-sac.  Impression         1. Nonvisualization of the right ovary on this examination.  2. Pelvic ultrasound otherwise is unremarkable.               UPT is negative.    PRE ENDOMETRIAL BIOPSY COUNSELING:  The patient was informed of the risk of bleeding, infection, uterine perforation and pain and that the test will rule-out endometrial cancer with accuracy greater than 95%. She was counseled on the alternatives to endometrial biopsy and agrees to proceed.    TIME OUT PERFORMED.  The cervix was visualized with a speculum and prepped with betadine  A sterile endometrial pipelle was passed without difficulty to a depth of 8 cm.   adequate Endometrial tissue was obtained.    The specimen was placed in formalyn and sent to Pathology for histology evaluation.  THe patient tolerated the procedure well.          ASSESSMENT and PLAN  Postmenopausal bleeding  (primary encounter diagnosis)  Plan: POCT Urine Pregnancy, Tissue Specimen To         Pathology, Obstetrics/Gynecology, Endometrial         biopsy    Vulvovaginitis  Plan: fluconazole (DIFLUCAN) 150 MG Tab      POST ENDOMETRIAL BIOPSY COUNSELING:  Manage post biopsy cramping with NSAIDs or Tylenol.  Expect spotting or light bleeding for a few days.  Report bleeding heavier than a period, fever > 101.0 F, worsening pain or a foul smelling vaginal discharge.    FOLLOW-UP: Pending biopsy results.  To call us in several days to discuss  biopsy results and treatment plan.

## 2019-06-25 ENCOUNTER — TELEPHONE (OUTPATIENT)
Dept: OBSTETRICS AND GYNECOLOGY | Facility: CLINIC | Age: 54
End: 2019-06-25

## 2019-06-25 DIAGNOSIS — N95.0 PMB (POSTMENOPAUSAL BLEEDING): Primary | ICD-10-CM

## 2019-06-25 DIAGNOSIS — N84.0 ENDOMETRIAL POLYP: ICD-10-CM

## 2019-06-25 NOTE — TELEPHONE ENCOUNTER
Patient called back and ask that Dr. Olivier give her a call , she says she missed her call this morning.  Please call patient

## 2019-06-25 NOTE — TELEPHONE ENCOUNTER
Please schedule her for Preop Visits , last week of July. I have scheduled her for Hysteroscopy with D&C for AUg 2

## 2019-07-18 ENCOUNTER — TELEPHONE (OUTPATIENT)
Dept: INTERNAL MEDICINE | Facility: CLINIC | Age: 54
End: 2019-07-18

## 2019-07-18 DIAGNOSIS — E55.9 VITAMIN D DEFICIENCY: ICD-10-CM

## 2019-07-18 DIAGNOSIS — D64.9 ANEMIA, UNSPECIFIED TYPE: ICD-10-CM

## 2019-07-18 DIAGNOSIS — I10 ESSENTIAL HYPERTENSION: ICD-10-CM

## 2019-07-18 DIAGNOSIS — Z00.00 ANNUAL PHYSICAL EXAM: Primary | ICD-10-CM

## 2019-07-18 DIAGNOSIS — E53.8 DISORDER OF VITAMIN B12: ICD-10-CM

## 2019-07-18 NOTE — TELEPHONE ENCOUNTER
I entered all orders including b12 and d because I didn't see those orders still in order tab.    When I went to link orders I found the b12 and d already linked.    So I cancelled my b12 and d duplicate orders.

## 2019-07-18 NOTE — TELEPHONE ENCOUNTER
----- Message from Lorenza Xiao sent at 7/18/2019  9:38 AM CDT -----  Contact: self  or  [phone]  Doctor appointment and lab have been scheduled.  Please link lab orders to the lab appointment.  Date of doctor appointment:  8/5  Date of lab appointment: 7/29  Physical or EP: EP  Comments: Pt has orders for Vitamin D & B12 already linked to  lab appt. Patient also states she may need an A1C

## 2019-07-22 DIAGNOSIS — E66.01 MORBID OBESITY WITH BMI OF 40.0-44.9, ADULT: ICD-10-CM
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
## 2019-07-23 ENCOUNTER — PATIENT MESSAGE (OUTPATIENT)
Dept: BARIATRICS | Facility: CLINIC | Age: 54
End: 2019-07-23

## 2019-07-23 ENCOUNTER — PATIENT MESSAGE (OUTPATIENT)
Dept: PRIMARY CARE CLINIC | Facility: CLINIC | Age: 54
End: 2019-07-23

## 2019-07-23 ENCOUNTER — PATIENT MESSAGE (OUTPATIENT)
Dept: INTERNAL MEDICINE | Facility: CLINIC | Age: 54
End: 2019-07-23

## 2019-07-23 RX ORDER — TOPIRAMATE 100 MG/1
100 TABLET, FILM COATED ORAL 2 TIMES DAILY
Qty: 60 TABLET | Refills: 0 | Status: SHIPPED | OUTPATIENT
Start: 2019-07-23 | End: 2019-07-30

## 2019-07-23 RX ORDER — PHENTERMINE HYDROCHLORIDE 15 MG/1
15 CAPSULE ORAL EVERY MORNING
Qty: 30 CAPSULE | Refills: 0 | OUTPATIENT
Start: 2019-07-23

## 2019-07-23 NOTE — TELEPHONE ENCOUNTER
Unfortunately neither Moshe nor I treat with Adipex.  I have refilled her Topamax.  Please follow up with a new PCP is planned.

## 2019-07-26 ENCOUNTER — PATIENT OUTREACH (OUTPATIENT)
Dept: ADMINISTRATIVE | Facility: OTHER | Age: 54
End: 2019-07-26

## 2019-07-29 ENCOUNTER — OFFICE VISIT (OUTPATIENT)
Dept: OBSTETRICS AND GYNECOLOGY | Facility: CLINIC | Age: 54
End: 2019-07-29
Attending: OBSTETRICS & GYNECOLOGY
Payer: COMMERCIAL

## 2019-07-29 ENCOUNTER — ANESTHESIA EVENT (OUTPATIENT)
Dept: SURGERY | Facility: OTHER | Age: 54
End: 2019-07-29
Payer: COMMERCIAL

## 2019-07-29 ENCOUNTER — HOSPITAL ENCOUNTER (OUTPATIENT)
Dept: PREADMISSION TESTING | Facility: OTHER | Age: 54
Discharge: HOME | End: 2019-07-29
Attending: OBSTETRICS & GYNECOLOGY
Payer: COMMERCIAL

## 2019-07-29 VITALS
WEIGHT: 240 LBS | RESPIRATION RATE: 16 BRPM | BODY MASS INDEX: 38.57 KG/M2 | OXYGEN SATURATION: 98 % | HEIGHT: 66 IN | HEART RATE: 74 BPM | TEMPERATURE: 98 F | SYSTOLIC BLOOD PRESSURE: 130 MMHG | DIASTOLIC BLOOD PRESSURE: 75 MMHG

## 2019-07-29 VITALS
HEIGHT: 65 IN | SYSTOLIC BLOOD PRESSURE: 144 MMHG | BODY MASS INDEX: 40.37 KG/M2 | DIASTOLIC BLOOD PRESSURE: 92 MMHG | WEIGHT: 242.31 LBS

## 2019-07-29 DIAGNOSIS — Z01.818 PREOPERATIVE EXAM FOR GYNECOLOGIC SURGERY: Primary | ICD-10-CM

## 2019-07-29 DIAGNOSIS — N92.4 ABNORMAL PERIMENOPAUSAL BLEEDING: ICD-10-CM

## 2019-07-29 PROCEDURE — 99999 PR PBB SHADOW E&M-EST. PATIENT-LVL III: CPT | Mod: PBBFAC,,, | Performed by: OBSTETRICS & GYNECOLOGY

## 2019-07-29 PROCEDURE — 3008F BODY MASS INDEX DOCD: CPT | Mod: CPTII,S$GLB,, | Performed by: OBSTETRICS & GYNECOLOGY

## 2019-07-29 PROCEDURE — 3078F PR MOST RECENT DIASTOLIC BLOOD PRESSURE < 80 MM HG: ICD-10-PCS | Mod: CPTII,S$GLB,, | Performed by: OBSTETRICS & GYNECOLOGY

## 2019-07-29 PROCEDURE — 3078F DIAST BP <80 MM HG: CPT | Mod: CPTII,S$GLB,, | Performed by: OBSTETRICS & GYNECOLOGY

## 2019-07-29 PROCEDURE — 99213 PR OFFICE/OUTPT VISIT, EST, LEVL III, 20-29 MIN: ICD-10-PCS | Mod: S$GLB,,, | Performed by: OBSTETRICS & GYNECOLOGY

## 2019-07-29 PROCEDURE — 99999 PR PBB SHADOW E&M-EST. PATIENT-LVL III: ICD-10-PCS | Mod: PBBFAC,,, | Performed by: OBSTETRICS & GYNECOLOGY

## 2019-07-29 PROCEDURE — 3008F PR BODY MASS INDEX (BMI) DOCUMENTED: ICD-10-PCS | Mod: CPTII,S$GLB,, | Performed by: OBSTETRICS & GYNECOLOGY

## 2019-07-29 PROCEDURE — 3074F SYST BP LT 130 MM HG: CPT | Mod: CPTII,S$GLB,, | Performed by: OBSTETRICS & GYNECOLOGY

## 2019-07-29 PROCEDURE — 3074F PR MOST RECENT SYSTOLIC BLOOD PRESSURE < 130 MM HG: ICD-10-PCS | Mod: CPTII,S$GLB,, | Performed by: OBSTETRICS & GYNECOLOGY

## 2019-07-29 PROCEDURE — 99213 OFFICE O/P EST LOW 20 MIN: CPT | Mod: S$GLB,,, | Performed by: OBSTETRICS & GYNECOLOGY

## 2019-07-29 RX ORDER — LIDOCAINE HYDROCHLORIDE 10 MG/ML
0.5 INJECTION, SOLUTION EPIDURAL; INFILTRATION; INTRACAUDAL; PERINEURAL ONCE
Status: CANCELLED | OUTPATIENT
Start: 2019-07-29 | End: 2019-07-29

## 2019-07-29 RX ORDER — FLUCONAZOLE 150 MG/1
TABLET ORAL
Refills: 0 | COMMUNITY
Start: 2019-06-13 | End: 2019-07-29 | Stop reason: CLARIF

## 2019-07-29 RX ORDER — SODIUM CHLORIDE, SODIUM LACTATE, POTASSIUM CHLORIDE, CALCIUM CHLORIDE 600; 310; 30; 20 MG/100ML; MG/100ML; MG/100ML; MG/100ML
INJECTION, SOLUTION INTRAVENOUS CONTINUOUS
Status: CANCELLED | OUTPATIENT
Start: 2019-07-29

## 2019-07-29 RX ORDER — ACETAMINOPHEN 500 MG
1000 TABLET ORAL
Status: CANCELLED | OUTPATIENT
Start: 2019-07-29 | End: 2019-07-29

## 2019-07-29 RX ORDER — PREGABALIN 75 MG/1
75 CAPSULE ORAL
Status: CANCELLED | OUTPATIENT
Start: 2019-07-29 | End: 2019-07-29

## 2019-07-29 RX ORDER — FAMOTIDINE 20 MG/1
20 TABLET, FILM COATED ORAL
Status: CANCELLED | OUTPATIENT
Start: 2019-07-29 | End: 2019-07-29

## 2019-07-29 RX ORDER — MIDAZOLAM HYDROCHLORIDE 5 MG/ML
5 INJECTION INTRAMUSCULAR; INTRAVENOUS
Status: CANCELLED | OUTPATIENT
Start: 2019-07-29 | End: 2019-07-29

## 2019-07-29 NOTE — PROGRESS NOTES
C.C Pre-op Exam      HPI : Miesha Domínguez is a 53 y.o. female  female who presents for preop exam for a Hysteroscopy with D&C scheduled on   .     Past Medical History:   Diagnosis Date    Asthma     as a child    HLD (hyperlipidemia)     HTN (hypertension)     Neuropathy     Type II or unspecified type diabetes mellitus without mention of complication, not stated as uncontrolled      Past Surgical History:   Procedure Laterality Date     SECTION      COLONOSCOPY N/A 2018    Performed by Ramez Ramírez MD at [facility] ENDO (4TH FLR)    EGD (ESOPHAGOGASTRODUODENOSCOPY) N/A 2013    Performed by Sami Francis MD at [facility] OR 2ND FLR    GASTRECTOMY, SLEEVE, LAPAROSCOPIC N/A 2013    Performed by Sami Francis MD at [facility] OR 2ND FLR    SLEEVE GASTROPLASTY       Family History   Problem Relation Age of Onset    Cancer Father         d.64 lung -smoker    Hypertension Mother     Diabetes Mother     Ovarian cancer Mother 74    Diabetes Sister     Hypertension Sister     Hypertension Brother     Diabetes Brother     Breast cancer Neg Hx     Colon cancer Neg Hx     Stroke Neg Hx     Heart attack Neg Hx     Heart disease Neg Hx     Heart failure Neg Hx     Hyperlipidemia Neg Hx      OB History        1    Para   1    Term   1            AB        Living   1       SAB        TAB        Ectopic        Multiple        Live Births   1               Review of patient's allergies indicates:  No Known Allergies    Current Outpatient Medications:     amLODIPine (NORVASC) 5 MG tablet, Take 1 tablet (5 mg total) by mouth once daily., Disp: 90 tablet, Rfl: 1    CALCIUM CITRATE/VITAMIN D3 (CALCIUM CITRATE + D ORAL), Take 2 tablets by mouth 2 (two) times daily. Calcium citrate 400m g vit d 500 mg, Disp: , Rfl:     cyanocobalamin, vitamin B-12, (VITAMIN B-12) 2,500 mcg Subl, Place 1 tablet under the tongue once a week. Pt  taking every other week, Disp: , Rfl:     fluconazole (DIFLUCAN) 150 MG Tab, TAKE 1 TABLET (150 MG TOTAL) BY MOUTH ONCE. AND REPEAT IN 3 DAYS IF NOT IMPROVING. FOR 1 DOSE, Disp: , Rfl: 0    hydroCHLOROthiazide (HYDRODIURIL) 25 MG tablet, TAKE ONE TABLET BY MOUTH ONCE DAILY, Disp: 90 tablet, Rfl: 3    levocetirizine (XYZAL) 5 MG tablet, Take 1 tablet (5 mg total) by mouth every evening., Disp: 30 tablet, Rfl: 11    metFORMIN (GLUCOPHAGE) 1000 MG tablet, Take 1 tablet (1,000 mg total) by mouth daily with breakfast., Disp: 90 tablet, Rfl: 3    MULTIVITAMIN ORAL, Take 1 tablet by mouth once daily. Centrum adult chews ( flavor burst) , Disp: , Rfl:     phentermine 15 MG capsule, Take 1 capsule (15 mg total) by mouth every morning., Disp: 30 capsule, Rfl: 0    potassium 99 mg Tab, 3 tablets twice daily, Disp: , Rfl:     rosuvastatin (CRESTOR) 20 MG tablet, Take 1 tablet (20 mg total) by mouth once daily., Disp: 90 tablet, Rfl: 1    topiramate (TOPAMAX) 100 MG tablet, Take 1 tablet (100 mg total) by mouth 2 (two) times daily., Disp: 60 tablet, Rfl: 0  Social History     Socioeconomic History    Marital status:      Spouse name: justin    Number of children: 1    Years of education: Not on file    Highest education level: Not on file   Occupational History    Occupation:      Employer: [employer]   Social Needs    Financial resource strain: Not on file    Food insecurity:     Worry: Not on file     Inability: Not on file    Transportation needs:     Medical: Not on file     Non-medical: Not on file   Tobacco Use    Smoking status: Never Smoker    Smokeless tobacco: Never Used   Substance and Sexual Activity    Alcohol use: Yes     Comment: occassional for Hollidays    Drug use: No    Sexual activity: Yes     Partners: Male     Birth control/protection: None     Comment:  x 28 years: Spouse : Justin   Lifestyle    Physical activity:     Days per week: Not on file      "Minutes per session: Not on file    Stress: Not on file   Relationships    Social connections:     Talks on phone: Not on file     Gets together: Not on file     Attends Congregational service: Not on file     Active member of club or organization: Not on file     Attends meetings of clubs or organizations: Not on file     Relationship status: Not on file   Other Topics Concern    Not on file   Social History Narrative    SOCIAL HISTORY:     , spouse is in good health, .     for [employer],    Daughter,  @[employer], major [employer]; working @ [employer]    + exercise, walk, elliptical, Rebecca       Last pap June 2019: Normal  Last pathology: proliferative endometrium with fragments of polyp  Last ultrasound :  EXAMINATION:  US PELVIS COMP WITH TRANSVAG NON-OB (XPD)    CLINICAL HISTORY:  Postmenopausal bleeding    TECHNIQUE:  Transabdominal sonography of the pelvis was performed, followed by transvaginal sonography to better evaluate the uterus and ovaries.    COMPARISON:  None.    FINDINGS:  There is a smooth marginated anteverted uterus measuring 7.1 x 3.4 x 4.3 cm.  No uterine masses to suggest fibroids.  Endometrial stripe measures 6.1 mm and is within normal limits.    The left ovary measures 2.4 x 1.3 x 2.9 cm.  No cystic or solid masses or abnormal adnexal fluid collections.    The right ovary was not visualized on the current examination.  There is no significant free fluid in the cul-de-sac.      Impression       1. Nonvisualization of the right ovary on this examination.  2. Pelvic ultrasound otherwise is unremarkable.      Electronically signed by: Wellington Carpenter MD  Date: 06/11/2019  Time: 10:00         BP (!) 144/92   Ht 5' 5" (1.651 m)   Wt 109.9 kg (242 lb 4.6 oz)   BMI 40.32 kg/m²     ROS:    GENERAL: Denies weight gain or weight loss. Feeling well overall.   SKIN: Denies rash or lesions.   HEAD: Denies head injury or headache.   NODES: Denies enlarged lymph " nodes.   CHEST: Denies chest pain or shortness of breath.   CARDIOVASCULAR: Denies palpitations or left sided chest pain.   ABDOMEN: No abdominal pain, constipation, diarrhea, nausea, vomiting or rectal bleeding.   URINARY: No frequency, dysuria, hematuria, or burning on urination.  REPRODUCTIVE: See HPI.   BREASTS: The patient performs breast self-examination and denies pain, lumps, or nipple discharge.   HEMATOLOGIC: No easy bruisability or excessive bleeding with the exception of menstrual cycles.  MUSCULOSKELETAL: Denies joint pain or swelling.   NEUROLOGIC: Denies syncope or weakness.   PSYCHIATRIC: Denies depression, anxiety or mood swings.    PHYSICAL EXAM:    APPEARANCE: Well nourished, well developed, in no acute distress.  AFFECT: WNL, alert and oriented x 3  SKIN: No acne or hirsutism  NECK: Neck symmetric without masses or thyromegaly  NODES: No inguinal, cervical, axillary, or femoral lymph node enlargement  CHEST: Good respiratory effect  ABDOMEN: Soft.  No tenderness or masses.  No hepatosplenomegaly.  No hernias.  PELVIC: Normal external genitalia without lesions.  Normal hair distribution.  Adequate perineal body, normal urethral meatus.  Vagina moist and well rugated without lesions or discharge.  Cervix pink, without lesions, discharge or tenderness.  No significant cystocele or rectocele.  Bimanual exam shows uterus to be normal size, regular, mobile and nontender.  Adnexa without masses or tenderness.    EXTREMITIES: No edema.    ASSESSMENT & PLAN:    Miesha was seen today for pre-op exam and postmenopausal bleeding.    Diagnoses and all orders for this visit:    Preoperative exam for gynecologic surgery  -     Vital signs; Standing  -     Chlorhexidine (CHG) 2% Wipes; Standing  -     Notify Physician - Potential Need of Opioid Reversal; Standing  -     Chlorohexidine Gluconate Bath; Standing  -     Full code; Standing  -     Place in Outpatient; Standing  -     POCT glucose; Standing  -      Diet clear liquid; Standing  -     Place DANIEL hose; Standing  -     Place sequential compression device; Standing    Abnormal perimenopausal bleeding  -     Vital signs; Standing  -     Chlorhexidine (CHG) 2% Wipes; Standing  -     Notify Physician - Potential Need of Opioid Reversal; Standing  -     Chlorohexidine Gluconate Bath; Standing  -     Full code; Standing  -     Place in Outpatient; Standing  -     POCT glucose; Standing  -     Diet clear liquid; Standing  -     Place DANIEL hose; Standing  -     Place sequential compression device; Standing    Other orders  -     IP VTE LOW RISK PATIENT; Standing  -     Ambulate; Standing         I have discussed the risks, benefits, indications, and alternatives of the procedure in detail.  The patient verbalizes her understanding.  All questions answered.  Consents signed.  The patient agrees to proceed to proceed as planned.

## 2019-07-29 NOTE — ANESTHESIA PREPROCEDURE EVALUATION
Past Medical History:   Diagnosis Date    Asthma     as a child    HLD (hyperlipidemia)     HTN (hypertension)     Neuropathy     Type II or unspecified type diabetes mellitus without mention of complication, not stated as uncontrolled      Past Surgical History:   Procedure Laterality Date     SECTION      COLONOSCOPY N/A 2018    Performed by Ramez aRmírez MD at [facility] ENDO (4TH FLR)    EGD (ESOPHAGOGASTRODUODENOSCOPY) N/A 2013    Performed by Sami Francis MD at [facility] OR 2ND FLR    GASTRECTOMY, SLEEVE, LAPAROSCOPIC N/A 2013    Performed by Sami Francis MD at [facility] OR 2ND FLR    SLEEVE GASTROPLASTY       Patient Active Problem List   Diagnosis    HTN (hypertension)    HLD (hyperlipidemia)    Morbid obesity    Carpal tunnel syndrome    Diabetic peripheral neuropathy    Chronic allergic rhinitis    Uncontrolled type 2 diabetes mellitus with diabetic neuropathy, without long-term current use of insulin    Hypokalemia    Thiamine deficiency    Vitamin D deficiency    Heart palpitations    Infected insect bite of left leg    Encounter for screening colonoscopy     2D Echo:  No results found for this or any previous visit.    Please See ROS/PMH and Active Problem List above                                                                                                              2019  Miesha Domínguez is a 53 y.o., female.    Anesthesia Evaluation    I have reviewed the Patient Summary Reports.    I have reviewed the Nursing Notes.   I have reviewed the Medications.     Review of Systems  Anesthesia Hx:  No problems with previous Anesthesia Denies Hx of Anesthetic complications  Neg history of prior surgery. Denies Family Hx of Anesthesia complications.   Denies Personal Hx of Anesthesia complications.   Social:  Non-Smoker, Social Alcohol Use    Hematology/Oncology:  Hematology Normal   Oncology Normal     EENT/Dental:   chronic  allergic rhinitis   Cardiovascular:   Exercise tolerance: good Hypertension hyperlipidemia ECG has been reviewed. H/O palpitations.   Pulmonary:   Asthma asymptomatic Asthma as a child   Renal/:  Renal/ Normal     Hepatic/GI:  Hepatic/GI Normal    Musculoskeletal:  Musculoskeletal Normal    Neurological:   Neuromuscular Disease, CTS  Peripheral Neuropathy    Endocrine:   Diabetes, poorly controlled, type 2    Dermatological:  Skin Normal    Psych:  Psychiatric Normal           Physical Exam  General:  Morbid Obesity    Airway/Jaw/Neck:  Airway Findings: Mouth Opening: Normal Tongue: Normal  General Airway Assessment: Adult  Mallampati: III  Improves to II with phonation.  TM Distance: Normal, at least 6 cm  Jaw/Neck Findings:     Neck ROM: Normal ROM     Eyes/Ears/Nose:  EYES/EARS/NOSE FINDINGS: Normal   Dental:  Dental Findings: In tact   Chest/Lungs:  Chest/Lungs Findings: Clear to auscultation, Normal Respiratory Rate     Heart/Vascular:  Heart Findings: Rate: Normal  Rhythm: Regular Rhythm  Sounds: Normal  Heart murmur: negative Vascular Findings: Normal    Abdomen:  Abdomen Findings:  Soft, Nontender, Normal     Musculoskeletal:  Musculoskeletal Findings: Normal   Skin:  Skin Findings: Normal    Mental Status:  Mental Status Findings:  Cooperative, Alert and Oriented         Anesthesia Plan  Type of Anesthesia, risks & benefits discussed:  Anesthesia Type:  general  Patient's Preference:   Intra-op Monitoring Plan: standard ASA monitors  Intra-op Monitoring Plan Comments:   Post Op Pain Control Plan: per primary service following discharge from PACU  Post Op Pain Control Plan Comments:   Induction:   IV  Beta Blocker:  Patient is not currently on a Beta-Blocker (No further documentation required).       Informed Consent: Patient understands risks and agrees with Anesthesia plan.  Questions answered. Anesthesia consent signed with patient.  ASA Score: 3     Day of Surgery Review of History & Physical:    H&P  update referred to the surgeon.     Anesthesia Plan Notes: CBC and BMP from today are in Epic.        Ready For Surgery From Anesthesia Perspective.

## 2019-07-29 NOTE — DISCHARGE INSTRUCTIONS
PRE-ADMIT TESTING -  [phone]    [address]  [place] [facility]          Your surgery has been scheduled at [facility]. We are pleased to have the opportunity to serve you. For Further Information please call [phone].    On the day of surgery please report to the Information Desk on the 1st floor.    · CONTACT YOUR PHYSICIAN'S OFFICE THE DAY PRIOR TO YOUR SURGERY TO OBTAIN YOUR ARRIVAL TIME.     · The evening before surgery do not eat anything after 9 p.m. ( this includes hard candy, chewing gum and mints).  You may only have GATORADE, POWERADE AND WATER  from 9 p.m. until you leave your home.   DO NOT DRINK ANY LIQUIDS ON THE WAY TO THE HOSPITAL.      SPECIAL MEDICATION INSTRUCTIONS: TAKE medications checked off by the Anesthesiologist on your Medication List.    Angiogram Patients: Take medications as instructed by your physician, including aspirin.     Surgery Patients:    If you take ASPIRIN - Your PHYSICIAN/SURGEON will need to inform you IF/OR when you need to stop taking aspirin prior to your surgery.     Do Not take any medications containing IBUPROFEN.  Do Not Wear any make-up or dark nail polish   (especially eye make-up) to surgery. If you come to surgery with makeup on you will be required to remove the makeup or nail polish.    Do not shave your surgical area at least 5 days prior to your surgery. The surgical prep will be performed at the hospital according to Infection Control regulations.    Leave all valuables at home.   Do Not wear any jewelry or watches, including any metal in body piercings. Jewelry must be removed prior to coming to the hospital.  There is a possibility that rings that are unable to be removed may be cut off if they are on the surgical extremity.    Contact Lens must be removed before surgery. Either do not wear the contact lens or bring a case and solution for storage.  Please bring a container for eyeglasses or dentures as required.  Bring  any paperwork your physician has provided, such as consent forms,  history and physicals, doctor's orders, etc.   Bring comfortable clothes that are loose fitting to wear upon discharge. Take into consideration the type of surgery being performed.  Maintain your diet as advised per your physician the day prior to surgery.      Adequate rest the night before surgery is advised.   Park in the Parking lot behind the hospital or in the [facility] across the street from the parking lot. Parking is complimentary.  If you will be discharged the same day as your procedure, please arrange for a responsible adult to drive you home or to accompany you if traveling by taxi.   YOU WILL NOT BE PERMITTED TO DRIVE OR TO LEAVE THE HOSPITAL ALONE AFTER SURGERY.   It is strongly recommended that you arrange for someone to remain with you for the first 24 hrs following your surgery.    The Surgeon will speak to your family/visitor after your surgery regarding the outcome of your surgery and post op care.  The Surgeon may speak to you after your surgery, but there is a possibility you may not remember the details.  Please check with your family members regarding the conversation with the Surgeon.      Thank you for your cooperation.  The Staff of [facility].                Bathing Instructions with Hibiclens     Shower the evening before and morning of your procedure with Hibiclens:   Wash your face with water and your regular face wash/soap   Apply Hibiclens directly on your skin or on a wet washcloth and wash gently. When showering: Move away from the shower stream when applying Hibiclens to avoid rinsing off too soon.   Rinse thoroughly with warm water   Do not dilute Hibiclens         Dry off as usual, do not use any deodorant, powder, body lotions, perfume, after shave or cologne.

## 2019-07-29 NOTE — H&P (VIEW-ONLY)
C.C Pre-op Exam      HPI : Miesha Domínguez is a 53 y.o. female  female who presents for preop exam for a Hysteroscopy with D&C scheduled on  .     Past Medical History:   Diagnosis Date    Asthma     as a child    HLD (hyperlipidemia)     HTN (hypertension)     Neuropathy     Type II or unspecified type diabetes mellitus without mention of complication, not stated as uncontrolled      Past Surgical History:   Procedure Laterality Date     SECTION      COLONOSCOPY N/A 2018    Performed by Ramez Ramírez MD at [facility] ENDO (4TH FLR)    EGD (ESOPHAGOGASTRODUODENOSCOPY) N/A 2013    Performed by Sami Francis MD at [facility] OR 2ND FLR    GASTRECTOMY, SLEEVE, LAPAROSCOPIC N/A 2013    Performed by Sami Francis MD at [facility] OR 2ND FLR    SLEEVE GASTROPLASTY       Family History   Problem Relation Age of Onset    Cancer Father         d.64 lung -smoker    Hypertension Mother     Diabetes Mother     Ovarian cancer Mother 74    Diabetes Sister     Hypertension Sister     Hypertension Brother     Diabetes Brother     Breast cancer Neg Hx     Colon cancer Neg Hx     Stroke Neg Hx     Heart attack Neg Hx     Heart disease Neg Hx     Heart failure Neg Hx     Hyperlipidemia Neg Hx      OB History        1    Para   1    Term   1            AB        Living   1       SAB        TAB        Ectopic        Multiple        Live Births   1               Review of patient's allergies indicates:  No Known Allergies    Current Outpatient Medications:     amLODIPine (NORVASC) 5 MG tablet, Take 1 tablet (5 mg total) by mouth once daily., Disp: 90 tablet, Rfl: 1    CALCIUM CITRATE/VITAMIN D3 (CALCIUM CITRATE + D ORAL), Take 2 tablets by mouth 2 (two) times daily. Calcium citrate 400m g vit d 500 mg, Disp: , Rfl:     cyanocobalamin, vitamin B-12, (VITAMIN B-12) 2,500 mcg Subl, Place 1 tablet under the tongue once a week. Pt taking  every other week, Disp: , Rfl:     fluconazole (DIFLUCAN) 150 MG Tab, TAKE 1 TABLET (150 MG TOTAL) BY MOUTH ONCE. AND REPEAT IN 3 DAYS IF NOT IMPROVING. FOR 1 DOSE, Disp: , Rfl: 0    hydroCHLOROthiazide (HYDRODIURIL) 25 MG tablet, TAKE ONE TABLET BY MOUTH ONCE DAILY, Disp: 90 tablet, Rfl: 3    levocetirizine (XYZAL) 5 MG tablet, Take 1 tablet (5 mg total) by mouth every evening., Disp: 30 tablet, Rfl: 11    metFORMIN (GLUCOPHAGE) 1000 MG tablet, Take 1 tablet (1,000 mg total) by mouth daily with breakfast., Disp: 90 tablet, Rfl: 3    MULTIVITAMIN ORAL, Take 1 tablet by mouth once daily. Centrum adult chews ( flavor burst) , Disp: , Rfl:     phentermine 15 MG capsule, Take 1 capsule (15 mg total) by mouth every morning., Disp: 30 capsule, Rfl: 0    potassium 99 mg Tab, 3 tablets twice daily, Disp: , Rfl:     rosuvastatin (CRESTOR) 20 MG tablet, Take 1 tablet (20 mg total) by mouth once daily., Disp: 90 tablet, Rfl: 1    topiramate (TOPAMAX) 100 MG tablet, Take 1 tablet (100 mg total) by mouth 2 (two) times daily., Disp: 60 tablet, Rfl: 0  Social History     Socioeconomic History    Marital status:      Spouse name: justin    Number of children: 1    Years of education: Not on file    Highest education level: Not on file   Occupational History    Occupation:      Employer: [employer]   Social Needs    Financial resource strain: Not on file    Food insecurity:     Worry: Not on file     Inability: Not on file    Transportation needs:     Medical: Not on file     Non-medical: Not on file   Tobacco Use    Smoking status: Never Smoker    Smokeless tobacco: Never Used   Substance and Sexual Activity    Alcohol use: Yes     Comment: occassional for Hollidays    Drug use: No    Sexual activity: Yes     Partners: Male     Birth control/protection: None     Comment:  x 28 years: Spouse : Justin   Lifestyle    Physical activity:     Days per week: Not on file     Minutes  "per session: Not on file    Stress: Not on file   Relationships    Social connections:     Talks on phone: Not on file     Gets together: Not on file     Attends Yarsani service: Not on file     Active member of club or organization: Not on file     Attends meetings of clubs or organizations: Not on file     Relationship status: Not on file   Other Topics Concern    Not on file   Social History Narrative    SOCIAL HISTORY:     , spouse is in good health, .     for [employer],    Daughter,  @[email or web address], major Hotel [employer]; working @ [employer]    + exercise, walk, elliptical, Rebecca       Last pap Janell 3,2019: Normal, Neg HR HPV    Last pathology 6/13/2019:FINAL PATHOLOGIC DIAGNOSIS  Endometrium, biopsy:  - Weakly proliferative endometrium with focal features suggestive endometrial polyp .  - Negative for atypia and malignancy  Last ultrasound   EXAMINATION:  US PELVIS COMP WITH TRANSVAG NON-OB (XPD)    CLINICAL HISTORY:  Postmenopausal bleeding    TECHNIQUE:  Transabdominal sonography of the pelvis was performed, followed by transvaginal sonography to better evaluate the uterus and ovaries.    COMPARISON:  None.    FINDINGS:  There is a smooth marginated anteverted uterus measuring 7.1 x 3.4 x 4.3 cm.  No uterine masses to suggest fibroids.  Endometrial stripe measures 6.1 mm and is within normal limits.    The left ovary measures 2.4 x 1.3 x 2.9 cm.  No cystic or solid masses or abnormal adnexal fluid collections.    The right ovary was not visualized on the current examination.  There is no significant free fluid in the cul-de-sac.      Impression       1. Nonvisualization of the right ovary on this examination.  2. Pelvic ultrasound otherwise is unremarkable.           BP (!) 144/92   Ht 5' 5" (1.651 m)   Wt 109.9 kg (242 lb 4.6 oz)   BMI 40.32 kg/m²     ROS:    GENERAL: Denies weight gain or weight loss. Feeling well overall.   SKIN: Denies rash or lesions.   HEAD: " Denies head injury or headache.   NODES: Denies enlarged lymph nodes.   CHEST: Denies chest pain or shortness of breath.   CARDIOVASCULAR: Denies palpitations or left sided chest pain.   ABDOMEN: No abdominal pain, constipation, diarrhea, nausea, vomiting or rectal bleeding.   URINARY: No frequency, dysuria, hematuria, or burning on urination.  REPRODUCTIVE: See HPI.   BREASTS: The patient performs breast self-examination and denies pain, lumps, or nipple discharge.   HEMATOLOGIC: No easy bruisability or excessive bleeding with the exception of menstrual cycles.  MUSCULOSKELETAL: Denies joint pain or swelling.   NEUROLOGIC: Denies syncope or weakness.   PSYCHIATRIC: Denies depression, anxiety or mood swings.    PHYSICAL EXAM:    APPEARANCE: Well nourished, well developed, in no acute distress.  AFFECT: WNL, alert and oriented x 3  SKIN: No acne or hirsutism  NECK: Neck symmetric without masses or thyromegaly  NODES: No inguinal, cervical, axillary, or femoral lymph node enlargement  CHEST: Good respiratory effect  ABDOMEN: Soft.  No tenderness or masses.  No hepatosplenomegaly.  No hernias.  PELVIC: Normal external genitalia without lesions.  Normal hair distribution.  Adequate perineal body, normal urethral meatus.  Vagina moist and well rugated without lesions or discharge.  Cervix pink, without lesions, discharge or tenderness.  No significant cystocele or rectocele.  Bimanual exam shows uterus to be normal size, regular, mobile and nontender.  Adnexa without masses or tenderness.    EXTREMITIES: No edema.    ASSESSMENT & PLAN:    Miesha was seen today for pre-op exam and postmenopausal bleeding.    Diagnoses and all orders for this visit:    Preoperative exam for gynecologic surgery    Abnormal perimenopausal bleeding         I have discussed the risks, benefits, indications, and alternatives of the procedure in detail.  The patient verbalizes her understanding.  All questions answered.  Consents signed.  The  patient agrees to proceed to proceed as planned.

## 2019-07-29 NOTE — H&P
C.C Pre-op Exam      HPI : Miesha Domínguez is a 53 y.o. female  female who presents for preop exam for a Hysteroscopy with D&C scheduled on  .     Past Medical History:   Diagnosis Date    Asthma     as a child    HLD (hyperlipidemia)     HTN (hypertension)     Neuropathy     Type II or unspecified type diabetes mellitus without mention of complication, not stated as uncontrolled      Past Surgical History:   Procedure Laterality Date     SECTION      COLONOSCOPY N/A 2018    Performed by Ramez Ramírez MD at [facility] ENDO (4TH FLR)    EGD (ESOPHAGOGASTRODUODENOSCOPY) N/A 2013    Performed by Sami Francis MD at [facility] OR 2ND FLR    GASTRECTOMY, SLEEVE, LAPAROSCOPIC N/A 2013    Performed by Sami Francis MD at [facility] OR 2ND FLR    SLEEVE GASTROPLASTY       Family History   Problem Relation Age of Onset    Cancer Father         d.64 lung -smoker    Hypertension Mother     Diabetes Mother     Ovarian cancer Mother 74    Diabetes Sister     Hypertension Sister     Hypertension Brother     Diabetes Brother     Breast cancer Neg Hx     Colon cancer Neg Hx     Stroke Neg Hx     Heart attack Neg Hx     Heart disease Neg Hx     Heart failure Neg Hx     Hyperlipidemia Neg Hx      OB History        1    Para   1    Term   1            AB        Living   1       SAB        TAB        Ectopic        Multiple        Live Births   1               Review of patient's allergies indicates:  No Known Allergies    Current Outpatient Medications:     amLODIPine (NORVASC) 5 MG tablet, Take 1 tablet (5 mg total) by mouth once daily., Disp: 90 tablet, Rfl: 1    CALCIUM CITRATE/VITAMIN D3 (CALCIUM CITRATE + D ORAL), Take 2 tablets by mouth 2 (two) times daily. Calcium citrate 400m g vit d 500 mg, Disp: , Rfl:     cyanocobalamin, vitamin B-12, (VITAMIN B-12) 2,500 mcg Subl, Place 1 tablet under the tongue once a week. Pt taking  every other week, Disp: , Rfl:     fluconazole (DIFLUCAN) 150 MG Tab, TAKE 1 TABLET (150 MG TOTAL) BY MOUTH ONCE. AND REPEAT IN 3 DAYS IF NOT IMPROVING. FOR 1 DOSE, Disp: , Rfl: 0    hydroCHLOROthiazide (HYDRODIURIL) 25 MG tablet, TAKE ONE TABLET BY MOUTH ONCE DAILY, Disp: 90 tablet, Rfl: 3    levocetirizine (XYZAL) 5 MG tablet, Take 1 tablet (5 mg total) by mouth every evening., Disp: 30 tablet, Rfl: 11    metFORMIN (GLUCOPHAGE) 1000 MG tablet, Take 1 tablet (1,000 mg total) by mouth daily with breakfast., Disp: 90 tablet, Rfl: 3    MULTIVITAMIN ORAL, Take 1 tablet by mouth once daily. Centrum adult chews ( flavor burst) , Disp: , Rfl:     phentermine 15 MG capsule, Take 1 capsule (15 mg total) by mouth every morning., Disp: 30 capsule, Rfl: 0    potassium 99 mg Tab, 3 tablets twice daily, Disp: , Rfl:     rosuvastatin (CRESTOR) 20 MG tablet, Take 1 tablet (20 mg total) by mouth once daily., Disp: 90 tablet, Rfl: 1    topiramate (TOPAMAX) 100 MG tablet, Take 1 tablet (100 mg total) by mouth 2 (two) times daily., Disp: 60 tablet, Rfl: 0  Social History     Socioeconomic History    Marital status:      Spouse name: justin    Number of children: 1    Years of education: Not on file    Highest education level: Not on file   Occupational History    Occupation:      Employer: [employer]   Social Needs    Financial resource strain: Not on file    Food insecurity:     Worry: Not on file     Inability: Not on file    Transportation needs:     Medical: Not on file     Non-medical: Not on file   Tobacco Use    Smoking status: Never Smoker    Smokeless tobacco: Never Used   Substance and Sexual Activity    Alcohol use: Yes     Comment: occassional for Hollidays    Drug use: No    Sexual activity: Yes     Partners: Male     Birth control/protection: None     Comment:  x 28 years: Spouse : Justin   Lifestyle    Physical activity:     Days per week: Not on file     Minutes  "per session: Not on file    Stress: Not on file   Relationships    Social connections:     Talks on phone: Not on file     Gets together: Not on file     Attends Buddhist service: Not on file     Active member of club or organization: Not on file     Attends meetings of clubs or organizations: Not on file     Relationship status: Not on file   Other Topics Concern    Not on file   Social History Narrative    SOCIAL HISTORY:     , spouse is in good health, .     for [employer],    Daughter,  @[employer], major Hotel [employer]ism; working @ [employer]    + exercise, walk, elliptical, Rebecca       Last pap Janell 3,2019: Normal, Neg HR HPV    Last pathology 6/13/2019:FINAL PATHOLOGIC DIAGNOSIS  Endometrium, biopsy:  - Weakly proliferative endometrium with focal features suggestive endometrial polyp .  - Negative for atypia and malignancy  Last ultrasound   EXAMINATION:  US PELVIS COMP WITH TRANSVAG NON-OB (XPD)    CLINICAL HISTORY:  Postmenopausal bleeding    TECHNIQUE:  Transabdominal sonography of the pelvis was performed, followed by transvaginal sonography to better evaluate the uterus and ovaries.    COMPARISON:  None.    FINDINGS:  There is a smooth marginated anteverted uterus measuring 7.1 x 3.4 x 4.3 cm.  No uterine masses to suggest fibroids.  Endometrial stripe measures 6.1 mm and is within normal limits.    The left ovary measures 2.4 x 1.3 x 2.9 cm.  No cystic or solid masses or abnormal adnexal fluid collections.    The right ovary was not visualized on the current examination.  There is no significant free fluid in the cul-de-sac.      Impression       1. Nonvisualization of the right ovary on this examination.  2. Pelvic ultrasound otherwise is unremarkable.           BP (!) 144/92   Ht 5' 5" (1.651 m)   Wt 109.9 kg (242 lb 4.6 oz)   BMI 40.32 kg/m²     ROS:    GENERAL: Denies weight gain or weight loss. Feeling well overall.   SKIN: Denies rash or lesions.   HEAD: " Denies head injury or headache.   NODES: Denies enlarged lymph nodes.   CHEST: Denies chest pain or shortness of breath.   CARDIOVASCULAR: Denies palpitations or left sided chest pain.   ABDOMEN: No abdominal pain, constipation, diarrhea, nausea, vomiting or rectal bleeding.   URINARY: No frequency, dysuria, hematuria, or burning on urination.  REPRODUCTIVE: See HPI.   BREASTS: The patient performs breast self-examination and denies pain, lumps, or nipple discharge.   HEMATOLOGIC: No easy bruisability or excessive bleeding with the exception of menstrual cycles.  MUSCULOSKELETAL: Denies joint pain or swelling.   NEUROLOGIC: Denies syncope or weakness.   PSYCHIATRIC: Denies depression, anxiety or mood swings.    PHYSICAL EXAM:    APPEARANCE: Well nourished, well developed, in no acute distress.  AFFECT: WNL, alert and oriented x 3  SKIN: No acne or hirsutism  NECK: Neck symmetric without masses or thyromegaly  NODES: No inguinal, cervical, axillary, or femoral lymph node enlargement  CHEST: Good respiratory effect  ABDOMEN: Soft.  No tenderness or masses.  No hepatosplenomegaly.  No hernias.  PELVIC: Normal external genitalia without lesions.  Normal hair distribution.  Adequate perineal body, normal urethral meatus.  Vagina moist and well rugated without lesions or discharge.  Cervix pink, without lesions, discharge or tenderness.  No significant cystocele or rectocele.  Bimanual exam shows uterus to be normal size, regular, mobile and nontender.  Adnexa without masses or tenderness.    EXTREMITIES: No edema.    ASSESSMENT & PLAN:    Miesha was seen today for pre-op exam and postmenopausal bleeding.    Diagnoses and all orders for this visit:    Preoperative exam for gynecologic surgery    Abnormal perimenopausal bleeding         I have discussed the risks, benefits, indications, and alternatives of the procedure in detail.  The patient verbalizes her understanding.  All questions answered.  Consents signed.  The  patient agrees to proceed to proceed as planned.

## 2019-07-30 ENCOUNTER — TELEPHONE (OUTPATIENT)
Dept: INTERNAL MEDICINE | Facility: CLINIC | Age: 54
End: 2019-07-30

## 2019-07-30 ENCOUNTER — OFFICE VISIT (OUTPATIENT)
Dept: BARIATRICS | Facility: CLINIC | Age: 54
End: 2019-07-30
Payer: COMMERCIAL

## 2019-07-30 VITALS
BODY MASS INDEX: 38.83 KG/M2 | DIASTOLIC BLOOD PRESSURE: 84 MMHG | HEART RATE: 84 BPM | SYSTOLIC BLOOD PRESSURE: 118 MMHG | WEIGHT: 241.63 LBS | HEIGHT: 66 IN

## 2019-07-30 DIAGNOSIS — E66.01 CLASS 2 SEVERE OBESITY WITH BODY MASS INDEX (BMI) OF 35 TO 39.9 WITH SERIOUS COMORBIDITY: Primary | ICD-10-CM

## 2019-07-30 DIAGNOSIS — E66.01 MORBID OBESITY WITH BMI OF 40.0-44.9, ADULT: ICD-10-CM

## 2019-07-30 DIAGNOSIS — Z98.84 S/P LAPAROSCOPIC SLEEVE GASTRECTOMY: ICD-10-CM

## 2019-07-30 PROCEDURE — 99999 PR PBB SHADOW E&M-EST. PATIENT-LVL III: ICD-10-PCS | Mod: PBBFAC,,, | Performed by: INTERNAL MEDICINE

## 2019-07-30 PROCEDURE — 99244 OFF/OP CNSLTJ NEW/EST MOD 40: CPT | Mod: S$GLB,,, | Performed by: INTERNAL MEDICINE

## 2019-07-30 PROCEDURE — 99244 PR OFFICE CONSULTATION,LEVEL IV: ICD-10-PCS | Mod: S$GLB,,, | Performed by: INTERNAL MEDICINE

## 2019-07-30 PROCEDURE — 99999 PR PBB SHADOW E&M-EST. PATIENT-LVL III: CPT | Mod: PBBFAC,,, | Performed by: INTERNAL MEDICINE

## 2019-07-30 RX ORDER — TOPIRAMATE 100 MG/1
100 TABLET, FILM COATED ORAL NIGHTLY
Qty: 90 TABLET | Refills: 1 | Status: SHIPPED | OUTPATIENT
Start: 2019-07-30 | End: 2019-11-19 | Stop reason: SDUPTHER

## 2019-07-30 RX ORDER — TOPIRAMATE 100 MG/1
100 TABLET, FILM COATED ORAL NIGHTLY
Qty: 90 TABLET | Refills: 1 | Status: SHIPPED | OUTPATIENT
Start: 2019-07-30 | End: 2019-07-30 | Stop reason: SDUPTHER

## 2019-07-30 NOTE — PATIENT INSTRUCTIONS
Start Ozempic once a week. Start with 0.25mg once a week x 4 weeks, then 0.5 mg weekly.     Decrease portions as soon as you start Ozempic. Some nausea in the first 2 weeks is not unusual.     If you get pain across the upper abdomen and around to your back, please call the office.     - To lose weight you want to cut 100% starchy carbohydrates out of your diet (bread, rice, pasta, potatoes, granola, flour, corn, peas, oatmeal, grits, tortillas, crackers, chips) and get  grams of protein.  Aim for 100 grams of protein and 1000 calories daily.    - thrdPlace Eliel (code 22108)      - No soda, sweet tea, juices, sports drinks or lemonade     -Exercise daily. Continue.     - Premier Protein (Chocolate, Bananas & Cream, Strawberries & Cream, Vanilla) Jeremiah or [employer]    - Syntrax [place] from [employer], www.bariatricadGamer Guidesage.com, www.bariatricchoice.com. (LACTOSE FREE)    - Silver Peak Systemsro - Amazon.com (LACTOSE FREE)    - Veggetti Pro from [employer], [employer], [employer] Beyond    - www.pinterest.com (cauliflower, cloud bread, quest bar cookies, eggplant, zucchini, zucchini noodles, crustless quiche, no carb meals, taco lettuce boats)    - http://thegildaingthomasgface.INFUSD.com/    5-Day Menu Plan: 800-1000 Calories    DAY 1     Breakfast  4 slices deli turkey  Small apple    Snack  ¼ cup almonds    Lunch  Lean hamburger tash  1 cup green beans    Dinner  2 skinless chicken thighs  1 cup cooked carrots    Snack  SF jello    DAY 2    Breakfast  2 eggs with 2 tbsp salsa    Snack  2 slices deli turkey  ½ cup Peaches canned (in its own juice)    Lunch  [place]: Deli chicken and mustard   Pear cup (no sugar added)    Dinner  Baked fish  1 cup cooked yellow squash    Snack  SF popsicle            DAY 3    Breakfast   Protein drink: 1 scoop whey powder + 6oz soy milk    Snack  ¼ cup almonds    Lunch  Tuna Salad: 3oz canned tuna in water, 1 egg, and 1 tsp light finn)  Pineapple cup (no sugar added)    Dinner  Baked chicken  breast  1 cup grilled eggplant    Snack  8oz Chocolate Soy Milk      DAY 4    Breakfast  2 eggs, turkey sausage tash    Snack  4 slices deli turkey    Lunch    Snack  1/4 cup almonds  Peach cup (no sugar added)    Dinner  3oz baked pork chop  1 cup cooked green beans        DAY 5    Breakfast  Protein drink: 1 scoop whey powder + 6oz soy milk    Snack   ¼ cup almonds  1 apple    Lunch  1 cup Chicken salad: (3oz canned chicken in water, 1 egg, 1 tsp light finn)    Snack  4 slices deli turkey  Fruit cup (no sugar added)    Dinner  Omelet: 2 eggs, grilled shrimp, bell pepper and onion        Meal Ideas for Regular Bariatric Diet  *Recipes and products available at www.bariatriceating.com      Breakfast: (15-20g protein)    - Egg white omelet: 2 egg whites or ½ cup Egg Beaters. (Optional proteins: cheese, shrimp, black beans, chicken, sliced turkey) (Optional veggies: tomatoes, salsa, spinach, mushrooms, onions, green peppers, or small slice avocado)     - Egg and sausage: 1 egg or ¼ cup Egg Beaters (any variety), with 1 judy or 2 links of Turkey sausage or Veggie breakfast sausage (Wicron or [employer])    - Crust-less breakfast quiche: To make a glass pie dish, mix 4oz part skim Ricotta, 1 cup skim milk, and 2 eggs as your base. Add protein: shredded cheese, sliced lean ham or turkey, turkey mcgraw/sausage. Add veggies: tomato, onion, green onion, mushroom, green pepper, spinach, etc.    - Yogurt parfait: Mix 1 - 6oz container Dannon Light N Fit vanilla yogurt, with ¼ cup Kashi Go Lean cereal    - Cottage cheese and fruit: ½ cup part-skim cottage cheese or ricotta cheese topped with fresh fruit or sugar free preserves     - Tawana Kiser's Vanilla Egg custard* (add 2 Tbsp instant coffee granules to make Cappuccino Custard*)    - Hi-Protein café latte (skim milk, decaf coffee, 1 scoop protein powder). Optional to add Sugar free syrup or extract flavoring.    Lunch: (20-30g protein)    - ½ cup Black bean soup  (Homemade or Progresso), with ¼ cup shredded low-fat cheese. Top with chopped tomato or fresh salsa.     - Lean deli turkey breast and low-fat sliced cheese, mustard or light finn to moisten, rolled up together, or wrapped in a Humza lettuce leaf    - Chicken salad made from dinner leftovers, moisten with low-fat salad dressing or light finn. Also try leftover salmon, shrimp, tuna or boiled eggs. Serve ½ cup over dark green salad    - Fat-free canned refried beans, topped with ¼ cup shredded low-fat cheese. Top with chopped tomato or fresh salsa.     - Greek salad: Top mixed greens with 1-2oz grilled chicken, tomatoes, red onions, 2-3 kalamata olives, and sprinkle lightly with feta cheese. Spritz with Balsamic vinegar to taste.     - Crust-less lunch quiche: To make a glass pie dish, mix 4oz part skim Ricotta, 1 cup skim milk, and 2 eggs as your base. Add protein: shredded cheese, sliced lean ham or turkey, shrimp, chicken. Add veggies: tomato, onion, green onion, mushroom, green pepper, spinach, artichoke, broccoli, etc.    - Pizza bake: tomato sauce, low-fat shredded mozzarella and turkey pepperoni or Marshallese mcgraw. Add any veggies.    - Cucumber crab bites: Spread ¼ cup crab dip (lump crabmeat + light cream cheese and green onions) over sliced cucumber.     - Chicken with light spinach and artichoke dip*: Puree in : 6oz cooked and drained spinach, 2 cloves garlic, 1 can cannelloni beans, ½ cup chopped green onions, 1 can drained artichoke hearts (not marinated in oil), lemon juice and basil. Mix in 2oz chopped up chicken.    Supper: (20-30g protein)    - Serve grilled fish over dark green salad tossed with low-fat dressing, served with grilled asparagus rawls     - Rotisserie chicken salad: served with sliced strawberries, walnuts, fat-free feta cheese crumbles and 1 tbsp Oneals Own Light Raspberry [place] Vinaigrette    - Shrimp cocktail: Dip cold boiled shrimp in homemade low-sugar cocktail  sauce (1/2 cup Gavi One Carb ketchup, 2 tbsp horseradish, 1/4 tsp hot sauce, 1 tsp Worcestershire sauce, 1 tbsp freshly-squeezed lemon juice). Serve with dark green salad, walnuts, and crumbled blue cheese drizzled with olive oil and Balsamic vinegar    - Tuna Melt: Spread tuna salad onto 2 thick slices of tomato. Top with low-fat cheese and broil until cheese is melted. May also be made with chicken salad of shrimp salad. [place] with different types of cheeses.    - Homemade low-fat Chili using extra lean ground beef or ground turkey. Top with shredded cheese and salsa as desired. May add dollop fat-free sour cream if desired    - Dinner Omelet with shrimp or chicken and onion, green peppers and chives.    - No noodle lasagna: Use sliced zucchini or eggplant in place of noodles.  Layer with part skim ricotta cheese and low sugar meat sauce (use very lean ground beef or ground turkey).    - Mexican chicken bake: Bake chunks of chicken breast or thigh with taco seasoning, Pace brand enchilada sauce, green onions and low-fat cheese. Serve with ¼ cup black beans or fat free refried beans topped with chopped tomatoes or salsa.    - Iliana frozen meatballs, simmered in Classico Marinara sauce. Different flavors of salsa or spaghetti sauce create different dishes! Sprinkle with parmesan cheese. Serve with grilled or steamed veggies, or a dark green salad.    - Simmer boneless skinless chicken thigh chunks in Classico Marinara sauce or roasted salsa until tender with chopped onion, bell pepper, garlic, mushrooms, spinach, etc.     - Hamburger, without the bun, dressed the way you like. Served with grilled or steamed veggies.    - Eggplant parmesan: Bake slices of eggplant at 350 degrees for 15 minutes. Layer tomato sauce, sliced eggplant and low-fat mozzarella cheese in a baking dish and cover with foil. Bake 30-40 more minutes or until bubbly. Uncover and bake at 400 degrees for about 15 more minutes, or until  top is slightly crisp.    - Fish tacos: grilled/baked white fish, wrapped in Humza lettuce leaf, topped with salsa, shredded low-fat cheese, and light coleslaw.    Snacks: (100-200 calories; >5g protein)    - 1 low-fat cheese stick with 8 cherry tomatoes or 1 serving fresh fruit  - 4 thin slices fat-free turkey breast and 1 slice low-fat cheese  - 4 thin slices fat-free honey ham with wedge of melon  - 1/4 cup unsalted nuts with ½ cup fruit  - 6-oz container Dannon Light n Fit vanilla yogurt, topped with 1oz unsalted nuts         - apple, celery or baby carrots spread with 2 Tbsp natural peanut butter or almond butter   - apple slices with 1 oz slice low-fat cheese  - celery, cucumber, bell pepper or baby carrots dipped in ¼ cup hummus bean spread or light spinach and artichoke dip (*recipe in lunch section)  - 100 calorie bag microwave light popcorn with 3 tbsp grated parmesan cheese  - Ulysses Links Beef Steak - 14g protein! (similar to beef jerky)  - 2 wedges [place] - Light Herb & Garlic Cheese with sliced cucumber or green bell pepper  - 1/2 cup low-fat cottage cheese with ¼ cup fruit or ¼ cup salsa  - RTD Protein drinks: Atkins, Low Carb Slim Fast, EAS light, Muscle Milk Light, etc.  - Homemade Protein drinks: GNC Soy95, Isopure, Nectar, UNJURY, Whey Gourmet, etc. Mix 1 scoop powder with 8oz skim/1% milk or light soymilk.  - Protein bars: Atkins, EAS, Pure Protein, Think Thin, Detour, etc. Must have 0-4 grams sugar - Read the label.    Takeout Options: No more than twice/week  Deli - Salads (no pasta or rice), meats, cheeses. Roasted chicken. Lox (salmon)    Mexican - Platters which don't include tortillas, chips, or rice. Go easy on the beans. Example: Fajitas without the tortillas. Ask the  not to bring chips to the table if they are too tempting.    Greek - Meat or fish and vegetable, but no bread or rice. Including hummus, baba ganoush, etc, is OK. Most sit-down Greek restaurants can provide  you with cucumber slices for dipping instead of mary bread.    Fast Food (Avoid as much as possible) - Salads (no croutons and limit salad dressing to 2 tbsp), grilled chicken sandwich without the bun and ask for no finn. Danyas low fat chili or Taco Bell pintos and cheese.    BBQ - The meats are fine if you ask for sauces on the side, but most of the traditional side dishes are loaded with carbs. Bala slaw, baked beans and BBQ sauce are typically made with sugar.    Chinese - Nothing deep-fried, no rice or noodles. Many Chinese sauces have starch and sugar in them, so you'll have to use your judgement. If you find that these sauces trigger cravings, or cause Dumping, you can ask for the sauce to be made without sugar or just use soy sauce.          Lactose-free & Artificial Sweetener-free Protein Powders    Remember, your protein shake should have less than 4 grams of sugar per serving. For whey powders, choose 100% whey pro isolate, not a blend. Blends add creatine in as a filler.    Natural Zero-calorie sweeteners  ? Stevia  ? Truvia  ? Swerve (http://www.Videoflot/about-swerve/)     Lactose-free Protein Powders  ? % Egg Protein (Vanilla & Chocolate)  ? GNC Pro Performance 100% Soy Isolate (Vanilla & Chocolate)  ? Bluebonnet Protein Powder  ? Garden of Life ® raw organic protein  ? Isopure ® low carb protein powder  ? NutraBio ® 100% whey protein isolate  ? NutraBio ® organic plant protein - vegan  ? Now ® pea protein - vegan  ? Orgain ® Organic protein powder  ? Syntrax ® Nectar    Lactose-free Ready to drink Protein Shakes  ? Muscle Milk  ? GNC Total Lean Lean Shake 25  ? Isopure ® zero carb   ? Protein 2 O    Artificial Sweetener-free Protein Powders  ? Pure Protein Natural Whey Protein Powder (Vanilla, Chocolate & Strawberry)  ? Ozzie Young Whey Pro Isolate (sweetened w/ stevia) (Vanilla, Chocolate, Strawberry, Pina Coloda & Tropical Dreamsicle)  ? Nature's Best Natural Isopure - Unflavored  (zero carb, found at [facility])    Lactose & Artificial-Sweetener-Free Protein Powders  ? Ozzie Hector Egg White Protein (sweetened w/ stevia)  ? Garden of Life ® raw organic protein  ? Syntrax ® Nectar

## 2019-07-30 NOTE — LETTER
[facility]  [address]  [facility] [address]  Phone: [phone]  Fax: [phone] July 30, 2019      Winsome Mary MD  [address]  [facility] [address]    Patient: Miesha Domínguez   MR Number: [id number]   YOB: 1965   Date of Visit: 7/30/2019     Dear Dr. Mary:    Thank you for referring Miesha Domínguez to me for evaluation. Below are the relevant portions of my assessment and plan of care.    Ms. Domínguez's assessment is as follows:    1. Class 2 severe obesity with body mass index (BMI) of 35 to 39.9 with serious comorbidity    2. Morbid obesity with BMI of 40.0-44.9, adult    3. Uncontrolled type 2 diabetes mellitus with diabetic neuropathy, without long-term current use of insulin    4. S/P laparoscopic sleeve gastrectomy        PLAN:  1. Class 2 severe obesity with body mass index (BMI) of 35 to 39.9 with serious comorbidity  To lose weight you want to cut 100% starchy carbohydrates out of your diet (bread, rice, pasta, potatoes, granola, flour, corn, peas, oatmeal, grits, tortillas, crackers, chips) and get  grams of protein.  Aim for 100 grams of protein and 1000 calories daily.     - Orthogem Eliel (code 97148)     - No soda, sweet tea, juices, sports drinks or lemonade      -Exercise daily. Continue.      2. Morbid obesity with BMI of 40.0-44.9, adult  - topiramate (TOPAMAX) 100 MG tablet; Take 1 tablet (100 mg total) by mouth every evening.  Dispense: 90 tablet; Refill: 1     3. Uncontrolled type 2 diabetes mellitus with diabetic neuropathy, without long-term current use of insulin  - semaglutide (OZEMPIC) 0.25 mg or 0.5 mg(2 mg/1.5 mL) PnIj; Inject 0.5 mg into the skin every 7 days.  Dispense: 1.5 mL; Refill: 3      Start Ozempic once a week. Start with 0.25mg once a week x 4 weeks, then 0.5 mg weekly.      Decrease portions as soon as you start Ozempic. Some nausea in the first 2 weeks is not unusual.      If you get pain  across the upper abdomen and around to your back, please call the office.     4. S/p sleeve gastrectomy  Continue bariatric diet for life.   800-1000 ben menu and meal ideas given.   Lactose free shake list given.     If you have questions, please do not hesitate to call me. I look forward to following Miesha along with you.    Sincerely,      Huma Christine MD   Section of Bariatric Surgery  Department of Surgery  [facility]    AGF/yecenia

## 2019-07-30 NOTE — LETTER
August 1, 2019      Winsome Mary MD  [address]  [facility] [address]           [facility]  [address]  [facility] [address]  Phone: [phone]  Fax: [phone]          Patient: Miesha Domínguez   MR Number: [id number]   YOB: 1965   Date of Visit: 7/30/2019       Dear Dr. Winsome Mary:    Thank you for referring Miesha Domínguez to me for evaluation. Attached you will find relevant portions of my assessment and plan of care.    If you have questions, please do not hesitate to call me. I look forward to following Miesha Domínguez along with you.    Sincerely,    Huma Christine MD    Enclosure  CC:  No Recipients    If you would like to receive this communication electronically, please contact [email or web address] or [phone] to request more information on [facility] access.    For providers and/or their staff who would like to refer a patient to [facility], please contact us through our one-stop-shop provider referral line, [facility], at [phone].    If you feel you have received this communication in error or would no longer like to receive these types of communications, please e-mail [email or web address]

## 2019-07-30 NOTE — TELEPHONE ENCOUNTER
----- Message from Milagros Wolf MD sent at 7/29/2019  9:05 PM CDT -----  Please review your lab work and we will discuss at your pending office visit.  Milagros Mcgill

## 2019-08-01 ENCOUNTER — TELEPHONE (OUTPATIENT)
Dept: INTERNAL MEDICINE | Facility: CLINIC | Age: 54
End: 2019-08-01

## 2019-08-01 NOTE — TELEPHONE ENCOUNTER
----- Message from Lorenza Xiao sent at 8/1/2019 10:06 AM CDT -----  Contact: self   Patient would like to get test results  Name of test (lab, mammo, etc.):   Fasting Lab  Date of test:  7/29  Ordering provider: Nano  Where was the test performed:  [facility]  Would the patient rather a call back or a response via [facility]?:  Call back  Comments:  Patient states she is having a procedure tomorrow & would like to go over the results

## 2019-08-01 NOTE — TELEPHONE ENCOUNTER
Lab unremarkable, she is iron deficient but she is not anemic; will review in more detail @ 8/5 appt

## 2019-08-01 NOTE — TELEPHONE ENCOUNTER
Spoke to pt. Pt stated that she is scheduled for a cystoscopy to remove endometrial polyps tomorrow. She stated that she would like her lab results to make sure there are no problems to expect with the anesthesia.  I inquired if the physician requested a clearance for anesthesia from Dr. Mcgill.  She stated that there was no request. She stated that she wanted to be sure all was well. She stated that she is still planning on keeping her f/u with Dr. Mcgill on Monday, 8/5/19.    Please advise.

## 2019-08-02 ENCOUNTER — HOSPITAL ENCOUNTER (OUTPATIENT)
Facility: OTHER | Age: 54
Discharge: HOME | End: 2019-08-02
Attending: OBSTETRICS & GYNECOLOGY | Admitting: OBSTETRICS & GYNECOLOGY
Payer: COMMERCIAL

## 2019-08-02 ENCOUNTER — ANESTHESIA (OUTPATIENT)
Dept: SURGERY | Facility: OTHER | Age: 54
End: 2019-08-02
Payer: COMMERCIAL

## 2019-08-02 VITALS
BODY MASS INDEX: 38.57 KG/M2 | WEIGHT: 240 LBS | OXYGEN SATURATION: 97 % | HEART RATE: 80 BPM | DIASTOLIC BLOOD PRESSURE: 73 MMHG | TEMPERATURE: 98 F | SYSTOLIC BLOOD PRESSURE: 122 MMHG | HEIGHT: 66 IN | RESPIRATION RATE: 16 BRPM

## 2019-08-02 DIAGNOSIS — N92.4 ABNORMAL PERIMENOPAUSAL BLEEDING: ICD-10-CM

## 2019-08-02 DIAGNOSIS — Z01.818 PREOPERATIVE EXAM FOR GYNECOLOGIC SURGERY: ICD-10-CM

## 2019-08-02 DIAGNOSIS — Z98.890 S/P D&C (STATUS POST DILATION AND CURETTAGE): Primary | ICD-10-CM

## 2019-08-02 PROBLEM — N84.0 ENDOMETRIAL POLYP: Status: ACTIVE | Noted: 2019-08-02

## 2019-08-02 LAB
B-HCG UR QL: NEGATIVE
CTP QC/QA: YES
POCT GLUCOSE: 88 MG/DL (ref 70–110)

## 2019-08-02 PROCEDURE — 81025 URINE PREGNANCY TEST: CPT | Performed by: SPECIALIST

## 2019-08-02 PROCEDURE — 36000706: Performed by: OBSTETRICS & GYNECOLOGY

## 2019-08-02 PROCEDURE — 36000707: Performed by: OBSTETRICS & GYNECOLOGY

## 2019-08-02 PROCEDURE — 82962 GLUCOSE BLOOD TEST: CPT | Performed by: OBSTETRICS & GYNECOLOGY

## 2019-08-02 PROCEDURE — 25000003 PHARM REV CODE 250: Performed by: SPECIALIST

## 2019-08-02 PROCEDURE — 63600175 PHARM REV CODE 636 W HCPCS: Performed by: ANESTHESIOLOGY

## 2019-08-02 PROCEDURE — 88305 TISSUE EXAM BY PATHOLOGIST: CPT | Performed by: PATHOLOGY

## 2019-08-02 PROCEDURE — 71000033 HC RECOVERY, INTIAL HOUR: Performed by: OBSTETRICS & GYNECOLOGY

## 2019-08-02 PROCEDURE — 37000008 HC ANESTHESIA 1ST 15 MINUTES: Performed by: OBSTETRICS & GYNECOLOGY

## 2019-08-02 PROCEDURE — 63600175 PHARM REV CODE 636 W HCPCS: Performed by: NURSE ANESTHETIST, CERTIFIED REGISTERED

## 2019-08-02 PROCEDURE — 88305 TISSUE SPECIMEN TO PATHOLOGY - SURGERY: ICD-10-PCS | Mod: 26,,, | Performed by: PATHOLOGY

## 2019-08-02 PROCEDURE — 71000015 HC POSTOP RECOV 1ST HR: Performed by: OBSTETRICS & GYNECOLOGY

## 2019-08-02 PROCEDURE — 58558 HYSTEROSCOPY BIOPSY: CPT | Mod: ,,, | Performed by: OBSTETRICS & GYNECOLOGY

## 2019-08-02 PROCEDURE — 63600175 PHARM REV CODE 636 W HCPCS: Performed by: SPECIALIST

## 2019-08-02 PROCEDURE — 71000016 HC POSTOP RECOV ADDL HR: Performed by: OBSTETRICS & GYNECOLOGY

## 2019-08-02 PROCEDURE — 58558 PR HYSTEROSCOPY,W/ENDO BX: ICD-10-PCS | Mod: ,,, | Performed by: OBSTETRICS & GYNECOLOGY

## 2019-08-02 PROCEDURE — 88305 TISSUE EXAM BY PATHOLOGIST: CPT | Mod: 26,,, | Performed by: PATHOLOGY

## 2019-08-02 PROCEDURE — 25000003 PHARM REV CODE 250: Performed by: NURSE ANESTHETIST, CERTIFIED REGISTERED

## 2019-08-02 PROCEDURE — 37000009 HC ANESTHESIA EA ADD 15 MINS: Performed by: OBSTETRICS & GYNECOLOGY

## 2019-08-02 PROCEDURE — 27201423 OPTIME MED/SURG SUP & DEVICES STERILE SUPPLY: Performed by: OBSTETRICS & GYNECOLOGY

## 2019-08-02 PROCEDURE — C1782 MORCELLATOR: HCPCS | Performed by: OBSTETRICS & GYNECOLOGY

## 2019-08-02 RX ORDER — IBUPROFEN 600 MG/1
600 TABLET ORAL EVERY 6 HOURS PRN
Status: DISCONTINUED | OUTPATIENT
Start: 2019-08-02 | End: 2019-08-02 | Stop reason: HOSPADM

## 2019-08-02 RX ORDER — MIDAZOLAM HYDROCHLORIDE 5 MG/ML
5 INJECTION INTRAMUSCULAR; INTRAVENOUS
Status: DISCONTINUED | OUTPATIENT
Start: 2019-08-02 | End: 2019-08-02 | Stop reason: HOSPADM

## 2019-08-02 RX ORDER — ONDANSETRON 2 MG/ML
4 INJECTION INTRAMUSCULAR; INTRAVENOUS DAILY PRN
Status: DISCONTINUED | OUTPATIENT
Start: 2019-08-02 | End: 2019-08-02 | Stop reason: HOSPADM

## 2019-08-02 RX ORDER — GLYCOPYRROLATE 0.2 MG/ML
INJECTION INTRAMUSCULAR; INTRAVENOUS
Status: DISCONTINUED | OUTPATIENT
Start: 2019-08-02 | End: 2019-08-02

## 2019-08-02 RX ORDER — PREGABALIN 75 MG/1
75 CAPSULE ORAL
Status: COMPLETED | OUTPATIENT
Start: 2019-08-02 | End: 2019-08-02

## 2019-08-02 RX ORDER — SODIUM CHLORIDE 0.9 % (FLUSH) 0.9 %
3 SYRINGE (ML) INJECTION
Status: DISCONTINUED | OUTPATIENT
Start: 2019-08-02 | End: 2019-08-02 | Stop reason: HOSPADM

## 2019-08-02 RX ORDER — PROPOFOL 10 MG/ML
VIAL (ML) INTRAVENOUS
Status: DISCONTINUED | OUTPATIENT
Start: 2019-08-02 | End: 2019-08-02

## 2019-08-02 RX ORDER — LIDOCAINE HYDROCHLORIDE 10 MG/ML
0.5 INJECTION, SOLUTION EPIDURAL; INFILTRATION; INTRACAUDAL; PERINEURAL ONCE
Status: DISCONTINUED | OUTPATIENT
Start: 2019-08-02 | End: 2019-08-02 | Stop reason: HOSPADM

## 2019-08-02 RX ORDER — HYDROCODONE BITARTRATE AND ACETAMINOPHEN 5; 325 MG/1; MG/1
1 TABLET ORAL EVERY 4 HOURS PRN
Status: DISCONTINUED | OUTPATIENT
Start: 2019-08-02 | End: 2019-08-02 | Stop reason: HOSPADM

## 2019-08-02 RX ORDER — SODIUM CHLORIDE, SODIUM LACTATE, POTASSIUM CHLORIDE, CALCIUM CHLORIDE 600; 310; 30; 20 MG/100ML; MG/100ML; MG/100ML; MG/100ML
INJECTION, SOLUTION INTRAVENOUS CONTINUOUS
Status: DISCONTINUED | OUTPATIENT
Start: 2019-08-02 | End: 2019-08-02 | Stop reason: HOSPADM

## 2019-08-02 RX ORDER — ONDANSETRON 8 MG/1
8 TABLET, ORALLY DISINTEGRATING ORAL EVERY 8 HOURS PRN
Status: DISCONTINUED | OUTPATIENT
Start: 2019-08-02 | End: 2019-08-02 | Stop reason: HOSPADM

## 2019-08-02 RX ORDER — ACETAMINOPHEN 500 MG
1000 TABLET ORAL
Status: COMPLETED | OUTPATIENT
Start: 2019-08-02 | End: 2019-08-02

## 2019-08-02 RX ORDER — FENTANYL CITRATE 50 UG/ML
INJECTION, SOLUTION INTRAMUSCULAR; INTRAVENOUS
Status: DISCONTINUED | OUTPATIENT
Start: 2019-08-02 | End: 2019-08-02

## 2019-08-02 RX ORDER — LIDOCAINE HCL/PF 100 MG/5ML
SYRINGE (ML) INTRAVENOUS
Status: DISCONTINUED | OUTPATIENT
Start: 2019-08-02 | End: 2019-08-02

## 2019-08-02 RX ORDER — ONDANSETRON HYDROCHLORIDE 2 MG/ML
INJECTION, SOLUTION INTRAMUSCULAR; INTRAVENOUS
Status: DISCONTINUED | OUTPATIENT
Start: 2019-08-02 | End: 2019-08-02

## 2019-08-02 RX ORDER — IBUPROFEN 600 MG/1
600 TABLET ORAL 3 TIMES DAILY
Qty: 60 TABLET | Refills: 0 | Status: SHIPPED | OUTPATIENT
Start: 2019-08-02 | End: 2021-05-20

## 2019-08-02 RX ORDER — DIPHENHYDRAMINE HYDROCHLORIDE 50 MG/ML
25 INJECTION INTRAMUSCULAR; INTRAVENOUS EVERY 4 HOURS PRN
Status: DISCONTINUED | OUTPATIENT
Start: 2019-08-02 | End: 2019-08-02 | Stop reason: HOSPADM

## 2019-08-02 RX ORDER — MEPERIDINE HYDROCHLORIDE 25 MG/ML
12.5 INJECTION INTRAMUSCULAR; INTRAVENOUS; SUBCUTANEOUS ONCE AS NEEDED
Status: DISCONTINUED | OUTPATIENT
Start: 2019-08-02 | End: 2019-08-02 | Stop reason: HOSPADM

## 2019-08-02 RX ORDER — OXYCODONE HYDROCHLORIDE 5 MG/1
5 TABLET ORAL
Status: DISCONTINUED | OUTPATIENT
Start: 2019-08-02 | End: 2019-08-02 | Stop reason: HOSPADM

## 2019-08-02 RX ORDER — HYDROMORPHONE HYDROCHLORIDE 2 MG/ML
0.4 INJECTION, SOLUTION INTRAMUSCULAR; INTRAVENOUS; SUBCUTANEOUS EVERY 5 MIN PRN
Status: DISCONTINUED | OUTPATIENT
Start: 2019-08-02 | End: 2019-08-02 | Stop reason: HOSPADM

## 2019-08-02 RX ORDER — DIPHENHYDRAMINE HCL 25 MG
25 CAPSULE ORAL EVERY 4 HOURS PRN
Status: DISCONTINUED | OUTPATIENT
Start: 2019-08-02 | End: 2019-08-02 | Stop reason: HOSPADM

## 2019-08-02 RX ORDER — FAMOTIDINE 20 MG/1
20 TABLET, FILM COATED ORAL
Status: COMPLETED | OUTPATIENT
Start: 2019-08-02 | End: 2019-08-02

## 2019-08-02 RX ADMIN — HYDROMORPHONE HYDROCHLORIDE 0.4 MG: 2 INJECTION, SOLUTION INTRAMUSCULAR; INTRAVENOUS; SUBCUTANEOUS at 09:08

## 2019-08-02 RX ADMIN — CARBOXYMETHYLCELLULOSE SODIUM 2 DROP: 2.5 SOLUTION/ DROPS OPHTHALMIC at 08:08

## 2019-08-02 RX ADMIN — FENTANYL CITRATE 100 MCG: 50 INJECTION, SOLUTION INTRAMUSCULAR; INTRAVENOUS at 08:08

## 2019-08-02 RX ADMIN — ACETAMINOPHEN 1000 MG: 500 TABLET, FILM COATED ORAL at 07:08

## 2019-08-02 RX ADMIN — PROPOFOL 200 MG: 10 INJECTION, EMULSION INTRAVENOUS at 08:08

## 2019-08-02 RX ADMIN — FAMOTIDINE 20 MG: 20 TABLET, FILM COATED ORAL at 07:08

## 2019-08-02 RX ADMIN — PREGABALIN 75 MG: 75 CAPSULE ORAL at 07:08

## 2019-08-02 RX ADMIN — PROMETHAZINE HYDROCHLORIDE 6.25 MG: 25 INJECTION INTRAMUSCULAR; INTRAVENOUS at 12:08

## 2019-08-02 RX ADMIN — ONDANSETRON 4 MG: 2 INJECTION, SOLUTION INTRAMUSCULAR; INTRAVENOUS at 08:08

## 2019-08-02 RX ADMIN — LIDOCAINE HYDROCHLORIDE 60 MG: 20 INJECTION, SOLUTION INTRAVENOUS at 08:08

## 2019-08-02 RX ADMIN — GLYCOPYRROLATE 0.2 MG: 0.2 INJECTION, SOLUTION INTRAMUSCULAR; INTRAVENOUS at 08:08

## 2019-08-02 RX ADMIN — SODIUM CHLORIDE, SODIUM LACTATE, POTASSIUM CHLORIDE, AND CALCIUM CHLORIDE: 600; 310; 30; 20 INJECTION, SOLUTION INTRAVENOUS at 08:08

## 2019-08-02 NOTE — INTERVAL H&P NOTE
The patient has been examined and the H&P has been reviewed:    I concur with the findings and no changes have occurred since H&P was written.    Anesthesia/Surgery risks, benefits and alternative options discussed and understood by patient/family.          Active Hospital Problems    Diagnosis  POA    Preoperative exam for gynecologic surgery [Z01.818]  Not Applicable      Resolved Hospital Problems   No resolved problems to display.

## 2019-08-02 NOTE — OP NOTE
Operative Report    Procedure: Dilation and Curettage, Hysteroscopy    Date of Surgery 08/02/2019    Surgeon:  Dr. Catrina Olivier    Assistant: Dr. Niraj Jackson, PGY-3    Pre-Op Diagnosis:      * PMB (postmenopausal bleeding) [N95.0]     * Endometrial polyp [N84.0]    Post-op Diagnosis:  Same    EBL: 5 mL     IVF: 750 mL    UOP: 30 mL     Specimen:    Start     Ordered     08/02/19 0920   Specimen to Pathology - Surgery  Once     Comments:  1.Endometrial polyp2. Endometrial shavings      Start Status     08/02/19 0920 Collected (08/02/19 0920) Order ID: [id number]       08/02/19 0920         Findings:     1. Normal appearing external genitalia and vagina. Small, atrophic cervix with stenosis of the cervical os. Cervix descended to 3 cm from the the vaginal introitus with gentle traction on the single-tooth tenaculum   2. Serial dilation with Annette dilators to accommodate 6mm hysteroscope.   3. Endometrial polyp arising from the left cornual area near the left tubal ostium, morcellated with Truclear Soft Tissue Shaver Mini.Specimen sent to pathology.   4. Sharp curettage done and specimen sent to pathology.   5. Hysteroscopic fluid input 260mL, deficit 100mL, resection time 22 seconds.     Procedure in Detail:  The patient was taken to the Operating Room where general anesthesia was obtained. She was then was placed in the dorsal lithotomy position with her legs in the yellowfin stirrups. The patient was then prepped and draped in the normal sterile fashion and the bladder was emptied with in-and-out catheterization. The weighted speculum was placed in the posterior aspect of the vagina and the right angle retractor was placed in the anterior aspect of the vagina. The cervix was visualized and a single-tooth tenaculum was used to grasp the anterior aspect of the cervix. Annette dilators were used to dilate the cervix and the hysteroscope was then inserted into the cervical os and and avanced through the cervical canal  until the uterine cavity was directly visualized. Bilateral ostea were visualized at that time and appeared normal but for an endometrial polyp arising from the left cornual area near the left tubal ostium. The Truclear device was introduced, and the polyp was morcellated with Truclear Soft Tissue Shaver Mini. The tissue specimen was sent to pathology.  The hysteroscope was then removed and sharp currettage of the uterus was performed and specimens were sent to pathology for evaluation. The single tooth tenaculum was then removed and the cervix was noted to be hemostatic. Sponge and lap counts were correct x 2.  The patient tolerated the procedure well and was taken to recovery in stable condition.    Niraj Jackson M.D.  Obstetrics & Gynecology  PGY-3

## 2019-08-02 NOTE — TRANSFER OF CARE
"Anesthesia Transfer of Care Note    Patient: Miesha Domínguez    Procedure(s) Performed: Procedure(s) (LRB):  [id number] (N/A)    Patient location: PACU    Anesthesia Type: general    Transport from OR: Transported from OR on 2-3 L/min O2 by NC with adequate spontaneous ventilation    Post pain: adequate analgesia    Post assessment: no apparent anesthetic complications    Post vital signs: stable    Level of consciousness: awake and alert    Nausea/Vomiting: no nausea/vomiting    Complications: none    Transfer of care protocol was followed      Last vitals:   Visit Vitals  /83 (BP Location: Right arm, Patient Position: Lying)   Pulse 82   Temp 36.6 °C (97.9 °F) (Oral)   Resp 16   Ht 5' 6" (1.676 m)   Wt 108.9 kg (240 lb)   SpO2 99%   Breastfeeding? No   BMI 38.74 kg/m²     "

## 2019-08-02 NOTE — ANESTHESIA POSTPROCEDURE EVALUATION
Anesthesia Post Evaluation    Patient: Miesha Domínguez    Procedure(s) Performed: Procedure(s) (LRB):  [id number] (N/A)    Final Anesthesia Type: general  Patient location during evaluation: PACU  Patient participation: Yes- Able to Participate  Level of consciousness: awake and alert  Post-procedure vital signs: reviewed and stable  Pain management: adequate  Airway patency: patent  PONV status at discharge: No PONV  Anesthetic complications: no      Cardiovascular status: blood pressure returned to baseline  Respiratory status: unassisted and room air  Hydration status: euvolemic  Follow-up not needed.          Vitals Value Taken Time   /71 8/2/2019  9:49 AM   Temp 36.7 °C (98 °F) 8/2/2019  9:16 AM   Pulse 88 8/2/2019  9:58 AM   Resp 16 8/2/2019  9:16 AM   SpO2 100 % 8/2/2019  9:58 AM   Vitals shown include unvalidated device data.      Event Time     Out of Recovery 10:00:14          Pain/Melisa Score: Pain Rating Prior to Med Admin: 4 (8/2/2019  9:29 AM)  Pain Rating Post Med Admin: 2 (states tolerable) (8/2/2019  9:46 AM)  Melisa Score: 9 (8/2/2019  9:46 AM)

## 2019-08-02 NOTE — DISCHARGE SUMMARY
[facility]  Brief Operative Note     SUMMARY     Surgery Date: 8/2/2019     Surgeon(s) and Role:     * Catrina Olivier MD - Primary    Assisting Surgeon: Niraj Jackson MD PGY3    Pre-op Diagnosis:  PMB (postmenopausal bleeding) [N95.0]  Endometrial polyp [N84.0]    Post-op Diagnosis:  Post-Op Diagnosis Codes:     * PMB (postmenopausal bleeding) [N95.0]     * Endometrial polyp [N84.0]    Procedure(s) (LRB):  [id number] (N/A)    Anesthesia: General    Description of the findings of the procedure:   1. Normal appearing external genitalia and vagina. Small, atrophic cervix with stenosis of the cervical os.   2. Serial dilation with Annette dilators to accommodate 6mm hysteroscope.   3. Endometrial polyp arising from the left cornual area near the left tubal ostium, morcellated with Truclear Soft Tissue Shaver Mini.Specimen sent to pathology.   4. Sharp curettage done and specimen sent to pathology.   5. Hysteroscopic fluid input 260mL, deficit 100mL, resection time 22 seconds.     Estimated Blood Loss: 5 mL         Specimens:   Specimen (12h ago, onward)    Start     Ordered    08/02/19 0920  Specimen to Pathology - Surgery  Once     Comments:  1.Endometrial polyp2. Endometrial shavings     Start Status     08/02/19 0920 Collected (08/02/19 0920) Order ID: [id number]       08/02/19 0920        Discharge Note    SUMMARY     Admit Date: 8/2/2019    Discharge Date and Time:  08/02/2019 9:26 AM    Hospital Course (synopsis of major diagnoses, care, treatment, and services provided during the course of the hospital stay):   Patient was admitted for the above mentioned procedure.  Procedure was performed without difficulty.  Please see operative report for further details.  She was transferred to recovery in stable condition and discharged home the same day.      Final Diagnosis: Post-Op Diagnosis Codes:     * PMB (postmenopausal bleeding) [N95.0]     * Endometrial polyp  [N84.0]    Disposition: Home or Self Care    Follow Up/Patient Instructions: See below    Medications:  Reconciled Home Medications:      Medication List      START taking these medications    ibuprofen 600 MG tablet  Commonly known as:  ADVIL,MOTRIN  Take 1 tablet (600 mg total) by mouth 3 (three) times daily.        CONTINUE taking these medications    amLODIPine 5 MG tablet  Commonly known as:  NORVASC  Take 1 tablet (5 mg total) by mouth once daily.     CALCIUM CITRATE + D ORAL  Take 2 tablets by mouth 2 (two) times daily. Calcium citrate 400m g vit d 500 mg     hydroCHLOROthiazide 25 MG tablet  Commonly known as:  HYDRODIURIL  TAKE ONE TABLET BY MOUTH ONCE DAILY     levocetirizine 5 MG tablet  Commonly known as:  XYZAL  Take 1 tablet (5 mg total) by mouth every evening.     metFORMIN 1000 MG tablet  Commonly known as:  GLUCOPHAGE  Take 1 tablet (1,000 mg total) by mouth daily with breakfast.     MULTIVITAMIN ORAL  Take 1 tablet by mouth once daily. Centrum adult chews ( flavor burst)     potassium 99 mg Tab  3 tablets twice daily     rosuvastatin 20 MG tablet  Commonly known as:  CRESTOR  Take 1 tablet (20 mg total) by mouth once daily.     topiramate 100 MG tablet  Commonly known as:  TOPAMAX  Take 1 tablet (100 mg total) by mouth every evening.     VITAMIN B-12 2,500 mcg Subl  Generic drug:  cyanocobalamin (vitamin B-12)  Place 1 tablet under the tongue once a week. Pt taking every other week          Discharge Procedure Orders   Diet general     Call MD for:  temperature >100.4     Call MD for:  severe uncontrolled pain     Call MD for:   Scheduling Instructions: Heavy vaginal bleeding, soaking though more than 1 heavy pad per hour.     Activity as tolerated   Scheduling Instructions: Nothing in the vagina for 2 weeks - No douching, tampons, or sex.     Follow-up Information     Catrina Olivier MD. Schedule an appointment as soon as possible for a visit in 6 weeks.    Specialties:  Obstetrics, Obstetrics  and Gynecology  Why:  Post-Op  Contact information:  [address] [address]  [phone]               Niraj Jackson M.D. PGY-3   Obstetrics & Gynecology

## 2019-08-02 NOTE — DISCHARGE INSTRUCTIONS

## 2019-08-04 ENCOUNTER — PATIENT MESSAGE (OUTPATIENT)
Dept: INTERNAL MEDICINE | Facility: CLINIC | Age: 54
End: 2019-08-04

## 2019-08-04 RX ORDER — AMLODIPINE BESYLATE 5 MG/1
TABLET ORAL
Qty: 90 TABLET | Refills: 1 | Status: SHIPPED | OUTPATIENT
Start: 2019-08-04 | End: 2020-01-13

## 2019-08-04 NOTE — PROGRESS NOTES
53 y.o. femalepresents in f/u to diabetes, hypertension, and dyslipidemia    Of note, pt had D&C last Friday 2/2 DUB  H/H ==> 13.1/40.8    DM w/ neuro tx w/metfomrin 1,000mg(pt was taking  BID  Till developed diarrhea then decreased to one daily   After ~ one month), decreased intake soda drinks  Denied increased thirst, urination, or hypoglycemia episodes  ++ burning , B/L feet , primarily @ night  ++ calf tightness @ night, occ cramping  A1C ==> 5.9    HTN tx w/HCTZ( ave 1/qoweek) and amldipine 5mg   Denied HA or dizziness  BP today==>111/70    Dyslipidemia tx w/ rosuvastatin 20mg  Denies unusual muscle pain or chest pain  LDL==>66.4      ROS:  No fever, chills, or night sweats  No blurry vision or visual disturbance  No dysphagia or early satiety  No palpitations or tachycardia  No cough or wheezing  No GERD or abdominal pain  Answers for HPI/ROS submitted by the patient on 8/4/2019   activity change: No  unexpected weight change: No  neck pain: No  hearing loss: No  rhinorrhea: No  trouble swallowing: No  eye discharge: No  visual disturbance: No  chest tightness: No  wheezing: No  chest pain: No  palpitations: No  blood in stool: No  constipation: No  vomiting: No  diarrhea: No  polydipsia: No  polyuria: No  difficulty urinating: No  hematuria: No  menstrual problem: No  dysuria: No  joint swelling: No  arthralgias: No  headaches: No  weakness: No  confusion: No  dysphoric mood: No    Remainder of review negative except as previously noted    PAST MEDICAL HX: Reviewed  PAST SURGICAL HX: Reviewed  SOCIAL HX: Reviewed  FAMILY HX: Reviewed    PHYSICAL EXAM:  VS: As noted  GENERAL: WDWN, A&O, NAD, conversant and co-operative; pleasant as always  EYES: Conj/lids unremarkable, sclera anicteric,  NECK: Supple w/o lymphadenopathy or thyromegaly  RESPIRATORY: Efforts are unlabored. Lungs CTAP  CARDIOVASCULAR: Heart RRR. No carotid bruits noted. 1+ pedal pulses. No LE edema  MUSCULOSKELETAL: Gait normal. No CCE  Calves:  NT, no erythema or edema or warmth  Feet : no lesions  NEUROLOGIC: WILDER. No tremor noted  SKIN: Warm and dry    IMPRESSION:  DM w/ neuropathy, decreased A1C-but exacerbation of neuropathy  HLD, @ goal  HTN, stable  B12 elevation  Nocturnal leg cramps        PLAN:  Hold B12 x 1 week  Then restart @ 2 x weekly  Calcium PM  Diabetic diet  Low salt, low fat diet  Exercise   Iron rich foods  Call prn   RTC 6 mos

## 2019-08-05 ENCOUNTER — OFFICE VISIT (OUTPATIENT)
Dept: INTERNAL MEDICINE | Facility: CLINIC | Age: 54
End: 2019-08-05
Payer: COMMERCIAL

## 2019-08-05 VITALS
HEART RATE: 80 BPM | WEIGHT: 240.5 LBS | BODY MASS INDEX: 38.65 KG/M2 | OXYGEN SATURATION: 98 % | TEMPERATURE: 99 F | HEIGHT: 66 IN | DIASTOLIC BLOOD PRESSURE: 70 MMHG | SYSTOLIC BLOOD PRESSURE: 111 MMHG

## 2019-08-05 DIAGNOSIS — R20.2 PARESTHESIAS: ICD-10-CM

## 2019-08-05 DIAGNOSIS — E78.2 MIXED HYPERLIPIDEMIA: ICD-10-CM

## 2019-08-05 DIAGNOSIS — G47.62 NOCTURNAL LEG CRAMPS: ICD-10-CM

## 2019-08-05 DIAGNOSIS — I10 ESSENTIAL HYPERTENSION: ICD-10-CM

## 2019-08-05 PROCEDURE — 3074F PR MOST RECENT SYSTOLIC BLOOD PRESSURE < 130 MM HG: ICD-10-PCS | Mod: CPTII,S$GLB,, | Performed by: INTERNAL MEDICINE

## 2019-08-05 PROCEDURE — 3008F BODY MASS INDEX DOCD: CPT | Mod: CPTII,S$GLB,, | Performed by: INTERNAL MEDICINE

## 2019-08-05 PROCEDURE — 99214 OFFICE O/P EST MOD 30 MIN: CPT | Mod: S$GLB,,, | Performed by: INTERNAL MEDICINE

## 2019-08-05 PROCEDURE — 3044F PR MOST RECENT HEMOGLOBIN A1C LEVEL <7.0%: ICD-10-PCS | Mod: CPTII,S$GLB,, | Performed by: INTERNAL MEDICINE

## 2019-08-05 PROCEDURE — 3008F PR BODY MASS INDEX (BMI) DOCUMENTED: ICD-10-PCS | Mod: CPTII,S$GLB,, | Performed by: INTERNAL MEDICINE

## 2019-08-05 PROCEDURE — 3078F DIAST BP <80 MM HG: CPT | Mod: CPTII,S$GLB,, | Performed by: INTERNAL MEDICINE

## 2019-08-05 PROCEDURE — 3074F SYST BP LT 130 MM HG: CPT | Mod: CPTII,S$GLB,, | Performed by: INTERNAL MEDICINE

## 2019-08-05 PROCEDURE — 3078F PR MOST RECENT DIASTOLIC BLOOD PRESSURE < 80 MM HG: ICD-10-PCS | Mod: CPTII,S$GLB,, | Performed by: INTERNAL MEDICINE

## 2019-08-05 PROCEDURE — 99214 PR OFFICE/OUTPT VISIT, EST, LEVL IV, 30-39 MIN: ICD-10-PCS | Mod: S$GLB,,, | Performed by: INTERNAL MEDICINE

## 2019-08-05 PROCEDURE — 99999 PR PBB SHADOW E&M-EST. PATIENT-LVL III: CPT | Mod: PBBFAC,,, | Performed by: INTERNAL MEDICINE

## 2019-08-05 PROCEDURE — 3044F HG A1C LEVEL LT 7.0%: CPT | Mod: CPTII,S$GLB,, | Performed by: INTERNAL MEDICINE

## 2019-08-05 PROCEDURE — 99999 PR PBB SHADOW E&M-EST. PATIENT-LVL III: ICD-10-PCS | Mod: PBBFAC,,, | Performed by: INTERNAL MEDICINE

## 2019-08-05 RX ORDER — AMLODIPINE BESYLATE 5 MG/1
5 TABLET ORAL DAILY
Qty: 90 TABLET | Refills: 1 | OUTPATIENT
Start: 2019-08-05

## 2019-08-05 RX ORDER — GABAPENTIN 300 MG/1
300 CAPSULE ORAL NIGHTLY
Qty: 90 CAPSULE | Refills: 1 | Status: SHIPPED | OUTPATIENT
Start: 2019-08-05 | End: 2019-10-08 | Stop reason: SDUPTHER

## 2019-08-05 NOTE — MEDICAL/APP STUDENT
Subjective:       Patient ID: Miesha Domínguez is a 53 y.o. female.    Chief Complaint: Follow-up (6 month) and Foot Problem (both feet. cramping feeling)    HPI: Miesha Domínguez is a 52 yo F with a PMH of T2DM and peripheral neuropathy, hypertension, and hyperlipidemia who presents for 6 month follow up. She reports intermittent cramping and tightness with burning in her feet and calves. She says it is worst at night when she is going to sleep. Her diabetes has been well controlled with Metformin 500mg, and her HbA1c has been trending down over the last few visits from 7.2 -> 6.2 -> 5.9% as of 19. She also underwent hysteroscopy with D+C for a endometrial polyp on 19, pathology is in process. She reported some abdominal cramping and vaginal bleeding immediately after the procedure but this has resolved. She had a negative pap smear and mammogram in , and a normal colonoscopy in . She denies any recent illnesses, fevers, chest pain, SOB, abdominal pain, changes in urinary or bowel habit.    Past Medical History:   Diagnosis Date    Asthma     as a child    HLD (hyperlipidemia)     HTN (hypertension)     Neuropathy     Type II or unspecified type diabetes mellitus without mention of complication, not stated as uncontrolled      Past Surgical History:   Procedure Laterality Date     SECTION      COLONOSCOPY N/A 2018    Performed by Ramez Ram[facility]rez MD at [facility] ENDO (4TH FLR)    EGD (ESOPHAGOGASTRODUODENOSCOPY) N/A 2013    Performed by Sami Francis MD at [facility] OR 2ND FLR    GASTRECTOMY, SLEEVE, LAPAROSCOPIC N/A 2013    Performed by [facility] at [facility] OR 2ND FLR    SLEEVE GASTROPLASTY       Current Outpatient Medications on File Prior to Visit   Medication Sig Dispense Refill    amLODIPine (NORVASC) 5 MG tablet TAKE 1 TABLET BY MOUTH ONCE DAILY 90 tablet 1    CALCIUM CITRATE/VITAMIN D3 (CALCIUM CITRATE + D ORAL) Take 2 tablets by  mouth 2 (two) times daily. Calcium citrate 400m g vit d 500 mg      cyanocobalamin, vitamin B-12, (VITAMIN B-12) 2,500 mcg Subl Place 1 tablet under the tongue once a week. Pt taking every other week      hydroCHLOROthiazide (HYDRODIURIL) 25 MG tablet TAKE ONE TABLET BY MOUTH ONCE DAILY 90 tablet 3    ibuprofen (ADVIL,MOTRIN) 600 MG tablet Take 1 tablet (600 mg total) by mouth 3 (three) times daily. 60 tablet 0    levocetirizine (XYZAL) 5 MG tablet Take 1 tablet (5 mg total) by mouth every evening. 30 tablet 11    metFORMIN (GLUCOPHAGE) 1000 MG tablet Take 1 tablet (1,000 mg total) by mouth daily with breakfast. 90 tablet 3    MULTIVITAMIN ORAL Take 1 tablet by mouth once daily. Centrum adult chews ( flavor burst)       potassium 99 mg Tab 3 tablets twice daily      rosuvastatin (CRESTOR) 20 MG tablet Take 1 tablet (20 mg total) by mouth once daily. 90 tablet 1    topiramate (TOPAMAX) 100 MG tablet Take 1 tablet (100 mg total) by mouth every evening. 90 tablet 1     No current facility-administered medications on file prior to visit.      Review of patient's allergies indicates:  No Known Allergies  Family History   Problem Relation Age of Onset    Cancer Father         d.64 lung -smoker    Hypertension Mother     Diabetes Mother     Ovarian cancer Mother 74    Diabetes Sister     Hypertension Sister     Hypertension Brother     Diabetes Brother     Breast cancer Neg Hx     Colon cancer Neg Hx     Stroke Neg Hx     Heart attack Neg Hx     Heart disease Neg Hx     Heart failure Neg Hx     Hyperlipidemia Neg Hx      Review of Systems   Constitutional: Negative for chills, fever and unexpected weight change.   HENT: Negative for congestion, ear pain and sore throat.    Eyes: Negative for pain, redness and visual disturbance.   Respiratory: Negative for cough and shortness of breath.    Cardiovascular: Negative for chest pain, palpitations and leg swelling.   Gastrointestinal: Positive for  constipation. Negative for abdominal pain, diarrhea, nausea and vomiting.   Endocrine: Negative for cold intolerance and heat intolerance.   Genitourinary: Negative for difficulty urinating, frequency, hematuria, urgency and vaginal bleeding.   Musculoskeletal:        Pt notes cramping and tightness with burning sensation in bilateral feet and calves   Neurological: Negative for weakness and headaches.   Psychiatric/Behavioral: Negative.        Objective:       Vitals:    08/05/19 0944   BP: 111/70   Pulse: 80   Temp: 99.1 °F (37.3 °C)       Physical Exam   Constitutional: She is oriented to person, place, and time. She appears well-developed and well-nourished.   HENT:   Head: Normocephalic and atraumatic.   Neck: Normal range of motion. Neck supple.   Cardiovascular: Normal rate, regular rhythm, normal heart sounds and intact distal pulses.   Pulmonary/Chest: Effort normal and breath sounds normal.   Abdominal: Soft. Bowel sounds are normal. She exhibits no distension. There is no tenderness.   Musculoskeletal: Normal range of motion.   Neurological: She is alert and oriented to person, place, and time.   Skin: Skin is warm and dry.   Psychiatric: She has a normal mood and affect. Her behavior is normal.       Assessment:       1. Uncontrolled type 2 diabetes mellitus with diabetic neuropathy, without long-term current use of insulin    2. Mixed hyperlipidemia    3. Essential hypertension        Plan:       - Continue Metformin 500mg once per day  - Start gabapentin 300mg at nighttime for peripheral neuropathic pain, instructed patient to call clinic in 2 weeks to evaluate if medication is helping. Also encouraged to take calcium tablets  - B12 level noted to be high, advised patient to withhold dose for a week and resume twice a week

## 2019-08-21 RX ORDER — ROSUVASTATIN CALCIUM 20 MG/1
TABLET, COATED ORAL
Qty: 90 TABLET | Refills: 2 | Status: SHIPPED | OUTPATIENT
Start: 2019-08-21 | End: 2020-05-24

## 2019-09-10 DIAGNOSIS — E11.9 TYPE 2 DIABETES MELLITUS WITHOUT COMPLICATION: ICD-10-CM

## 2019-09-26 ENCOUNTER — PATIENT MESSAGE (OUTPATIENT)
Dept: BARIATRICS | Facility: CLINIC | Age: 54
End: 2019-09-26

## 2019-09-30 ENCOUNTER — TELEPHONE (OUTPATIENT)
Dept: BARIATRICS | Facility: CLINIC | Age: 54
End: 2019-09-30

## 2019-09-30 NOTE — TELEPHONE ENCOUNTER
----- Message from Callie Mason sent at 9/30/2019 11:47 AM CDT -----  Contact: pt   Pt called want to know if you can change her Ozempic to a 90 day supply instead of a 30 day due to her insurance will charge higher for 30 days.  Please advise, pt call back # [phone]

## 2019-09-30 NOTE — TELEPHONE ENCOUNTER
Returned patient call. , stated she attempted to  her new Rx.Ozempic 0.25 mg. The price came up to $241. Pt stated. She contacted her insurance company. They have inform her. Rx.Ozempic. Has to be prescribed as a 90 day supply. I informed. Ms. Domínguez. I will get this information over to Dr. Christine.

## 2019-10-01 RX ORDER — METFORMIN HYDROCHLORIDE 1000 MG/1
1000 TABLET ORAL
Qty: 90 TABLET | Refills: 3 | Status: SHIPPED | OUTPATIENT
Start: 2019-10-01 | End: 2020-08-18

## 2019-10-07 ENCOUNTER — PATIENT MESSAGE (OUTPATIENT)
Dept: INTERNAL MEDICINE | Facility: CLINIC | Age: 54
End: 2019-10-07

## 2019-10-07 ENCOUNTER — TELEPHONE (OUTPATIENT)
Dept: INTERNAL MEDICINE | Facility: CLINIC | Age: 54
End: 2019-10-07

## 2019-10-07 DIAGNOSIS — E78.2 MIXED HYPERLIPIDEMIA: ICD-10-CM

## 2019-10-07 NOTE — TELEPHONE ENCOUNTER
----- Message from Francy Rosales sent at 10/7/2019  8:31 AM CDT -----  Contact: patient [phone]  Patient had a call reminding her that she is due for a follow up. She said that she has had her 6 month appt and is confused about when she is supposed to come in. She is due for an A1c.

## 2019-10-07 NOTE — TELEPHONE ENCOUNTER
Spoke to pt. Pt advised that she is due for her 6 month f/u in February.  F/u scheduled.  Labs ordered and scheduled.

## 2019-10-08 RX ORDER — GABAPENTIN 300 MG/1
600 CAPSULE ORAL NIGHTLY
Qty: 180 CAPSULE | Refills: 1
Start: 2019-10-08 | End: 2019-10-21 | Stop reason: SDUPTHER

## 2019-10-21 DIAGNOSIS — G47.62 NOCTURNAL LEG CRAMPS: Primary | ICD-10-CM

## 2019-10-21 RX ORDER — GABAPENTIN 300 MG/1
600 CAPSULE ORAL NIGHTLY
Qty: 180 CAPSULE | Refills: 1 | Status: SHIPPED | OUTPATIENT
Start: 2019-10-21 | End: 2019-10-21 | Stop reason: SDUPTHER

## 2019-10-21 RX ORDER — GABAPENTIN 300 MG/1
600 CAPSULE ORAL NIGHTLY
Qty: 180 CAPSULE | Refills: 1 | Status: SHIPPED | OUTPATIENT
Start: 2019-10-21 | End: 2020-09-04

## 2019-10-31 RX ORDER — HYDROCHLOROTHIAZIDE 25 MG/1
25 TABLET ORAL DAILY
Qty: 90 TABLET | Refills: 3 | Status: SHIPPED | OUTPATIENT
Start: 2019-10-31 | End: 2020-09-24

## 2019-11-16 ENCOUNTER — PATIENT OUTREACH (OUTPATIENT)
Dept: ADMINISTRATIVE | Facility: OTHER | Age: 54
End: 2019-11-16

## 2019-11-19 ENCOUNTER — OFFICE VISIT (OUTPATIENT)
Dept: BARIATRICS | Facility: CLINIC | Age: 54
End: 2019-11-19
Payer: COMMERCIAL

## 2019-11-19 VITALS
HEART RATE: 81 BPM | BODY MASS INDEX: 34.26 KG/M2 | WEIGHT: 213.19 LBS | SYSTOLIC BLOOD PRESSURE: 110 MMHG | HEIGHT: 66 IN | DIASTOLIC BLOOD PRESSURE: 70 MMHG

## 2019-11-19 DIAGNOSIS — E66.9 OBESITY, CLASS I, BMI 30.0-34.9 (SEE ACTUAL BMI): Primary | ICD-10-CM

## 2019-11-19 PROCEDURE — 3044F PR MOST RECENT HEMOGLOBIN A1C LEVEL <7.0%: ICD-10-PCS | Mod: CPTII,S$GLB,, | Performed by: INTERNAL MEDICINE

## 2019-11-19 PROCEDURE — 3008F BODY MASS INDEX DOCD: CPT | Mod: CPTII,S$GLB,, | Performed by: INTERNAL MEDICINE

## 2019-11-19 PROCEDURE — 3008F PR BODY MASS INDEX (BMI) DOCUMENTED: ICD-10-PCS | Mod: CPTII,S$GLB,, | Performed by: INTERNAL MEDICINE

## 2019-11-19 PROCEDURE — 3074F PR MOST RECENT SYSTOLIC BLOOD PRESSURE < 130 MM HG: ICD-10-PCS | Mod: CPTII,S$GLB,, | Performed by: INTERNAL MEDICINE

## 2019-11-19 PROCEDURE — 3044F HG A1C LEVEL LT 7.0%: CPT | Mod: CPTII,S$GLB,, | Performed by: INTERNAL MEDICINE

## 2019-11-19 PROCEDURE — 3074F SYST BP LT 130 MM HG: CPT | Mod: CPTII,S$GLB,, | Performed by: INTERNAL MEDICINE

## 2019-11-19 PROCEDURE — 99214 OFFICE O/P EST MOD 30 MIN: CPT | Mod: S$GLB,,, | Performed by: INTERNAL MEDICINE

## 2019-11-19 PROCEDURE — 99214 PR OFFICE/OUTPT VISIT, EST, LEVL IV, 30-39 MIN: ICD-10-PCS | Mod: S$GLB,,, | Performed by: INTERNAL MEDICINE

## 2019-11-19 PROCEDURE — 3078F DIAST BP <80 MM HG: CPT | Mod: CPTII,S$GLB,, | Performed by: INTERNAL MEDICINE

## 2019-11-19 PROCEDURE — 99999 PR PBB SHADOW E&M-EST. PATIENT-LVL III: CPT | Mod: PBBFAC,,, | Performed by: INTERNAL MEDICINE

## 2019-11-19 PROCEDURE — 99999 PR PBB SHADOW E&M-EST. PATIENT-LVL III: ICD-10-PCS | Mod: PBBFAC,,, | Performed by: INTERNAL MEDICINE

## 2019-11-19 PROCEDURE — 3078F PR MOST RECENT DIASTOLIC BLOOD PRESSURE < 80 MM HG: ICD-10-PCS | Mod: CPTII,S$GLB,, | Performed by: INTERNAL MEDICINE

## 2019-11-19 RX ORDER — TOPIRAMATE 100 MG/1
100 TABLET, FILM COATED ORAL NIGHTLY
Qty: 90 TABLET | Refills: 1 | Status: SHIPPED | OUTPATIENT
Start: 2019-11-19 | End: 2019-11-22 | Stop reason: SDUPTHER

## 2019-11-19 NOTE — PATIENT INSTRUCTIONS
Continue ozempic and topiramate.     To lose weight you want to cut 100% starchy carbohydrates out of your diet (bread, rice, pasta, potatoes, granola, flour, corn, peas, oatmeal, grits, tortillas, crackers, chips) and get  grams of protein.  Aim for 100 grams of protein and 1000 calories daily.    - [employer] (code [id number])      - No soda, sweet tea, juices, sports drinks or lemonade     -Exercise daily. Continue.     5-Day Menu Plan: 800-1000 Calories    DAY 1     Breakfast  1 boiled egg  Small apple    Snack  ¼ cup almonds    Lunch  Morning star tash  1 cup green beans    Dinner  3 oz salmon  1 cup cooked carrots    Snack  SF jello    DAY 2    Breakfast  2 eggs with 2 tbsp salsa    Snack  6 oz greek yogurt  ½ cup Peaches canned (in its own juice)    Lunch  [place]:Tuna and mustard   Pear cup (no sugar added)    Dinner  Baked fish  1 cup cooked yellow squash    Snack   popsicle            DAY 3    Breakfast   Protein drink: 1 scoop whey powder + 6oz soy milk    Snack  ¼ cup almonds    Lunch  Tuna Salad: 3oz canned tuna in water, 1 egg, and 1 tsp light finn)  Pineapple cup (no sugar added)    Dinner  Baked shrimp  1 cup grilled eggplant    Snack  8oz Chocolate Soy Milk      DAY 4    Breakfast  2 eggs, morning star tash    Snack  1 cheese stick    Lunch    Snack  1/4 cup almonds  Peach cup (no sugar added)    Dinner  3oz baked fish  1 cup cooked green beans          DAY 5    Breakfast  Protein drink: 1 scoop whey powder + 6oz soy milk    Snack   ¼ cup almonds  1 apple    Lunch  1 cup tuna salad: (3oz canned tuna in water, 1 egg, 1 tsp light finn)    Snack  3 oz greek yogurt  Fruit cup (no sugar added)    Dinner  Omelet: 2 eggs, grilled shrimp, bell pepper and onion          Helpful tips to survive the holidays:  - Dont save yourself for the meal: Make sure you continue to eat high protein small meals every 3-4 hours to ensure to do not become over-hungry. Avoid temptation by not showing up to a  holiday party or gathering hungry.   - Plan ahead. Bring a dish to a party if you know there may not be an appropriate option.   - Choose sugar-free drinks: Stick to water or other sugar-free beverages and make sure you are getting 6-8 cups of fluid each day (but not with meals!). Avoid alcohol, carbonated beverages, and high-fat/high-sugar beverages like hot chocolate and eggnog. Try sugar-free hot cocoa made with skim milk or water, or sugar-free spiced tea to add some holiday flair to your beverage (see sugar-free mulled cider recipe below)  - Take your time: Eat mindfully. Dont graze on food throughout the day. Sit down to enjoy your small meals. Chew slowly and thoroughly. Cut your food into small pieces before eating.  - Listen to your body: Stop eating as soon as you feel full. Do not feel pressured to try certain (or all) foods or to eat all of the food on your plate. Listen to your hunger cues.   - REMEMBER: Make your holidays about spending time with family and friends instead of focusing gatherings around food.  - Keep up your exercise routine: Make sure you continue to get regular exercise throughout the holiday season. Encourage friends and family to be active by taking a walk together after a meal, to look at holiday lights, or to window-shop.    Good Holiday Meal Options:  - Roasted Turkey, NO skin. Use low sodium broth instead of gravy.   - Stuffed Bell Peppers made WITHOUT bread crumbs or Rice. Try using parmesan cheese instead  - Gumbo, NO rice. Try picking out mostly the meat/seafood and vegetables with little broth.   - Green Bean Casserole made with 98% fat free cream of mushroom soup and crushed almonds/pecans instead of fried onions  - Side salad w/ low fat dressing. Try a different kind of salad maybe use Kale or spinach.   - Roasted non-starchy vegetables like brussel sprouts, broccoli, green beans, zucchini, butternut squash, cauliflower  - Cauliflower Mash (steam or roast cauliflower,  puree w/ low fat cheese, dash of fat free milk and 2-3 sprays of I cant believe its not butter spray. Add garlic powder and black pepper to season). Use Low sodium broth instead of gravy.   - Try Loaded Cauliflower Mash (Make cauliflower like above cauliflower mash. Top with diced turkey mcgraw, ¼ cup low fat cheddar cheese and bake @ 350* F for 5-10 minutes, until cheese is melted. Top with minced chives, black pepper and garlic to taste).   - Homemade cranberry sauce using Splenda or another alternative sweetener. Boil fresh cranberries and add splenda to taste. Boil until cranberries break open and then simmer until it reaches the consistency you want (less time for more watery sauce and simmer for longer to create a thicker sauce).   - Deviled eggs: make using low fat finn, mustard, DILL relish (not sweet relish).   - Vegetable tray w/ Greek yogurt Ranch Dip. Mix 1 packet of [place] ranch dip mix w/ 16 oz low fat plain greek yogurt.     Good Holiday Dessert Options:  - High protein Pumpkin Cheesecake (see recipe below)  - Pumpkin Whip (see recipe below)  - Quest Apple Pie or Cinnamon Roll flavored protein bar (warm in microwave for 10-15 seconds)  - Eggnog Protein shake (see recipe below)  - Fresh fruit w/ low fat cheese  - Sugar-free Jello Parfaits. Layer Red and Green sugar-free jello in cups and top w/ 2 tbsp Sugar-free cool-whip    Pumpkin Cheesecake    8 ounces fat free cream cheese, softened   2 scoops of vanilla protein powder (<4 g sugar per serving)   ¼ tsp Fine salt   2 eggs, at room temperature   1/3 cup fat free sour cream  1/3 cup fat free half and half  1 15 -ounce can pure pumpkin puree   1 tablespoon pumpkin pie spice, plus more for dusting   Unsalted nuts, crushed  *Add splenda to taste    Directions     1. Preheat the oven to 300 degrees F. Line 18 muffin cups with paper liners. Sprinkle 1 tsp crushed unsalted nuts at the bottom of each of muffin cup liner.     2. In a large bowl, beat  the cream cheese, vanilla protein powder and 1/4 teaspoon fine salt on medium-high speed until smooth and creamy, 2 to 3 minutes. Scrape down the sides, reduce speed to low and beat in the eggs, 1 at a time, until combined. Beat in 1/3 cup fat free sour cream and fat free half and half. Stir in the pumpkin puree and pumpkin pie spice until smooth. Divide evenly among cookie-lined paper cups, filling almost all the way to the top.     3. Bake until the filling is just set, 40 to 45 minutes. A sharp knife inserted into the center will come out moist, but clean. Cool completely in tins on a wire rack. Refrigerate until cold, 4 hours, or overnight. Top with a dusting of pumpkin pie spice.    Recipe altered from the following recipe: http://www.[employer]/recipes/food-network-danya/mini-pumpkin-cheesecakes-recipe.print.html?oc=linkback    Pumpkin Whip    Box of sugar-free vanilla pudding  Can of pumpkin puree  Pumpkin Pie spice (sprinkle to taste)  ½ cup of sugar-free Cool Whip    Directions:  Make sugar-free pudding according to package directions using fat free or 1% milk. Stir in pumpkin and cool whip. Add pumpkin pie spice to taste.     Egg Nog Protein shake    8 oz skim or 1% milk  1 scoop vanilla protein powder  1 tbsp sugar-free vanilla pudding mix  ½ tsp butter flavor extract  ½ tsp rum extract  ½ tsp cinnamon     Shake together or blend with ice and serve.     Sugar-Free Mulled Cider    3 oz diet cran-apple juice  6 oz water  1 packet sugar-free apple cider mix  ½ tsp apple pie spice  ½ tsp butter flavor extract  1 tbsp Sugar-free Syrup    Mix together. Warm if needed and serve w/ orange wedge and cinnamon stick.

## 2019-11-19 NOTE — PROGRESS NOTES
"Subjective:       Patient ID: Miesha Domínguez is a 53 y.o. female.    Chief Complaint: Follow-up    Pt here today for follow-up. Has lost 28 lbs.Continued topiramate that she was already on. Started ozempic. She went to support group as well. Only SE was constipation at first. Used miralax with good relief.   BS had been in 90s fasting.    Follow-up   Associated symptoms include myalgias. Pertinent negatives include no arthralgias, chest pain, chills or fever.     Review of Systems   Constitutional: Negative for chills and fever.   Respiratory: Negative for shortness of breath.         Denies Snoring   Cardiovascular: Negative for chest pain and leg swelling.   Gastrointestinal: Negative for constipation and diarrhea.        Denies GERD   Genitourinary: Positive for menstrual problem. Negative for difficulty urinating.        Recent  bleeding. bx with polyps.    Musculoskeletal: Positive for myalgias. Negative for arthralgias and back pain.   Neurological: Negative for dizziness and light-headedness.   Psychiatric/Behavioral: Negative for dysphoric mood. The patient is not nervous/anxious.        Objective:     /70   Pulse 81   Ht 5' 6" (1.676 m)   Wt 96.7 kg (213 lb 3 oz)   BMI 34.41 kg/m²     Physical Exam   Constitutional: She is oriented to person, place, and time. She appears well-developed. No distress.   Obese   HENT:   Head: Normocephalic and atraumatic.   Eyes: Pupils are equal, round, and reactive to light. EOM are normal. No scleral icterus.   Neck: Normal range of motion. Neck supple.   Cardiovascular: Normal rate.   Pulmonary/Chest: Effort normal.   Musculoskeletal: Normal range of motion. She exhibits no edema.   Neurological: She is alert and oriented to person, place, and time. No cranial nerve deficit.   Skin: Skin is warm and dry. No erythema.   Psychiatric: She has a normal mood and affect. Her behavior is normal. Judgment normal.   Vitals reviewed.      Assessment:       1. " Obesity, Class I, BMI 30.0-34.9 (see actual BMI)    2. Uncontrolled type 2 diabetes mellitus with diabetic neuropathy, without long-term current use of insulin        Plan:           Miesha was seen today for follow-up.    Diagnoses and all orders for this visit:    Obesity, Class I, BMI 30.0-34.9 (see actual BMI)  -     topiramate (TOPAMAX) 100 MG tablet; Take 1 tablet (100 mg total) by mouth every evening.    Uncontrolled type 2 diabetes mellitus with diabetic neuropathy, without long-term current use of insulin  -     semaglutide (OZEMPIC) 0.25 mg or 0.5 mg(2 mg/1.5 mL) PnIj; Inject 0.5 mg into the skin every 7 days.           To lose weight you want to cut 100% starchy carbohydrates out of your diet (bread, rice, pasta, potatoes, granola, flour, corn, peas, oatmeal, grits, tortillas, crackers, chips) and get  grams of protein.  Aim for 100 grams of protein and 1000 calories daily.    - Austral 3D Eliel (code [id number])      - No soda, sweet tea, juices, sports drinks or lemonade     -Exercise daily. Continue.       Start Ozempic once a week. Start with 0.25mg once a week x 4 weeks, then 0.5 mg weekly.     Decrease portions as soon as you start Ozempic. Some nausea in the first 2 weeks is not unusual.     If you get pain across the upper abdomen and around to your back, please call the office.    25 min face to face.

## 2019-11-21 ENCOUNTER — PATIENT MESSAGE (OUTPATIENT)
Dept: BARIATRICS | Facility: CLINIC | Age: 54
End: 2019-11-21

## 2019-11-22 DIAGNOSIS — E66.9 OBESITY, CLASS I, BMI 30.0-34.9 (SEE ACTUAL BMI): ICD-10-CM

## 2019-11-22 RX ORDER — TOPIRAMATE 100 MG/1
100 TABLET, FILM COATED ORAL NIGHTLY
Qty: 90 TABLET | Refills: 1 | Status: SHIPPED | OUTPATIENT
Start: 2019-11-22 | End: 2020-03-17 | Stop reason: SDUPTHER

## 2019-11-29 ENCOUNTER — OFFICE VISIT (OUTPATIENT)
Dept: PRIMARY CARE CLINIC | Facility: CLINIC | Age: 54
End: 2019-11-29
Payer: COMMERCIAL

## 2019-11-29 VITALS
HEART RATE: 76 BPM | SYSTOLIC BLOOD PRESSURE: 102 MMHG | TEMPERATURE: 98 F | DIASTOLIC BLOOD PRESSURE: 70 MMHG | BODY MASS INDEX: 34.51 KG/M2 | WEIGHT: 214.75 LBS | HEIGHT: 66 IN

## 2019-11-29 DIAGNOSIS — G56.00 CARPAL TUNNEL SYNDROME, UNSPECIFIED LATERALITY: ICD-10-CM

## 2019-11-29 DIAGNOSIS — J30.9 CHRONIC ALLERGIC RHINITIS: ICD-10-CM

## 2019-11-29 DIAGNOSIS — I15.2 OBESITY, DIABETES, AND HYPERTENSION SYNDROME: ICD-10-CM

## 2019-11-29 DIAGNOSIS — E11.59 OBESITY, DIABETES, AND HYPERTENSION SYNDROME: ICD-10-CM

## 2019-11-29 DIAGNOSIS — E55.9 VITAMIN D DEFICIENCY: ICD-10-CM

## 2019-11-29 DIAGNOSIS — E66.9 OBESITY, DIABETES, AND HYPERTENSION SYNDROME: ICD-10-CM

## 2019-11-29 DIAGNOSIS — J18.9 COMMUNITY ACQUIRED PNEUMONIA, UNSPECIFIED LATERALITY: ICD-10-CM

## 2019-11-29 DIAGNOSIS — E78.2 MIXED HYPERLIPIDEMIA: Chronic | ICD-10-CM

## 2019-11-29 DIAGNOSIS — E11.42 DIABETIC PERIPHERAL NEUROPATHY: ICD-10-CM

## 2019-11-29 DIAGNOSIS — J02.9 PHARYNGITIS, UNSPECIFIED ETIOLOGY: Primary | ICD-10-CM

## 2019-11-29 DIAGNOSIS — I10 ESSENTIAL HYPERTENSION: Chronic | ICD-10-CM

## 2019-11-29 DIAGNOSIS — E11.9 CONTROLLED TYPE 2 DIABETES MELLITUS WITHOUT COMPLICATION, WITHOUT LONG-TERM CURRENT USE OF INSULIN: ICD-10-CM

## 2019-11-29 DIAGNOSIS — E11.69 OBESITY, DIABETES, AND HYPERTENSION SYNDROME: ICD-10-CM

## 2019-11-29 DIAGNOSIS — E51.9 THIAMINE DEFICIENCY: ICD-10-CM

## 2019-11-29 PROBLEM — L08.9 INFECTED INSECT BITE OF LEFT LEG: Status: RESOLVED | Noted: 2018-05-07 | Resolved: 2019-11-29

## 2019-11-29 PROBLEM — N84.0 ENDOMETRIAL POLYP: Status: RESOLVED | Noted: 2019-08-02 | Resolved: 2019-11-29

## 2019-11-29 PROBLEM — R00.2 HEART PALPITATIONS: Chronic | Status: ACTIVE | Noted: 2017-11-07

## 2019-11-29 PROBLEM — Z12.11 ENCOUNTER FOR SCREENING COLONOSCOPY: Status: RESOLVED | Noted: 2018-07-06 | Resolved: 2019-11-29

## 2019-11-29 PROBLEM — Z98.890 S/P D&C (STATUS POST DILATION AND CURETTAGE): Status: RESOLVED | Noted: 2019-08-02 | Resolved: 2019-11-29

## 2019-11-29 PROBLEM — S80.862A INFECTED INSECT BITE OF LEFT LEG: Status: RESOLVED | Noted: 2018-05-07 | Resolved: 2019-11-29

## 2019-11-29 PROBLEM — Z01.818 PREOPERATIVE EXAM FOR GYNECOLOGIC SURGERY: Status: RESOLVED | Noted: 2019-08-02 | Resolved: 2019-11-29
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
## 2019-11-29 PROCEDURE — 99999 PR PBB SHADOW E&M-EST. PATIENT-LVL III: CPT | Mod: PBBFAC,,, | Performed by: FAMILY MEDICINE

## 2019-11-29 PROCEDURE — 99999 PR PBB SHADOW E&M-EST. PATIENT-LVL III: ICD-10-PCS | Mod: PBBFAC,,, | Performed by: FAMILY MEDICINE

## 2019-11-29 PROCEDURE — 3044F PR MOST RECENT HEMOGLOBIN A1C LEVEL <7.0%: ICD-10-PCS | Mod: CPTII,S$GLB,, | Performed by: FAMILY MEDICINE

## 2019-11-29 PROCEDURE — 3074F SYST BP LT 130 MM HG: CPT | Mod: CPTII,S$GLB,, | Performed by: FAMILY MEDICINE

## 2019-11-29 PROCEDURE — 3078F PR MOST RECENT DIASTOLIC BLOOD PRESSURE < 80 MM HG: ICD-10-PCS | Mod: CPTII,S$GLB,, | Performed by: FAMILY MEDICINE

## 2019-11-29 PROCEDURE — 99213 OFFICE O/P EST LOW 20 MIN: CPT | Mod: 25,S$GLB,, | Performed by: FAMILY MEDICINE

## 2019-11-29 PROCEDURE — 3078F DIAST BP <80 MM HG: CPT | Mod: CPTII,S$GLB,, | Performed by: FAMILY MEDICINE

## 2019-11-29 PROCEDURE — 3074F PR MOST RECENT SYSTOLIC BLOOD PRESSURE < 130 MM HG: ICD-10-PCS | Mod: CPTII,S$GLB,, | Performed by: FAMILY MEDICINE

## 2019-11-29 PROCEDURE — 96372 PR INJECTION,THERAP/PROPH/DIAG2ST, IM OR SUBCUT: ICD-10-PCS | Mod: S$GLB,,, | Performed by: FAMILY MEDICINE

## 2019-11-29 PROCEDURE — 96372 THER/PROPH/DIAG INJ SC/IM: CPT | Mod: S$GLB,,, | Performed by: FAMILY MEDICINE

## 2019-11-29 PROCEDURE — 99213 PR OFFICE/OUTPT VISIT, EST, LEVL III, 20-29 MIN: ICD-10-PCS | Mod: 25,S$GLB,, | Performed by: FAMILY MEDICINE

## 2019-11-29 PROCEDURE — 3008F BODY MASS INDEX DOCD: CPT | Mod: CPTII,S$GLB,, | Performed by: FAMILY MEDICINE

## 2019-11-29 PROCEDURE — 3044F HG A1C LEVEL LT 7.0%: CPT | Mod: CPTII,S$GLB,, | Performed by: FAMILY MEDICINE

## 2019-11-29 PROCEDURE — 3008F PR BODY MASS INDEX (BMI) DOCUMENTED: ICD-10-PCS | Mod: CPTII,S$GLB,, | Performed by: FAMILY MEDICINE

## 2019-11-29 RX ORDER — HYDROCODONE BITARTRATE AND ACETAMINOPHEN 7.5; 325 MG/1; MG/1
1 TABLET ORAL EVERY 6 HOURS PRN
Qty: 28 TABLET | Refills: 0 | Status: SHIPPED | OUTPATIENT
Start: 2019-11-29 | End: 2020-02-24

## 2019-11-29 RX ORDER — FLUCONAZOLE 150 MG/1
150 TABLET ORAL DAILY
Qty: 1 TABLET | Refills: 2 | Status: SHIPPED | OUTPATIENT
Start: 2019-11-29 | End: 2019-11-30

## 2019-11-29 RX ORDER — AZITHROMYCIN 250 MG/1
TABLET, FILM COATED ORAL
Qty: 6 TABLET | Refills: 0 | Status: SHIPPED | OUTPATIENT
Start: 2019-11-29 | End: 2020-02-24 | Stop reason: ALTCHOICE

## 2019-11-29 RX ORDER — CEFTRIAXONE 1 G/1
1 INJECTION, POWDER, FOR SOLUTION INTRAMUSCULAR; INTRAVENOUS
Status: COMPLETED | OUTPATIENT
Start: 2019-11-29 | End: 2019-11-29

## 2019-11-29 RX ORDER — METHYLPREDNISOLONE 4 MG/1
TABLET ORAL
Qty: 1 PACKAGE | Refills: 0 | Status: SHIPPED | OUTPATIENT
Start: 2019-11-29 | End: 2019-12-20

## 2019-11-29 RX ADMIN — CEFTRIAXONE 1 G: 1 INJECTION, POWDER, FOR SOLUTION INTRAMUSCULAR; INTRAVENOUS at 11:11

## 2019-11-29 NOTE — PROGRESS NOTES
Subjective:   Patient ID: Miesha Domínguez is a 53 y.o. female.    Chief Complaint: Sore Throat (hurts to swallow, 5 days) and Neck Pain      Sore Throat    This is a new problem. The current episode started in the past 7 days. The problem has been rapidly worsening. Neither side of throat is experiencing more pain than the other. The pain is at a severity of 8/10. The pain is severe. Associated symptoms include congestion, coughing, ear pain, headaches, a hoarse voice, a plugged ear sensation, neck pain, swollen glands and trouble swallowing. Pertinent negatives include no abdominal pain, diarrhea, ear discharge, shortness of breath or vomiting. She has had exposure to strep. She has tried acetaminophen, NSAIDs and gargles for the symptoms. The treatment provided no relief.       Patient queried and denies any further complaints.    LOCATION  DURATION  SEVERITY  QUALITY  TIMING  CAUSE  ASSOCIATED SYMPTOMS  MODIFIERS.    ALLERGIES AND MEDICATIONS: updated and reviewed.  Review of patient's allergies indicates:  No Known Allergies    Current Outpatient Medications:     AFLURIA QD 2019-20,3YR UP,,PF, 60 mcg (15 mcg x 4)/0.5 mL Syrg, , Disp: , Rfl:     amLODIPine (NORVASC) 5 MG tablet, TAKE 1 TABLET BY MOUTH ONCE DAILY, Disp: 90 tablet, Rfl: 1    CALCIUM CITRATE/VITAMIN D3 (CALCIUM CITRATE + D ORAL), Take 2 tablets by mouth 2 (two) times daily. Calcium citrate 400m g vit d 500 mg, Disp: , Rfl:     cyanocobalamin, vitamin B-12, (VITAMIN B-12) 2,500 mcg Subl, Place 1 tablet under the tongue once a week. Pt taking every other week, Disp: , Rfl:     gabapentin (NEURONTIN) 300 MG capsule, Take 2 capsules (600 mg total) by mouth every evening., Disp: 180 capsule, Rfl: 1    hydroCHLOROthiazide (HYDRODIURIL) 25 MG tablet, Take 1 tablet (25 mg total) by mouth once daily., Disp: 90 tablet, Rfl: 3    ibuprofen (ADVIL,MOTRIN) 600 MG tablet, Take 1 tablet (600 mg total) by mouth 3 (three) times daily., Disp: 60 tablet, Rfl:  0    levocetirizine (XYZAL) 5 MG tablet, Take 1 tablet (5 mg total) by mouth every evening., Disp: 30 tablet, Rfl: 11    metFORMIN (GLUCOPHAGE) 1000 MG tablet, Take 1 tablet (1,000 mg total) by mouth daily with breakfast., Disp: 90 tablet, Rfl: 3    MULTIVITAMIN ORAL, Take 1 tablet by mouth once daily. Centrum adult chews ( flavor burst) , Disp: , Rfl:     potassium 99 mg Tab, 3 tablets twice daily, Disp: , Rfl:     rosuvastatin (CRESTOR) 20 MG tablet, TAKE 1 TABLET BY MOUTH EVERY DAY, Disp: 90 tablet, Rfl: 2    semaglutide (OZEMPIC) 0.25 mg or 0.5 mg(2 mg/1.5 mL) PnIj, Inject 0.5 mg into the skin every 7 days., Disp: 4.5 mL, Rfl: 1    topiramate (TOPAMAX) 100 MG tablet, Take 1 tablet (100 mg total) by mouth every evening., Disp: 90 tablet, Rfl: 1    azithromycin (Z-ALHAJI) 250 MG tablet, Take 2 tablets by mouth on day 1; Take 1 tablet by mouth on days 2-5, Disp: 6 tablet, Rfl: 0    fluconazole (DIFLUCAN) 150 MG Tab, Take 1 tablet (150 mg total) by mouth once daily. As needed for yeast vaginitis for 1 day, Disp: 1 tablet, Rfl: 2    HYDROcodone-acetaminophen (NORCO) 7.5-325 mg per tablet, Take 1 tablet by mouth every 6 (six) hours as needed for Pain. Med necessary M54.5, Disp: 28 tablet, Rfl: 0    methylPREDNISolone (MEDROL DOSEPACK) 4 mg tablet, use as directed, Disp: 1 Package, Rfl: 0    Current Facility-Administered Medications:     cefTRIAXone injection 1 g, 1 g, Intramuscular, 1 time in Clinic/HOD, Sulaiman Valentin MD    Review of Systems   Constitutional: Negative for fatigue and fever.   HENT: Positive for congestion, ear pain, hoarse voice, sore throat and trouble swallowing. Negative for ear discharge, postnasal drip, rhinorrhea and sinus pressure.    Respiratory: Positive for cough. Negative for shortness of breath.    Cardiovascular: Negative for chest pain, palpitations and leg swelling.   Gastrointestinal: Negative for abdominal pain, diarrhea, nausea and vomiting.   Genitourinary: Negative for  "dysuria.   Musculoskeletal: Positive for neck pain.   Neurological: Positive for headaches. Negative for dizziness and light-headedness.       Objective:     Vitals:    11/29/19 1105   BP: 102/70   Pulse: 76   Temp: 98.4 °F (36.9 °C)   Weight: 97.4 kg (214 lb 11.7 oz)   Height: 5' 5.5" (1.664 m)     Body mass index is 35.19 kg/m².    Physical Exam   Constitutional: She is oriented to person, place, and time. She appears well-developed and well-nourished. She is cooperative. She does not have a sickly appearance. No distress.   HENT:   Head: Normocephalic and atraumatic.   Right Ear: Hearing, tympanic membrane, external ear and ear canal normal. No tenderness.   Left Ear: Hearing, tympanic membrane, external ear and ear canal normal. No tenderness.   Nose: Nose normal.   Mouth/Throat: Oropharynx is clear and moist. Normal dentition. No oropharyngeal exudate, posterior oropharyngeal edema or posterior oropharyngeal erythema.   Eyes: Conjunctivae and lids are normal. Right eye exhibits no discharge. Left eye exhibits no discharge. Right conjunctiva is not injected. Left conjunctiva is not injected. No scleral icterus. Right eye exhibits normal extraocular motion. Left eye exhibits normal extraocular motion.   Neck: Normal range of motion. Neck supple. No JVD present. Carotid bruit is not present. No tracheal deviation and no edema present. No thyromegaly present.   Cardiovascular: Normal rate, regular rhythm, normal heart sounds and normal pulses. Exam reveals no friction rub.   No murmur heard.  Pulmonary/Chest: Effort normal and breath sounds normal. No accessory muscle usage. No respiratory distress. She has no wheezes. She has no rhonchi. She has no rales.   Musculoskeletal: She exhibits no edema.   Lymphadenopathy:        Head (right side): No submandibular adenopathy present.        Head (left side): No submandibular adenopathy present.     She has no cervical adenopathy.   Neurological: She is alert and oriented " to person, place, and time.   Skin: Skin is warm and dry. She is not diaphoretic.   Psychiatric: Her speech is normal and behavior is normal. Thought content normal. Her mood appears not anxious. Her affect is not angry, not labile and not inappropriate. She does not exhibit a depressed mood.   Nursing note and vitals reviewed.      Assessment and Plan:   Miesha was seen today for sore throat and neck pain.    Diagnoses and all orders for this visit:    Pharyngitis, unspecified etiology    Community acquired pneumonia, unspecified laterality    Chronic allergic rhinitis    Obesity, diabetes, and hypertension syndrome    Essential hypertension    Mixed hyperlipidemia    Controlled type 2 diabetes mellitus without complication, without long-term current use of insulin    Diabetic peripheral neuropathy    Thiamine deficiency    Vitamin D deficiency    Carpal tunnel syndrome, unspecified laterality    Other orders  -     azithromycin (Z-ALHAJI) 250 MG tablet; Take 2 tablets by mouth on day 1; Take 1 tablet by mouth on days 2-5  -     cefTRIAXone injection 1 g  -     methylPREDNISolone (MEDROL DOSEPACK) 4 mg tablet; use as directed  -     fluconazole (DIFLUCAN) 150 MG Tab; Take 1 tablet (150 mg total) by mouth once daily. As needed for yeast vaginitis for 1 day  -     HYDROcodone-acetaminophen (NORCO) 7.5-325 mg per tablet; Take 1 tablet by mouth every 6 (six) hours as needed for Pain. Med necessary M54.5    Hydrate, rest, OTC Mucinex Expectorant as directed, Nasal saline as needed.  OTC Zyrtec as directed.      Follow up in about 2 weeks (around 12/13/2019).    THIS NOTE WILL BE SHARED WITH THE PATIENT.

## 2019-12-04 ENCOUNTER — TELEPHONE (OUTPATIENT)
Dept: BARIATRICS | Facility: CLINIC | Age: 54
End: 2019-12-04

## 2019-12-31 LAB
LEFT EYE DM RETINOPATHY: NEGATIVE
RIGHT EYE DM RETINOPATHY: NEGATIVE

## 2020-01-13 RX ORDER — AMLODIPINE BESYLATE 5 MG/1
TABLET ORAL
Qty: 90 TABLET | Refills: 3 | Status: SHIPPED | OUTPATIENT
Start: 2020-01-13 | End: 2020-09-24

## 2020-01-17 ENCOUNTER — PATIENT OUTREACH (OUTPATIENT)
Dept: ADMINISTRATIVE | Facility: HOSPITAL | Age: 55
End: 2020-01-17

## 2020-02-19 ENCOUNTER — LAB VISIT (OUTPATIENT)
Dept: LAB | Facility: HOSPITAL | Age: 55
End: 2020-02-19
Attending: INTERNAL MEDICINE
Payer: COMMERCIAL

## 2020-02-19 DIAGNOSIS — E78.2 MIXED HYPERLIPIDEMIA: ICD-10-CM

## 2020-02-19 LAB
ALBUMIN SERPL BCP-MCNC: 3.6 G/DL (ref 3.5–5.2)
ALP SERPL-CCNC: 69 U/L (ref 55–135)
ALT SERPL W/O P-5'-P-CCNC: 14 U/L (ref 10–44)
ANION GAP SERPL CALC-SCNC: 7 MMOL/L (ref 8–16)
AST SERPL-CCNC: 18 U/L (ref 10–40)
BILIRUB SERPL-MCNC: 0.5 MG/DL (ref 0.1–1)
BUN SERPL-MCNC: 18 MG/DL (ref 6–20)
CALCIUM SERPL-MCNC: 9.4 MG/DL (ref 8.7–10.5)
CHLORIDE SERPL-SCNC: 108 MMOL/L (ref 95–110)
CHOLEST SERPL-MCNC: 153 MG/DL (ref 120–199)
CHOLEST/HDLC SERPL: 2.3 {RATIO} (ref 2–5)
CO2 SERPL-SCNC: 27 MMOL/L (ref 23–29)
CREAT SERPL-MCNC: 0.9 MG/DL (ref 0.5–1.4)
EST. GFR  (AFRICAN AMERICAN): >60 ML/MIN/1.73 M^2
EST. GFR  (NON AFRICAN AMERICAN): >60 ML/MIN/1.73 M^2
ESTIMATED AVG GLUCOSE: 117 MG/DL (ref 68–131)
GLUCOSE SERPL-MCNC: 82 MG/DL (ref 70–110)
HBA1C MFR BLD HPLC: 5.7 % (ref 4–5.6)
HDLC SERPL-MCNC: 66 MG/DL (ref 40–75)
HDLC SERPL: 43.1 % (ref 20–50)
LDLC SERPL CALC-MCNC: 75.8 MG/DL (ref 63–159)
NONHDLC SERPL-MCNC: 87 MG/DL
POTASSIUM SERPL-SCNC: 3.5 MMOL/L (ref 3.5–5.1)
PROT SERPL-MCNC: 7.6 G/DL (ref 6–8.4)
SODIUM SERPL-SCNC: 142 MMOL/L (ref 136–145)
TRIGL SERPL-MCNC: 56 MG/DL (ref 30–150)

## 2020-02-19 PROCEDURE — 83036 HEMOGLOBIN GLYCOSYLATED A1C: CPT

## 2020-02-19 PROCEDURE — 80053 COMPREHEN METABOLIC PANEL: CPT

## 2020-02-19 PROCEDURE — 80061 LIPID PANEL: CPT

## 2020-02-19 PROCEDURE — 36415 COLL VENOUS BLD VENIPUNCTURE: CPT | Mod: PO

## 2020-02-21 NOTE — PROGRESS NOTES
54 y.o. femalepresents in f/u to diabetes, hypertension, and dyslipidemia    DM w/ neuro tx w/metfomrin 1,000mg(pt was taking  BID, Till developed diarrhea then decreased to one daily   After ~ one month), decreased intake soda drinks  Denied increased thirst, urination, or hypoglycemia episodes  ++ burning , B/L feet , primarily @ night  Tx: Gabapentin- pt doesn't think very helpful  ++ calf tightness @ night, occ cramping  A1C ==> 5.7    HTN tx w/HCTZ( ave 1/qoweek) and amldipine 5mg   Denied HA or dizziness  BP today==>122/82    Dyslipidemia tx w/ rosuvastatin 20mg  Denies unusual muscle pain or chest pain  LDL==>75.8      ROS:  No fever, chills, or night sweats  No blurry vision or visual disturbance  No dysphagia or early satiety  No palpitations or tachycardia  No cough or wheezing  No GERD or abdominal pain  Answers for HPI/ROS submitted by the patient on 2/23/2020   activity change: No  unexpected weight change: No  neck pain: No  hearing loss: No  rhinorrhea: No  trouble swallowing: No  eye discharge: No  visual disturbance: No  chest tightness: No  wheezing: No  chest pain: No  palpitations: Not this week, experienced last  Week, no chest pain, SOB or lightheadedness  Improved w/ hydration  blood in stool: No  constipation: No  vomiting: No  diarrhea: No  polydipsia: No  polyuria: No  difficulty urinating: No  hematuria: No  menstrual problem: No  dysuria: No  joint swelling: No  arthralgias: No  headaches: No  weakness: No  confusion: No  dysphoric mood: No    Remainder of review negative except as previously noted    PAST MEDICAL HX: Reviewed  PAST SURGICAL HX: Reviewed  SOCIAL HX: Reviewed  FAMILY HX: Reviewed    PHYSICAL EXAM:  VS: As noted  GENERAL: WDWN, A&O, NAD, conversant and co-operative; pleasant as always  EYES: Conj/lids unremarkable, sclera anicteric,  NECK: Supple w/o lymphadenopathy or thyromegaly  RESPIRATORY: Efforts are unlabored. Lungs CTAP  CARDIOVASCULAR: Heart RRR. No carotid bruits  noted. 1+ pedal pulses. No LE edema  MUSCULOSKELETAL: Gait normal. No CCE  Calves: NT, no erythema or edema or warmth  Feet : no lesions  NEUROLOGIC: WILDER. No tremor noted  SKIN: Warm and dry  FEET: Monofilament - intact  Vibratory decreased metatarsals  No lesions or ulcers    IMPRESSION:  DM w/ neuropathy, decreased A1C again,   now 5.7    HLD, near goal @ 75.8  HTN, stable  Nocturnal leg cramps  Parestheias, LE        PLAN:  Consult Neuro  Wean gabapentin to 300mg x one week , then if    no change may d/c  Diabetic diet  Low salt, low fat diet  Exercise   Iron rich foods  Call prn   RTC 6 mos

## 2020-02-24 ENCOUNTER — OFFICE VISIT (OUTPATIENT)
Dept: INTERNAL MEDICINE | Facility: CLINIC | Age: 55
End: 2020-02-24
Payer: COMMERCIAL

## 2020-02-24 ENCOUNTER — PATIENT MESSAGE (OUTPATIENT)
Dept: INTERNAL MEDICINE | Facility: CLINIC | Age: 55
End: 2020-02-24

## 2020-02-24 VITALS
SYSTOLIC BLOOD PRESSURE: 122 MMHG | HEIGHT: 66 IN | TEMPERATURE: 99 F | HEART RATE: 74 BPM | RESPIRATION RATE: 18 BRPM | BODY MASS INDEX: 34.79 KG/M2 | WEIGHT: 216.5 LBS | DIASTOLIC BLOOD PRESSURE: 82 MMHG

## 2020-02-24 DIAGNOSIS — E78.2 MIXED HYPERLIPIDEMIA: ICD-10-CM

## 2020-02-24 DIAGNOSIS — I10 ESSENTIAL HYPERTENSION: ICD-10-CM

## 2020-02-24 DIAGNOSIS — E11.40 CONTROLLED TYPE 2 DIABETES MELLITUS WITH DIABETIC NEUROPATHY, WITHOUT LONG-TERM CURRENT USE OF INSULIN: Primary | ICD-10-CM

## 2020-02-24 PROCEDURE — 99214 OFFICE O/P EST MOD 30 MIN: CPT | Mod: S$GLB,,, | Performed by: INTERNAL MEDICINE

## 2020-02-24 PROCEDURE — 99999 PR PBB SHADOW E&M-EST. PATIENT-LVL III: ICD-10-PCS | Mod: PBBFAC,,, | Performed by: INTERNAL MEDICINE

## 2020-02-24 PROCEDURE — 3008F BODY MASS INDEX DOCD: CPT | Mod: CPTII,S$GLB,, | Performed by: INTERNAL MEDICINE

## 2020-02-24 PROCEDURE — 3008F PR BODY MASS INDEX (BMI) DOCUMENTED: ICD-10-PCS | Mod: CPTII,S$GLB,, | Performed by: INTERNAL MEDICINE

## 2020-02-24 PROCEDURE — 99999 PR PBB SHADOW E&M-EST. PATIENT-LVL III: CPT | Mod: PBBFAC,,, | Performed by: INTERNAL MEDICINE

## 2020-02-24 PROCEDURE — 3044F HG A1C LEVEL LT 7.0%: CPT | Mod: CPTII,S$GLB,, | Performed by: INTERNAL MEDICINE

## 2020-02-24 PROCEDURE — 3074F SYST BP LT 130 MM HG: CPT | Mod: CPTII,S$GLB,, | Performed by: INTERNAL MEDICINE

## 2020-02-24 PROCEDURE — 3044F PR MOST RECENT HEMOGLOBIN A1C LEVEL <7.0%: ICD-10-PCS | Mod: CPTII,S$GLB,, | Performed by: INTERNAL MEDICINE

## 2020-02-24 PROCEDURE — 3079F PR MOST RECENT DIASTOLIC BLOOD PRESSURE 80-89 MM HG: ICD-10-PCS | Mod: CPTII,S$GLB,, | Performed by: INTERNAL MEDICINE

## 2020-02-24 PROCEDURE — 99214 PR OFFICE/OUTPT VISIT, EST, LEVL IV, 30-39 MIN: ICD-10-PCS | Mod: S$GLB,,, | Performed by: INTERNAL MEDICINE

## 2020-02-24 PROCEDURE — 3079F DIAST BP 80-89 MM HG: CPT | Mod: CPTII,S$GLB,, | Performed by: INTERNAL MEDICINE

## 2020-02-24 PROCEDURE — 3074F PR MOST RECENT SYSTOLIC BLOOD PRESSURE < 130 MM HG: ICD-10-PCS | Mod: CPTII,S$GLB,, | Performed by: INTERNAL MEDICINE

## 2020-02-26 ENCOUNTER — TELEPHONE (OUTPATIENT)
Dept: INTERNAL MEDICINE | Facility: CLINIC | Age: 55
End: 2020-02-26

## 2020-02-26 DIAGNOSIS — Z00.00 ANNUAL PHYSICAL EXAM: Primary | ICD-10-CM

## 2020-02-26 RX ORDER — LOSARTAN POTASSIUM 100 MG/1
100 TABLET ORAL DAILY
Qty: 90 TABLET | Refills: 3 | Status: SHIPPED | OUTPATIENT
Start: 2020-02-26 | End: 2021-01-27

## 2020-02-26 NOTE — TELEPHONE ENCOUNTER
I spoke to pt and she definitely want to try new medication losartan 100mg.  She will stop amlodipine and take HCTZ prn.    Pt verbalized understanding.

## 2020-02-26 NOTE — TELEPHONE ENCOUNTER
----- Message from Micha Steven sent at 2/26/2020  2:21 PM CST -----  Contact: Patient   Doctor appointment and lab have been scheduled.  Please link lab orders to the lab appointment.  Date of doctor appointment:  8.28.20  Date of lab appointment:  8.21.20  Physical or EP: Physical   Comments:

## 2020-02-26 NOTE — TELEPHONE ENCOUNTER
Can try losartan 100mg daily and d/c amlodipine  And use HCTZ prn only  If pt agreeable will escript losartan

## 2020-02-26 NOTE — TELEPHONE ENCOUNTER
I spoke to pt, Rx will be sent to her pharm as soon as Dr. Mcgill is done with her afternoon clinic.  Pt should get a call from her pharm when Rx is ready for .    Pt verbalized understanding

## 2020-02-26 NOTE — TELEPHONE ENCOUNTER
----- Message from Maryan Tran sent at 2/26/2020  3:27 PM CST -----  Contact: [phone]  Patient is calling to check the status of her new blood pressure medication Losartan. Patient wants to know when its going to be sent to her pharmacy.  Please advise, thanks     [facility]/pharmacy #[id number] - [place], [place] - [address]   [phone] (Phone)  [phone] (Fax)

## 2020-03-16 RX ORDER — AZITHROMYCIN 250 MG/1
TABLET, FILM COATED ORAL
Qty: 6 TABLET | Refills: 0 | OUTPATIENT
Start: 2020-03-16

## 2020-03-16 RX ORDER — METHYLPREDNISOLONE 4 MG/1
TABLET ORAL
Refills: 0 | OUTPATIENT
Start: 2020-03-16

## 2020-03-16 RX ORDER — FLUCONAZOLE 150 MG/1
150 TABLET ORAL DAILY
Qty: 1 TABLET | Refills: 2 | OUTPATIENT
Start: 2020-03-16 | End: 2020-03-17

## 2020-03-17 ENCOUNTER — TELEPHONE (OUTPATIENT)
Dept: BARIATRICS | Facility: CLINIC | Age: 55
End: 2020-03-17

## 2020-03-17 DIAGNOSIS — E66.9 OBESITY, CLASS I, BMI 30.0-34.9 (SEE ACTUAL BMI): ICD-10-CM

## 2020-03-17 RX ORDER — TOPIRAMATE 100 MG/1
100 TABLET, FILM COATED ORAL NIGHTLY
Qty: 90 TABLET | Refills: 1 | Status: SHIPPED | OUTPATIENT
Start: 2020-03-17 | End: 2020-06-24 | Stop reason: SDUPTHER

## 2020-03-17 NOTE — TELEPHONE ENCOUNTER
Weight last OV, 213. Last chart weight 216#  Reported weight   214#  lap gastric sleeve - 7/29/13     Continued topiramate that she was already on. Started ozempic. Has been cooking for family, so some challenges. Has been getting in her proteins. Has been still having a little bit of starches. Has cut sugary drinks. Walking for exercise. Off work currently (). Denies SE with meds. Her HCTZ has been decreased to only as needed. Losartan to once a day.     Lab Results   Component Value Date    HGBA1C 5.7 (H) 02/19/2020    HGBA1C 5.9 (H) 07/29/2019    HGBA1C 6.2 (H) 01/29/2019     Lab Results   Component Value Date    LDLCALC 75.8 02/19/2020    CREATININE 0.9 02/19/2020

## 2020-03-23 ENCOUNTER — PATIENT MESSAGE (OUTPATIENT)
Dept: INTERNAL MEDICINE | Facility: CLINIC | Age: 55
End: 2020-03-23

## 2020-03-24 ENCOUNTER — PATIENT MESSAGE (OUTPATIENT)
Dept: INTERNAL MEDICINE | Facility: CLINIC | Age: 55
End: 2020-03-24

## 2020-05-13 ENCOUNTER — TELEPHONE (OUTPATIENT)
Dept: INTERNAL MEDICINE | Facility: CLINIC | Age: 55
End: 2020-05-13

## 2020-05-13 DIAGNOSIS — R82.90 ABNORMAL URINE: Primary | ICD-10-CM

## 2020-05-13 NOTE — TELEPHONE ENCOUNTER
rec urine culture and UA- order placed  And resume cranberry juice    rec she add magnesium 400mg daily for cramping

## 2020-05-13 NOTE — TELEPHONE ENCOUNTER
----- Message from Paco Francis sent at 5/13/2020  4:04 PM CDT -----  Contact: Miesha- [phone]  Would like to receive medical advice.  Symptoms: Radiating R leg pain & wants blood work done b/c urine was cloudy    How long has the patient had these symptoms:  Per pt awhile    Would they like a call back or a response via [facility]:  Call back     Additional information:  Pt would like to have blood work done. Please call to advise.

## 2020-05-13 NOTE — TELEPHONE ENCOUNTER
I spoke to pt, c/o cramping in right leg for approx 1-2 weeks off and on.  Pt taking potassium BID.    Urine is cloudy.  Have you been drinking plenty of water? Yes  Are you having burning with urination? No Pressure? No Frequency? Yes but due to fluid intake  Pt stopped drinking cranberry juice.Pt uses [facility] [address] for Rx.    Please advise    thanks

## 2020-05-14 ENCOUNTER — LAB VISIT (OUTPATIENT)
Dept: LAB | Facility: HOSPITAL | Age: 55
End: 2020-05-14
Attending: INTERNAL MEDICINE
Payer: COMMERCIAL

## 2020-05-14 DIAGNOSIS — R82.90 ABNORMAL URINE: ICD-10-CM

## 2020-05-14 LAB
BACTERIA #/AREA URNS AUTO: ABNORMAL /HPF
BILIRUB UR QL STRIP: NEGATIVE
CAOX CRY UR QL COMP ASSIST: ABNORMAL
CLARITY UR REFRACT.AUTO: ABNORMAL
COLOR UR AUTO: YELLOW
GLUCOSE UR QL STRIP: NEGATIVE
HGB UR QL STRIP: NEGATIVE
KETONES UR QL STRIP: NEGATIVE
LEUKOCYTE ESTERASE UR QL STRIP: ABNORMAL
MICROSCOPIC COMMENT: ABNORMAL
NITRITE UR QL STRIP: NEGATIVE
PH UR STRIP: 5 [PH] (ref 5–8)
PROT UR QL STRIP: NEGATIVE
RBC #/AREA URNS AUTO: 1 /HPF (ref 0–4)
SP GR UR STRIP: 1.02 (ref 1–1.03)
SQUAMOUS #/AREA URNS AUTO: 6 /HPF
URN SPEC COLLECT METH UR: ABNORMAL
WBC #/AREA URNS AUTO: 1 /HPF (ref 0–5)

## 2020-05-14 PROCEDURE — 81001 URINALYSIS AUTO W/SCOPE: CPT

## 2020-05-14 PROCEDURE — 87086 URINE CULTURE/COLONY COUNT: CPT

## 2020-05-15 ENCOUNTER — PATIENT MESSAGE (OUTPATIENT)
Dept: INTERNAL MEDICINE | Facility: CLINIC | Age: 55
End: 2020-05-15

## 2020-05-15 DIAGNOSIS — R82.998 CALCIUM OXALATE CRYSTALS PRESENT IN URINE: ICD-10-CM

## 2020-05-15 DIAGNOSIS — R30.0 DYSURIA: Primary | ICD-10-CM

## 2020-05-15 LAB
BACTERIA UR CULT: NORMAL
BACTERIA UR CULT: NORMAL

## 2020-05-16 NOTE — TELEPHONE ENCOUNTER
Urine culture just resulted today and did not reveal a UTI,  PH 5 is in the normal range, but more toward the acidic end  rec she drink cranberry juice and water  SHe is + for calcium crystals  Rec: Urology to further evaluate  Consult order placed    Thought the leg problem was cramping that improved w/ the magnesium  Numbness may be nerve or circulation or musculoskeletal  rec appt to evaluate

## 2020-05-18 ENCOUNTER — TELEPHONE (OUTPATIENT)
Dept: INTERNAL MEDICINE | Facility: CLINIC | Age: 55
End: 2020-05-18

## 2020-05-18 NOTE — TELEPHONE ENCOUNTER
----- Message from China Garcia sent at 5/18/2020  8:33 AM CDT -----  Contact: [phone] / self   Patient is returning a phone call.  Who left a message for the patient: Dr Mcgill   Does patient know what this is regarding:  Test Resuts  Comments:

## 2020-05-18 NOTE — TELEPHONE ENCOUNTER
----- Message from Milagros Wolf MD sent at 5/18/2020  3:13 PM CDT -----  Please note that your urine culture did not reveal an infection.  If your urinary symptoms persist, a visit to the Urologist would be recommended.  Milagros Mcgill

## 2020-05-18 NOTE — TELEPHONE ENCOUNTER
Spoke to pt. Pt informed of results per last Patient Message.  Pt scheduled to see Dr. Mcgill on Friday, 5/22/20 at 3:40 PM.    Referral sent to referral coordinator for scheduling.

## 2020-05-20 ENCOUNTER — OFFICE VISIT (OUTPATIENT)
Dept: UROLOGY | Facility: CLINIC | Age: 55
End: 2020-05-20
Payer: COMMERCIAL

## 2020-05-20 VITALS
WEIGHT: 216 LBS | SYSTOLIC BLOOD PRESSURE: 122 MMHG | BODY MASS INDEX: 34.72 KG/M2 | HEIGHT: 66 IN | HEART RATE: 75 BPM | DIASTOLIC BLOOD PRESSURE: 79 MMHG

## 2020-05-20 DIAGNOSIS — R30.0 DYSURIA: ICD-10-CM

## 2020-05-20 DIAGNOSIS — R82.998 CALCIUM OXALATE CRYSTALS IN URINE: Primary | ICD-10-CM

## 2020-05-20 DIAGNOSIS — R82.998 CALCIUM OXALATE CRYSTALS PRESENT IN URINE: ICD-10-CM

## 2020-05-20 LAB
BILIRUB SERPL-MCNC: ABNORMAL MG/DL
BLOOD URINE, POC: ABNORMAL
COLOR, POC UA: ABNORMAL
GLUCOSE UR QL STRIP: NORMAL
KETONES UR QL STRIP: ABNORMAL
LEUKOCYTE ESTERASE URINE, POC: ABNORMAL
NITRITE, POC UA: ABNORMAL
PH, POC UA: 6
PROTEIN, POC: ABNORMAL
SPECIFIC GRAVITY, POC UA: 1.01
UROBILINOGEN, POC UA: NORMAL

## 2020-05-20 PROCEDURE — 99203 PR OFFICE/OUTPT VISIT, NEW, LEVL III, 30-44 MIN: ICD-10-PCS | Mod: 25,S$GLB,, | Performed by: NURSE PRACTITIONER

## 2020-05-20 PROCEDURE — 3074F SYST BP LT 130 MM HG: CPT | Mod: CPTII,S$GLB,, | Performed by: NURSE PRACTITIONER

## 2020-05-20 PROCEDURE — 3008F PR BODY MASS INDEX (BMI) DOCUMENTED: ICD-10-PCS | Mod: CPTII,S$GLB,, | Performed by: NURSE PRACTITIONER

## 2020-05-20 PROCEDURE — 3074F PR MOST RECENT SYSTOLIC BLOOD PRESSURE < 130 MM HG: ICD-10-PCS | Mod: CPTII,S$GLB,, | Performed by: NURSE PRACTITIONER

## 2020-05-20 PROCEDURE — 81002 URINALYSIS NONAUTO W/O SCOPE: CPT | Mod: S$GLB,,, | Performed by: NURSE PRACTITIONER

## 2020-05-20 PROCEDURE — 81002 POCT URINE DIPSTICK WITHOUT MICROSCOPE: ICD-10-PCS | Mod: S$GLB,,, | Performed by: NURSE PRACTITIONER

## 2020-05-20 PROCEDURE — 3078F DIAST BP <80 MM HG: CPT | Mod: CPTII,S$GLB,, | Performed by: NURSE PRACTITIONER

## 2020-05-20 PROCEDURE — 3008F BODY MASS INDEX DOCD: CPT | Mod: CPTII,S$GLB,, | Performed by: NURSE PRACTITIONER

## 2020-05-20 PROCEDURE — 99203 OFFICE O/P NEW LOW 30 MIN: CPT | Mod: 25,S$GLB,, | Performed by: NURSE PRACTITIONER

## 2020-05-20 PROCEDURE — 3078F PR MOST RECENT DIASTOLIC BLOOD PRESSURE < 80 MM HG: ICD-10-PCS | Mod: CPTII,S$GLB,, | Performed by: NURSE PRACTITIONER

## 2020-05-20 NOTE — PROGRESS NOTES
Subjective:      Miesha Domínguez is a 54 y.o. female who was referred by Dr. Wolf for evaluation of her dysuria.      The patient presents today reporting abnormal urinalysis earlier this month. Denies bothersome urinary symptoms including dysuria, frequency and urgency.  Denies flank pain. Denies a history of nephrolithiasis and recurrent UTIs.     Component      Latest Ref Rng & Units 5/14/2020   RBC, UA      0 - 4 /hpf 1   WBC, UA      0 - 5 /hpf 1   Bacteria, UA      None-Occ /hpf Rare   Squam Epithel, UA      /hpf 6   Ca Oxalate Samra, UA      None-Moderate Many (A)   Microscopic Comment       SEE COMMENT     The following portions of the patient's history were reviewed and updated as appropriate: allergies, current medications, past family history, past medical history, past social history, past surgical history and problem list.    Review of Systems  Constitutional: no fever or chills  ENT: no nasal congestion or sore throat  Respiratory: no cough or shortness of breath  Cardiovascular: no chest pain or palpitations  Gastrointestinal: no nausea or vomiting, tolerating diet  Genitourinary: as per HPI  Hematologic/Lymphatic: no easy bruising or lymphadenopathy  Musculoskeletal: no arthralgias or myalgias  Neurological: no seizures or tremors  Behavioral/Psych: no auditory or visual hallucinations     Objective:   Vital Signs:   Vitals:    05/20/20 0835   BP: 122/79   Pulse: 75       Physical Exam   General: alert and oriented, no acute distress  Head: normocephalic, atraumatic  Neck: supple, no lymphadenopathy, normal ROM, no masses  Respiratory: Symmetric expansion, non-labored breathing  Cardiovascular: regular rate and rhythm, nomal pulses, no peripheral edema  Abdomen: soft, non tender, non distended, no palpable masses, no hernias, no hepatomegaly or splenomegaly  Pelvic: deferred  Lymphatic: no inguinal nodes  Skin: normal coloration and turgor, no rashes, no suspicious skin lesions  noted  Neuro: alert and oriented x3, no gross deficits  Psych: normal judgment and insight, normal mood/affect and non-anxious  No CVA tenderness     Lab Review   Urinalysis demonstrates positive for leukocytes (trace), red blood cells (5-10 tonny/mL), protein (trace)  Lab Results   Component Value Date    WBC 8.44 07/29/2019    HGB 13.1 07/29/2019    HCT 40.8 07/29/2019    MCV 79 (L) 07/29/2019     07/29/2019     Lab Results   Component Value Date    CREATININE 0.9 02/19/2020    BUN 18 02/19/2020     Imaging   None    Assessment:   Calcium oxalate crystal in urine    Plan:   Miesha was seen today for other.    Diagnoses and all orders for this visit:    Calcium oxalate crystals in urine  -     POCT URINE DIPSTICK WITHOUT MICROSCOPE  -     US Retroperitoneal Complete (Kidney and; Future  -     X-Ray KUB; Future    Calcium oxalate crystals present in urine  -     Ambulatory referral/consult to Urology    Plan:  --Discussed the benign nature of this finding in the urine  --ADRIENNE/KUB to r/o stones  --Follow up will be determined based on the above finding

## 2020-05-20 NOTE — LETTER
May 20, 2020      Milagros Wolf MD  [address] [address]           [facility] [address]  Phone: [phone]  Fax: [phone]          Patient: Miesha Domínguez   MR Number: [id number]   YOB: 1965   Date of Visit: 5/20/2020       Dear Dr. Milagros Wolf:    Thank you for referring Miesha Domínguez to me for evaluation. Attached you will find relevant portions of my assessment and plan of care.    If you have questions, please do not hesitate to call me. I look forward to following Miesha Domínguez along with you.    Sincerely,    Denisse Gonzalez, LIMA    Enclosure  CC:  No Recipients    If you would like to receive this communication electronically, please contact [email or web address] or [phone] to request more information on [employer] access.    For providers and/or their staff who would like to refer a patient to [facility], please contact us through our one-stop-shop provider referral line, [facility], at [phone].    If you feel you have received this communication in error or would no longer like to receive these types of communications, please e-mail [email or web address]

## 2020-05-22 ENCOUNTER — OFFICE VISIT (OUTPATIENT)
Dept: INTERNAL MEDICINE | Facility: CLINIC | Age: 55
End: 2020-05-22
Payer: COMMERCIAL

## 2020-05-22 ENCOUNTER — LAB VISIT (OUTPATIENT)
Dept: LAB | Facility: HOSPITAL | Age: 55
End: 2020-05-22
Attending: INTERNAL MEDICINE
Payer: COMMERCIAL

## 2020-05-22 VITALS
HEART RATE: 75 BPM | SYSTOLIC BLOOD PRESSURE: 118 MMHG | BODY MASS INDEX: 34.68 KG/M2 | HEIGHT: 66 IN | OXYGEN SATURATION: 99 % | WEIGHT: 215.81 LBS | RESPIRATION RATE: 17 BRPM | DIASTOLIC BLOOD PRESSURE: 80 MMHG | TEMPERATURE: 100 F

## 2020-05-22 DIAGNOSIS — G25.81 RLS (RESTLESS LEGS SYNDROME): ICD-10-CM

## 2020-05-22 DIAGNOSIS — E11.42 DIABETIC PERIPHERAL NEUROPATHY: ICD-10-CM

## 2020-05-22 DIAGNOSIS — E11.9 CONTROLLED TYPE 2 DIABETES MELLITUS WITHOUT COMPLICATION, WITHOUT LONG-TERM CURRENT USE OF INSULIN: ICD-10-CM

## 2020-05-22 DIAGNOSIS — R25.2 MUSCLE CRAMP, NOCTURNAL: ICD-10-CM

## 2020-05-22 DIAGNOSIS — R20.2 PARESTHESIA OF BILATERAL LEGS: ICD-10-CM

## 2020-05-22 DIAGNOSIS — R20.2 PARESTHESIA OF BILATERAL LEGS: Primary | ICD-10-CM

## 2020-05-22 DIAGNOSIS — E51.9 THIAMINE DEFICIENCY: ICD-10-CM

## 2020-05-22 PROCEDURE — 36415 COLL VENOUS BLD VENIPUNCTURE: CPT | Mod: PO

## 2020-05-22 PROCEDURE — 84425 ASSAY OF VITAMIN B-1: CPT

## 2020-05-22 PROCEDURE — 83540 ASSAY OF IRON: CPT

## 2020-05-22 PROCEDURE — 3074F SYST BP LT 130 MM HG: CPT | Mod: CPTII,S$GLB,, | Performed by: INTERNAL MEDICINE

## 2020-05-22 PROCEDURE — 99214 OFFICE O/P EST MOD 30 MIN: CPT | Mod: S$GLB,,, | Performed by: INTERNAL MEDICINE

## 2020-05-22 PROCEDURE — 80053 COMPREHEN METABOLIC PANEL: CPT

## 2020-05-22 PROCEDURE — 84207 ASSAY OF VITAMIN B-6: CPT

## 2020-05-22 PROCEDURE — 3079F DIAST BP 80-89 MM HG: CPT | Mod: CPTII,S$GLB,, | Performed by: INTERNAL MEDICINE

## 2020-05-22 PROCEDURE — 85025 COMPLETE CBC W/AUTO DIFF WBC: CPT

## 2020-05-22 PROCEDURE — 84252 ASSAY OF VITAMIN B-2: CPT

## 2020-05-22 PROCEDURE — 84443 ASSAY THYROID STIM HORMONE: CPT

## 2020-05-22 PROCEDURE — 99999 PR PBB SHADOW E&M-EST. PATIENT-LVL IV: CPT | Mod: PBBFAC,,, | Performed by: INTERNAL MEDICINE

## 2020-05-22 PROCEDURE — 3074F PR MOST RECENT SYSTOLIC BLOOD PRESSURE < 130 MM HG: ICD-10-PCS | Mod: CPTII,S$GLB,, | Performed by: INTERNAL MEDICINE

## 2020-05-22 PROCEDURE — 99214 PR OFFICE/OUTPT VISIT, EST, LEVL IV, 30-39 MIN: ICD-10-PCS | Mod: S$GLB,,, | Performed by: INTERNAL MEDICINE

## 2020-05-22 PROCEDURE — 3008F BODY MASS INDEX DOCD: CPT | Mod: CPTII,S$GLB,, | Performed by: INTERNAL MEDICINE

## 2020-05-22 PROCEDURE — 3044F HG A1C LEVEL LT 7.0%: CPT | Mod: CPTII,S$GLB,, | Performed by: INTERNAL MEDICINE

## 2020-05-22 PROCEDURE — 3079F PR MOST RECENT DIASTOLIC BLOOD PRESSURE 80-89 MM HG: ICD-10-PCS | Mod: CPTII,S$GLB,, | Performed by: INTERNAL MEDICINE

## 2020-05-22 PROCEDURE — 82746 ASSAY OF FOLIC ACID SERUM: CPT

## 2020-05-22 PROCEDURE — 3044F PR MOST RECENT HEMOGLOBIN A1C LEVEL <7.0%: ICD-10-PCS | Mod: CPTII,S$GLB,, | Performed by: INTERNAL MEDICINE

## 2020-05-22 PROCEDURE — 99999 PR PBB SHADOW E&M-EST. PATIENT-LVL IV: ICD-10-PCS | Mod: PBBFAC,,, | Performed by: INTERNAL MEDICINE

## 2020-05-22 PROCEDURE — 3008F PR BODY MASS INDEX (BMI) DOCUMENTED: ICD-10-PCS | Mod: CPTII,S$GLB,, | Performed by: INTERNAL MEDICINE

## 2020-05-22 PROCEDURE — 83735 ASSAY OF MAGNESIUM: CPT

## 2020-05-22 PROCEDURE — 82607 VITAMIN B-12: CPT

## 2020-05-22 NOTE — PROGRESS NOTES
CC: LE numbness    54 y.o. yo female w/ HTN, DM, and HLD presents for paresthesias of RLE  More around the lower leg./ankle region  And right plantar aspect of the foot  And had leg cramping that has improved  Trial of gabapentin not helpful  Ibuprofen provided some relief w/ 400mg  HCTZ decreased from 2 to 1 daily and BP stable  Started Mg x 2 weeks  Also, restarted her B vitamins that she was advised to take after her gastric surgery  Recall : B vitamin deficiency  And EMG's + peripheral neuropathy several yrs ago thought to be 2/2 to DM, that is now well controlled    MEDCARD:Reviewed  ROS:  No fever, chills , or night sweats  No visual disturbance or itchy/watery eyes  No ear or sinus pain/pressure  No dysphagia or early satiety  No chest pain or palpitations  No cough or wheezing  No GERD or abdominal pain  No joint swelling or redness  No skin rashes or blisters  ++ cold intolerance  No increased thirst or urination  No HA or focal deficits  Remainder of review negative except as previously noted    PMHX: Reviewed  PSHX: Reviewed  SHX: Reviewed  FHX: Reviewed    PE:  VSS  GEN: WDWN, A&O, NAD, conversant and co-operative,  Pleasant as always  EYES: Conj/lids unremarkable, sclera anicteric  ENT: Hearing intact;       RESP: Efforts unlabored,     CV: Heart RRR,  1+ pedal pulses, no LE edema    MSK: Gait normal. No CCE noted  NEURO: WILDER. No tremor noted  Light sensation intact  DTR's 2+ and equal  SKIN: Warm and dry    IMPRESSION:  Paresthesias  Peripheral neuropathy  MM cramps, improved w/ decreased HCTZ, Ca+, Mg  RLS,consider if sx related  Thiamine deficiency, s/p gastric surgery  DM, well controlled    PLAN:  Lab   Continue gabapentin  Add ibuprofen 600mg tid w/ gi precautions  Consider Mirapex  Consider Neuro re-eval  Call prn

## 2020-05-23 LAB
ALBUMIN SERPL BCP-MCNC: 4 G/DL (ref 3.5–5.2)
ALP SERPL-CCNC: 69 U/L (ref 55–135)
ALT SERPL W/O P-5'-P-CCNC: 20 U/L (ref 10–44)
ANION GAP SERPL CALC-SCNC: 7 MMOL/L (ref 8–16)
AST SERPL-CCNC: 22 U/L (ref 10–40)
BASOPHILS # BLD AUTO: 0.03 K/UL (ref 0–0.2)
BASOPHILS NFR BLD: 0.4 % (ref 0–1.9)
BILIRUB SERPL-MCNC: 0.4 MG/DL (ref 0.1–1)
BUN SERPL-MCNC: 20 MG/DL (ref 6–20)
CALCIUM SERPL-MCNC: 9.7 MG/DL (ref 8.7–10.5)
CHLORIDE SERPL-SCNC: 111 MMOL/L (ref 95–110)
CO2 SERPL-SCNC: 25 MMOL/L (ref 23–29)
CREAT SERPL-MCNC: 0.8 MG/DL (ref 0.5–1.4)
DIFFERENTIAL METHOD: ABNORMAL
EOSINOPHIL # BLD AUTO: 0.1 K/UL (ref 0–0.5)
EOSINOPHIL NFR BLD: 1.7 % (ref 0–8)
ERYTHROCYTE [DISTWIDTH] IN BLOOD BY AUTOMATED COUNT: 17 % (ref 11.5–14.5)
EST. GFR  (AFRICAN AMERICAN): >60 ML/MIN/1.73 M^2
EST. GFR  (NON AFRICAN AMERICAN): >60 ML/MIN/1.73 M^2
FOLATE SERPL-MCNC: 8.3 NG/ML (ref 4–24)
GLUCOSE SERPL-MCNC: 73 MG/DL (ref 70–110)
HCT VFR BLD AUTO: 39.8 % (ref 37–48.5)
HGB BLD-MCNC: 12.1 G/DL (ref 12–16)
IMM GRANULOCYTES # BLD AUTO: 0.01 K/UL (ref 0–0.04)
IMM GRANULOCYTES NFR BLD AUTO: 0.1 % (ref 0–0.5)
IRON SERPL-MCNC: 84 UG/DL (ref 30–160)
LYMPHOCYTES # BLD AUTO: 3.5 K/UL (ref 1–4.8)
LYMPHOCYTES NFR BLD: 42.9 % (ref 18–48)
MAGNESIUM SERPL-MCNC: 2 MG/DL (ref 1.6–2.6)
MCH RBC QN AUTO: 26.3 PG (ref 27–31)
MCHC RBC AUTO-ENTMCNC: 30.4 G/DL (ref 32–36)
MCV RBC AUTO: 87 FL (ref 82–98)
MONOCYTES # BLD AUTO: 0.6 K/UL (ref 0.3–1)
MONOCYTES NFR BLD: 7.3 % (ref 4–15)
NEUTROPHILS # BLD AUTO: 3.9 K/UL (ref 1.8–7.7)
NEUTROPHILS NFR BLD: 47.6 % (ref 38–73)
NRBC BLD-RTO: 0 /100 WBC
PLATELET # BLD AUTO: 203 K/UL (ref 150–350)
PMV BLD AUTO: 11.7 FL (ref 9.2–12.9)
POTASSIUM SERPL-SCNC: 3.8 MMOL/L (ref 3.5–5.1)
PROT SERPL-MCNC: 7.6 G/DL (ref 6–8.4)
RBC # BLD AUTO: 4.6 M/UL (ref 4–5.4)
SATURATED IRON: 23 % (ref 20–50)
SODIUM SERPL-SCNC: 143 MMOL/L (ref 136–145)
TOTAL IRON BINDING CAPACITY: 369 UG/DL (ref 250–450)
TRANSFERRIN SERPL-MCNC: 249 MG/DL (ref 200–375)
TSH SERPL DL<=0.005 MIU/L-ACNC: 1.24 UIU/ML (ref 0.4–4)
VIT B12 SERPL-MCNC: >2000 PG/ML (ref 210–950)
WBC # BLD AUTO: 8.13 K/UL (ref 3.9–12.7)

## 2020-05-24 RX ORDER — ROSUVASTATIN CALCIUM 20 MG/1
TABLET, COATED ORAL
Qty: 90 TABLET | Refills: 3 | Status: SHIPPED | OUTPATIENT
Start: 2020-05-24 | End: 2021-04-15 | Stop reason: SDUPTHER

## 2020-05-26 ENCOUNTER — HOSPITAL ENCOUNTER (OUTPATIENT)
Dept: RADIOLOGY | Facility: OTHER | Age: 55
Discharge: HOME | End: 2020-05-26
Attending: NURSE PRACTITIONER
Payer: COMMERCIAL

## 2020-05-26 ENCOUNTER — PATIENT MESSAGE (OUTPATIENT)
Dept: INTERNAL MEDICINE | Facility: CLINIC | Age: 55
End: 2020-05-26

## 2020-05-26 DIAGNOSIS — R82.998 CALCIUM OXALATE CRYSTALS IN URINE: ICD-10-CM

## 2020-05-26 PROCEDURE — 76770 US RETROPERITONEAL COMPLETE: ICD-10-PCS | Mod: 26,,, | Performed by: RADIOLOGY

## 2020-05-26 PROCEDURE — 76770 US EXAM ABDO BACK WALL COMP: CPT | Mod: TC

## 2020-05-26 PROCEDURE — 74018 RADEX ABDOMEN 1 VIEW: CPT | Mod: TC,FY

## 2020-05-26 PROCEDURE — 76770 US EXAM ABDO BACK WALL COMP: CPT | Mod: 26,,, | Performed by: RADIOLOGY

## 2020-05-26 PROCEDURE — 74018 RADEX ABDOMEN 1 VIEW: CPT | Mod: 26,,, | Performed by: RADIOLOGY

## 2020-05-26 PROCEDURE — 74018 XR KUB: ICD-10-PCS | Mod: 26,,, | Performed by: RADIOLOGY

## 2020-05-29 ENCOUNTER — TELEPHONE (OUTPATIENT)
Dept: BARIATRICS | Facility: CLINIC | Age: 55
End: 2020-05-29

## 2020-05-29 ENCOUNTER — TELEPHONE (OUTPATIENT)
Dept: INTERNAL MEDICINE | Facility: CLINIC | Age: 55
End: 2020-05-29

## 2020-05-29 ENCOUNTER — PATIENT MESSAGE (OUTPATIENT)
Dept: BARIATRICS | Facility: CLINIC | Age: 55
End: 2020-05-29

## 2020-05-29 LAB
PYRIDOXAL SERPL-MCNC: 72 UG/L (ref 5–50)
VIT B1 BLD-MCNC: 57 UG/L (ref 38–122)
VIT B2 SERPL-MCNC: 40 MCG/L (ref 1–19)

## 2020-05-29 RX ORDER — SEMAGLUTIDE 1.34 MG/ML
INJECTION, SOLUTION SUBCUTANEOUS
Qty: 9 ML | Refills: 0 | Status: SHIPPED | OUTPATIENT
Start: 2020-05-29 | End: 2020-06-24 | Stop reason: SDUPTHER

## 2020-05-29 NOTE — TELEPHONE ENCOUNTER
Called [facility] pharmacy in regards to pt rx (Ozempic) status. Pharmacist stated that rx was priced at $200 for a 90 day supply due to insurance. I notified pharmacist that pt only paid $75 dollars for last refill. Asked pharmacist to apply discount card. Pharmacist stated price has changed to $25 once coupon card was added. Pharmacist stated that pt can  rx(Ozempic) any time today.

## 2020-05-29 NOTE — TELEPHONE ENCOUNTER
Spoke with pt in regards to rx (Ozempic) . Notified pt that medication is priced at $25 dollars and is ready for .

## 2020-05-29 NOTE — TELEPHONE ENCOUNTER
----- Message from Milagros Wolf MD sent at 5/29/2020  9:51 AM CDT -----  Please note that your B 6 level was elevated.  Your other B vitamins are still pending.  Milagros Mcgill

## 2020-06-02 ENCOUNTER — TELEPHONE (OUTPATIENT)
Dept: BARIATRICS | Facility: CLINIC | Age: 55
End: 2020-06-02

## 2020-06-02 NOTE — TELEPHONE ENCOUNTER
Returned phone call to cover my meds regarding pt coverage. Notified cover my meds that pt was approved for $25 once coupon card was added and rx (Ozempic) was picked up on 5/29.

## 2020-06-11 ENCOUNTER — OCCUPATIONAL HEALTH (OUTPATIENT)
Dept: URGENT CARE | Facility: CLINIC | Age: 55
End: 2020-06-11

## 2020-06-11 DIAGNOSIS — Z02.89 ENCOUNTER FOR EXAMINATION REQUIRED BY DEPARTMENT OF TRANSPORTATION (DOT): Primary | ICD-10-CM

## 2020-06-11 PROCEDURE — 99499 UNLISTED E&M SERVICE: CPT | Mod: S$GLB,,, | Performed by: NURSE PRACTITIONER

## 2020-06-11 PROCEDURE — 99499 PHYSICAL, RECERT DOT/CDL: ICD-10-PCS | Mod: S$GLB,,, | Performed by: NURSE PRACTITIONER

## 2020-06-24 ENCOUNTER — OFFICE VISIT (OUTPATIENT)
Dept: BARIATRICS | Facility: CLINIC | Age: 55
End: 2020-06-24
Payer: COMMERCIAL

## 2020-06-24 ENCOUNTER — TELEPHONE (OUTPATIENT)
Dept: BARIATRICS | Facility: CLINIC | Age: 55
End: 2020-06-24

## 2020-06-24 DIAGNOSIS — R82.998 CALCIUM OXALATE CRYSTALS IN URINE: ICD-10-CM

## 2020-06-24 DIAGNOSIS — E66.9 OBESITY, CLASS I, BMI 30.0-34.9 (SEE ACTUAL BMI): ICD-10-CM

## 2020-06-24 DIAGNOSIS — Z98.84 S/P LAPAROSCOPIC SLEEVE GASTRECTOMY: Primary | ICD-10-CM

## 2020-06-24 PROCEDURE — 99213 PR OFFICE/OUTPT VISIT, EST, LEVL III, 20-29 MIN: ICD-10-PCS | Mod: 95,,, | Performed by: INTERNAL MEDICINE

## 2020-06-24 PROCEDURE — 3044F PR MOST RECENT HEMOGLOBIN A1C LEVEL <7.0%: ICD-10-PCS | Mod: CPTII,,, | Performed by: INTERNAL MEDICINE

## 2020-06-24 PROCEDURE — 99213 OFFICE O/P EST LOW 20 MIN: CPT | Mod: 95,,, | Performed by: INTERNAL MEDICINE

## 2020-06-24 PROCEDURE — 3044F HG A1C LEVEL LT 7.0%: CPT | Mod: CPTII,,, | Performed by: INTERNAL MEDICINE

## 2020-06-24 RX ORDER — SEMAGLUTIDE 1.34 MG/ML
INJECTION, SOLUTION SUBCUTANEOUS
Qty: 9 ML | Refills: 1 | Status: SHIPPED | OUTPATIENT
Start: 2020-06-24 | End: 2020-08-17

## 2020-06-24 RX ORDER — TOPIRAMATE 100 MG/1
100 TABLET, FILM COATED ORAL NIGHTLY
Qty: 90 TABLET | Refills: 1 | Status: SHIPPED | OUTPATIENT
Start: 2020-06-24 | End: 2020-11-13

## 2020-06-24 NOTE — PROGRESS NOTES
Subjective:       Patient ID: Miesha Domínguez is a 54 y.o. female.    Chief Complaint: Follow-up    The patient location is: [facility] .home  The chief complaint leading to consultation is: Patient presents with:  Follow-up       Visit type: audiovisual    Face to Face time with patient: 10 min  17  minutes of total time spent on the encounter, which includes face to face time and non-face to face time preparing to see the patient (eg, review of tests), Obtaining and/or reviewing separately obtained history, Documenting clinical information in the electronic or other health record, Independently interpreting results (not separately reported) and communicating results to the patient/family/caregiver, or Care coordination (not separately reported).         Each patient to whom he or she provides medical services by telemedicine is:  (1) informed of the relationship between the physician and patient and the respective role of any other health care provider with respect to management of the patient; and (2) notified that he or she may decline to receive medical services by telemedicine and may withdraw from such care at any time.    Notes:     Weight last OV, 213. Last chart weight 216#  Reported weight   213#  lap gastric sleeve - 7/29/13     Continued topiramate that she was already on. Started ozempic. States she has not been following diet. Had some calcium oxalate crystals in her urine, so decreased her protein intake. Has increased her water intake.    Denies SE with meds. Walking about 1 hour a day.     Follow-up  Associated symptoms include myalgias. Pertinent negatives include no arthralgias, chest pain, chills or fever.     Review of Systems   Constitutional: Negative for chills and fever.   Respiratory: Negative for shortness of breath.         Denies Snoring   Cardiovascular: Negative for chest pain and leg swelling.   Gastrointestinal: Negative for constipation and diarrhea.        Denies GERD    Genitourinary: Positive for menstrual problem. Negative for difficulty urinating.        Recent  bleeding. bx with polyps.    Musculoskeletal: Positive for myalgias. Negative for arthralgias and back pain.   Neurological: Negative for dizziness and light-headedness.   Psychiatric/Behavioral: Negative for dysphoric mood. The patient is not nervous/anxious.        Objective:     There were no vitals taken for this visit.    Physical Exam  Vitals signs reviewed.   Constitutional:       General: She is not in acute distress.     Appearance: She is well-developed.      Comments: Obese   HENT:      Head: Normocephalic and atraumatic.   Eyes:      General: No scleral icterus.     Pupils: Pupils are equal, round, and reactive to light.   Neck:      Musculoskeletal: Normal range of motion and neck supple.   Cardiovascular:      Rate and Rhythm: Normal rate.   Pulmonary:      Effort: Pulmonary effort is normal.   Musculoskeletal: Normal range of motion.   Skin:     General: Skin is warm and dry.      Findings: No erythema.   Neurological:      Mental Status: She is alert and oriented to person, place, and time.      Cranial Nerves: No cranial nerve deficit.   Psychiatric:         Behavior: Behavior normal.         Judgment: Judgment normal.         Assessment:       1. S/P laparoscopic sleeve gastrectomy    2. Uncontrolled type 2 diabetes mellitus with diabetic neuropathy, without long-term current use of insulin    3. Obesity, Class I, BMI 30.0-34.9 (see actual BMI)    4. Calcium oxalate crystals in urine        Plan:           Miesha was seen today for follow-up.    Diagnoses and all orders for this visit:    S/P laparoscopic sleeve gastrectomy    Uncontrolled type 2 diabetes mellitus with diabetic neuropathy, without long-term current use of insulin  -     semaglutide (OZEMPIC) 1 mg/dose (2 mg/1.5 mL) PnIj; INJECT 1 MG SUBQ WEEKLY.    Obesity, Class I, BMI 30.0-34.9 (see actual BMI)  -     topiramate (TOPAMAX) 100 MG  tablet; Take 1 tablet (100 mg total) by mouth every evening.    Calcium oxalate crystals in urine           To lose weight you want to cut 100% starchy carbohydrates out of your diet (bread, rice, pasta, potatoes, granola, flour, corn, peas, oatmeal, grits, tortillas, crackers, chips) and get  grams of protein.  Aim for 100 grams of protein and 1000 calories daily.    - Team[employer] Eliel (code 98690)      - No soda, sweet tea, juices, sports drinks or lemonade     -Exercise daily. Continue.     Continue topiramate    continue Ozempic 1 mg once a week.     Decrease portions as soon as you start Ozempic. Some nausea in the first 2 weeks is not unusual.     If you get pain across the upper abdomen and around to your back, please call the office.      Follow up with RD for kidney stones and s/p sleeve 2013.

## 2020-06-24 NOTE — PATIENT INSTRUCTIONS
To lose weight you want to cut 100% starchy carbohydrates out of your diet (bread, rice, pasta, potatoes, granola, flour, corn, peas, oatmeal, grits, tortillas, crackers, chips) and get  grams of protein.  Aim for 100 grams of protein and 1000 calories daily.    - [employer] Eliel (code 54179)      - No soda, sweet tea, juices, sports drinks or lemonade     -Exercise daily. Continue.       Continue Ozempic 1 mg once a week.     Decrease portions as soon as you start Ozempic. Some nausea in the first 2 weeks is not unusual.     If you get pain across the upper abdomen and around to your back, please call the office.    Continue topiramate      [facility] Survival Guide  Tips to Stay on Track During COVID-19      DO DON'T   Keep up with your food log  Maintaining your caloric intake and diet quality is critical for achieving your health and weight loss goals.  Download the Eliel [employer] and use [facility] Code 70504 to track food and fluid intake Feel Discouraged - You can do this!  There are a lot of changes happening in our world but don't let them discourage you. Focus on the future and remind yourself of all the work and effort you've put in so far.     Keep protein-rich foods stocked up  buy chicken and turkey in bulk and freeze them, keep dry or canned beans on hand, get the largest quantity of eggs available. Make sure you are getting your required protein intake (between  grams EACH DAY).   Drink fluid during meals or 30 minutes before/after eating  Your regular routine may have changed which may have caused some of your meal or snack times to change.  keep track of when you consume any liquid and time your meals accordingly. This will also help you make sure you are staying hydrated throughout the day   Continue with your protein drinks or bars  Order online or use grocery delivery service. Always make sure you order more WELL BEFORE you are running low, especially now  when deliveries may take longer to arrive.  Remember to check to make sure your protein shakes or bars have 4 gms of sugar or less.     Keep table sugar around  You may be more tempted to add it to your drinks or food if it is visible and easily accessible.   Try new recipes  Use this time to experiment in the kitchen and find some different healthy dishes you enjoy - you can use the nutrition booklet to help guide you.  If you cannot find your copy, please download it from our website @ [email or web address]  Click here to download [facility]'s Nutrition Binder.   Add tough or crunchy foods back into your diet too quickly  Raw veggies are great snack foods but adding them in too quickly after surgery will cause pain and discomfort   Eat your meals slowly and intentionally  You shouldn't have to rush out to be anywhere so really take your time and aim for each meal to last around 20-30 minutes.   Drink sugary/bubbly drinks or alcohol    It may be tempting especially if you are surrounded by others without these limitations, but these beverages will prevent weight loss and cause gastric  pain/discomfort     Listen to your body - try to recognize when you feel full.  Learning your body's signals can be difficult but it is a key step in your weight loss journey. While you are is this process of working on this step, be sure portion out the recommended quantities of foods and meals/snacks to prevent overeating.   Eat your meals using electronics  This is especially difficult at home where your use of computers, phones, and TVs are pretty much unregulated. Designate a meal spot or spots where there is limited distractions and you can focus on your food - maybe your kitchen table or on an outside porch or deck.   Continue taking vitamins and minerals  Take them at the same time each day and keep a log of when you take your supplements to make sure you don't miss  any. These supplements are essential for preventing malnutrition and other health problems that will deter your progress.   'Save' your appetite   You may feel like holding out from food as long as possible so you can eat a large meal later on, but your body needs energy throughout the day in order to properly fuel itself and keep you alert.  Try eating small meals throughout the day - use these meals as mini breaks from work or projects you are doing at home.   Stay active!  It is so important for both your physical and mental health that you get regular physical activity. Even if you can no longer physically go to the gym or to workout classes there are tons of online resources to keep you moving. Incorporate a specific exercise time into your daily home routine and keep a journal of your activity.   Order food delivery or take out   Most places are now offering delivery services, but it can still be difficult to find and choose healthy options. Choose to do a grocery store  or delivery instead- cooking food at home is less expensive then eating out!   Review the resources you've been provided and keep in touch with your healthcare team.  Let them know if you are struggling or experiencing any problems - they are here to help!  Call us to schedule a telehealth visit at [phone] Isolate yourself   It may be called 'social distancing' but that does not mean we can't still connect with one another. Phone calls or group video chats are great ways to keep in touch while staying at home. Any method of getting regular social time with friends and family will help to remind you that you're not in this alone.     Grocery List    Items to Keep Stocked in Your Kitchen  PROTEINS (Lean)    Eggs  Beans (canned and/or dried)  Skinless chicken/turkey   Tuna/Robeline (canned and/or pouch)  Tofu or Tempeh  Courtney Veggie Burgers  Fish or shrimp (fresh or frozen)  Ground Beef (90% lean)  Steaks  Cesari Greek  Yogurt  Cabot Cottage Cheese  Hummus  Low-fat cheeses (Laughing Cow, Baby Bell, mozzarella string cheese)  Fairlife Non-fat Milk    Vegetables (non-starchy)  *veggies can be fresh or frozen    Broccoli   Cauliflower   Carrots   Onions   Cabbage   Radishes   Zucchini   Okra   Greens   Peppers   Spinach   Turnips   Mushrooms   Tomatoes   Celery   Lettuce   Asparagus  Eggplant   Green Onions   Kale    Fruits  *Fruits can be fresh or frozen    Apples  Oranges  Pears  Kiwi  Melon  Berries  Peaches  Unsweetened Applesauce    *Avoid fruits canned In syrup  *Stick to 1-2 servings of fruit/day   Vitamins    Flintstones Complete  Chewables    Super B-Complex tablets with 50 mg Thiamine    Nature's Way liquid Calcium Citrate + Vit D     Sublingual Vitamin B12   Other    Sugar-free Popsicles    Sugar-free Jello    Crystal Lite    Low Fat Condiments     Decaf Coffee/Tea           Foods to Avoid Having Around the House  Butter/Margarine Cookies Candy Chips  Pretzels Grits  Granola Popcorn Cleveland Corn  Bread Alcohol Soda  Pasta  Rice  Cake/Pie Sausage Potatoes Ice Cream                 Tricks to Prevent Emotional Eating During Quarantine      Keep 'trigger foods' out of the house   Keep yourself distracted with work, games, music, or whatever hobby you enjoy. This may be the time to try a new activity!   Try fighting stress with breathing techniques, yoga, meditation, or prayer   Mix up your meals with a variety of dishes   Keep up with your food diary   Call or video chat with a friend or family   Plan your meals for specific time and try for smaller meals throughout the day   Pre-portion all meals and snacks    Step outside for some fresh air or do a quick exercise activity to reset yourself    *Avoid negative thoughts about yourself - if you have a slip-up, you are not a failure. Forgive yourself and focus on learning from it so you can prevent it for the future              Ways to Stay Active While Staying Inside       [employer] Videos - free exercise and gym classes at any fitness level   Apps -tons of fitness apps are currently offering free trial periods and offer workouts that don't require equipment   Chores around the house, such as cleaning or gardening   Walk up and down the stairs   Video-chat with your regular workout tien and do an online class together   Make a playlist of your favorite fun songs and dance around - no need to worry about knowing any serious dance moves, just jump around get your heart rate up!    While you are limited to working out at home, you may find it easier to do short, mini workouts multiple times a day instead of all at once. Try to still get at least 30 minutes of physical activity in each day!   Physical Health = Mental Health    The health of both your mind and body are equally important -be sure you are taking the time to care for both. It is likely that the current health concerns and quarantine mandates caused significant changes to your normal routine. Although this can feel overwhelming and seem difficult to manage, there are ways you can take to manage these feelings and keep your mental and physical health journey on track. Here are some tips for self-care during quarantine:     Meditate, take deep breaths - find any practice that will help you center yourself.   Move around your house throughout the day. Avoid staying in the same seat or room for too long and try to work in an area of your house that get lots of sunlight if possible.   Get fresh air for a bit every day - being outside is a great way to improve your mood! You don't necessarily have to go far from your house. You could even just hang out on your porch for a while or take a walk around the block.    Get good sleep and maintain a regular sleep schedule   Connect with others. While we aren't able to physically be with others right now it is still so important to socialize and interact with other people,  even if it is being done remotely.    Keep yourself busy. Make a list of tasks that you want to complete around the house, start a new book, do some art projects, try journaling.

## 2020-06-30 ENCOUNTER — CLINICAL SUPPORT (OUTPATIENT)
Dept: BARIATRICS | Facility: CLINIC | Age: 55
End: 2020-06-30
Payer: COMMERCIAL

## 2020-06-30 VITALS — BODY MASS INDEX: 34.59 KG/M2 | WEIGHT: 214.31 LBS

## 2020-06-30 DIAGNOSIS — Z98.84 S/P LAPAROSCOPIC SLEEVE GASTRECTOMY: ICD-10-CM

## 2020-06-30 DIAGNOSIS — E66.9 OBESITY (BMI 30-39.9): ICD-10-CM

## 2020-06-30 DIAGNOSIS — Z71.3 NUTRITIONAL COUNSELING: ICD-10-CM

## 2020-06-30 DIAGNOSIS — E11.9 CONTROLLED TYPE 2 DIABETES MELLITUS WITHOUT COMPLICATION, WITHOUT LONG-TERM CURRENT USE OF INSULIN: ICD-10-CM

## 2020-06-30 DIAGNOSIS — I10 ESSENTIAL HYPERTENSION: Chronic | ICD-10-CM

## 2020-06-30 PROCEDURE — 99499 UNLISTED E&M SERVICE: CPT | Mod: S$GLB,,, | Performed by: DIETITIAN, REGISTERED

## 2020-06-30 PROCEDURE — 97803 MED NUTRITION INDIV SUBSEQ: CPT | Mod: S$GLB,,, | Performed by: DIETITIAN, REGISTERED

## 2020-06-30 PROCEDURE — 99999 PR PBB SHADOW E&M-EST. PATIENT-LVL I: ICD-10-PCS | Mod: PBBFAC,,, | Performed by: DIETITIAN, REGISTERED

## 2020-06-30 PROCEDURE — 99499 NO LOS: ICD-10-PCS | Mod: S$GLB,,, | Performed by: DIETITIAN, REGISTERED

## 2020-06-30 PROCEDURE — 99999 PR PBB SHADOW E&M-EST. PATIENT-LVL I: CPT | Mod: PBBFAC,,, | Performed by: DIETITIAN, REGISTERED

## 2020-06-30 PROCEDURE — 97803 PR MED NUTR THER, SUBSQ, INDIV, EA 15 MIN: ICD-10-PCS | Mod: S$GLB,,, | Performed by: DIETITIAN, REGISTERED

## 2020-06-30 NOTE — PROGRESS NOTES
NUTRITION NOTE    Referring Physician: Sami Francis M.D.  Reason for MNT Referral: Follow-up 7 years s/p Gastric Sleeve  Lowest wt after sleeve was 188 lbs fter surgery.   Highest wt prior to sleeve sx was 268 lbs  PAST MEDICAL HISTORY:    Denies nausea, vomiting, constipation and diarrhea.  Reports problems, including weight regain.    Past Medical History:   Diagnosis Date    Asthma     as a child    Endometrial polyp 8/2/2019    HLD (hyperlipidemia)     HTN (hypertension)     Neuropathy     S/P D&C (status post dilation and curettage) 8/2/2019    Type II or unspecified type diabetes mellitus without mention of complication, not stated as uncontrolled        CLINICAL DATA:  54 y.o. female.    There were no vitals filed for this visit.    Current Weight: 214 lbs  BMI: 34.59  Total Weight Loss: 51 lbs  Excess Weight Loss: 41%  Would like to get back to 180-190 lbs  LABS:  Reviewed.   Elevated B vitamins: B-1, B-6 and B-12 may halve dose    CURRENT DIET:  Bariatric Diet.  Diet Recall: 45-50 grams of protein/day; 48+ oz of fluids/day  B Coffee egg and turkey sausage 13 gms or protein  S: SF jello or sliced cheese or nuts 4-6 gms protein  L turkey breast with cheese stick 14 gms pro  D similar as lunch 14 gm protein    Meal Pattern: 3 meal(s) + 1 snack(s) + 0 protein supplement(s)  Inadequate protein supplement intake.  Adequate dairy intake.  Adequate vegetable intake. Tolerates raw vegetables and lettuce.  Adequate fruit intake.  Starchy CHO: none reported      EXERCISE:  Walking up and down block light resistance training and bands    Restrictions to Exercise: COVID-19 has prevented her from working out at gym    VITAMINS / MINERALS:  Pt reports adherence to vitamin and mineral protocol    ASSESSMENT:  Doing fairly well overall.  Weight gain.  Inadequate calorie intake.  Inadequate protein intake.  Adequate fluid intake.  Following diet appropriately.  Exercising.  Excess vitamins & minerals.  Cut B  vitamin intake by half    BARIATRIC DIET DISCUSSION:  Instructed and provided written materials on bariatric diet plan.  Reinforced post-op nutrition guidelines.    PLAN / RECOMMENDATIONS:  May begin to incorporate raw vegetables, lettuce, unsalted nuts, and seedsas tolerated.  May begin to swallow whole pills as tolerated.  Back on track with diet plan.  Adjust diet by adding protein shake or protein bar daily to increase protein to  gms of protein every day.  Increase protein intake.  Maintain fluid intake.  Increase exercise.  Continue appropriate vitamins & minerals.  Adjust vitamins & minerals by cut B vitamins intake by half.    Return to clinic in 1 months.    SESSION TIME: 15 minutes

## 2020-07-31 ENCOUNTER — TELEPHONE (OUTPATIENT)
Dept: OBSTETRICS AND GYNECOLOGY | Facility: CLINIC | Age: 55
End: 2020-07-31

## 2020-07-31 ENCOUNTER — HOSPITAL ENCOUNTER (OUTPATIENT)
Dept: RADIOLOGY | Facility: HOSPITAL | Age: 55
Discharge: HOME | End: 2020-07-31
Attending: OBSTETRICS & GYNECOLOGY
Payer: COMMERCIAL

## 2020-07-31 DIAGNOSIS — Z12.31 SCREENING MAMMOGRAM, ENCOUNTER FOR: ICD-10-CM

## 2020-07-31 DIAGNOSIS — Z12.31 SCREENING MAMMOGRAM, ENCOUNTER FOR: Primary | ICD-10-CM

## 2020-07-31 PROCEDURE — 77063 MAMMO DIGITAL SCREENING BILAT WITH TOMOSYNTHESIS_CAD: ICD-10-PCS | Mod: 26,,, | Performed by: RADIOLOGY

## 2020-07-31 PROCEDURE — 77063 BREAST TOMOSYNTHESIS BI: CPT | Mod: 26,,, | Performed by: RADIOLOGY

## 2020-07-31 PROCEDURE — 77067 SCR MAMMO BI INCL CAD: CPT | Mod: 26,,, | Performed by: RADIOLOGY

## 2020-07-31 PROCEDURE — 77067 SCR MAMMO BI INCL CAD: CPT | Mod: TC,PN

## 2020-07-31 PROCEDURE — 77067 MAMMO DIGITAL SCREENING BILAT WITH TOMOSYNTHESIS_CAD: ICD-10-PCS | Mod: 26,,, | Performed by: RADIOLOGY

## 2020-07-31 NOTE — TELEPHONE ENCOUNTER
----- Message from Chana Ponce sent at 7/31/2020  9:15 AM CDT -----  Patient would like to get an order put in for a mammogram    Patient would like to receive a call once order is placed so she could schedule appt    Patient can be reached at [phone]

## 2020-07-31 NOTE — TELEPHONE ENCOUNTER
----- Message from Chana Ponce sent at 7/31/2020  9:15 AM CDT -----  Patient would like to get an order put in for a mammogram    Patient would like to receive a call once order is placed so she could schedule appt    Patient can be reached at [phone]

## 2020-08-03 ENCOUNTER — TELEPHONE (OUTPATIENT)
Dept: RADIOLOGY | Facility: HOSPITAL | Age: 55
End: 2020-08-03

## 2020-08-03 DIAGNOSIS — R92.8 ABNORMAL MAMMOGRAM: Primary | ICD-10-CM

## 2020-08-03 NOTE — TELEPHONE ENCOUNTER
Spoke with patient and explained mammogram findings.Patient expressed understanding of results. Patient scheduled abnormal mammogram follow up appointment at [facility] on 8/13/2020.

## 2020-08-07 ENCOUNTER — TELEPHONE (OUTPATIENT)
Dept: INTERNAL MEDICINE | Facility: CLINIC | Age: 55
End: 2020-08-07

## 2020-08-07 NOTE — TELEPHONE ENCOUNTER
Noted. Forms mailed to [employer]. Copied placed for pt to  for her records, and for chart. RTW note ready for .

## 2020-08-07 NOTE — TELEPHONE ENCOUNTER
"Pt has requested Covid paperwork, she is a  and there will not be any temps taken officially prior to the children boarding  Safety depends on parents taking temps and " self-reporting" sx. Paperwork completed, as reviewed w/ pt even added recs do not ensure her safety w/ her Co-morbidities  And she has procedure pending at [facility] and requested RTW 8/19 in anticipation of bx results being available before  "

## 2020-08-13 ENCOUNTER — HOSPITAL ENCOUNTER (OUTPATIENT)
Dept: RADIOLOGY | Facility: HOSPITAL | Age: 55
Discharge: HOME | End: 2020-08-13
Attending: OBSTETRICS & GYNECOLOGY
Payer: COMMERCIAL

## 2020-08-13 DIAGNOSIS — R92.8 ABNORMAL MAMMOGRAM: ICD-10-CM

## 2020-08-13 PROCEDURE — 77061 BREAST TOMOSYNTHESIS UNI: CPT | Mod: TC,LT

## 2020-08-13 PROCEDURE — 76642 ULTRASOUND BREAST LIMITED: CPT | Mod: 26,LT,, | Performed by: RADIOLOGY

## 2020-08-13 PROCEDURE — 77065 DX MAMMO INCL CAD UNI: CPT | Mod: 26,LT,, | Performed by: RADIOLOGY

## 2020-08-13 PROCEDURE — 76642 US BREAST LEFT LIMITED: ICD-10-PCS | Mod: 26,LT,, | Performed by: RADIOLOGY

## 2020-08-13 PROCEDURE — 76642 ULTRASOUND BREAST LIMITED: CPT | Mod: TC,LT

## 2020-08-13 PROCEDURE — 77061 MAMMO DIGITAL DIAGNOSTIC LEFT WITH TOMOSYNTHESIS_CAD: ICD-10-PCS | Mod: 26,LT,, | Performed by: RADIOLOGY

## 2020-08-13 PROCEDURE — 77061 BREAST TOMOSYNTHESIS UNI: CPT | Mod: 26,LT,, | Performed by: RADIOLOGY

## 2020-08-13 PROCEDURE — 77065 DX MAMMO INCL CAD UNI: CPT | Mod: TC,LT

## 2020-08-13 PROCEDURE — 77065 MAMMO DIGITAL DIAGNOSTIC LEFT WITH TOMOSYNTHESIS_CAD: ICD-10-PCS | Mod: 26,LT,, | Performed by: RADIOLOGY

## 2020-08-21 ENCOUNTER — LAB VISIT (OUTPATIENT)
Dept: LAB | Facility: HOSPITAL | Age: 55
End: 2020-08-21
Attending: INTERNAL MEDICINE
Payer: COMMERCIAL

## 2020-08-21 DIAGNOSIS — Z00.00 ANNUAL PHYSICAL EXAM: ICD-10-CM

## 2020-08-21 LAB
ALBUMIN SERPL BCP-MCNC: 3.8 G/DL (ref 3.5–5.2)
ALP SERPL-CCNC: 83 U/L (ref 55–135)
ALT SERPL W/O P-5'-P-CCNC: 16 U/L (ref 10–44)
ANION GAP SERPL CALC-SCNC: 7 MMOL/L (ref 8–16)
AST SERPL-CCNC: 20 U/L (ref 10–40)
BASOPHILS # BLD AUTO: 0.03 K/UL (ref 0–0.2)
BASOPHILS NFR BLD: 0.4 % (ref 0–1.9)
BILIRUB SERPL-MCNC: 0.7 MG/DL (ref 0.1–1)
BUN SERPL-MCNC: 16 MG/DL (ref 6–20)
CALCIUM SERPL-MCNC: 9.8 MG/DL (ref 8.7–10.5)
CHLORIDE SERPL-SCNC: 111 MMOL/L (ref 95–110)
CHOLEST SERPL-MCNC: 145 MG/DL (ref 120–199)
CHOLEST/HDLC SERPL: 2 {RATIO} (ref 2–5)
CO2 SERPL-SCNC: 24 MMOL/L (ref 23–29)
CREAT SERPL-MCNC: 0.8 MG/DL (ref 0.5–1.4)
DIFFERENTIAL METHOD: ABNORMAL
EOSINOPHIL # BLD AUTO: 0.1 K/UL (ref 0–0.5)
EOSINOPHIL NFR BLD: 1.2 % (ref 0–8)
ERYTHROCYTE [DISTWIDTH] IN BLOOD BY AUTOMATED COUNT: 15.3 % (ref 11.5–14.5)
EST. GFR  (AFRICAN AMERICAN): >60 ML/MIN/1.73 M^2
EST. GFR  (NON AFRICAN AMERICAN): >60 ML/MIN/1.73 M^2
ESTIMATED AVG GLUCOSE: 108 MG/DL (ref 68–131)
GLUCOSE SERPL-MCNC: 78 MG/DL (ref 70–110)
HBA1C MFR BLD HPLC: 5.4 % (ref 4–5.6)
HCT VFR BLD AUTO: 41.6 % (ref 37–48.5)
HDLC SERPL-MCNC: 72 MG/DL (ref 40–75)
HDLC SERPL: 49.7 % (ref 20–50)
HGB BLD-MCNC: 12.7 G/DL (ref 12–16)
IMM GRANULOCYTES # BLD AUTO: 0.01 K/UL (ref 0–0.04)
IMM GRANULOCYTES NFR BLD AUTO: 0.1 % (ref 0–0.5)
LDLC SERPL CALC-MCNC: 63.4 MG/DL (ref 63–159)
LYMPHOCYTES # BLD AUTO: 2.4 K/UL (ref 1–4.8)
LYMPHOCYTES NFR BLD: 33.8 % (ref 18–48)
MCH RBC QN AUTO: 26.3 PG (ref 27–31)
MCHC RBC AUTO-ENTMCNC: 30.5 G/DL (ref 32–36)
MCV RBC AUTO: 86 FL (ref 82–98)
MONOCYTES # BLD AUTO: 0.4 K/UL (ref 0.3–1)
MONOCYTES NFR BLD: 6 % (ref 4–15)
NEUTROPHILS # BLD AUTO: 4.1 K/UL (ref 1.8–7.7)
NEUTROPHILS NFR BLD: 58.5 % (ref 38–73)
NONHDLC SERPL-MCNC: 73 MG/DL
NRBC BLD-RTO: 0 /100 WBC
PLATELET # BLD AUTO: 220 K/UL (ref 150–350)
PMV BLD AUTO: 12.1 FL (ref 9.2–12.9)
POTASSIUM SERPL-SCNC: 3.8 MMOL/L (ref 3.5–5.1)
PROT SERPL-MCNC: 7.7 G/DL (ref 6–8.4)
RBC # BLD AUTO: 4.82 M/UL (ref 4–5.4)
SODIUM SERPL-SCNC: 142 MMOL/L (ref 136–145)
TRIGL SERPL-MCNC: 48 MG/DL (ref 30–150)
TSH SERPL DL<=0.005 MIU/L-ACNC: 1.27 UIU/ML (ref 0.4–4)
WBC # BLD AUTO: 6.95 K/UL (ref 3.9–12.7)

## 2020-08-21 PROCEDURE — 85025 COMPLETE CBC W/AUTO DIFF WBC: CPT

## 2020-08-21 PROCEDURE — 36415 COLL VENOUS BLD VENIPUNCTURE: CPT | Mod: PO

## 2020-08-21 PROCEDURE — 80053 COMPREHEN METABOLIC PANEL: CPT

## 2020-08-21 PROCEDURE — 84443 ASSAY THYROID STIM HORMONE: CPT

## 2020-08-21 PROCEDURE — 80061 LIPID PANEL: CPT

## 2020-08-21 PROCEDURE — 83036 HEMOGLOBIN GLYCOSYLATED A1C: CPT

## 2020-08-21 RX ORDER — PRAMIPEXOLE DIHYDROCHLORIDE 0.12 MG/1
0.12 TABLET ORAL NIGHTLY
Qty: 90 TABLET | Refills: 3 | Status: SHIPPED | OUTPATIENT
Start: 2020-08-21 | End: 2020-09-04

## 2020-08-24 ENCOUNTER — TELEPHONE (OUTPATIENT)
Dept: INTERNAL MEDICINE | Facility: CLINIC | Age: 55
End: 2020-08-24

## 2020-08-24 NOTE — TELEPHONE ENCOUNTER
----- Message from Milagros Wolf MD sent at 8/23/2020  6:28 PM CDT -----  Please review your lab work and we will discuss at your pending office visit.  Milagros Mcgill

## 2020-09-03 NOTE — PROGRESS NOTES
"CC: Annual PE    54 year-old female presents for PE  Nonsmoker,   Rare social alcohol consumer.     FAMILY HISTORY:   Dad d. 64, lung cancer. He had glaucoma. Heavy Smoker   and worked w/ asbestos   Mom d. 73 of ovarian cancer, she had diabetes and hypertension.   Brother + hypertension, diabetes.   Sister + hypertension, diabetes. Colon polyps    SCREENING TESTS:   Cholesterol, reviewed,-  Colonoscopy, 2018- ,normal 10yrs  No change in bowels or blood in stool   [facility],  Dr. Olivier.  Mammo, gyn  BMD, n/a  Eye exam, UTD  Dental exam, UTD    VACCINATIONS:  Tdap, 9/20/13   Pneumovax, 2016  Flu, yearly    MEDS: In the MedCard.     REVIEW OF SYSTEMS:   No fever, chill, or night sweats  No chest pain, shortness of breath, PND, or orthopnea.  No change in bowel or bladder function.   No unusual headaches or focal deficits   No weakness in arms or legs or slurred speech.     RECALL RECENT F/UP MESSAGE  "still experiencing the same feeling around my ankle which is numbness and flat foot feeling mostly on my right foot along with tingling and hot feeling. My left foot sometimes feel tingle on bottom. I have had muscle cramps in both legs and in the bottom of both feet and my left arm muscle cramp. My hands are always cold I also complain about being cold with no chills or fever. I am taking 3 gabapentin just to help me sleep but does not work with what I am feeling in my feet and leg area. I feel a little relief when I take my shower at night to let the warm water run to hot on my right leg down my ankle to my foot."  Consult for Neurology placed  PT reported several yrs of hitting right ankle when gets out of car  Reported left UE mm pain , especially upper arm  Reviewed EMG studies from [id number]  CTS +, and abnormal RLE  Reviewed lab- including B12, thiamine, folate  Noted gabapentin not controlling sx   She stopped the mirapex, did't think it was helping , didn't think she was having "jumpy" legs      Remainder of review " negative except as previously noted.       PHYSICAL EXAM:   VS: stable  GENERAL: She is alert and oriented, in no acute distress. She is well developed, well nourished, conversant and cooperative, pleasant patient.   EYES: Conjunctivae and lids unremarkable. Sclerae anicteric. Pupils are reactive.   NECK: Supple. No thyromegaly noted.   RESPIRATORY: Efforts unlabored.   LUNGS: Clear to auscultation.   HEART: Regular rate and rhythm. No carotid bruits, 1+ pedal pulse.   No edema.    ABDOMEN: Bowel sounds present, soft, nontender.   No hepatosplenomegaly.   MUSCULOSKELETAL: Gait normal.   Left arm, tender over the upper arm, good ROM  SPine: NT, hips NT  Decreased ROM , neg SLR  Right ankle NT, good ROM  ( pt noted sx on the inside)  No clubbing, cyanosis or edema appreciated.   NEURO: WILDER. No tremor noted  SKIN: Warm and dry; hands w/ good capillary refill and radial pulse      IMPRESSION:   Annual PE  Family history of ovarian cancer.   DM w/ neuropathy, asx  Hypertension, stable.   Dyslipidemia, LDL not @ goal.  Paresthesias/numbness, LE   EMG's + peripheral neuropathy several yrs ago thought to be 2/2 to DM, that is now well controlled  MM pain , left upper arm  Right ankle/ foot pain    PLAN:  Continue present meds  Increase gabapentin to 600mg BID- to take when gets home from work and pre-hs  Xrays- today, L-spine, ankle/foot  Lab- today - ESR/CRP/CPK/ANGELICA/CCP  RTC 6 months

## 2020-09-04 ENCOUNTER — TELEPHONE (OUTPATIENT)
Dept: INTERNAL MEDICINE | Facility: CLINIC | Age: 55
End: 2020-09-04

## 2020-09-04 ENCOUNTER — HOSPITAL ENCOUNTER (OUTPATIENT)
Dept: RADIOLOGY | Facility: HOSPITAL | Age: 55
Discharge: HOME | End: 2020-09-04
Attending: INTERNAL MEDICINE
Payer: COMMERCIAL

## 2020-09-04 ENCOUNTER — OFFICE VISIT (OUTPATIENT)
Dept: INTERNAL MEDICINE | Facility: CLINIC | Age: 55
End: 2020-09-04
Payer: COMMERCIAL

## 2020-09-04 VITALS
OXYGEN SATURATION: 99 % | BODY MASS INDEX: 34.12 KG/M2 | HEIGHT: 66 IN | WEIGHT: 212.31 LBS | HEART RATE: 84 BPM | SYSTOLIC BLOOD PRESSURE: 114 MMHG | RESPIRATION RATE: 16 BRPM | DIASTOLIC BLOOD PRESSURE: 72 MMHG | TEMPERATURE: 98 F

## 2020-09-04 DIAGNOSIS — M79.671 RIGHT FOOT PAIN: ICD-10-CM

## 2020-09-04 DIAGNOSIS — Z11.59 ENCOUNTER FOR HEPATITIS C SCREENING TEST FOR LOW RISK PATIENT: ICD-10-CM

## 2020-09-04 DIAGNOSIS — M54.9 DORSALGIA, UNSPECIFIED: ICD-10-CM

## 2020-09-04 DIAGNOSIS — Z00.00 ANNUAL PHYSICAL EXAM: Primary | ICD-10-CM

## 2020-09-04 DIAGNOSIS — E11.42 DIABETIC PERIPHERAL NEUROPATHY: ICD-10-CM

## 2020-09-04 DIAGNOSIS — Z11.4 ENCOUNTER FOR SCREENING FOR HIV: ICD-10-CM

## 2020-09-04 DIAGNOSIS — E78.2 MIXED HYPERLIPIDEMIA: ICD-10-CM

## 2020-09-04 DIAGNOSIS — E11.40 CONTROLLED TYPE 2 DIABETES MELLITUS WITH DIABETIC NEUROPATHY, WITHOUT LONG-TERM CURRENT USE OF INSULIN: ICD-10-CM

## 2020-09-04 DIAGNOSIS — M79.10 MUSCLE PAIN: ICD-10-CM

## 2020-09-04 DIAGNOSIS — G47.62 NOCTURNAL LEG CRAMPS: ICD-10-CM

## 2020-09-04 DIAGNOSIS — I10 ESSENTIAL HYPERTENSION: ICD-10-CM

## 2020-09-04 PROCEDURE — 73610 X-RAY EXAM OF ANKLE: CPT | Mod: TC,PO,RT

## 2020-09-04 PROCEDURE — 73610 XR ANKLE COMPLETE 3 VIEW RIGHT: ICD-10-PCS | Mod: 26,RT,, | Performed by: RADIOLOGY

## 2020-09-04 PROCEDURE — 99999 PR PBB SHADOW E&M-EST. PATIENT-LVL V: ICD-10-PCS | Mod: PBBFAC,,, | Performed by: INTERNAL MEDICINE

## 2020-09-04 PROCEDURE — 73610 X-RAY EXAM OF ANKLE: CPT | Mod: 26,RT,, | Performed by: RADIOLOGY

## 2020-09-04 PROCEDURE — 73630 X-RAY EXAM OF FOOT: CPT | Mod: TC,PO,RT

## 2020-09-04 PROCEDURE — 72110 X-RAY EXAM L-2 SPINE 4/>VWS: CPT | Mod: TC,PO

## 2020-09-04 PROCEDURE — 3074F PR MOST RECENT SYSTOLIC BLOOD PRESSURE < 130 MM HG: ICD-10-PCS | Mod: CPTII,S$GLB,, | Performed by: INTERNAL MEDICINE

## 2020-09-04 PROCEDURE — 99396 PR PREVENTIVE VISIT,EST,40-64: ICD-10-PCS | Mod: S$GLB,,, | Performed by: INTERNAL MEDICINE

## 2020-09-04 PROCEDURE — 99396 PREV VISIT EST AGE 40-64: CPT | Mod: S$GLB,,, | Performed by: INTERNAL MEDICINE

## 2020-09-04 PROCEDURE — 3008F PR BODY MASS INDEX (BMI) DOCUMENTED: ICD-10-PCS | Mod: CPTII,S$GLB,, | Performed by: INTERNAL MEDICINE

## 2020-09-04 PROCEDURE — 3074F SYST BP LT 130 MM HG: CPT | Mod: CPTII,S$GLB,, | Performed by: INTERNAL MEDICINE

## 2020-09-04 PROCEDURE — 3078F DIAST BP <80 MM HG: CPT | Mod: CPTII,S$GLB,, | Performed by: INTERNAL MEDICINE

## 2020-09-04 PROCEDURE — 3044F HG A1C LEVEL LT 7.0%: CPT | Mod: CPTII,S$GLB,, | Performed by: INTERNAL MEDICINE

## 2020-09-04 PROCEDURE — 3044F PR MOST RECENT HEMOGLOBIN A1C LEVEL <7.0%: ICD-10-PCS | Mod: CPTII,S$GLB,, | Performed by: INTERNAL MEDICINE

## 2020-09-04 PROCEDURE — 72110 XR LUMBAR SPINE COMPLETE 5 VIEW: ICD-10-PCS | Mod: 26,,, | Performed by: RADIOLOGY

## 2020-09-04 PROCEDURE — 73630 XR FOOT COMPLETE 3 VIEW RIGHT: ICD-10-PCS | Mod: 26,RT,, | Performed by: RADIOLOGY

## 2020-09-04 PROCEDURE — 72110 X-RAY EXAM L-2 SPINE 4/>VWS: CPT | Mod: 26,,, | Performed by: RADIOLOGY

## 2020-09-04 PROCEDURE — 99999 PR PBB SHADOW E&M-EST. PATIENT-LVL V: CPT | Mod: PBBFAC,,, | Performed by: INTERNAL MEDICINE

## 2020-09-04 PROCEDURE — 3078F PR MOST RECENT DIASTOLIC BLOOD PRESSURE < 80 MM HG: ICD-10-PCS | Mod: CPTII,S$GLB,, | Performed by: INTERNAL MEDICINE

## 2020-09-04 PROCEDURE — 73630 X-RAY EXAM OF FOOT: CPT | Mod: 26,RT,, | Performed by: RADIOLOGY

## 2020-09-04 PROCEDURE — 3008F BODY MASS INDEX DOCD: CPT | Mod: CPTII,S$GLB,, | Performed by: INTERNAL MEDICINE

## 2020-09-04 RX ORDER — METFORMIN HYDROCHLORIDE 500 MG/1
500 TABLET ORAL DAILY
Qty: 90 TABLET | Refills: 3 | Status: SHIPPED | OUTPATIENT
Start: 2020-09-04 | End: 2021-02-25

## 2020-09-04 RX ORDER — GABAPENTIN 300 MG/1
CAPSULE ORAL
Qty: 360 CAPSULE | Refills: 1 | Status: SHIPPED | OUTPATIENT
Start: 2020-09-04 | End: 2021-02-25 | Stop reason: ALTCHOICE

## 2020-09-04 NOTE — TELEPHONE ENCOUNTER
----- Message from Milagros Wolf MD sent at 9/4/2020  1:17 PM CDT -----  Please note that your foot xray did reveal arthritis with spurs at the ankle and heel area.  Milagros Mcgill

## 2020-09-04 NOTE — TELEPHONE ENCOUNTER
----- Message from Milagros Wolf MD sent at 9/4/2020  1:16 PM CDT -----  Please note that your back xray did reveal arthritis at several levels, but nothing to suggest an area of concern that might be causing the leg problem.  Milagros Mcgill

## 2020-09-08 ENCOUNTER — TELEPHONE (OUTPATIENT)
Dept: INTERNAL MEDICINE | Facility: CLINIC | Age: 55
End: 2020-09-08

## 2020-09-08 DIAGNOSIS — M54.9 DORSALGIA, UNSPECIFIED: ICD-10-CM

## 2020-09-08 NOTE — TELEPHONE ENCOUNTER
----- Message from Corie Campos sent at 9/8/2020 10:02 AM CDT -----  Contact: self/ [phone]  Would like to get medical advice.  Symptoms (please be specific):    How long has patient had these symptoms:    Pharmacy name and phone #:    Any drug allergies (copy from chart):      Would the patient rather a call back or a response via [facility]?:    Comments:  Patient would like a call back about her results of her x ray from 9/4 and her appointment, patient is still in pain and would like to know if she could maybe have an injection for the pain. Patient has an appointment in Nov. For neurology which she think is too far away. Please call and advise.

## 2020-09-08 NOTE — TELEPHONE ENCOUNTER
Spoke to pt. Pt stated that she has been taking the gabapentin as directed by Dr. Mcgill (2 at 5PM, and 2 @ 7PM). Pt stated that it is still not helping. She stated that she has been on gabapentin so long, she doesn't think it works as well. Pt stated that her feet and legs feel the same. She is having numbness around the ankle and foot. Pt stated that yesterday her foot went completely numb while she was driving. She stated that she was worried about not being able to drive.  Pt stated that she was scheduled to see Neurology on 11/25/20. She stated that she was contacted and moved up to 11/4/20. Pt upset because it is too far off.  Pt was very weepy over the phone.  Please advise.

## 2020-09-08 NOTE — TELEPHONE ENCOUNTER
Spoke to pt. Pt ok with proceeding with MRI.   Order for MRI sent to referral coordinator for scheduling.     Pt advised that if her symptoms exacerbate or worsen to go to the ER.  Pt verbalized understanding.

## 2020-09-09 ENCOUNTER — TELEPHONE (OUTPATIENT)
Dept: INTERNAL MEDICINE | Facility: CLINIC | Age: 55
End: 2020-09-09

## 2020-09-09 NOTE — TELEPHONE ENCOUNTER
----- Message from Milagros Wolf MD sent at 9/8/2020  4:54 PM CDT -----  Please note that the lab to check on your inflammatory markers and muscle injury and  Rheumatoid Arthritis were all negative. There is still one test pending for lupus and when it results   You will be advised.  Milagros Mcgill

## 2020-09-10 ENCOUNTER — TELEPHONE (OUTPATIENT)
Dept: INTERNAL MEDICINE | Facility: CLINIC | Age: 55
End: 2020-09-10

## 2020-09-10 NOTE — TELEPHONE ENCOUNTER
----- Message from Milagros Wolf MD sent at 9/9/2020  9:05 PM CDT -----  Please note your remaining test result has finalized and was negative for lupus.  Milagros Mcgill

## 2020-09-14 ENCOUNTER — HOSPITAL ENCOUNTER (OUTPATIENT)
Dept: RADIOLOGY | Facility: HOSPITAL | Age: 55
Discharge: HOME | End: 2020-09-14
Attending: INTERNAL MEDICINE
Payer: COMMERCIAL

## 2020-09-14 DIAGNOSIS — M54.9 DORSALGIA, UNSPECIFIED: ICD-10-CM

## 2020-09-14 PROCEDURE — 72148 MRI LUMBAR SPINE W/O DYE: CPT | Mod: TC

## 2020-09-14 PROCEDURE — 72148 MRI LUMBAR SPINE WITHOUT CONTRAST: ICD-10-PCS | Mod: 26,,, | Performed by: RADIOLOGY

## 2020-09-14 PROCEDURE — 72148 MRI LUMBAR SPINE W/O DYE: CPT | Mod: 26,,, | Performed by: RADIOLOGY

## 2020-09-15 ENCOUNTER — TELEPHONE (OUTPATIENT)
Dept: INTERNAL MEDICINE | Facility: CLINIC | Age: 55
End: 2020-09-15

## 2020-09-15 DIAGNOSIS — G35 MULTIPLE SCLEROSIS: ICD-10-CM

## 2020-09-15 DIAGNOSIS — M47.814 SPONDYLOSIS WITHOUT MYELOPATHY OR RADICULOPATHY, THORACIC REGION: ICD-10-CM

## 2020-09-15 NOTE — TELEPHONE ENCOUNTER
Reviewed MRI results w/ pt  Need to have MRI w/ contrast  Will order and advise pt of schedule  She's still experiencing same sx, only relief temporary w/ warm shower, gabapentin not resolving, NSAID/tylenol not helpful  Concerns about co=pays and missing work     Order placed

## 2020-09-16 ENCOUNTER — TELEPHONE (OUTPATIENT)
Dept: INTERNAL MEDICINE | Facility: CLINIC | Age: 55
End: 2020-09-16

## 2020-09-16 NOTE — TELEPHONE ENCOUNTER
----- Message from Yoko Smith sent at 9/16/2020 11:26 AM CDT -----  Regarding: MRI Order  Called Patient to schedule MRI's, and patient refused. Patient would like for provider to give her a call. Please call patient. Thank you

## 2020-09-16 NOTE — TELEPHONE ENCOUNTER
Discussed w/ pt MTI concerns, radiologist concerns for MS in the differential  Pt rather see Neurology 1st , will reach out to dept again for appt times  Pt aware may be in [place]

## 2020-09-16 NOTE — TELEPHONE ENCOUNTER
Noted.  Orders sent to referral coordinator for scheduling. Advised that they offer the Financial Assistance contact number as well.

## 2020-09-21 ENCOUNTER — TELEPHONE (OUTPATIENT)
Dept: INTERNAL MEDICINE | Facility: CLINIC | Age: 55
End: 2020-09-21

## 2020-09-21 NOTE — TELEPHONE ENCOUNTER
----- Message from Ana María Block sent at 9/21/2020  9:38 AM CDT -----  Contact: Patient [phone]  Patient has an appt scheduled for 09/24/2020 with Neurology. Patient is requesting a note from you regarding this appt for her employer.    Please call and advise.    Thank You

## 2020-09-21 NOTE — TELEPHONE ENCOUNTER
Spoke to pt. Pt stated that she will be going to her neurology visit on 9/24/20. She stated that she needs a work note excusing her. 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 She will be returning to work that same evening. Pt would like to  letter tomorrow morning.  Please advise.

## 2020-09-22 ENCOUNTER — PATIENT OUTREACH (OUTPATIENT)
Dept: ADMINISTRATIVE | Facility: OTHER | Age: 55
End: 2020-09-22

## 2020-09-22 NOTE — PROGRESS NOTES
Health Maintenance Due   Topic Date Due    Shingles Vaccine (1 of 2) 12/22/2017    Influenza Vaccine (1) 08/01/2020     Updates were requested from care everywhere.  Chart was reviewed for overdue Proactive [facility] Encounters ([facility]) topics (CRS, Breast Cancer Screening, Eye exam)  Health Maintenance has been updated.  LINKS immunization registry triggered.  Immunizations were reconciled.

## 2020-09-24 ENCOUNTER — OFFICE VISIT (OUTPATIENT)
Dept: NEUROLOGY | Facility: CLINIC | Age: 55
End: 2020-09-24
Payer: COMMERCIAL

## 2020-09-24 VITALS
WEIGHT: 216.5 LBS | HEART RATE: 70 BPM | SYSTOLIC BLOOD PRESSURE: 144 MMHG | HEIGHT: 66 IN | DIASTOLIC BLOOD PRESSURE: 94 MMHG | RESPIRATION RATE: 14 BRPM | TEMPERATURE: 97 F | BODY MASS INDEX: 34.79 KG/M2

## 2020-09-24 DIAGNOSIS — G62.9 NEUROPATHY: ICD-10-CM

## 2020-09-24 DIAGNOSIS — G56.01 CARPAL TUNNEL SYNDROME OF RIGHT WRIST: ICD-10-CM

## 2020-09-24 DIAGNOSIS — G95.9 SPINAL CORD LESION: Primary | ICD-10-CM

## 2020-09-24 PROCEDURE — 3008F PR BODY MASS INDEX (BMI) DOCUMENTED: ICD-10-PCS | Mod: CPTII,S$GLB,, | Performed by: PSYCHIATRY & NEUROLOGY

## 2020-09-24 PROCEDURE — 99204 PR OFFICE/OUTPT VISIT, NEW, LEVL IV, 45-59 MIN: ICD-10-PCS | Mod: S$GLB,,, | Performed by: PSYCHIATRY & NEUROLOGY

## 2020-09-24 PROCEDURE — 99999 PR PBB SHADOW E&M-EST. PATIENT-LVL V: CPT | Mod: PBBFAC,,, | Performed by: PSYCHIATRY & NEUROLOGY

## 2020-09-24 PROCEDURE — 99204 OFFICE O/P NEW MOD 45 MIN: CPT | Mod: S$GLB,,, | Performed by: PSYCHIATRY & NEUROLOGY

## 2020-09-24 PROCEDURE — 3008F BODY MASS INDEX DOCD: CPT | Mod: CPTII,S$GLB,, | Performed by: PSYCHIATRY & NEUROLOGY

## 2020-09-24 PROCEDURE — 99999 PR PBB SHADOW E&M-EST. PATIENT-LVL V: ICD-10-PCS | Mod: PBBFAC,,, | Performed by: PSYCHIATRY & NEUROLOGY

## 2020-09-24 PROCEDURE — 3077F SYST BP >= 140 MM HG: CPT | Mod: CPTII,S$GLB,, | Performed by: PSYCHIATRY & NEUROLOGY

## 2020-09-24 PROCEDURE — 3080F DIAST BP >= 90 MM HG: CPT | Mod: CPTII,S$GLB,, | Performed by: PSYCHIATRY & NEUROLOGY

## 2020-09-24 PROCEDURE — 3077F PR MOST RECENT SYSTOLIC BLOOD PRESSURE >= 140 MM HG: ICD-10-PCS | Mod: CPTII,S$GLB,, | Performed by: PSYCHIATRY & NEUROLOGY

## 2020-09-24 PROCEDURE — 3080F PR MOST RECENT DIASTOLIC BLOOD PRESSURE >= 90 MM HG: ICD-10-PCS | Mod: CPTII,S$GLB,, | Performed by: PSYCHIATRY & NEUROLOGY

## 2020-09-24 RX ORDER — HYDROCHLOROTHIAZIDE 25 MG/1
TABLET ORAL
Qty: 90 TABLET | Refills: 3 | Status: SHIPPED | OUTPATIENT
Start: 2020-09-24 | End: 2021-03-05

## 2020-09-24 NOTE — PROGRESS NOTES
"Consult from Dr Mcgill    HPI: Miesha Domínguez is a 54 y.o. female referred for numbness in the legs and abnormal MRI L spine    This month, she complained to PCP about numbness in the distal legs    The patient is diabetic.  An MRI L spine was eventually ordered and showed cord lesion in the distal T spine (see below)      Note she saw Neurology in 2012 for numbness, tingling with pins and needles feeling in the feet, legs, hands, fingers " over the last 6 months." EMG showed CTS and Neuropathy, thought to be related to her DM2    She states these symptoms were mild until 6 months ago. Over 6 months she had burning in both feet- using gabapentin. Tried RLS meds without relief      The patient states she more recently (1-2 months) started noticing she was hitting her right ankle with driving and this was more numb. She can move this well, but more numbness at times.This does not feel like a fall asleep feeling, just numb and episodic, not daily. Worse at night  She was not having any back pain at any point and no radicular pain.     No trauma history    No prior blindness, weakness, or numbness episodes. Never had had any stroke like symptoms prior    She is using gabapentin with some relief  Topamax is used for weight loss and was started year ago but dose was raised this year      She lives in [facility]. 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   HENT: Negative for nosebleeds.    Eyes: Negative for double vision.   Respiratory: Negative for hemoptysis.    Cardiovascular: Negative for leg swelling.   Gastrointestinal: Negative for blood in stool.   Genitourinary: Negative for hematuria.   Musculoskeletal: Negative for back pain.   Skin: Negative for rash.   Neurological: Positive for sensory change. Negative for dizziness, weakness and headaches. "   Psychiatric/Behavioral: Negative for memory loss.         I have reviewed all of this patient's past medical and surgical histories as well as family and social histories and active allergies and medications as documented in the electronic medical record.        Exam:  Gen Appearance, well developed/nourished in no apparent distress  CV: 2+ distal pulses with no edema or swelling  Neuro:  MS: Awake, alert, oriented to place, person, time, situation. Sustains attention. Recent/remote memory intact, Language is full to spontaneous speech/repetition/naming/comprehension. Fund of Knowledge is full  CN: Optic discs are flat with normal vasculature, PERRL, Extraoccular movements and visual fields are full. Normal facial sensation and strength, Hearing symmetric, Tongue and Palate are midline and strong. Shoulder Shrug symmetric and strong.  Motor: Normal bulk, tone, no abnormal movements. 5/5 strength bilateral upper/lower extremities with 2+ reflexes in the arms but 3+ in the legs and bilateral plantar response  Sensory: symmetric to light touch, pain, temp, and vibration,  Romberg negative  Cerebellar: Finger-nose,Heal-shin, Rapid alternating movements intact  Gait: Normal stance, no ataxia    Imagin2020 MRI L spine: 1. Focal area of signal hyperintensity within the distal spinal cord at the T11-T12 level, incompletely evaluated on this exam.  No associated cord expansion.  Recommend further characterization with contrast enhanced MRIs of the cervical and thoracic spine.  2. Mild lumbar degenerative changes without significant spinal canal stenosis or neural foraminal narrowing.  This report was flagged in Epic as abnormal.       Labs:  labs including ANGELICA, ESR, CK, TSH normal. NO deficiency in B12, Folate, B1 and B6  2020 A1C 5.4    Assessment/Plan: Miesha Domínguez is a 54 y.o. female with DM and history of DM related peripheral neuropathy in the feet and hands. Now with 6 months of more tingling in both  feet (more on right) but numbness specifically in the right ankle for 1-2 months in episodes    I recommend:     1. MRI L spine showed focal area of hyperintensity in the T11-T12 region.  -Needs contrasted MRI brain, C and T spine per orders to look for any other areas of demyelination +/- Degenerative change as a cause  -Contrasted T spine MRI will help to determine if this is lesion is acute or chronic. Her symptoms are subacute  -No trauma history. Neurological exam shows only increased reflexes in the legs. She has never had any other CIS     2. Otherwise, she has mild lumbar degenerative changes which would not explain her distal leg symptoms  -This patient has DM2 and known peripheral neuropathy from 2012 EMG without much neuropathy symptoms since then until recently  -Gabapentin gives her some relief  -Note her use of Topamax for weight loss: This medication can cause some episodic numbness:can try to half dose this medication to see if this improves any of there episodic numbness.  -Right CTS found by 2012 EMG/NCS is treated with brace PRN    3. Note she lives to my clinic (in [facility])    Follow up will be by phone. If MRIs shows suspicion for MS she may need LP and  she can likely follow up closer to home in MS clinic. If diagnoses remains uncertain, we may update her EMG/NCS of the legs     CC:Dr Mcgill

## 2020-09-24 NOTE — LETTER
September 24, 2020      Milagros Wolf MD  [address] [facility] [address]           [facility] [facility]  [address] [address]  Phone: [phone]  Fax: [phone]          Patient: Miesha Domínguez   MR Number: [id number]   YOB: 1965   Date of Visit: 9/24/2020       Dear Dr. Milagros Wolf:    Thank you for referring Miesha Domínguez to me for evaluation. Attached you will find relevant portions of my assessment and plan of care.    If you have questions, please do not hesitate to call me. I look forward to following Miesha Domínguez along with you.    Sincerely,    Kan Eng MD    Enclosure  CC:  No Recipients    If you would like to receive this communication electronically, please contact [email or web address] or [phone] to request more information on "[employer] Link access.    For providers and/or their staff who would like to refer a patient to [facility], please contact us through our one-stop-shop provider referral line, [facility], at [phone].    If you feel you have received this communication in error or would no longer like to receive these types of communications, please e-mail [email or web address]

## 2020-09-28 ENCOUNTER — TELEPHONE (OUTPATIENT)
Dept: INTERNAL MEDICINE | Facility: CLINIC | Age: 55
End: 2020-09-28

## 2020-09-28 NOTE — TELEPHONE ENCOUNTER
Pt called on both phone numbers left. No answers. Left messages advising that no order is needed for the flu vaccine at her local pharmacy or  facility.

## 2020-09-28 NOTE — TELEPHONE ENCOUNTER
----- Message from Margaret Huff sent at 9/28/2020 12:47 PM CDT -----  Regarding: Pt self Mobile [phone] or Home [phone]  Patient is calling in regards to her wanting to put in an order for a flu shot please.

## 2020-09-29 ENCOUNTER — HOSPITAL ENCOUNTER (OUTPATIENT)
Dept: RADIOLOGY | Facility: HOSPITAL | Age: 55
Discharge: HOME | End: 2020-09-29
Attending: PSYCHIATRY & NEUROLOGY
Payer: COMMERCIAL

## 2020-09-29 DIAGNOSIS — G95.9 SPINAL CORD LESION: ICD-10-CM

## 2020-09-29 PROCEDURE — 72156 MRI NECK SPINE W/O & W/DYE: CPT | Mod: TC

## 2020-09-29 PROCEDURE — 72156 MRI NECK SPINE W/O & W/DYE: CPT | Mod: 26,,, | Performed by: RADIOLOGY

## 2020-09-29 PROCEDURE — 70553 MRI BRAIN DEMYELINATING W/ WO CONTRAST: ICD-10-PCS | Mod: 26,,, | Performed by: RADIOLOGY

## 2020-09-29 PROCEDURE — 72156 MRI CERVICAL SPINE DEMYELINATING W W/O CONTRAST: ICD-10-PCS | Mod: 26,,, | Performed by: RADIOLOGY

## 2020-09-29 PROCEDURE — 72157 MRI CHEST SPINE W/O & W/DYE: CPT | Mod: 26,,, | Performed by: RADIOLOGY

## 2020-09-29 PROCEDURE — 70553 MRI BRAIN STEM W/O & W/DYE: CPT | Mod: 26,,, | Performed by: RADIOLOGY

## 2020-09-29 PROCEDURE — A9585 GADOBUTROL INJECTION: HCPCS | Performed by: PSYCHIATRY & NEUROLOGY

## 2020-09-29 PROCEDURE — 72157 MRI CHEST SPINE W/O & W/DYE: CPT | Mod: TC

## 2020-09-29 PROCEDURE — 70553 MRI BRAIN STEM W/O & W/DYE: CPT | Mod: TC

## 2020-09-29 PROCEDURE — 25500020 PHARM REV CODE 255: Performed by: PSYCHIATRY & NEUROLOGY

## 2020-09-29 PROCEDURE — 72157 MRI THORACIC SPINE DEMYELINATING W W/O CONTRAST: ICD-10-PCS | Mod: 26,,, | Performed by: RADIOLOGY

## 2020-09-29 RX ORDER — GADOBUTROL 604.72 MG/ML
10 INJECTION INTRAVENOUS
Status: COMPLETED | OUTPATIENT
Start: 2020-09-29 | End: 2020-09-29

## 2020-09-29 RX ADMIN — GADOBUTROL 10 ML: 604.72 INJECTION INTRAVENOUS at 08:09

## 2020-10-01 DIAGNOSIS — G37.9 DEMYELINATING DISORDER: Primary | ICD-10-CM

## 2020-10-02 ENCOUNTER — TELEPHONE (OUTPATIENT)
Dept: NEUROLOGY | Facility: CLINIC | Age: 55
End: 2020-10-02

## 2020-10-20 ENCOUNTER — LAB VISIT (OUTPATIENT)
Dept: LAB | Facility: HOSPITAL | Age: 55
End: 2020-10-20
Attending: PSYCHIATRY & NEUROLOGY
Payer: COMMERCIAL

## 2020-10-20 ENCOUNTER — OFFICE VISIT (OUTPATIENT)
Dept: NEUROLOGY | Facility: CLINIC | Age: 55
End: 2020-10-20
Payer: COMMERCIAL

## 2020-10-20 VITALS
BODY MASS INDEX: 33.87 KG/M2 | SYSTOLIC BLOOD PRESSURE: 142 MMHG | WEIGHT: 210.75 LBS | TEMPERATURE: 98 F | HEIGHT: 66 IN | DIASTOLIC BLOOD PRESSURE: 87 MMHG | HEART RATE: 72 BPM

## 2020-10-20 DIAGNOSIS — R20.2 PARESTHESIA: ICD-10-CM

## 2020-10-20 DIAGNOSIS — R93.7 ABNORMAL MRI, SPINE: ICD-10-CM

## 2020-10-20 DIAGNOSIS — R90.89 ABNORMAL FINDING ON MRI OF BRAIN: ICD-10-CM

## 2020-10-20 DIAGNOSIS — G93.9 WHITE MATTER LESION OF CENTRAL NERVOUS SYSTEM: Primary | ICD-10-CM

## 2020-10-20 DIAGNOSIS — G93.9 WHITE MATTER LESION OF CENTRAL NERVOUS SYSTEM: ICD-10-CM

## 2020-10-20 LAB
25(OH)D3+25(OH)D2 SERPL-MCNC: 41 NG/ML (ref 30–96)
RHEUMATOID FACT SERPL-ACNC: 46 IU/ML (ref 0–15)
VIT B12 SERPL-MCNC: 1701 PG/ML (ref 210–950)

## 2020-10-20 PROCEDURE — 86255 FLUORESCENT ANTIBODY SCREEN: CPT | Mod: 59

## 2020-10-20 PROCEDURE — 36415 COLL VENOUS BLD VENIPUNCTURE: CPT

## 2020-10-20 PROCEDURE — 3077F PR MOST RECENT SYSTOLIC BLOOD PRESSURE >= 140 MM HG: ICD-10-PCS | Mod: CPTII,S$GLB,, | Performed by: PSYCHIATRY & NEUROLOGY

## 2020-10-20 PROCEDURE — 82306 VITAMIN D 25 HYDROXY: CPT

## 2020-10-20 PROCEDURE — 3077F SYST BP >= 140 MM HG: CPT | Mod: CPTII,S$GLB,, | Performed by: PSYCHIATRY & NEUROLOGY

## 2020-10-20 PROCEDURE — 99999 PR PBB SHADOW E&M-EST. PATIENT-LVL III: CPT | Mod: PBBFAC,,, | Performed by: PSYCHIATRY & NEUROLOGY

## 2020-10-20 PROCEDURE — 84252 ASSAY OF VITAMIN B-2: CPT

## 2020-10-20 PROCEDURE — 3079F PR MOST RECENT DIASTOLIC BLOOD PRESSURE 80-89 MM HG: ICD-10-PCS | Mod: CPTII,S$GLB,, | Performed by: PSYCHIATRY & NEUROLOGY

## 2020-10-20 PROCEDURE — 86235 NUCLEAR ANTIGEN ANTIBODY: CPT | Mod: 59

## 2020-10-20 PROCEDURE — 86038 ANTINUCLEAR ANTIBODIES: CPT

## 2020-10-20 PROCEDURE — 86235 NUCLEAR ANTIGEN ANTIBODY: CPT

## 2020-10-20 PROCEDURE — 1126F AMNT PAIN NOTED NONE PRSNT: CPT | Mod: S$GLB,,, | Performed by: PSYCHIATRY & NEUROLOGY

## 2020-10-20 PROCEDURE — 1126F PR PAIN SEVERITY QUANTIFIED, NO PAIN PRESENT: ICD-10-PCS | Mod: S$GLB,,, | Performed by: PSYCHIATRY & NEUROLOGY

## 2020-10-20 PROCEDURE — 84207 ASSAY OF VITAMIN B-6: CPT

## 2020-10-20 PROCEDURE — 83520 IMMUNOASSAY QUANT NOS NONAB: CPT

## 2020-10-20 PROCEDURE — 3008F PR BODY MASS INDEX (BMI) DOCUMENTED: ICD-10-PCS | Mod: CPTII,S$GLB,, | Performed by: PSYCHIATRY & NEUROLOGY

## 2020-10-20 PROCEDURE — 84425 ASSAY OF VITAMIN B-1: CPT

## 2020-10-20 PROCEDURE — 86431 RHEUMATOID FACTOR QUANT: CPT

## 2020-10-20 PROCEDURE — 86617 LYME DISEASE ANTIBODY: CPT | Mod: 59

## 2020-10-20 PROCEDURE — 3008F BODY MASS INDEX DOCD: CPT | Mod: CPTII,S$GLB,, | Performed by: PSYCHIATRY & NEUROLOGY

## 2020-10-20 PROCEDURE — 82525 ASSAY OF COPPER: CPT

## 2020-10-20 PROCEDURE — 82164 ANGIOTENSIN I ENZYME TEST: CPT

## 2020-10-20 PROCEDURE — 99215 PR OFFICE/OUTPT VISIT, EST, LEVL V, 40-54 MIN: ICD-10-PCS | Mod: S$GLB,,, | Performed by: PSYCHIATRY & NEUROLOGY

## 2020-10-20 PROCEDURE — 82607 VITAMIN B-12: CPT

## 2020-10-20 PROCEDURE — 86255 FLUORESCENT ANTIBODY SCREEN: CPT

## 2020-10-20 PROCEDURE — 99215 OFFICE O/P EST HI 40 MIN: CPT | Mod: S$GLB,,, | Performed by: PSYCHIATRY & NEUROLOGY

## 2020-10-20 PROCEDURE — 99999 PR PBB SHADOW E&M-EST. PATIENT-LVL III: ICD-10-PCS | Mod: PBBFAC,,, | Performed by: PSYCHIATRY & NEUROLOGY

## 2020-10-20 PROCEDURE — 3079F DIAST BP 80-89 MM HG: CPT | Mod: CPTII,S$GLB,, | Performed by: PSYCHIATRY & NEUROLOGY

## 2020-10-20 NOTE — LETTER
October 22, 2020      Kan Eng MD  [address] [place] [id number]           [facility] [address]  [address]  [facility] [address]  Phone: [phone]          Patient: Miesha Domínguez   MR Number: [id number]   YOB: 1965   Date of Visit: 10/20/2020       Dear Dr. Kan Eng:    Thank you for referring Miesha Domínguez to me for evaluation. Attached you will find relevant portions of my assessment and plan of care.    If you have questions, please do not hesitate to call me. I look forward to following Miesha Domínguez along with you.    Sincerely,    Glenn Cortez MD    Enclosure  CC:  No Recipients    If you would like to receive this communication electronically, please contact [email or web address] or [phone] to request more information on [employer] Link access.    For providers and/or their staff who would like to refer a patient to [facility], please contact us through our one-stop-shop provider referral line, [facility], at [phone].    If you feel you have received this communication in error or would no longer like to receive these types of communications, please e-mail [email or web address]

## 2020-10-20 NOTE — PROGRESS NOTES
Neurology Clinic Visit  Primary Care Provider: Danni Wolf MD   Referring Provider: Kan Eng MD   Date of Visit: 10/20/2020       chief complaint: evaluation for MS    History of Present Illness  Miesha Domínguez is a 54 y.o. right handed female I have been asked to consult for evaluation and treatment of possible multiple sclerosis.     In 2018, she noticed burning from feet upto calves. She has had camping in her legs. She also noticed when she would stretch in bed, she would have spasms in her legs. This resolved with gabapentin. This was contributed to neuropathy per patient.     In March 2020, she noticed tingling in her right foot and later ascended to knees (right> left). Sometimes she would lose her balance but overall she feels that her gait was not affected. No leg weakness.  This improved after weaning Topamax. Topamax was started about 1 year ago for weight loss per patient.      In 2013, she saw Dr. Cuadra for 6 month history of tingling and numbness in hands and feet. EMG/NCS showed right carpal tunnel.     She has never had vision loss. No gait disturbance in past except what was mentioned above. No double vision or typical brainstem syndromes.     Recent MRI Brain/Cervical/Thoracic w/wo show nonspecific white matter lesions in brain (nonenhancing), 1left juxtacortical lesion and C2, C3, and T11 non-enhancing spinal cord lesions.     No joint pain or swelling. No bladder or bowel dysfunction. No dysarthria or dysphagia. No tremors. She denies heat sensitivity. In fact, she feels better when she takes a hot shower.  She has history of gastric sleeve in 2013.  Currently she has numbness in right foot.     Family history - No MS        Patient Active Problem List    Diagnosis Date Noted    Obesity, diabetes, and hypertension syndrome 11/29/2019    Controlled type 2 diabetes mellitus without complication, without long-term current use of insulin 11/29/2019    Heart  palpitations 2017    Thiamine deficiency 02/15/2016    Vitamin D deficiency 02/15/2016    Chronic allergic rhinitis 2013    Carpal tunnel syndrome 2013    Diabetic peripheral neuropathy 2013    Essential hypertension 2012    HLD (hyperlipidemia) 2012     Past Medical History:   Diagnosis Date    Asthma     as a child    Endometrial polyp 2019    HLD (hyperlipidemia)     HTN (hypertension)     Neuropathy     S/P D&C (status post dilation and curettage) 2019    Type II or unspecified type diabetes mellitus without mention of complication, not stated as uncontrolled      Past Surgical History:   Procedure Laterality Date     SECTION      COLONOSCOPY N/A 2018    Procedure: COLONOSCOPY;  Surgeon: Ramez Ramírez MD;  Location: [address]);  Service: Endoscopy;  Laterality: N/A;    HYSTEROSCOPY WITH DILATION AND CURETTAGE OF UTERUS N/A 2019    Procedure: [id number];  Surgeon: Catrina Olivier MD;  Location: [facility];  Service: OB/GYN;  Laterality: N/A;    SLEEVE GASTROPLASTY       Family History   Problem Relation Age of Onset    Cancer Father         d.64 lung -smoker    Hypertension Mother     Diabetes Mother     Ovarian cancer Mother 74    Diabetes Sister     Hypertension Sister     Hypertension Brother     Diabetes Brother     Breast cancer Neg Hx     Colon cancer Neg Hx     Stroke Neg Hx     Heart attack Neg Hx     Heart disease Neg Hx     Heart failure Neg Hx     Hyperlipidemia Neg Hx          Current Outpatient Medications   Medication Sig    cyanocobalamin, vitamin B-12, (VITAMIN B-12) 2,500 mcg Subl Place 1 tablet under the tongue every other day. Pt taking every other day    gabapentin (NEURONTIN) 300 MG capsule Take 2 bid as directed (Patient taking differently: Take 1,200 mg by mouth every evening. )    hydroCHLOROthiazide (HYDRODIURIL) 25 MG tablet TAKE 1 TABLET BY  MOUTH EVERY DAY    ibuprofen (ADVIL,MOTRIN) 600 MG tablet Take 1 tablet (600 mg total) by mouth 3 (three) times daily.    losartan (COZAAR) 100 MG tablet Take 1 tablet (100 mg total) by mouth once daily.    metFORMIN (GLUCOPHAGE) 500 MG tablet Take 1 tablet (500 mg total) by mouth once daily.    MULTIVITAMIN ORAL Take 1 tablet by mouth once daily. Centrum adult chews ( flavor burst)     potassium 99 mg Tab 3 tablets twice daily (Patient taking differently: Take 1 tablet by mouth once daily. )    rosuvastatin (CRESTOR) 20 MG tablet TAKE 1 TABLET BY MOUTH EVERY DAY    semaglutide (OZEMPIC) 1 mg/dose (2 mg/1.5 mL) PnIj INJECT 1 MG SUBCUTANOUESLY WEEKLY    topiramate (TOPAMAX) 100 MG tablet Take 1 tablet (100 mg total) by mouth every evening. (Patient not taking: Reported on 10/20/2020)     No current facility-administered medications for this visit.        Review of patient's allergies indicates:  No Known Allergies  Social History     Socioeconomic History    Marital status:      Spouse name: justin    Number of children: 1    Years of education: Not on file    Highest education level: Not on file   Occupational History    Occupation:      Employer: [employer]   Social Needs    Financial resource strain: Not hard at all    Food insecurity     Worry: Never true     Inability: Never true    Transportation needs     Medical: No     Non-medical: No   Tobacco Use    Smoking status: Never Smoker    Smokeless tobacco: Never Used   Substance and Sexual Activity    Alcohol use: Yes     Frequency: Never     Binge frequency: Never     Comment: occassional for Hollidays    Drug use: No    Sexual activity: Yes     Partners: Male     Birth control/protection: None     Comment:  x 28 years: Spouse : Justin   Lifestyle    Physical activity     Days per week: 3 days     Minutes per session: 60 min    Stress: Not at all   Relationships    Social connections     Talks on phone:  "More than three times a week     Gets together: Once a week     Attends Anabaptist service: Not on file     Active member of club or organization: No     Attends meetings of clubs or organizations: Never     Relationship status:    Other Topics Concern    Not on file   Social History Narrative    SOCIAL HISTORY:     , spouse is in good health, .     for [employer],    Daughter,  @[employer]; working @ [employer]    + exercise, walk, elliptical, Rebecca       Review of Systems    Constitutional: negative  Eyes: negative  Ears, nose, mouth, throat, and face: negative  Respiratory: negative  Cardiovascular: negative  Gastrointestinal: negative  Genitourinary:negative  Integument/breast: negative  Hematologic/lymphatic: negative  Musculoskeletal:negative  Neurological: + paresthesias  Behavioral/Psych: negative  Endocrine: negative  Allergic/Immunologic: negative    Objective:  Vital signs in last 24 hours:    Vitals:    10/20/20 0739   BP: (!) 142/87   Pulse: 72   Temp: 97.7 °F (36.5 °C)   Weight: 95.6 kg (210 lb 12.2 oz)   Height: 5' 6" (1.676 m)       Body mass index is 34.02 kg/m².     In general, the patient is well nourished.    No bruits. Fundi are normal bilaterally.    MENTAL STATUS: language is fluent, normal verbal comprehension, short-term and remote memory is intact, attention is normal, patient is alert and oriented x 3, fund of knowlege is appropriate by vocabulary.     CRANIAL NERVE EXAM:  There is no [place].  Extraocular muscles are intact. Pupils are equal, round, and reactive to light. No facial asymmetry. Facial sensation is intact bilaterally. There is no dysarthria. Uvula is midline, and palate moves symmetrically. Shoulder shrug intact bilaterlly. Tongue protrusion is midline. Hearing is grossly intact. Neck is supple.     MOTOR EXAM: Normal bulk and tone throughout UE and LE bilaterally.   No pronator drift; rapid sequential movements " are normal; Strength is  5/5 in all groups in the lower extremities and upper extremities;    REFLEXES: 2+ on right, 3+ on left, absent ankle bilaterally; toes are down bilaterally    SENSORY EXAM: Normal to light touch throughout. Mild decrease vibration in fingers and toes bilaterally.     COORDINATION: Normal finger-to-nose exam     GAIT: Narrow based and stable; 25ft timed walk 4.74 without assist.    Imaging    MRI brain w/wo:  Impression:     Several scattered punctate foci of T2 FLAIR signal hyperintensity throughout the supra infratentorial parenchyma which are primarily supratentorial.  While nonspecific in light of cord lesions concerning for areas of prior demyelination with pattern primarily concerning for multiple sclerosis and clinical correlation advised.     There is no evidence for acute infarction or intracranial enhancing lesion.      MRI Cervical and thoracic w/wo:  Impression:     Ill-defined short segment foci of T2 stir signal hyperintensity within the cervical and thoracic cord specifically at the level the tip of the odontoid, dorsal C3 level and T11/T12 level corresponding to abnormality seen on lumbar MRI.  While nonspecific concerning for possible prior areas of demyelination.  There is no evidence for cord expansion or enhancement to suggest active demyelination.  Clinical correlation and follow-up advised.     Please note there is slight ventral positioning of the thoracic cord at T3/T4 level with suggestion of possible underlying arachnoid web or cyst.     Mild scattered degenerative changes without significant central canal or neural foraminal stenosis.     No abnormal intrathecal enhancement throughout the cervical or thoracic canal.     Clinical correlation and follow-up advised.       Assessment:    1. Abnormal MRI Thoracic Spine  2. Abnormal MRI Cervical spine  3. Brain white matter lesions  4. Paresthesia  5. Abnormal DTR    Discussion:  Patient presents for evaluation of  multiple sclerosis in setting of white matter lesions in brain and spinal cord. The patient reports three clinical events in the past which are could represent typical demyelinating events. MRI Cervical and Thoracic Cord show three short segment non-enhancing T2/STIR hyperintense lesions. MRI brain shows mostly nonspecific white matter lesions and 1 juxtacortical lesion.  Given history, physical exam findings, and MRI findings, we will proceed with workup for evaluation of multiple sclerosis and MS mimics. We will arrange for lumbar puncture in the next 1-2 weeks and send for blood work today. She will follow-up after results are back. Will recheck vitamin levels as she is on supplementation and levels were elevated in the past.     Glenn Cortez MD  Neuroimmunology/MS Fellow  [facility]    Problem List Items Addressed This Visit     None      Visit Diagnoses     White matter lesion of central nervous system    -  Primary    Relevant Orders    ANGELICA Screen w/Reflex (Completed)    ANTI -SSA ANTIBODY (Completed)    ANTI-SSB ANTIBODY (Completed)    INTERLEUKIN-2 RECEPTOR (Completed)    MOG-IgG1 (Completed)    NMO AQUAPORIN-4-IGG, SERUM (Completed)    VITAMIN D (Completed)    Vitamin B12 (Completed)    Vitamin B1 (Completed)    Vitamin B2 (Completed)    Rheumatoid Factor (Completed)    LYME DISEASE WESTERN BLOT (Completed)    Angiotensin Converting Enzyme (Completed)    Vitamin B6 (Completed)    Lumbar puncture    Abnormal finding on MRI of brain        Relevant Orders    ANGELICA Screen w/Reflex (Completed)    ANTI -SSA ANTIBODY (Completed)    ANTI-SSB ANTIBODY (Completed)    INTERLEUKIN-2 RECEPTOR (Completed)    MOG-IgG1 (Completed)    NMO AQUAPORIN-4-IGG, SERUM (Completed)    VITAMIN D (Completed)    Vitamin B12 (Completed)    Vitamin B1 (Completed)    Vitamin B2 (Completed)    Rheumatoid Factor (Completed)    LYME DISEASE WESTERN BLOT (Completed)    Angiotensin Converting Enzyme (Completed)    Vitamin B6  (Completed)    Copper, serum (Completed)    Abnormal MRI, spine        Relevant Orders    ANGELICA Screen w/Reflex (Completed)    ANTI -SSA ANTIBODY (Completed)    ANTI-SSB ANTIBODY (Completed)    INTERLEUKIN-2 RECEPTOR (Completed)    MOG-IgG1 (Completed)    NMO AQUAPORIN-4-IGG, SERUM (Completed)    VITAMIN D (Completed)    Vitamin B12 (Completed)    Vitamin B1 (Completed)    Vitamin B2 (Completed)    Rheumatoid Factor (Completed)    LYME DISEASE WESTERN BLOT (Completed)    Angiotensin Converting Enzyme (Completed)    Vitamin B6 (Completed)    Paresthesia               Our visit today lasted 80 minutes, and 100% of this time was spent face to face with the patient. Over 50% of this visit included discussion of the treatment plan/medication changes/symptom management/exam findings/imaging results/coordination of care. The patient agrees with the plan of care.

## 2020-10-21 LAB
ACE SERPL-CCNC: 26 U/L (ref 16–85)
ANA SER QL IF: NORMAL

## 2020-10-22 LAB
ANTI-SSA ANTIBODY: 0.06 RATIO (ref 0–0.99)
ANTI-SSA INTERPRETATION: NEGATIVE
ANTI-SSB ANTIBODY: 0.09 RATIO (ref 0–0.99)
ANTI-SSB INTERPRETATION: NEGATIVE
B BURGDOR IGG SER QL IB: NEGATIVE
B BURGDOR IGM SER QL IB: NEGATIVE
COPPER SERPL-MCNC: 1402 UG/L (ref 810–1990)
SOL IL2 RECEP SERPL-MCNC: 118.5 PG/ML (ref 175.3–858.2)

## 2020-10-23 LAB
NMO/AQP4 FACS,S: NEGATIVE
VIT B2 SERPL-MCNC: 37 MCG/L (ref 1–19)

## 2020-10-24 LAB
MOG-IGG1: NEGATIVE
PYRIDOXAL SERPL-MCNC: 31 UG/L (ref 5–50)
VIT B1 BLD-MCNC: 58 UG/L (ref 38–122)

## 2020-11-03 ENCOUNTER — PROCEDURE VISIT (OUTPATIENT)
Dept: NEUROLOGY | Facility: CLINIC | Age: 55
End: 2020-11-03
Payer: COMMERCIAL

## 2020-11-03 VITALS
BODY MASS INDEX: 34.55 KG/M2 | SYSTOLIC BLOOD PRESSURE: 144 MMHG | HEIGHT: 66 IN | WEIGHT: 214.94 LBS | HEART RATE: 80 BPM | TEMPERATURE: 99 F | DIASTOLIC BLOOD PRESSURE: 91 MMHG

## 2020-11-03 DIAGNOSIS — G93.9 WHITE MATTER LESION OF CENTRAL NERVOUS SYSTEM: ICD-10-CM

## 2020-11-03 LAB
CLARITY CSF: CLEAR
COLOR CSF: COLORLESS
GLUCOSE CSF-MCNC: 59 MG/DL (ref 40–70)
LYMPHOCYTES NFR CSF MANUAL: 88 % (ref 40–80)
MONOS+MACROS NFR CSF MANUAL: 6 % (ref 15–45)
NEUTROPHILS NFR CSF MANUAL: 6 % (ref 0–6)
PROT CSF-MCNC: 20 MG/DL (ref 15–40)
RBC # CSF: 49 /CU MM
SPECIMEN VOL CSF: 1 ML
WBC # CSF: 2 /CU MM (ref 0–5)

## 2020-11-03 PROCEDURE — 82945 GLUCOSE OTHER FLUID: CPT

## 2020-11-03 PROCEDURE — 84157 ASSAY OF PROTEIN OTHER: CPT

## 2020-11-03 PROCEDURE — 83883 ASSAY NEPHELOMETRY NOT SPEC: CPT

## 2020-11-03 PROCEDURE — 62270 DX LMBR SPI PNXR: CPT | Mod: S$GLB,,, | Performed by: PHYSICIAN ASSISTANT

## 2020-11-03 PROCEDURE — 62270 LUMBAR PUNCTURE: ICD-10-PCS | Mod: S$GLB,,, | Performed by: PHYSICIAN ASSISTANT

## 2020-11-03 PROCEDURE — 82164 ANGIOTENSIN I ENZYME TEST: CPT

## 2020-11-03 PROCEDURE — 99000 SPECIMEN HANDLING OFFICE-LAB: CPT

## 2020-11-03 PROCEDURE — 86255 FLUORESCENT ANTIBODY SCREEN: CPT

## 2020-11-03 PROCEDURE — 89051 BODY FLUID CELL COUNT: CPT

## 2020-11-03 NOTE — PROCEDURES
Lumbar Puncture    Date/Time: 11/3/2020 8:15 AM  Location procedure was performed: [facility]  Performed by: hSelly Muñoz PA-C  Authorized by: Shelly Muñoz PA-C   Assisting provider: Chioma Nieto LPN  Pre-operative diagnosis:  White matter lesions of the central nervous system  Post-operative diagnosis: white matter lesions of the central nervous system  Consent Done: Yes  Anesthesia: local infiltration    Anesthesia:  Local Anesthetic: lidocaine 1% without epinephrine  Anesthetic total: 1 mL  Patient sedated: no  Preparation: Patient was prepped and draped in the usual sterile fashion.  Lumbar space: L3-L4 interspace  Patient's position: sitting  Needle gauge: 22  Needle type: Lorna pencil tip  Needle length: 3.5 in  Number of attempts: 1  Fluid appearance: blood-tinged then clearing  Tubes of fluid: 2  Total volume: 10 ml  Post-procedure: site cleaned and adhesive bandage applied  Complications: No  Patient tolerance: Patient tolerated the procedure well with no immediate complications

## 2020-11-03 NOTE — LETTER
"Justin Domínguez accompanied Miesha Domíngeuz (Sabrina) [facility]3/2020. They may return to work on 11/4/2020.    If you have any questions or concerns, please don't hesitate to call.            Shelly Muñoz PA-C  "

## 2020-11-05 ENCOUNTER — TELEPHONE (OUTPATIENT)
Dept: INTERNAL MEDICINE | Facility: CLINIC | Age: 55
End: 2020-11-05

## 2020-11-05 LAB — ACE CSF-CCNC: 1.2 U/L (ref 0–2.5)

## 2020-11-05 NOTE — TELEPHONE ENCOUNTER
Spoke to pt. Pt stated that she was seeing Bariatrics with Dr. Christine. She stated that Dr. Christine put her on Ozempic and topamax. She stated that her neurologist advised her wean off of the topamax, and she has. She stated that she no longer has leg burning. Pt is inquiring if  can prescribe her the ozempic.     I advised that this is not a medication that Dr. Mcgill prescribes. I advised that she would need to f/u with Dr. Christine for this.    Pt ok with this.

## 2020-11-05 NOTE — TELEPHONE ENCOUNTER
----- Message from Francy Lane sent at 11/5/2020  1:00 PM CST -----  Contact: self/[phone]  Pt called in regards to having questions about a Rx. Pt would like a call back.      Please advise

## 2020-11-06 LAB — KAPPA LC FREE CSF-MCNC: 0.03 MG/DL

## 2020-11-10 LAB — NMO/AQP4 FACS,CSF: NEGATIVE

## 2020-11-13 ENCOUNTER — OFFICE VISIT (OUTPATIENT)
Dept: BARIATRICS | Facility: CLINIC | Age: 55
End: 2020-11-13
Payer: COMMERCIAL

## 2020-11-13 ENCOUNTER — CLINICAL SUPPORT (OUTPATIENT)
Dept: BARIATRICS | Facility: CLINIC | Age: 55
End: 2020-11-13
Payer: COMMERCIAL

## 2020-11-13 VITALS
HEIGHT: 66 IN | WEIGHT: 213.19 LBS | BODY MASS INDEX: 34.26 KG/M2 | HEART RATE: 85 BPM | DIASTOLIC BLOOD PRESSURE: 80 MMHG | OXYGEN SATURATION: 98 % | SYSTOLIC BLOOD PRESSURE: 130 MMHG

## 2020-11-13 DIAGNOSIS — Z98.84 S/P LAPAROSCOPIC SLEEVE GASTRECTOMY: ICD-10-CM

## 2020-11-13 DIAGNOSIS — E66.9 OBESITY, CLASS I, BMI 30.0-34.9 (SEE ACTUAL BMI): ICD-10-CM

## 2020-11-13 DIAGNOSIS — E11.9 CONTROLLED TYPE 2 DIABETES MELLITUS WITHOUT COMPLICATION, WITHOUT LONG-TERM CURRENT USE OF INSULIN: Primary | ICD-10-CM

## 2020-11-13 PROCEDURE — 99499 NO LOS: ICD-10-PCS | Mod: S$GLB,,, | Performed by: DIETITIAN, REGISTERED

## 2020-11-13 PROCEDURE — 3079F DIAST BP 80-89 MM HG: CPT | Mod: CPTII,S$GLB,, | Performed by: INTERNAL MEDICINE

## 2020-11-13 PROCEDURE — 99999 PR PBB SHADOW E&M-EST. PATIENT-LVL I: CPT | Mod: PBBFAC,,, | Performed by: DIETITIAN, REGISTERED

## 2020-11-13 PROCEDURE — 3075F SYST BP GE 130 - 139MM HG: CPT | Mod: CPTII,S$GLB,, | Performed by: INTERNAL MEDICINE

## 2020-11-13 PROCEDURE — 3008F BODY MASS INDEX DOCD: CPT | Mod: CPTII,S$GLB,, | Performed by: INTERNAL MEDICINE

## 2020-11-13 PROCEDURE — 3044F PR MOST RECENT HEMOGLOBIN A1C LEVEL <7.0%: ICD-10-PCS | Mod: CPTII,S$GLB,, | Performed by: INTERNAL MEDICINE

## 2020-11-13 PROCEDURE — 99499 UNLISTED E&M SERVICE: CPT | Mod: S$GLB,,, | Performed by: DIETITIAN, REGISTERED

## 2020-11-13 PROCEDURE — 1125F AMNT PAIN NOTED PAIN PRSNT: CPT | Mod: S$GLB,,, | Performed by: INTERNAL MEDICINE

## 2020-11-13 PROCEDURE — 99213 PR OFFICE/OUTPT VISIT, EST, LEVL III, 20-29 MIN: ICD-10-PCS | Mod: S$GLB,,, | Performed by: INTERNAL MEDICINE

## 2020-11-13 PROCEDURE — 1125F PR PAIN SEVERITY QUANTIFIED, PAIN PRESENT: ICD-10-PCS | Mod: S$GLB,,, | Performed by: INTERNAL MEDICINE

## 2020-11-13 PROCEDURE — 99999 PR PBB SHADOW E&M-EST. PATIENT-LVL IV: ICD-10-PCS | Mod: PBBFAC,,, | Performed by: INTERNAL MEDICINE

## 2020-11-13 PROCEDURE — 99999 PR PBB SHADOW E&M-EST. PATIENT-LVL I: ICD-10-PCS | Mod: PBBFAC,,, | Performed by: DIETITIAN, REGISTERED

## 2020-11-13 PROCEDURE — 3008F PR BODY MASS INDEX (BMI) DOCUMENTED: ICD-10-PCS | Mod: CPTII,S$GLB,, | Performed by: INTERNAL MEDICINE

## 2020-11-13 PROCEDURE — 3075F PR MOST RECENT SYSTOLIC BLOOD PRESS GE 130-139MM HG: ICD-10-PCS | Mod: CPTII,S$GLB,, | Performed by: INTERNAL MEDICINE

## 2020-11-13 PROCEDURE — 3079F PR MOST RECENT DIASTOLIC BLOOD PRESSURE 80-89 MM HG: ICD-10-PCS | Mod: CPTII,S$GLB,, | Performed by: INTERNAL MEDICINE

## 2020-11-13 PROCEDURE — 99213 OFFICE O/P EST LOW 20 MIN: CPT | Mod: S$GLB,,, | Performed by: INTERNAL MEDICINE

## 2020-11-13 PROCEDURE — 99999 PR PBB SHADOW E&M-EST. PATIENT-LVL IV: CPT | Mod: PBBFAC,,, | Performed by: INTERNAL MEDICINE

## 2020-11-13 PROCEDURE — 3044F HG A1C LEVEL LT 7.0%: CPT | Mod: CPTII,S$GLB,, | Performed by: INTERNAL MEDICINE

## 2020-11-13 RX ORDER — AMLODIPINE BESYLATE 5 MG/1
5 TABLET ORAL DAILY
COMMUNITY
Start: 2020-09-24 | End: 2020-12-27

## 2020-11-13 RX ORDER — INFLUENZA A VIRUS A/VICTORIA/2454/2019 IVR-207 (H1N1) ANTIGEN (PROPIOLACTONE INACTIVATED), INFLUENZA A VIRUS A/HONG KONG/2671/2019 IVR-208 (H3N2) ANTIGEN (PROPIOLACTONE INACTIVATED), INFLUENZA B VIRUS B/VICTORIA/705/2018 BVR-11 ANTIGEN (PROPIOLACTONE INACTIVATED), INFLUENZA B VIRUS B/PHUKET/3073/2013 BVR-1B ANTIGEN (PROPIOLACTONE INACTIVATED) 15; 15; 15; 15 UG/.5ML; UG/.5ML; UG/.5ML; UG/.5ML
INJECTION, SUSPENSION INTRAMUSCULAR
COMMUNITY
Start: 2020-10-02 | End: 2020-11-24 | Stop reason: ALTCHOICE

## 2020-11-13 RX ORDER — PHENTERMINE HYDROCHLORIDE 37.5 MG/1
TABLET ORAL
Qty: 30 TABLET | Refills: 1 | Status: SHIPPED | OUTPATIENT
Start: 2020-11-13 | End: 2021-02-25 | Stop reason: SDUPTHER

## 2020-11-13 RX ORDER — SEMAGLUTIDE 1.34 MG/ML
INJECTION, SOLUTION SUBCUTANEOUS
Qty: 9 SYRINGE | Refills: 0 | Status: SHIPPED | OUTPATIENT
Start: 2020-11-13 | End: 2021-02-01

## 2020-11-13 NOTE — PROGRESS NOTES
"Subjective:       Patient ID: Miesha Domínguez is a 54 y.o. female.    Chief Complaint: Follow-up    Pt here today for follow-up.   Has lost 0 lbs since last in the office 1 year ago, net neg 28 lbs.Continued topiramate that she was already on. Neurology has stopped topiramate. Being worked up with neurology for demyelinating d/o. Started ozempic 1 mg. Only SE was constipation at first. Used miralax with good relief.   Had calcium oxalate in urine.   BS had been in 90s fasting.  She is s/p lap gastric sleeve - 7/29/13. 42% EWL  Lab Results       Component                Value               Date                       HGBA1C                   5.4                 08/21/2020                 HGBA1C                   5.7 (H)             02/19/2020                 HGBA1C                   5.9 (H)             07/29/2019            Lab Results       Component                Value               Date                       LDLCALC                  63.4                08/21/2020                 CREATININE               0.8                 08/21/2020               Follow-up  Associated symptoms include myalgias. Pertinent negatives include no arthralgias, chest pain, chills or fever.     Review of Systems   Constitutional: Negative for chills and fever.   Respiratory: Negative for shortness of breath.         Denies Snoring   Cardiovascular: Negative for chest pain and leg swelling.   Gastrointestinal: Negative for constipation and diarrhea.        Denies GERD   Genitourinary: Positive for menstrual problem. Negative for difficulty urinating.        Recent  bleeding. bx with polyps.    Musculoskeletal: Positive for myalgias. Negative for arthralgias and back pain.   Neurological: Negative for dizziness and light-headedness.   Psychiatric/Behavioral: Negative for dysphoric mood. The patient is not nervous/anxious.        Objective:     /80   Pulse 85   Ht 5' 6" (1.676 m)   Wt 96.7 kg (213 lb 3 oz)   LMP 04/05/2019 " (Approximate)   SpO2 98%   BMI 34.41 kg/m²     Physical Exam  Vitals signs reviewed.   Constitutional:       General: She is not in acute distress.     Appearance: She is well-developed.      Comments: Obese   HENT:      Head: Normocephalic and atraumatic.   Eyes:      General: No scleral icterus.     Pupils: Pupils are equal, round, and reactive to light.   Neck:      Musculoskeletal: Normal range of motion and neck supple.   Cardiovascular:      Rate and Rhythm: Normal rate.   Pulmonary:      Effort: Pulmonary effort is normal.   Musculoskeletal: Normal range of motion.   Skin:     General: Skin is warm and dry.      Findings: No erythema.   Neurological:      Mental Status: She is alert and oriented to person, place, and time.      Cranial Nerves: No cranial nerve deficit.   Psychiatric:         Behavior: Behavior normal.         Judgment: Judgment normal.         Assessment:       1. Controlled type 2 diabetes mellitus without complication, without long-term current use of insulin    2. S/P laparoscopic sleeve gastrectomy    3. Obesity, Class I, BMI 30.0-34.9 (see actual BMI)        Plan:           Miesha was seen today for follow-up.    Diagnoses and all orders for this visit:    Controlled type 2 diabetes mellitus without complication, without long-term current use of insulin  -     semaglutide (OZEMPIC) 1 mg/dose (2 mg/1.5 mL) PnIj; INJECT 1 MG SUBCUTANOUESLY WEEKLY    S/P laparoscopic sleeve gastrectomy    Obesity, Class I, BMI 30.0-34.9 (see actual BMI)    Other orders  -     phentermine (ADIPEX-P) 37.5 mg tablet; 1/2 tab po q am           Diet per [employer] (code [id number])      - No soda, sweet tea, juices, sports drinks or lemonade     -Exercise daily. Continue.       Continue Ozempic once a week.   Decrease portions as soon as you start Ozempic. Some nausea in the first 2 weeks is not unusual.     If you get pain across the upper abdomen and around to your back, please call the  office.    Patient warned of common side effects of phentermine including anxiety, insomnia, palpitations and increased blood pressure. It was also explained that it is for short-term usage along with diet and exercise, and that stopping the medication without making lifestyle changes will result in regain of weight. Patient states understanding.     Weight loss medications are controlled substances.  They require routine follow up. Prescription or pills that are lost or destroyed will not be replaced.       Start phentermine with 1/2 pill a day

## 2020-11-13 NOTE — PATIENT INSTRUCTIONS
Meal Ideas for Regular Bariatric Diet  *Recipes and products available at [email or web address]      Breakfast: (15-20g protein)    - Egg white omelet: 2 egg whites or ½ cup Egg Beaters. (Optional proteins: cheese, shrimp, black beans, chicken, sliced turkey) (Optional veggies: tomatoes, salsa, spinach, mushrooms, onions, green peppers, or small slice avocado)     - Egg and sausage: 1 egg or ¼ cup Egg Beaters (any variety), with 1 judy or 2 links of Turkey sausage or Veggie breakfast sausage ([employer] or [employer])    - Crust-less breakfast quiche: To make a glass pie dish, mix 4oz part skim Ricotta, 1 cup skim milk, and 2 eggs as your base. Add protein: shredded cheese, sliced lean ham or turkey, turkey mcgraw/sausage. Add veggies: tomato, onion, green onion, mushroom, green pepper, spinach, etc.    - Yogurt parfait: Mix 1 - 6oz container Dannon Light N Fit vanilla yogurt, with ¼ cup crushed unsalted nuts    - Cottage cheese and fruit: ½ cup part-skim cottage cheese or ricotta cheese topped with fresh fruit or sugar free preserves     - Tawana Kiser's Vanilla Egg custard* (add 2 Tbsp instant coffee granules to make Cappuccino Custard*)    - Hi-Protein café latte (skim milk, decaf coffee, 1 scoop protein powder). Optional to add Sugar free syrup or extract flavoring.    - Breakfast Lox: spread fat free cream cheese on slices of smoked salmon. Serve over scrambled or egg over easy (sauteed with nonstick cookspray) OR on a cucumber slice    - Eggwhich: Scramble or cook 1 large egg over easy using nonstick cookspray. Place between 2 slices of [place] mcgraw and low fat cheese.     Lunch: (20-30g protein)    - ½ cup Black bean soup (Homemade or Progresso), with ¼ cup shredded low-fat cheese. Top with chopped tomato or fresh salsa.     - Lean deli turkey breast and low-fat sliced cheese, mustard or light finn to moisten, rolled up together, or wrapped in a Humza lettuce leaf    - Chicken salad made from dinner  leftovers, moisten with low-fat salad dressing or light finn. Also try leftover salmon, shrimp, tuna or boiled eggs. Serve ½ cup over dark green salad    - Fat-free canned refried beans, topped with ¼ cup shredded low-fat cheese. Top with chopped tomato or fresh salsa.     - Greek salad: Top mixed greens with 1-2oz grilled chicken, tomatoes, red onions, 2-3 kalamata olives, and sprinkle lightly with feta cheese. Spritz with Balsamic vinegar to taste.     - Crust-less lunch quiche: To make a glass pie dish, mix 4oz part skim Ricotta, 1 cup skim milk, and 2 eggs as your base. Add protein: shredded cheese, sliced lean ham or turkey, shrimp, chicken. Add veggies: tomato, onion, green onion, mushroom, green pepper, spinach, artichoke, broccoli, etc.    - Pizza bake: spread a  caryn fabrizio mushroom with tomato sauce, low-fat shredded mozzarella and turkey pepperoni or [place] mcgraw. Add any veggies. Roast for 10-15 minutes, until cheese melted.     - Cucumber crab bites: Spread ¼ cup crab dip (lump crabmeat + light cream cheese and green onions) over sliced cucumber.     - Chicken with light spinach and artichoke dip*: Puree in : 6oz cooked and drained spinach, 2 cloves garlic, 1 can cannelloni beans, ½ cup chopped green onions, 1 can drained artichoke hearts (not marinated in oil), lemon juice and basil. Mix in 2oz chopped up chicken.    Supper: (20-30g protein)    - Serve grilled fish over dark green salad tossed with low-fat dressing, served with grilled asparagus rawls     - Rotisserie chicken salad: served with sliced strawberries, walnuts, fat-free feta cheese crumbles and 1 tbsp Oneals Own Light Raspberry [place] Vinaigrette    - Shrimp cocktail: Dip cold boiled shrimp in homemade low-sugar cocktail sauce (1/2 cup Gavi One Carb ketchup, 2 tbsp horseradish, 1/4 tsp hot sauce, 1 tsp Worcestershire sauce, 1 tbsp freshly-squeezed lemon juice). Serve with dark green salad, walnuts, and crumbled blue  cheese drizzled with olive oil and Balsamic vinegar    - Tuna Melt: Spread tuna salad onto 2 thick slices of tomato. Top with low-fat cheese and broil until cheese is melted. May also be made with chicken salad of shrimp salad. [place] with different types of cheeses.    - Chicken or beef fajitas (no tortilla, rice, beans, chips). Top meat and veggies w/ fresh salsa, fat free sour cream.     - Homemade low-fat Chili using extra lean ground beef or ground turkey. Top with shredded cheese and salsa as desired. May add dollop fat-free sour cream if desired    - Chicken parmesan: Top chicken breast w/ low sugar marinara sauce, mozzarella cheese and bake until chicken reaches 165*.  Serve w/ spaghetti SQUASH or [place] cut green beans    - Dinner Omelet with shrimp or chicken and onion, green peppers and chives.    - No noodle lasagna: Use sliced zucchini or eggplant in place of noodles.  Layer with part skim ricotta cheese and low sugar meat sauce (use very lean ground beef or ground turkey).    - Mexican chicken bake: Bake chunks of chicken breast or thigh with taco seasoning, Pace brand enchilada sauce, green onions and low-fat cheese. Serve with ¼ cup black beans or fat free refried beans topped with chopped tomatoes or salsa.    - [place] frozen meatballs, simmered in Classico Marinara sauce. Different flavors of salsa or spaghetti sauce create different dishes! Sprinkle with parmesan cheese. Serve with grilled or steamed veggies, or a dark green salad.    - Simmer boneless skinless chicken thigh chunks in Classico Marinara sauce or roasted salsa until tender with chopped onion, bell pepper, garlic, mushrooms, spinach, etc.     - Hamburger or veggie burger, without the bun, dressed the way you like. Served with grilled or steamed veggies.    - Eggplant parmesan: Bake slices of eggplant at 350 degrees for 15 minutes. Layer tomato sauce, sliced eggplant and low-fat mozzarella cheese in a baking dish and cover with  foil. Bake 30-40 more minutes or until bubbly. Uncover and bake at 400 degrees for about 15 more minutes, or until top is slightly crisp.    - Fish tacos: grilled/baked white fish, wrapped in Humza lettuce leaf, topped with salsa, shredded low-fat cheese, and light coleslaw.    - Chicken deborah: Sprinkle chicken w/ 1 tsp of hidden [place] ranch dip mix. Then grill chicken and top with black beans, salsa and 1 tsp fat free sour cream.     - Cauliflower pizza crust: Use cauliflower as crust (see recipe on pinterest, no flour!). Top w/ low fat cheese, turkey pepperoni and veggies and bake again    - chicken or turkey crust pizza: use ground chicken or turkey instead of cauliflower, spread in [place] and bake at 350 for about 20-30 minutes(may want to add garlic, black pepper, oregano and other herbs to ground meat mixture).  Remove and top w/ low fat cheese, turkey pepperoni and veggies and bake again for another 10 minutes or until cheese is browned.     Snacks: (100-200 calories; >5g protein)    - 1 low-fat cheese stick with 8 cherry tomatoes or 1 serving fresh fruit  - 4 thin slices fat-free turkey breast and 1 slice low-fat cheese  - 4 thin slices fat-free honey ham with wedge of melon  - 6-8 edamame pods (equivalent to about 1/4 cup edamame without pods).   - 1/4 cup unsalted nuts with ½ cup fruit  - 6-oz container Dannon Light n Fit vanilla yogurt, topped with 1oz unsalted nuts         - apple, celery or baby carrots spread with 2 Tbsp PB2  - apple slices with 1 oz slice low-fat cheese  - Apple slices dipped in 2 Tbsp of PB2  - celery, cucumber, bell pepper or baby carrots dipped in ¼ cup hummus bean spread or light spinach and artichoke dip (*recipe in lunch section)  - celery, cucumber, baby carrots dipped in high protein greek yogurt (Mix 16 oz plain greek yogurt + 1 packet of hidden valley ranch dip mix)  - Ulysses Links Beef Steak - 14g protein! (similar to beef jerky)  - 2 wedges [place] - Light Herb  & Garlic Cheese with sliced cucumber or green bell pepper  - 1/2 cup low-fat cottage cheese with ¼ cup fruit or ¼ cup salsa  - RTD Protein drinks: Atkins, Low Carb Slim Fast, EAS light, Muscle Milk Light, etc.  - Homemade Protein drinks: GNC Soy95, Isopure, Nectar, UNJURY, Whey Gourmet, etc. Mix 1 scoop powder with 8oz skim/1% milk or light soymilk.  - Protein bars: Atkins, EAS, Pure Protein, Think Thin, Detour, etc. Must have 0-4 grams sugar - Read the label.    Takeout Options: No more than twice/week  Deli - Salads (no pasta or rice), meats, cheeses. Roasted chicken. Lox (salmon)    Mexican - Platters which don't include tortillas, chips, or rice. Go easy on the beans. Example: Fajitas without the tortillas. Ask the  not to bring chips to the table if they are too tempting.    Greek - Meat or fish and vegetable, but no bread or rice. Including hummus, baba ganoush, etc, is OK. Most sit-down Greek restaurants can provide you with cucumber slices for dipping instead of mary bread.    Fast Food (Avoid as much as possible) - Salads (no croutons and limit salad dressing to 2 tbsp), grilled chicken sandwich without the bun and ask for no finn. Daynas low fat chili or Taco Bell pintos and cheese.    BBQ - The meats are fine if you ask for sauces on the side, but most of the traditional side dishes are loaded with carbs. Bala slaw, baked beans and BBQ sauce are typically made with sugar.    Chinese - Nothing deep-fried, no rice or noodles. Many Chinese sauces have starch and sugar in them, so you'll have to use your judgement. If you find that these sauces trigger cravings, or cause Dumping, you can ask for the sauce to be made without sugar or just use soy sauce.    Menu Plan: 800-1000 Calories;  grams of Protein    DAY 1     Breakfast  ½ cup 2% cottage cheese  ¼ cup fruit (no sugar added)    Snack  2% mozzarella string cheese  10 grapes    Lunch  2oz Lean hamburger or turkey tash  1 slice low-fat  cheese  ¼ cup green beans    Snack  200 calorie low-carb protein drink (4 grams sugar or less)    Dinner  2oz chicken thigh  ¼ cup cooked spinach     Snack  Atkins bar (15g protein)      DAY 2    Breakfast  1 egg with 1oz shredded cheddar cheese and 2T salsa    Snack  200 calorie low-carb protein drink (4 grams sugar or less)    Lunch  Lettuce Wraps: 2oz sliced turkey, 1 slice low-fat Swiss cheese, tomato, and mustard wrapped in a Humza lettuce leaf    Snack  ½ cup low-fat cottage cheese  Pear cup (no sugar added)    Dinner  2oz baked fish  ½ cup cooked broccoli    Snack  Sugar-free pudding cup      DAY 3    Breakfast   ½ cup low-fat ricotta cheese w/ Splenda to geovanni  ½ scoop Vanilla protein powder   ¼ cup fresh fruit    Snack  2% string cheese  6 unsalted almonds    Lunch  Tuna/Chicken Salad: 2oz canned tuna/chicken, 1 egg white, and 1 tsp light finn  Pineapple cup (no sugar added)    Snack  200 calorie low-carb protein drink (4 grams sugar or less)    Dinner  ½ baked pork chop   ¼ cup beans      DAY 4    Breakfast  200 calorie low-carb protein drink (4 grams sugar or less)    Snack  Boiled egg    Lunch  ½ cup grilled shrimp  Salad w/ 2 tbsp crumbled fat-free feta  1 tbsp light vinaigrette    Snack  200 calorie low-carb protein drink (4 grams sugar or less)    Dinner  ¾ cup red beans    Snack  Mini Babybell light      DAY 5    Breakfast  [place] pie: 3oz Greek yogurt, 1 tbsp Splenda, ½ individual pack Crystal Light lemonade. Top with ¼ cup chopped walnuts     Snack  3-4 lean ham or turkey slices, ¼ - ½ cup fruit    Lunch  Fiesta Chicken: 2oz canned chicken, 1oz shredded cheddar cheese, ¼ cup black beans  Top with 2 tbsp salsa and a small dollop light sour cream    Snack  200 calorie low-carb protein drink (4 grams sugar or less)    Dinner  Omelette: ¼ cup Egg Beaters, 4 large (1oz) shrimp, 1oz shredded low-fat cheese. Add bell pepper, onion, mushrooms, green onions, or salsa, optional.      DAY  6    Breakfast  1 judy or 2 links turkey sausage  ½ cup fruit    Snack  200 calorie low-carb protein drink (4 grams sugar or less)    Lunch  Grilled tilapia  Salad of baby spinach leaves with light dressing    Snack  200 calorie low-carb protein drink (4 grams sugar or less)    Dinner  Chicken thigh simmered in 98% fat free cream of mushroom soup  ½ cup cooked green beans

## 2020-11-13 NOTE — PROGRESS NOTES
NUTRITION NOTE    Referring Physician: Sami Francis M.D.  Reason for MNT Referral: Follow-up 7 years s/p Gastric Sleeve    PAST MEDICAL HISTORY:    Denies nausea, vomiting, constipation and diarrhea.  Reports doing well.    Past Medical History:   Diagnosis Date    Asthma     as a child    Endometrial polyp 8/2/2019    HLD (hyperlipidemia)     HTN (hypertension)     Neuropathy     S/P D&C (status post dilation and curettage) 8/2/2019    Type II or unspecified type diabetes mellitus without mention of complication, not stated as uncontrolled        CLINICAL DATA:  54 y.o. female.    There were no vitals filed for this visit.    Current Weight: 213 lbs  BMI: 34.41  Total Weight Loss: 52 lbs  Excess Weight Loss: 42 bs  Under 200s or 190s    LABS:  No recent bariatric labs    CURRENT DIET:  Bariatric Diet.  Diet Recall: 45 grams of protein/day; 48+ oz of fluids/day  B egg and turkey sausage 15 gms protein  L turkey breast and cheese 20 gms   D bean no rice or chicken 10 gms  S: SF jello or sf popsicles   Not using protein shakes  Meal Pattern: 3 meal(s) + 1-2 non-protein snack(s) + 0 protein supplement(s)  Inadequate protein supplement intake.  Adequate dairy intake.  Adequate vegetable intake. Tolerates raw vegetables and lettuce.  Adequate fruit intake.  Starchy CHO: none reported      EXERCISE:  See BA    VITAMINS / MINERALS:  Adherent with vitamins and minerals    ASSESSMENT:  Doing fairly well overall.  Weight loss.  Inadequate calorie intake.  Inadequate protein intake.  Adequate fluid intake.  Following diet appropriately.  Exercising.  Adequate vitamins & minerals.    BARIATRIC DIET DISCUSSION:  Instructed and provided written materials on bariatric diet plan.  Reinforced post-op nutrition guidelines.    PLAN / RECOMMENDATIONS:  May begin to incorporate raw vegetables, lettuce, unsalted nuts, and light popcorn as tolerated.  May begin to swallow whole pills as tolerated.  Continue excellent diet  plan.  Adjust diet by getting in  gms of protein per day.  Increase protein intake.  Maintain fluid intake.  Continue exercise.  Continue appropriate vitamins & minerals.      Return to clinic in 1 months.    SESSION TIME: 15 minutes

## 2020-11-13 NOTE — PATIENT INSTRUCTIONS
Diet per [employer] [facility] (code [id number])      - No soda, sweet tea, juices, sports drinks or lemonade     -Exercise daily. Continue.       Continue Ozempic once a week.   Decrease portions as soon as you start Ozempic. Some nausea in the first 2 weeks is not unusual.     If you get pain across the upper abdomen and around to your back, please call the office.    Patient warned of common side effects of phentermine including anxiety, insomnia, palpitations and increased blood pressure. It was also explained that it is for short-term usage along with diet and exercise, and that stopping the medication without making lifestyle changes will result in regain of weight. Patient states understanding.     Weight loss medications are controlled substances.  They require routine follow up. Prescription or pills that are lost or destroyed will not be replaced.       Start phentermine with 1/2 pill a day

## 2020-11-24 ENCOUNTER — OFFICE VISIT (OUTPATIENT)
Dept: NEUROLOGY | Facility: CLINIC | Age: 55
End: 2020-11-24
Payer: COMMERCIAL

## 2020-11-24 ENCOUNTER — TELEPHONE (OUTPATIENT)
Dept: NEUROLOGY | Facility: CLINIC | Age: 55
End: 2020-11-24

## 2020-11-24 ENCOUNTER — LAB VISIT (OUTPATIENT)
Dept: LAB | Facility: HOSPITAL | Age: 55
End: 2020-11-24
Attending: PSYCHIATRY & NEUROLOGY
Payer: COMMERCIAL

## 2020-11-24 VITALS
DIASTOLIC BLOOD PRESSURE: 92 MMHG | HEIGHT: 66 IN | BODY MASS INDEX: 34.12 KG/M2 | SYSTOLIC BLOOD PRESSURE: 147 MMHG | TEMPERATURE: 98 F | WEIGHT: 212.31 LBS | HEART RATE: 78 BPM

## 2020-11-24 DIAGNOSIS — G35 MULTIPLE SCLEROSIS: Primary | ICD-10-CM

## 2020-11-24 DIAGNOSIS — G35 MULTIPLE SCLEROSIS: ICD-10-CM

## 2020-11-24 DIAGNOSIS — Z71.89 COUNSELING REGARDING GOALS OF CARE: ICD-10-CM

## 2020-11-24 DIAGNOSIS — Z29.89 PROPHYLACTIC IMMUNOTHERAPY: ICD-10-CM

## 2020-11-24 DIAGNOSIS — R26.89 IMBALANCE: ICD-10-CM

## 2020-11-24 PROCEDURE — 1126F PR PAIN SEVERITY QUANTIFIED, NO PAIN PRESENT: ICD-10-PCS | Mod: S$GLB,,, | Performed by: PSYCHIATRY & NEUROLOGY

## 2020-11-24 PROCEDURE — 3008F PR BODY MASS INDEX (BMI) DOCUMENTED: ICD-10-PCS | Mod: CPTII,S$GLB,, | Performed by: PSYCHIATRY & NEUROLOGY

## 2020-11-24 PROCEDURE — 3008F BODY MASS INDEX DOCD: CPT | Mod: CPTII,S$GLB,, | Performed by: PSYCHIATRY & NEUROLOGY

## 2020-11-24 PROCEDURE — 3080F PR MOST RECENT DIASTOLIC BLOOD PRESSURE >= 90 MM HG: ICD-10-PCS | Mod: CPTII,S$GLB,, | Performed by: PSYCHIATRY & NEUROLOGY

## 2020-11-24 PROCEDURE — 3077F SYST BP >= 140 MM HG: CPT | Mod: CPTII,S$GLB,, | Performed by: PSYCHIATRY & NEUROLOGY

## 2020-11-24 PROCEDURE — 86146 BETA-2 GLYCOPROTEIN ANTIBODY: CPT | Mod: 59

## 2020-11-24 PROCEDURE — 3077F PR MOST RECENT SYSTOLIC BLOOD PRESSURE >= 140 MM HG: ICD-10-PCS | Mod: CPTII,S$GLB,, | Performed by: PSYCHIATRY & NEUROLOGY

## 2020-11-24 PROCEDURE — 36415 COLL VENOUS BLD VENIPUNCTURE: CPT

## 2020-11-24 PROCEDURE — 1126F AMNT PAIN NOTED NONE PRSNT: CPT | Mod: S$GLB,,, | Performed by: PSYCHIATRY & NEUROLOGY

## 2020-11-24 PROCEDURE — 3080F DIAST BP >= 90 MM HG: CPT | Mod: CPTII,S$GLB,, | Performed by: PSYCHIATRY & NEUROLOGY

## 2020-11-24 PROCEDURE — 99215 PR OFFICE/OUTPT VISIT, EST, LEVL V, 40-54 MIN: ICD-10-PCS | Mod: S$GLB,,, | Performed by: PSYCHIATRY & NEUROLOGY

## 2020-11-24 PROCEDURE — 85613 RUSSELL VIPER VENOM DILUTED: CPT

## 2020-11-24 PROCEDURE — 99215 OFFICE O/P EST HI 40 MIN: CPT | Mod: S$GLB,,, | Performed by: PSYCHIATRY & NEUROLOGY

## 2020-11-24 PROCEDURE — 99999 PR PBB SHADOW E&M-EST. PATIENT-LVL IV: CPT | Mod: PBBFAC,,, | Performed by: PSYCHIATRY & NEUROLOGY

## 2020-11-24 PROCEDURE — 99999 PR PBB SHADOW E&M-EST. PATIENT-LVL IV: ICD-10-PCS | Mod: PBBFAC,,, | Performed by: PSYCHIATRY & NEUROLOGY

## 2020-11-24 RX ORDER — METFORMIN HYDROCHLORIDE 1000 MG/1
1000 TABLET ORAL DAILY
COMMUNITY
Start: 2020-11-16 | End: 2021-04-26

## 2020-11-24 RX ORDER — ACETAMINOPHEN 500 MG
1 TABLET ORAL DAILY
COMMUNITY

## 2020-11-24 NOTE — PROGRESS NOTES
Neurology Clinic Visit  Primary Care Provider: Danni Wolf MD   Referring Provider: No ref. provider found   Date of Visit: 11/24/2020     chief complaint: evaluation for MS    History of Present Illness on initial presentation  Miesha Domínguez is a 54 y.o. right handed female I have been asked to consult for evaluation and treatment of possible multiple sclerosis.     In 2018, she noticed burning from feet upto calves. She has had camping in her legs. She also noticed when she would stretch in bed, she would have spasms in her legs. This resolved with gabapentin. This was contributed to neuropathy per patient.     In March 2020, she noticed tingling in her right foot and later ascended to knees (right> left). Sometimes she would lose her balance but overall she feels that her gait was not affected. No leg weakness.  This improved after weaning Topamax. Topamax was started about 1 year ago for weight loss per patient.      In 2013, she saw Dr. Cuadra for 6 month history of tingling and numbness in hands and feet. EMG/NCS showed right carpal tunnel.     She has never had vision loss. No gait disturbance in past except what was mentioned above. No double vision or typical brainstem syndromes.     Recent MRI Brain/Cervical/Thoracic w/wo show nonspecific white matter lesions in brain (nonenhancing), 1left juxtacortical lesion and C2, C3, and T11 non-enhancing spinal cord lesions.     No joint pain or swelling. No bladder or bowel dysfunction. No dysarthria or dysphagia. No tremors. She denies heat sensitivity. In fact, she feels better when she takes a hot shower.  She has history of gastric sleeve in 2013.  Currently she has numbness in right foot.     Family history - No MS    Interval History:  No new symptoms.   She had LP which showed negative MS profile  Ms mimics were negative except + RF  Still sometimes have tingling/numbness in extremities for which gabapentin helps.     Patient Active  Problem List    Diagnosis Date Noted    Impairment of balance 2020    Multiple sclerosis 2020    Obesity, diabetes, and hypertension syndrome 2019    Controlled type 2 diabetes mellitus without complication, without long-term current use of insulin 2019    Heart palpitations 2017    Thiamine deficiency 02/15/2016    Vitamin D deficiency 02/15/2016    Chronic allergic rhinitis 2013    Carpal tunnel syndrome 2013    Diabetic peripheral neuropathy 2013    Essential hypertension 2012    HLD (hyperlipidemia) 2012     Past Medical History:   Diagnosis Date    Asthma     as a child    Endometrial polyp 2019    HLD (hyperlipidemia)     HTN (hypertension)     Neuropathy     S/P D&C (status post dilation and curettage) 2019    Type II or unspecified type diabetes mellitus without mention of complication, not stated as uncontrolled      Past Surgical History:   Procedure Laterality Date     SECTION      COLONOSCOPY N/A 2018    Procedure: COLONOSCOPY;  Surgeon: Ramez Ramírez MD;  Location: [address]);  Service: Endoscopy;  Laterality: N/A;    HYSTEROSCOPY WITH DILATION AND CURETTAGE OF UTERUS N/A 2019    Procedure: [id number];  Surgeon: Catrina Olivier MD;  Location: [facility];  Service: OB/GYN;  Laterality: N/A;    SLEEVE GASTROPLASTY       Family History   Problem Relation Age of Onset    Cancer Father         d.64 lung -smoker    Hypertension Mother     Diabetes Mother     Ovarian cancer Mother 74    Diabetes Sister     Hypertension Sister     Hypertension Brother     Diabetes Brother     Breast cancer Neg Hx     Colon cancer Neg Hx     Stroke Neg Hx     Heart attack Neg Hx     Heart disease Neg Hx     Heart failure Neg Hx     Hyperlipidemia Neg Hx          Current Outpatient Medications   Medication Sig    amLODIPine (NORVASC) 5 MG tablet Take 5 mg by  mouth once daily.    gabapentin (NEURONTIN) 300 MG capsule Take 2 bid as directed (Patient taking differently: Take 1,200 mg by mouth every evening. )    hydroCHLOROthiazide (HYDRODIURIL) 25 MG tablet TAKE 1 TABLET BY MOUTH EVERY DAY    ibuprofen (ADVIL,MOTRIN) 600 MG tablet Take 1 tablet (600 mg total) by mouth 3 (three) times daily.    losartan (COZAAR) 100 MG tablet Take 1 tablet (100 mg total) by mouth once daily.    metFORMIN (GLUCOPHAGE) 500 MG tablet Take 1 tablet (500 mg total) by mouth once daily.    MULTIVITAMIN ORAL Take 1 tablet by mouth once daily. Centrum adult chews ( flavor burst)     phentermine (ADIPEX-P) 37.5 mg tablet 1/2 tab po q am    rosuvastatin (CRESTOR) 20 MG tablet TAKE 1 TABLET BY MOUTH EVERY DAY    semaglutide (OZEMPIC) 1 mg/dose (2 mg/1.5 mL) PnIj INJECT 1 MG SUBCUTANOUESLY WEEKLY    cholecalciferol, vitamin D3, (VITAMIN D3) 50 mcg (2,000 unit) Cap Take 1 capsule by mouth once daily.    cyanocobalamin, vitamin B-12, (VITAMIN B-12) 2,500 mcg Subl Place 1 tablet under the tongue every other day. Pt taking every other day    glatiramer (COPAXONE, GLATOPA) 40 mg/mL injection Inject 40 mg into the skin 3 (three) times a week.    metFORMIN (GLUCOPHAGE) 1000 MG tablet Take 1,000 mg by mouth once daily.    potassium 99 mg Tab 3 tablets twice daily (Patient not taking: Reported on 11/24/2020)     No current facility-administered medications for this visit.        Review of patient's allergies indicates:  No Known Allergies  Social History     Socioeconomic History    Marital status:      Spouse name: himanshu    Number of children: 1    Years of education: Not on file    Highest education level: Not on file   Occupational History    Occupation:      Employer: [employer]   Social Needs    Financial resource strain: Not hard at all    Food insecurity     Worry: Never true     Inability: Never true    Transportation needs     Medical: No      Non-medical: No   Tobacco Use    Smoking status: Never Smoker    Smokeless tobacco: Never Used   Substance and Sexual Activity    Alcohol use: Yes     Frequency: Never     Binge frequency: Never     Comment: occassional for Hollidays    Drug use: No    Sexual activity: Yes     Partners: Male     Birth control/protection: None     Comment:  x 28 years: Spouse : Justin   Lifestyle    Physical activity     Days per week: 3 days     Minutes per session: 60 min    Stress: Not at all   Relationships    Social connections     Talks on phone: More than three times a week     Gets together: Once a week     Attends [employer] service: Not on file     Active member of club or organization: No     Attends meetings of clubs or organizations: Never     Relationship status:    Other Topics Concern    Not on file   Social History Narrative    SOCIAL HISTORY:     , spouse is in good health, .     for [employer],    Daughter,  @[employer], [employer]; working @ [employer]    + exercise, walk, elliptical, Rebecca       Review of Systems    REVIEW OF SYMPTOMS 11/24/2020   Do you feel abnormally tired on most days? No   Do you feel you generally sleep well? Yes   Do you have difficulty controlling your bladder?  No   Do you have difficulty controlling your bowels?  No   Do you have frequent muscle cramps, tightness or spasms in your limbs?  Yes   Do you have new visual symptoms?  No   Do you have worsening difficulty with your memory or thinking? No   Do you have worsening symptoms of anxiety or depression?  No   For patients who walk, Do you have more difficulty walking?  No   Have you fallen since your last visit?  No   For patients who use wheelchairs: Do you have any skin wounds or breakdown? Not Applicable   Do you have difficulty using your hands?  No   Do you have shooting or burning pain? No   If you are sexually active, are you using birth control? Y/N  N/A Not  "Applicable   Do you often choke when swallowing liquids or solid food?  No   Do you experience worsening symptoms when overheated? No   Do you need any new equipment such as a wheelchair, walker or shower chair? No   Do you receive co-pay financial assistance for your principal MS medicine? Not Applicable   Would you be interested in participating in an MS research trial in the future? Not Applicable   For patients on Gilenya, Tecfidera, Aubagio, Rituxan, Ocrevus, Tysabri, Lemtrada or Methotrexate, are you aware that you should NOT receive live virus vaccines?  Not Applicable   Do you feel you have adequate family/friend support?  Yes   Do you have health insurance?   Yes   Are you currently employed? Yes   Do you receive SSDI/SSI?  Not Applicable   Do you use marijuana or cannabis products? No   Have you been diagnosed with a urinary tract infection since your last visit here? No   Have you been diagnosed with a respiratory tract infection since your last visit here? No   Have you been to the emergency room since your last visit here? No   Have you been hospitalized since your last visit here?  No         Objective:  Vital signs in last 24 hours:    Vitals:    11/24/20 1116   BP: (!) 147/92   Pulse: 78   Temp: 98.1 °F (36.7 °C)   Weight: 96.3 kg (212 lb 4.9 oz)   Height: 5' 6" (1.676 m)       Body mass index is 34.27 kg/m².     In general, the patient is well nourished.    No bruits. Fundi are normal bilaterally.    MENTAL STATUS: language is fluent, normal verbal comprehension, short-term and remote memory is intact, attention is normal, patient is alert and oriented x 3, fund of knowlege is appropriate by vocabulary.     CRANIAL NERVE EXAM:  There is no [place].  Extraocular muscles are intact. Pupils are equal, round, and reactive to light. No facial asymmetry. Facial sensation is intact bilaterally. There is no dysarthria. Uvula is midline, and palate moves symmetrically. Shoulder shrug intact bilaterlly. Tongue " protrusion is midline. Hearing is grossly intact. Neck is supple.     MOTOR EXAM: Normal bulk and tone throughout UE and LE bilaterally.   No pronator drift; rapid sequential movements are normal; Strength is  5/5 in all groups in the lower extremities and upper extremities;    REFLEXES: 2+ on right, 3+ on left, absent ankle bilaterally; toes are down bilaterally    SENSORY EXAM: Normal to light touch throughout. Mild decrease vibration in fingers and toes bilaterally.     COORDINATION: Normal finger-to-nose exam     GAIT: Narrow based and stable; 25ft timed walk 4.74 without assist.    Imaging    MRI brain w/wo:  Impression:     Several scattered punctate foci of T2 FLAIR signal hyperintensity throughout the supra infratentorial parenchyma which are primarily supratentorial.  While nonspecific in light of cord lesions concerning for areas of prior demyelination with pattern primarily concerning for multiple sclerosis and clinical correlation advised.     There is no evidence for acute infarction or intracranial enhancing lesion.      MRI Cervical and thoracic w/wo:  Impression:     Ill-defined short segment foci of T2 stir signal hyperintensity within the cervical and thoracic cord specifically at the level the tip of the odontoid, dorsal C3 level and T11/T12 level corresponding to abnormality seen on lumbar MRI.  While nonspecific concerning for possible prior areas of demyelination.  There is no evidence for cord expansion or enhancement to suggest active demyelination.  Clinical correlation and follow-up advised.     Please note there is slight ventral positioning of the thoracic cord at T3/T4 level with suggestion of possible underlying arachnoid web or cyst.     Mild scattered degenerative changes without significant central canal or neural foraminal stenosis.     No abnormal intrathecal enhancement throughout the cervical or thoracic canal.     Clinical correlation and follow-up  advised.       Assessment:    1. Multiple Sclerosis  2. Imbalance    Discussion:  Patient presents for evaluation of multiple sclerosis in setting of white matter lesions in brain and spinal cord. The patient reports three typical spinal cord demyelinating events in the past which is consistent with the three short segment non-enhancing T2/STIR hyperintense lesions in C/T spine. There are a few typical demyelinating lesions in the brain as well.  MS profile was negative. Mimics were negative except RF. However, given clinical events, physical exam findings, typical brain and spinal cord lesions, negative mimics and no better explanation, she meets criteria for diagnosis of multiple sclerosis. We discussed different treatment options and after shared decision making, decided to start Copaxone. Indication for treatment, side effects, risks, and potential adverse reactions were discussed with the patient and she understood. Will send to PT as she states she sometimes feels off balance. Repeat MRI Brain after six months of DMT therapy. Continue gabapentin for neuropathic pain.  Followup with Dr. Naidu in 3 months.       Glenn Cortez MD  Neuroimmunology/MS Fellow  [facility]    Problem List Items Addressed This Visit        Unprioritized    Multiple sclerosis - Primary    Relevant Orders    LUPUS ANTICOAGULANT (DRVVT) (Completed)    BETA-2 GLYCOPROTEIN ANTIBODIES (Completed)    Ambulatory referral/consult to Physical/Occupational Therapy      Other Visit Diagnoses     Imbalance        Counseling regarding goals of care        Prophylactic immunotherapy               Our visit today lasted 40 minutes, and 100% of this time was spent face to face with the patient. Over 50% of this visit included discussion of the treatment plan/medication changes/symptom management/exam findings/imaging results/coordination of care. The patient agrees with the plan of care. 25ft - 4.67 seconds    MS CENTER STAFF ATTESTATION  I  have personally seen and examined the patient along with MS Fellow Dr. Cortez, and agree with assessment and recommendations.      Mackenzie Allen MD  [facility]

## 2020-11-24 NOTE — TELEPHONE ENCOUNTER
----- Message from Moriah Prince sent at 11/24/2020  2:56 PM CST -----  Contact: self @ [phone]  Pt is calling as requesting with the Vitamin D3 2000 IU 50 mcg per day.

## 2020-11-27 RX ORDER — GLATIRAMER 40 MG/ML
40 INJECTION, SOLUTION SUBCUTANEOUS
Qty: 12 ML | Refills: 5 | Status: SHIPPED | OUTPATIENT
Start: 2020-11-27 | End: 2021-05-18

## 2020-11-28 ENCOUNTER — PATIENT MESSAGE (OUTPATIENT)
Dept: NEUROLOGY | Facility: CLINIC | Age: 55
End: 2020-11-28

## 2020-11-28 LAB
B2 GLYCOPROT1 IGA SER QL: <9 SAU
B2 GLYCOPROT1 IGG SER QL: <9 SGU
B2 GLYCOPROT1 IGM SER QL: <9 SMU

## 2020-11-30 ENCOUNTER — CLINICAL SUPPORT (OUTPATIENT)
Dept: REHABILITATION | Facility: HOSPITAL | Age: 55
End: 2020-11-30
Attending: PSYCHIATRY & NEUROLOGY
Payer: COMMERCIAL

## 2020-11-30 DIAGNOSIS — G35 MULTIPLE SCLEROSIS: ICD-10-CM

## 2020-11-30 DIAGNOSIS — R26.89 IMPAIRMENT OF BALANCE: ICD-10-CM

## 2020-11-30 PROCEDURE — 97161 PT EVAL LOW COMPLEX 20 MIN: CPT | Mod: PO

## 2020-11-30 NOTE — PLAN OF CARE
[facility]  Physical Therapy Neurological Rehabilitation Initial Evaluation and Discharge    Name: Miesha Domínguez  Clinic Number: [id number]    Therapy Diagnosis:   Encounter Diagnoses   Name Primary?    Multiple sclerosis     Impairment of balance      Physician: Glenn Cortez MD    Physician Orders: PT Eval and Treat   Medical Diagnosis from Referral: multiple sclerosis  Evaluation Date: 2020  Authorization Period Expiration: 20  Plan of Care Expiration: Evaluation only  Visit # / Visits authorized:     Time In: 1027  Time Out: 1106  Total Billable Time: 39 minutes~ pt arrived 12 minutes late due to going to wrong clinic    Precautions: Standard and Diabetes    Subjective   Date of onset: summer of 2020  History of current condition - Miesha reports: Pt noticed some stiffness in her R ankle and occasionally may feel off balance when she first sits up in the morning. Pt also endorses some muscle tightness to L upper arm ( triceps). Pt still works, drives and is not having any problems with her daily routine.     Medical History:   Past Medical History:   Diagnosis Date    Asthma     as a child    Endometrial polyp 2019    HLD (hyperlipidemia)     HTN (hypertension)     Neuropathy     S/P D&C (status post dilation and curettage) 2019    Type II or unspecified type diabetes mellitus without mention of complication, not stated as uncontrolled        Surgical History:   Miesha Domínguez  has a past surgical history that includes  section (); Sleeve Gastroplasty (); Colonoscopy (N/A, 2018); and Hysteroscopy with dilation and curettage of uterus (N/A, 2019).    Medications:   Miesha has a current medication list which includes the following prescription(s): amlodipine, cholecalciferol (vitamin d3), cyanocobalamin (vitamin b-12), gabapentin, glatiramer, hydrochlorothiazide, ibuprofen, losartan, metformin, metformin,  "multivitamin, phentermine, potassium, rosuvastatin, and ozempic.    Allergies:   Review of patient's allergies indicates:  No Known Allergies     Imaging, MRI studies: brain    Impression:     Several scattered punctate foci of T2 FLAIR signal hyperintensity throughout the supra infratentorial parenchyma which are primarily supratentorial.  While nonspecific in light of cord lesions concerning for areas of prior demyelination with pattern primarily concerning for multiple sclerosis and clinical correlation advised.     There is no evidence for acute infarction or intracranial enhancing lesion.    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, 5 hour days  Prior Level of Function: independent  Current Level of Function: independent    Pain:  Current 0/10, worst 0/10, best 0/10   Location: N/A       Pts goals: " to get more physically active."    Objective     Observation: pleasant and cooperative   Speech: clear    Mental status: alert, oriented to person, place, and time  Appearance: Casually dressed and Well groomed  Behavior:  calm, cooperative and adequate rapport can be established  Attention Span and Concentration:  Normal    Posture Alignment :slightly rounded shoulders, increased thoracic kyphosis in sitting    Dominant hand:  right     Skin integrity:  Intact    Visual/Auditory: denies changes   Tracking:NT  Saccades: NT  Acuity:NT~ wears reading glasses  R/L discrimination: NT  Visual field: NT    ROM:   GROSS AROM/PROM  UPPER EXTREMITY  (R) UE: WFL  (L) UE: WFL  LOWER EXTREMITY  (R) LE: WFL~ not tested formally in supine, but observed during " "functional movement and muscle testing  (L) LE: WFL~ not tested formally in supine, but observed during functional movement and muscle testing         Lower Extremity Strength  Right LE  Left LE    Hip flexion:  5/5 Hip flexion: 5/5   Knee extension: 5/5 Knee extension: 5/5   Knee flexion: 5/5 Knee flexion: 5/5   Ankle dorsiflexion:  5/5 Ankle dorsiflexion: 5/5   Ankle plantarflexion:  5/5 Ankle plantarflexion: 5/5   Hip abduction: 5/5 Hip abduction: 5/5   Hip adduction: 5/5 Hip adduction 5/5   Hip extension: 5/5 Hip extension: 5/5     Upper extremity strength:     Right Left   Shoulder Flexion: 5/5 5/5   Shoulder Abduction: 5/5 5/5   Shoulder Extension: 5/5 5/5   Shoulder ER: 5/5 5/5   Shoulder IR: 5/5 5/5   Elbow Flexion: 5/5 5/5   Elbow Extension: 5/5 5/5   Wrist Extension: 5/5 5/5   Wrist Flexion: 5/5 5/5    5/5 5/5     Coordination:   Rapid Alternating Movements: mildly limited B  Point to Point:    -Finger to Nose: WNL   -Heel to Shin: WFL    Sensation: intact  Proprioception: NT    Tone/Spasticity: none    Functional Mobility (Bed mobility, transfers)  Bed mobility: I  Supine to sit: I  Sit to supine: I  Rolling: I  Transfers to bed: I  Transfers to toilet: I  Sit to stand:  I  Stand pivot:  I  Car transfers: I  Wheelchair mobility: N/A  Floor transfers: NT        Evaluation   Single Limb Stance R LE 9 seconds  (<10 sec = HIGH FALL RISK)   Single Limb Stance L LE 8 seconds  (<10 sec = HIGH FALL RISK)   30 second Chair Rise 15 completed with arms   5 times sit to stand 10 seconds   TUG NT   Self selected walking speed 1.5 m/sec (6m/4s)   Fast walking speed 2.0 m/sec (6m/3s)       Postural control:  KINSEY SENSORY TESTING:  (P= Pass, F= Fail; note any sway; hold each position for 30")  Condition 1: (firm surface/feet together/eyes open) 30", P  Condition 2: (firm surface/feet together/eyes closed) 30", P  Condition 3: (firm surface/feet in tandem/eyes open) 13", F  Condition 4: (firm surface/feet in " "tandem/eyes closed) 6", F  Condition 5: (soft surface/feet together/eyes open) 30", P  Condition 6: (soft surface/feet together/eyes closed) 30", P, mild sway  Condition 7: (Fakuda step test), measure distance varied from center starting position NT    Gait Assessment:   - AD used: none  - Assistance: independent  - Stairs: independent    · GAIT DEVIATIONS:   Miesha displays the following deviations with ambulation: no major gait deviations noted, though B feet are positioned in some degree of ER   Impairments contributing to deviations: N/A      Functional Gait Assessment:   1. Gait on level surface =  3   (3) Normal: less than 5.5 sec, no A.D., no imbalance, normal gait pattern, deviates< 6in   (2) Mild impairment: 7-5.6 sec, uses A.D., mild gait deviations, or deviates 6-10 in   (1) Moderate impairment: > 7 sec, slow speed, imbalance, deviates 10-15 in.   (0) Severe impairment: needs assist, deviates >15 in, reach/touch wall  2. Change in Gait Speed = 3   (3) Normal: smooth change w/o loss of balance or gait deviation, deviates < 6 in, significant difference between speeds   (2) Mild impairment: changes speed, but demonstrates mild gait deviations, deviates 6-10 in, OR no deviations but unable to significantly speed, OR uses A.D.   (1) Moderate impairment: minor changes to speed, OR changes speed w/ significant deviations, deviates 10-15 in, OR  Changes speed , but loses balance & recovers   (0) Severe impairment: cannot change speed, deviates >15 in, or loses balance & needs assist  3. Gait with horizontal head turns  = 3   (3) Normal: no change in gait, deviates <6 in   (2) Mild impairment: slight change in speed, deviates 6-10 in, OR uses A.D.   (1) Moderate impairment: moderate change in speed, deviates 10-15 in   (0) Severe impairment: severe disruption of gait, deviates >15in  4. Gait with vertical head turns = 3   (3) Normal: no change in gait, deviates <6 in   (2) Mild impairment: slight change in speed, " deviates 6-10 in OR uses A.D.   (1) Moderate impairment: moderate change in speed, deviates 10-15 in   (0) Severe impairment: severe disruption of gait, deviates >15 in  5. Gait with pivot turns = 3   (3) Normal: performs safely in 3 sec, no LOB   (2) Mild impairment: performs in >3 sec & no LOB, OR turns safely & requires several steps to regain LOB   (1) Moderate impairment: turns slow, OR requires several small steps for balance following turn & stop   (0) Severe impairment: cannot turn safely, needs assist  6. Step over obstacle = 3   (3) Normal: steps over 2 stacked boxes w/o change in speed or LOB   (2) Mild impairment: able to step over 1 box w/o change in speed or LOB   (1) Moderate impairment: steps over 1 box but must slow down, may require VC   (0) Severe impairment: cannot perform w/o assist  7. Gait with Narrow MARCEL = 2   (3) Normal: 10 steps no staggering   (2) Mild impairment: 7-9 steps   (1) Moderate impairment: 4-7 steps   (0) Severe impairment: < 4 steps or cannot perform w/o assist  8. Gait with eyes closed = 3   (3) Normal: < 7 sec, no A.D., no LOB, normal gait pattern, deviates <6 in   (2) Mild impairment: 7.1-9 sec, mild gait deviations, deviates 6-10 in   (1) Moderate impairment: > 9 sec, abnormal pattern, LOB, deviates 10-15 in   (0) Severe impairment: cannot perform w/o assist, LOB, deviates >15in  9. Ambulating Backwards = 3   (3) Normal: no A.D., no LOB, normal gait pattern, deviates <6in   (2) Mild impairment: uses A.D., slower speed, mild gait deviations, deviates 6-10 in   (1) Moderate impairment: slow speed, abnormal gait pattern, LOB, deviates 10-15 in   (0) Severe impairment: severe gait deviations or LOB, deviates >15in  10. Steps = 3   (3) Normal: alternating feet, no rail   (2) Mild Impairment: alternating feet, uses rail   (1) Moderate impairment: step-to, uses rail   (0) Severe impairment: cannot perform safely    Score 29/30     Score:   <22/30 fall risk   <20/30 fall risk in  older adults   <18/30 fall risk in Parkinsons       Endurance Deficit: none     PT Evaluation Completed? Yes        CMS Impairment/Limitation/Restriction for FOTO Multiple Sclerosis Survey    Therapist reviewed FOTO scores for Miesha Domínguez on 11/30/2020.   FOTO documents entered into EPIC - see Media section.    Limitation Score: 3%  Category: Mobility         TREATMENT   No treatment provided; evaluation only.    Home Exercises and Patient Education Provided    Education provided:   - Pt educated regarding evaluation findings, potential plan of care if pt would schedule to see PT( PT suggested only 4 visits, at 1 x/week frequency). PT indicated that pt's current functional status is quite high compared to most patients currently receiving treatment in the neuro program. After a brief discussion, PT and pt decided jointly that pt will not schedule any therapy visits for now. However, when she returns to see Dr. Naidu in February, PT indicated that should pt feel the need to return, she can request a new therapy order. PT verbalized understanding and agreed to this plan of action.  PT also provided the below balance exercises for pt to work on at home. PT also recommended pt continue to walk on her treadmill to tolerance.    Written Home Exercises Provided: yes. SLS and tandem stance, both with UE support as needed for safety.    Assessment   Miesha is a 54 y.o. female referred to outpatient Physical Therapy with a medical diagnosis of multiple sclerosis. Pt presents to PT with the following impairments leading to his/her functional decline: mild balance impairment. Pt is currently independent with all aspects of mobility and is not limited in her ability to drive or work at this time. Her B UE and LE MMT reveals 5/5 grades for all muscles tested. Her FGA score of 29/30 is nearly perfect. Pt does demonstrate mild balance impairment with performance of tandem stance( eyes open and closed) and SLS. PT offered 4  visits at 1 x week frequency, but pt ultimately decided that she might possibly return next year after she sees Dr. Naidu in February. PT provided simple exercise handout to address the above balance deficits. Due to pt's high functional capacity at this time( FOTO self- limitation score only 3%), no additional PT intervention is recommended at this time.    Pt prognosis is Excellent.   Pt will benefit from skilled outpatient Physical Therapy to address the deficits stated above and in the chart below, provide pt/family education, and to maximize pt's level of independence.     Plan of care discussed with patient: Yes  Pt's spiritual, cultural and educational needs considered and patient is agreeable to the plan of care and goals as stated below:     Anticipated Barriers for therapy: none    Medical Necessity is demonstrated by the following  History  Co-morbidities and personal factors that may impact the plan of care Co-morbidities:   diabetes, HTN and prior abdominal surgery    Personal Factors:   no deficits     high   Examination  Body Structures and Functions, activity limitations and participation restrictions that may impact the plan of care Body Regions:   lower extremities    Body Systems:    balance    Participation Restrictions:   none    Activity limitations:   Learning and applying knowledge  no deficits    General Tasks and Commands  no deficits    Communication  no deficits    Mobility  no deficits    Self care  no deficits    Domestic Life  no deficits    Interactions/Relationships  no deficits    Life Areas  no deficits    Community and Social Life  no deficits         low   Clinical Presentation stable and uncomplicated low   Decision Making/ Complexity Score: low     Goals:  None written at this time as PT and pt decide that she may return next year if she feels that she is declining functionally in any way.    Plan   Pt discharged after initial evaluation and receiving 2 simple balance exercises  to practice at home.      Nestor Borjas, PT   11/30/2020

## 2020-12-01 LAB — LA PPP-IMP: NEGATIVE

## 2020-12-03 ENCOUNTER — SPECIALTY PHARMACY (OUTPATIENT)
Dept: PHARMACY | Facility: CLINIC | Age: 55
End: 2020-12-03

## 2020-12-21 ENCOUNTER — PATIENT MESSAGE (OUTPATIENT)
Dept: PHARMACY | Facility: CLINIC | Age: 55
End: 2020-12-21

## 2020-12-21 NOTE — TELEPHONE ENCOUNTER
FOR DOCUMENTATION ONLY:  [Patient gave permission to apply for Glatopa copay card]  Financial Assistance for Glatopa approved with a Co-Pay Card from 12/21/20 - 12/21/21    ID: [id number]  BIN: [id number]  GRP: [id number]    Max Amount: $0 Co-Pay per fill with a cap of $9,000/yr in assistance.

## 2020-12-24 NOTE — TELEPHONE ENCOUNTER
[employer] form faxed to [facility] to sign, so patient may receive injection device.  Patient required to fill with [facility]; Rx Routed and patient was informed.

## 2020-12-27 RX ORDER — AMLODIPINE BESYLATE 5 MG/1
TABLET ORAL
Qty: 90 TABLET | Refills: 3 | Status: SHIPPED | OUTPATIENT
Start: 2020-12-27 | End: 2021-02-25 | Stop reason: ALTCHOICE

## 2021-01-04 ENCOUNTER — PATIENT MESSAGE (OUTPATIENT)
Dept: INTERNAL MEDICINE | Facility: CLINIC | Age: 56
End: 2021-01-04

## 2021-01-04 ENCOUNTER — PATIENT MESSAGE (OUTPATIENT)
Dept: ADMINISTRATIVE | Facility: HOSPITAL | Age: 56
End: 2021-01-04

## 2021-01-06 ENCOUNTER — PATIENT OUTREACH (OUTPATIENT)
Dept: ADMINISTRATIVE | Facility: HOSPITAL | Age: 56
End: 2021-01-06

## 2021-01-14 LAB
LEFT EYE DM RETINOPATHY: NEGATIVE
RIGHT EYE DM RETINOPATHY: NEGATIVE

## 2021-01-20 ENCOUNTER — PATIENT OUTREACH (OUTPATIENT)
Dept: ADMINISTRATIVE | Facility: HOSPITAL | Age: 56
End: 2021-01-20

## 2021-01-27 RX ORDER — LOSARTAN POTASSIUM 100 MG/1
TABLET ORAL
Qty: 90 TABLET | Refills: 3 | Status: SHIPPED | OUTPATIENT
Start: 2021-01-27 | End: 2022-02-08 | Stop reason: SDUPTHER

## 2021-02-05 ENCOUNTER — PATIENT MESSAGE (OUTPATIENT)
Dept: NEUROLOGY | Facility: CLINIC | Age: 56
End: 2021-02-05

## 2021-02-11 ENCOUNTER — TELEPHONE (OUTPATIENT)
Dept: BARIATRICS | Facility: CLINIC | Age: 56
End: 2021-02-11

## 2021-02-11 DIAGNOSIS — E11.9 CONTROLLED TYPE 2 DIABETES MELLITUS WITHOUT COMPLICATION, WITHOUT LONG-TERM CURRENT USE OF INSULIN: ICD-10-CM

## 2021-02-11 RX ORDER — SEMAGLUTIDE 1.34 MG/ML
INJECTION, SOLUTION SUBCUTANEOUS
Qty: 9 ML | Refills: 0 | Status: SHIPPED | OUTPATIENT
Start: 2021-02-11 | End: 2021-02-25 | Stop reason: SDUPTHER

## 2021-02-12 ENCOUNTER — PATIENT MESSAGE (OUTPATIENT)
Dept: BARIATRICS | Facility: CLINIC | Age: 56
End: 2021-02-12

## 2021-02-23 ENCOUNTER — TELEPHONE (OUTPATIENT)
Dept: NEUROLOGY | Facility: CLINIC | Age: 56
End: 2021-02-23

## 2021-02-23 NOTE — TELEPHONE ENCOUNTER
Returned call, pt scheduled for her COVID vaccine tomorrow. Pt's next dose will be on 3/17. Informed pt okay to skip 3/17 glatiramer dose and resume on 3/19. Pt v/u.

## 2021-02-23 NOTE — TELEPHONE ENCOUNTER
----- Message from Winsome Jeffery sent at 2/23/2021 10:38 AM CST -----  Regarding: pt  Pt is calling to speak with the nurse in ref to getting her covid vaccine pt has an appt for tomorrow can you please call pt at [phone] or cell phone [phone]    LEENA

## 2021-02-24 ENCOUNTER — IMMUNIZATION (OUTPATIENT)
Dept: OBSTETRICS AND GYNECOLOGY | Facility: CLINIC | Age: 56
End: 2021-02-24
Payer: COMMERCIAL

## 2021-02-24 DIAGNOSIS — Z23 NEED FOR VACCINATION: Primary | ICD-10-CM

## 2021-02-24 PROCEDURE — 91300 COVID-19, MRNA, LNP-S, PF, 30 MCG/0.3 ML DOSE VACCINE: CPT | Mod: PBBFAC | Performed by: FAMILY MEDICINE

## 2021-02-25 ENCOUNTER — OFFICE VISIT (OUTPATIENT)
Dept: NEUROLOGY | Facility: CLINIC | Age: 56
End: 2021-02-25
Payer: COMMERCIAL

## 2021-02-25 ENCOUNTER — TELEPHONE (OUTPATIENT)
Dept: PSYCHIATRY | Facility: CLINIC | Age: 56
End: 2021-02-25

## 2021-02-25 ENCOUNTER — TELEPHONE (OUTPATIENT)
Dept: PHARMACY | Facility: CLINIC | Age: 56
End: 2021-02-25

## 2021-02-25 ENCOUNTER — OFFICE VISIT (OUTPATIENT)
Dept: BARIATRICS | Facility: CLINIC | Age: 56
End: 2021-02-25
Payer: COMMERCIAL

## 2021-02-25 VITALS
SYSTOLIC BLOOD PRESSURE: 122 MMHG | HEART RATE: 82 BPM | WEIGHT: 216.25 LBS | HEIGHT: 66 IN | BODY MASS INDEX: 34.75 KG/M2 | OXYGEN SATURATION: 96 % | DIASTOLIC BLOOD PRESSURE: 78 MMHG

## 2021-02-25 VITALS
HEART RATE: 70 BPM | DIASTOLIC BLOOD PRESSURE: 89 MMHG | TEMPERATURE: 98 F | HEIGHT: 66 IN | WEIGHT: 215.94 LBS | BODY MASS INDEX: 34.7 KG/M2 | SYSTOLIC BLOOD PRESSURE: 131 MMHG

## 2021-02-25 DIAGNOSIS — E11.9 CONTROLLED TYPE 2 DIABETES MELLITUS WITHOUT COMPLICATION, WITHOUT LONG-TERM CURRENT USE OF INSULIN: ICD-10-CM

## 2021-02-25 DIAGNOSIS — G35 MULTIPLE SCLEROSIS: ICD-10-CM

## 2021-02-25 DIAGNOSIS — E66.9 OBESITY, CLASS I, BMI 30.0-34.9 (SEE ACTUAL BMI): ICD-10-CM

## 2021-02-25 PROCEDURE — 3075F PR MOST RECENT SYSTOLIC BLOOD PRESS GE 130-139MM HG: ICD-10-PCS | Mod: CPTII,S$GLB,, | Performed by: PSYCHIATRY & NEUROLOGY

## 2021-02-25 PROCEDURE — 99215 OFFICE O/P EST HI 40 MIN: CPT | Mod: S$GLB,,, | Performed by: PSYCHIATRY & NEUROLOGY

## 2021-02-25 PROCEDURE — 99999 PR PBB SHADOW E&M-EST. PATIENT-LVL IV: ICD-10-PCS | Mod: PBBFAC,,, | Performed by: INTERNAL MEDICINE

## 2021-02-25 PROCEDURE — 1125F PR PAIN SEVERITY QUANTIFIED, PAIN PRESENT: ICD-10-PCS | Mod: S$GLB,,, | Performed by: PSYCHIATRY & NEUROLOGY

## 2021-02-25 PROCEDURE — 3079F DIAST BP 80-89 MM HG: CPT | Mod: CPTII,S$GLB,, | Performed by: PSYCHIATRY & NEUROLOGY

## 2021-02-25 PROCEDURE — 3008F PR BODY MASS INDEX (BMI) DOCUMENTED: ICD-10-PCS | Mod: CPTII,S$GLB,, | Performed by: INTERNAL MEDICINE

## 2021-02-25 PROCEDURE — 3074F PR MOST RECENT SYSTOLIC BLOOD PRESSURE < 130 MM HG: ICD-10-PCS | Mod: CPTII,S$GLB,, | Performed by: INTERNAL MEDICINE

## 2021-02-25 PROCEDURE — 3044F PR MOST RECENT HEMOGLOBIN A1C LEVEL <7.0%: ICD-10-PCS | Mod: CPTII,S$GLB,, | Performed by: INTERNAL MEDICINE

## 2021-02-25 PROCEDURE — 1125F AMNT PAIN NOTED PAIN PRSNT: CPT | Mod: S$GLB,,, | Performed by: PSYCHIATRY & NEUROLOGY

## 2021-02-25 PROCEDURE — 3008F BODY MASS INDEX DOCD: CPT | Mod: CPTII,S$GLB,, | Performed by: PSYCHIATRY & NEUROLOGY

## 2021-02-25 PROCEDURE — 99999 PR PBB SHADOW E&M-EST. PATIENT-LVL IV: CPT | Mod: PBBFAC,,, | Performed by: INTERNAL MEDICINE

## 2021-02-25 PROCEDURE — 99214 PR OFFICE/OUTPT VISIT, EST, LEVL IV, 30-39 MIN: ICD-10-PCS | Mod: S$GLB,,, | Performed by: INTERNAL MEDICINE

## 2021-02-25 PROCEDURE — 99999 PR PBB SHADOW E&M-EST. PATIENT-LVL IV: CPT | Mod: PBBFAC,,, | Performed by: PSYCHIATRY & NEUROLOGY

## 2021-02-25 PROCEDURE — 3075F SYST BP GE 130 - 139MM HG: CPT | Mod: CPTII,S$GLB,, | Performed by: PSYCHIATRY & NEUROLOGY

## 2021-02-25 PROCEDURE — 3079F PR MOST RECENT DIASTOLIC BLOOD PRESSURE 80-89 MM HG: ICD-10-PCS | Mod: CPTII,S$GLB,, | Performed by: PSYCHIATRY & NEUROLOGY

## 2021-02-25 PROCEDURE — 3008F BODY MASS INDEX DOCD: CPT | Mod: CPTII,S$GLB,, | Performed by: INTERNAL MEDICINE

## 2021-02-25 PROCEDURE — 99215 PR OFFICE/OUTPT VISIT, EST, LEVL V, 40-54 MIN: ICD-10-PCS | Mod: S$GLB,,, | Performed by: PSYCHIATRY & NEUROLOGY

## 2021-02-25 PROCEDURE — 1126F AMNT PAIN NOTED NONE PRSNT: CPT | Mod: S$GLB,,, | Performed by: INTERNAL MEDICINE

## 2021-02-25 PROCEDURE — 3008F PR BODY MASS INDEX (BMI) DOCUMENTED: ICD-10-PCS | Mod: CPTII,S$GLB,, | Performed by: PSYCHIATRY & NEUROLOGY

## 2021-02-25 PROCEDURE — 3044F HG A1C LEVEL LT 7.0%: CPT | Mod: CPTII,S$GLB,, | Performed by: INTERNAL MEDICINE

## 2021-02-25 PROCEDURE — 99999 PR PBB SHADOW E&M-EST. PATIENT-LVL IV: ICD-10-PCS | Mod: PBBFAC,,, | Performed by: PSYCHIATRY & NEUROLOGY

## 2021-02-25 PROCEDURE — 99214 OFFICE O/P EST MOD 30 MIN: CPT | Mod: S$GLB,,, | Performed by: INTERNAL MEDICINE

## 2021-02-25 PROCEDURE — 3074F SYST BP LT 130 MM HG: CPT | Mod: CPTII,S$GLB,, | Performed by: INTERNAL MEDICINE

## 2021-02-25 PROCEDURE — 1126F PR PAIN SEVERITY QUANTIFIED, NO PAIN PRESENT: ICD-10-PCS | Mod: S$GLB,,, | Performed by: INTERNAL MEDICINE

## 2021-02-25 PROCEDURE — 3078F PR MOST RECENT DIASTOLIC BLOOD PRESSURE < 80 MM HG: ICD-10-PCS | Mod: CPTII,S$GLB,, | Performed by: INTERNAL MEDICINE

## 2021-02-25 PROCEDURE — 3078F DIAST BP <80 MM HG: CPT | Mod: CPTII,S$GLB,, | Performed by: INTERNAL MEDICINE

## 2021-02-25 RX ORDER — PHENTERMINE HYDROCHLORIDE 37.5 MG/1
TABLET ORAL
Qty: 30 TABLET | Refills: 1 | Status: SHIPPED | OUTPATIENT
Start: 2021-02-25 | End: 2021-05-20 | Stop reason: SDUPTHER

## 2021-02-25 RX ORDER — PREGABALIN 100 MG/1
100 CAPSULE ORAL 2 TIMES DAILY
Qty: 60 CAPSULE | Refills: 6 | Status: SHIPPED | OUTPATIENT
Start: 2021-02-25 | End: 2021-08-30 | Stop reason: SDUPTHER

## 2021-02-25 RX ORDER — SEMAGLUTIDE 1.34 MG/ML
INJECTION, SOLUTION SUBCUTANEOUS
Qty: 9 ML | Refills: 0 | Status: SHIPPED | OUTPATIENT
Start: 2021-02-25 | End: 2021-05-12

## 2021-02-25 RX ORDER — SEMAGLUTIDE 1.34 MG/ML
INJECTION, SOLUTION SUBCUTANEOUS
Qty: 9 ML | Refills: 0 | Status: SHIPPED | OUTPATIENT
Start: 2021-02-25 | End: 2021-02-25

## 2021-02-26 DIAGNOSIS — G47.62 NOCTURNAL LEG CRAMPS: ICD-10-CM

## 2021-02-26 RX ORDER — GABAPENTIN 300 MG/1
CAPSULE ORAL
Qty: 360 CAPSULE | Refills: 1 | OUTPATIENT
Start: 2021-02-26

## 2021-03-03 DIAGNOSIS — E11.9 TYPE 2 DIABETES MELLITUS WITHOUT COMPLICATION: ICD-10-CM

## 2021-03-05 ENCOUNTER — OFFICE VISIT (OUTPATIENT)
Dept: INTERNAL MEDICINE | Facility: CLINIC | Age: 56
End: 2021-03-05
Payer: COMMERCIAL

## 2021-03-05 ENCOUNTER — LAB VISIT (OUTPATIENT)
Dept: LAB | Facility: HOSPITAL | Age: 56
End: 2021-03-05
Attending: INTERNAL MEDICINE
Payer: COMMERCIAL

## 2021-03-05 ENCOUNTER — TELEPHONE (OUTPATIENT)
Dept: INTERNAL MEDICINE | Facility: CLINIC | Age: 56
End: 2021-03-05

## 2021-03-05 VITALS
TEMPERATURE: 98 F | OXYGEN SATURATION: 99 % | HEIGHT: 66 IN | BODY MASS INDEX: 35.08 KG/M2 | DIASTOLIC BLOOD PRESSURE: 80 MMHG | HEART RATE: 68 BPM | WEIGHT: 218.25 LBS | SYSTOLIC BLOOD PRESSURE: 120 MMHG

## 2021-03-05 DIAGNOSIS — E78.2 MIXED HYPERLIPIDEMIA: ICD-10-CM

## 2021-03-05 DIAGNOSIS — E11.40 CONTROLLED TYPE 2 DIABETES MELLITUS WITH DIABETIC NEUROPATHY, WITHOUT LONG-TERM CURRENT USE OF INSULIN: ICD-10-CM

## 2021-03-05 DIAGNOSIS — I10 ESSENTIAL HYPERTENSION: ICD-10-CM

## 2021-03-05 DIAGNOSIS — E11.40 CONTROLLED TYPE 2 DIABETES MELLITUS WITH DIABETIC NEUROPATHY, WITHOUT LONG-TERM CURRENT USE OF INSULIN: Primary | ICD-10-CM

## 2021-03-05 DIAGNOSIS — Z00.00 PREVENTATIVE HEALTH CARE: Primary | ICD-10-CM

## 2021-03-05 LAB
ALBUMIN SERPL BCP-MCNC: 3.6 G/DL (ref 3.5–5.2)
ALP SERPL-CCNC: 82 U/L (ref 55–135)
ALT SERPL W/O P-5'-P-CCNC: 29 U/L (ref 10–44)
ANION GAP SERPL CALC-SCNC: 10 MMOL/L (ref 8–16)
AST SERPL-CCNC: 30 U/L (ref 10–40)
BILIRUB SERPL-MCNC: 0.4 MG/DL (ref 0.1–1)
BUN SERPL-MCNC: 15 MG/DL (ref 6–20)
CALCIUM SERPL-MCNC: 9.2 MG/DL (ref 8.7–10.5)
CHLORIDE SERPL-SCNC: 108 MMOL/L (ref 95–110)
CO2 SERPL-SCNC: 26 MMOL/L (ref 23–29)
CREAT SERPL-MCNC: 0.7 MG/DL (ref 0.5–1.4)
EST. GFR  (AFRICAN AMERICAN): >60 ML/MIN/1.73 M^2
EST. GFR  (NON AFRICAN AMERICAN): >60 ML/MIN/1.73 M^2
GLUCOSE SERPL-MCNC: 71 MG/DL (ref 70–110)
POTASSIUM SERPL-SCNC: 3.7 MMOL/L (ref 3.5–5.1)
PROT SERPL-MCNC: 7.6 G/DL (ref 6–8.4)
SODIUM SERPL-SCNC: 144 MMOL/L (ref 136–145)

## 2021-03-05 PROCEDURE — 3008F PR BODY MASS INDEX (BMI) DOCUMENTED: ICD-10-PCS | Mod: CPTII,S$GLB,, | Performed by: INTERNAL MEDICINE

## 2021-03-05 PROCEDURE — 83036 HEMOGLOBIN GLYCOSYLATED A1C: CPT | Performed by: INTERNAL MEDICINE

## 2021-03-05 PROCEDURE — 3079F PR MOST RECENT DIASTOLIC BLOOD PRESSURE 80-89 MM HG: ICD-10-PCS | Mod: CPTII,S$GLB,, | Performed by: INTERNAL MEDICINE

## 2021-03-05 PROCEDURE — 99214 OFFICE O/P EST MOD 30 MIN: CPT | Mod: S$GLB,,, | Performed by: INTERNAL MEDICINE

## 2021-03-05 PROCEDURE — 99999 PR PBB SHADOW E&M-EST. PATIENT-LVL IV: CPT | Mod: PBBFAC,,, | Performed by: INTERNAL MEDICINE

## 2021-03-05 PROCEDURE — 3074F PR MOST RECENT SYSTOLIC BLOOD PRESSURE < 130 MM HG: ICD-10-PCS | Mod: CPTII,S$GLB,, | Performed by: INTERNAL MEDICINE

## 2021-03-05 PROCEDURE — 3044F HG A1C LEVEL LT 7.0%: CPT | Mod: CPTII,S$GLB,, | Performed by: INTERNAL MEDICINE

## 2021-03-05 PROCEDURE — 99999 PR PBB SHADOW E&M-EST. PATIENT-LVL IV: ICD-10-PCS | Mod: PBBFAC,,, | Performed by: INTERNAL MEDICINE

## 2021-03-05 PROCEDURE — 3074F SYST BP LT 130 MM HG: CPT | Mod: CPTII,S$GLB,, | Performed by: INTERNAL MEDICINE

## 2021-03-05 PROCEDURE — 99214 PR OFFICE/OUTPT VISIT, EST, LEVL IV, 30-39 MIN: ICD-10-PCS | Mod: S$GLB,,, | Performed by: INTERNAL MEDICINE

## 2021-03-05 PROCEDURE — 3044F PR MOST RECENT HEMOGLOBIN A1C LEVEL <7.0%: ICD-10-PCS | Mod: CPTII,S$GLB,, | Performed by: INTERNAL MEDICINE

## 2021-03-05 PROCEDURE — 36415 COLL VENOUS BLD VENIPUNCTURE: CPT | Mod: PO | Performed by: INTERNAL MEDICINE

## 2021-03-05 PROCEDURE — 80053 COMPREHEN METABOLIC PANEL: CPT | Performed by: INTERNAL MEDICINE

## 2021-03-05 PROCEDURE — 3008F BODY MASS INDEX DOCD: CPT | Mod: CPTII,S$GLB,, | Performed by: INTERNAL MEDICINE

## 2021-03-05 PROCEDURE — 3079F DIAST BP 80-89 MM HG: CPT | Mod: CPTII,S$GLB,, | Performed by: INTERNAL MEDICINE

## 2021-03-05 PROCEDURE — 1126F AMNT PAIN NOTED NONE PRSNT: CPT | Mod: S$GLB,,, | Performed by: INTERNAL MEDICINE

## 2021-03-05 PROCEDURE — 1126F PR PAIN SEVERITY QUANTIFIED, NO PAIN PRESENT: ICD-10-PCS | Mod: S$GLB,,, | Performed by: INTERNAL MEDICINE

## 2021-03-05 RX ORDER — HYDROCHLOROTHIAZIDE 25 MG/1
25 TABLET ORAL DAILY PRN
Qty: 90 TABLET | Refills: 3
Start: 2021-03-05 | End: 2021-03-05 | Stop reason: SDUPTHER

## 2021-03-05 RX ORDER — HYDROCHLOROTHIAZIDE 25 MG/1
25 TABLET ORAL DAILY
Qty: 90 TABLET | Refills: 3
Start: 2021-03-05 | End: 2021-03-05

## 2021-03-05 RX ORDER — HYDROCHLOROTHIAZIDE 25 MG/1
25 TABLET ORAL DAILY PRN
Qty: 90 TABLET | Refills: 3
Start: 2021-03-05 | End: 2021-09-24

## 2021-03-06 ENCOUNTER — HOSPITAL ENCOUNTER (OUTPATIENT)
Dept: RADIOLOGY | Facility: HOSPITAL | Age: 56
Discharge: HOME | End: 2021-03-06
Attending: PSYCHIATRY & NEUROLOGY
Payer: COMMERCIAL

## 2021-03-06 DIAGNOSIS — G35 MULTIPLE SCLEROSIS: ICD-10-CM

## 2021-03-06 LAB
ESTIMATED AVG GLUCOSE: 105 MG/DL (ref 68–131)
HBA1C MFR BLD: 5.3 % (ref 4–5.6)

## 2021-03-06 PROCEDURE — 70553 MRI BRAIN DEMYELINATING W/ WO CONTRAST: ICD-10-PCS | Mod: 26,,, | Performed by: RADIOLOGY

## 2021-03-06 PROCEDURE — A9585 GADOBUTROL INJECTION: HCPCS | Performed by: PSYCHIATRY & NEUROLOGY

## 2021-03-06 PROCEDURE — 72157 MRI CHEST SPINE W/O & W/DYE: CPT | Mod: 26,,, | Performed by: RADIOLOGY

## 2021-03-06 PROCEDURE — 72156 MRI NECK SPINE W/O & W/DYE: CPT | Mod: TC

## 2021-03-06 PROCEDURE — 72157 MRI THORACIC SPINE DEMYELINATING W W/O CONTRAST: ICD-10-PCS | Mod: 26,,, | Performed by: RADIOLOGY

## 2021-03-06 PROCEDURE — 72156 MRI CERVICAL SPINE DEMYELINATING W W/O CONTRAST: ICD-10-PCS | Mod: 26,,, | Performed by: RADIOLOGY

## 2021-03-06 PROCEDURE — 70553 MRI BRAIN STEM W/O & W/DYE: CPT | Mod: 26,,, | Performed by: RADIOLOGY

## 2021-03-06 PROCEDURE — 72157 MRI CHEST SPINE W/O & W/DYE: CPT | Mod: TC

## 2021-03-06 PROCEDURE — 25500020 PHARM REV CODE 255: Performed by: PSYCHIATRY & NEUROLOGY

## 2021-03-06 PROCEDURE — 70553 MRI BRAIN STEM W/O & W/DYE: CPT | Mod: TC

## 2021-03-06 PROCEDURE — 72156 MRI NECK SPINE W/O & W/DYE: CPT | Mod: 26,,, | Performed by: RADIOLOGY

## 2021-03-06 RX ORDER — GADOBUTROL 604.72 MG/ML
10 INJECTION INTRAVENOUS
Status: COMPLETED | OUTPATIENT
Start: 2021-03-06 | End: 2021-03-06

## 2021-03-06 RX ADMIN — GADOBUTROL 10 ML: 604.72 INJECTION INTRAVENOUS at 11:03

## 2021-03-08 ENCOUNTER — TELEPHONE (OUTPATIENT)
Dept: INTERNAL MEDICINE | Facility: CLINIC | Age: 56
End: 2021-03-08

## 2021-03-13 ENCOUNTER — HOSPITAL ENCOUNTER (EMERGENCY)
Facility: HOSPITAL | Age: 56
Discharge: HOME | End: 2021-03-13
Attending: EMERGENCY MEDICINE
Payer: COMMERCIAL

## 2021-03-13 VITALS
SYSTOLIC BLOOD PRESSURE: 158 MMHG | HEART RATE: 91 BPM | HEIGHT: 66 IN | DIASTOLIC BLOOD PRESSURE: 74 MMHG | WEIGHT: 216 LBS | OXYGEN SATURATION: 99 % | RESPIRATION RATE: 18 BRPM | BODY MASS INDEX: 34.72 KG/M2 | TEMPERATURE: 98 F

## 2021-03-13 DIAGNOSIS — T78.40XA ALLERGIC REACTION, INITIAL ENCOUNTER: Primary | ICD-10-CM

## 2021-03-13 DIAGNOSIS — T78.3XXA ANGIOEDEMA, INITIAL ENCOUNTER: ICD-10-CM

## 2021-03-13 PROCEDURE — 99284 EMERGENCY DEPT VISIT MOD MDM: CPT | Mod: 25

## 2021-03-13 PROCEDURE — 86703 HIV-1/HIV-2 1 RESULT ANTBDY: CPT | Performed by: EMERGENCY MEDICINE

## 2021-03-13 PROCEDURE — 99285 PR EMERGENCY DEPT VISIT,LEVEL V: ICD-10-PCS | Mod: ,,, | Performed by: EMERGENCY MEDICINE

## 2021-03-13 PROCEDURE — 99285 EMERGENCY DEPT VISIT HI MDM: CPT | Mod: ,,, | Performed by: EMERGENCY MEDICINE

## 2021-03-13 PROCEDURE — 25000003 PHARM REV CODE 250: Performed by: PHYSICIAN ASSISTANT

## 2021-03-13 PROCEDURE — 86803 HEPATITIS C AB TEST: CPT | Performed by: EMERGENCY MEDICINE

## 2021-03-13 PROCEDURE — 63600175 PHARM REV CODE 636 W HCPCS: Performed by: PHYSICIAN ASSISTANT

## 2021-03-13 PROCEDURE — 96374 THER/PROPH/DIAG INJ IV PUSH: CPT

## 2021-03-13 RX ORDER — DIPHENHYDRAMINE HYDROCHLORIDE 50 MG/ML
25 INJECTION INTRAMUSCULAR; INTRAVENOUS
Status: COMPLETED | OUTPATIENT
Start: 2021-03-13 | End: 2021-03-13

## 2021-03-13 RX ORDER — ACETAMINOPHEN 500 MG
1000 TABLET ORAL
Status: COMPLETED | OUTPATIENT
Start: 2021-03-13 | End: 2021-03-13

## 2021-03-13 RX ADMIN — ACETAMINOPHEN 1000 MG: 500 TABLET ORAL at 11:03

## 2021-03-13 RX ADMIN — DIPHENHYDRAMINE HYDROCHLORIDE 25 MG: 50 INJECTION, SOLUTION INTRAMUSCULAR; INTRAVENOUS at 10:03

## 2021-03-15 ENCOUNTER — TELEPHONE (OUTPATIENT)
Dept: NEUROLOGY | Facility: CLINIC | Age: 56
End: 2021-03-15

## 2021-03-15 LAB
HCV AB SERPL QL IA: NEGATIVE
HIV 1+2 AB+HIV1 P24 AG SERPL QL IA: NEGATIVE

## 2021-03-17 ENCOUNTER — IMMUNIZATION (OUTPATIENT)
Dept: OBSTETRICS AND GYNECOLOGY | Facility: CLINIC | Age: 56
End: 2021-03-17
Payer: COMMERCIAL

## 2021-03-17 DIAGNOSIS — Z23 NEED FOR VACCINATION: Primary | ICD-10-CM

## 2021-03-17 PROCEDURE — 0002A COVID-19, MRNA, LNP-S, PF, 30 MCG/0.3 ML DOSE VACCINE: CPT | Mod: PBBFAC | Performed by: FAMILY MEDICINE

## 2021-03-17 PROCEDURE — 91300 COVID-19, MRNA, LNP-S, PF, 30 MCG/0.3 ML DOSE VACCINE: CPT | Mod: PBBFAC | Performed by: FAMILY MEDICINE

## 2021-04-07 ENCOUNTER — TELEPHONE (OUTPATIENT)
Dept: INTERNAL MEDICINE | Facility: CLINIC | Age: 56
End: 2021-04-07

## 2021-04-15 RX ORDER — ROSUVASTATIN CALCIUM 20 MG/1
20 TABLET, COATED ORAL DAILY
Qty: 90 TABLET | Refills: 3 | Status: SHIPPED | OUTPATIENT
Start: 2021-04-15 | End: 2022-06-16

## 2021-04-26 RX ORDER — METFORMIN HYDROCHLORIDE 1000 MG/1
TABLET ORAL
Qty: 90 TABLET | Refills: 3 | Status: SHIPPED | OUTPATIENT
Start: 2021-04-26 | End: 2022-04-29

## 2021-05-12 DIAGNOSIS — E11.9 CONTROLLED TYPE 2 DIABETES MELLITUS WITHOUT COMPLICATION, WITHOUT LONG-TERM CURRENT USE OF INSULIN: ICD-10-CM

## 2021-05-12 RX ORDER — SEMAGLUTIDE 1.34 MG/ML
INJECTION, SOLUTION SUBCUTANEOUS
Status: CANCELLED | OUTPATIENT
Start: 2021-05-12

## 2021-05-19 ENCOUNTER — SPECIALTY PHARMACY (OUTPATIENT)
Dept: PHARMACY | Facility: CLINIC | Age: 56
End: 2021-05-19

## 2021-05-20 ENCOUNTER — OFFICE VISIT (OUTPATIENT)
Dept: BARIATRICS | Facility: CLINIC | Age: 56
End: 2021-05-20
Payer: COMMERCIAL

## 2021-05-20 VITALS
SYSTOLIC BLOOD PRESSURE: 146 MMHG | HEART RATE: 76 BPM | HEIGHT: 66 IN | OXYGEN SATURATION: 98 % | DIASTOLIC BLOOD PRESSURE: 84 MMHG | WEIGHT: 220.88 LBS | BODY MASS INDEX: 35.5 KG/M2

## 2021-05-20 DIAGNOSIS — E11.9 CONTROLLED TYPE 2 DIABETES MELLITUS WITHOUT COMPLICATION, WITHOUT LONG-TERM CURRENT USE OF INSULIN: ICD-10-CM

## 2021-05-20 DIAGNOSIS — Z98.84 S/P LAPAROSCOPIC SLEEVE GASTRECTOMY: ICD-10-CM

## 2021-05-20 DIAGNOSIS — E66.01 CLASS 2 SEVERE OBESITY WITH BODY MASS INDEX (BMI) OF 35 TO 39.9 WITH SERIOUS COMORBIDITY: Primary | ICD-10-CM

## 2021-05-20 PROCEDURE — 1126F AMNT PAIN NOTED NONE PRSNT: CPT | Mod: S$GLB,,, | Performed by: INTERNAL MEDICINE

## 2021-05-20 PROCEDURE — 99999 PR PBB SHADOW E&M-EST. PATIENT-LVL IV: CPT | Mod: PBBFAC,,, | Performed by: INTERNAL MEDICINE

## 2021-05-20 PROCEDURE — 1126F PR PAIN SEVERITY QUANTIFIED, NO PAIN PRESENT: ICD-10-PCS | Mod: S$GLB,,, | Performed by: INTERNAL MEDICINE

## 2021-05-20 PROCEDURE — 3008F PR BODY MASS INDEX (BMI) DOCUMENTED: ICD-10-PCS | Mod: CPTII,S$GLB,, | Performed by: INTERNAL MEDICINE

## 2021-05-20 PROCEDURE — 3008F BODY MASS INDEX DOCD: CPT | Mod: CPTII,S$GLB,, | Performed by: INTERNAL MEDICINE

## 2021-05-20 PROCEDURE — 99214 PR OFFICE/OUTPT VISIT, EST, LEVL IV, 30-39 MIN: ICD-10-PCS | Mod: S$GLB,,, | Performed by: INTERNAL MEDICINE

## 2021-05-20 PROCEDURE — 99999 PR PBB SHADOW E&M-EST. PATIENT-LVL IV: ICD-10-PCS | Mod: PBBFAC,,, | Performed by: INTERNAL MEDICINE

## 2021-05-20 PROCEDURE — 99214 OFFICE O/P EST MOD 30 MIN: CPT | Mod: S$GLB,,, | Performed by: INTERNAL MEDICINE

## 2021-05-20 RX ORDER — PHENTERMINE HYDROCHLORIDE 37.5 MG/1
TABLET ORAL
Qty: 30 TABLET | Refills: 1 | Status: SHIPPED | OUTPATIENT
Start: 2021-07-02 | End: 2021-08-26

## 2021-05-28 ENCOUNTER — OFFICE VISIT (OUTPATIENT)
Dept: NEUROLOGY | Facility: CLINIC | Age: 56
End: 2021-05-28
Payer: COMMERCIAL

## 2021-05-28 ENCOUNTER — LAB VISIT (OUTPATIENT)
Dept: LAB | Facility: HOSPITAL | Age: 56
End: 2021-05-28
Attending: PSYCHIATRY & NEUROLOGY
Payer: COMMERCIAL

## 2021-05-28 VITALS
BODY MASS INDEX: 35.47 KG/M2 | SYSTOLIC BLOOD PRESSURE: 135 MMHG | HEART RATE: 78 BPM | DIASTOLIC BLOOD PRESSURE: 89 MMHG | WEIGHT: 220.69 LBS | HEIGHT: 66 IN

## 2021-05-28 DIAGNOSIS — E55.9 VITAMIN D DEFICIENCY: ICD-10-CM

## 2021-05-28 DIAGNOSIS — E55.9 VITAMIN D DEFICIENCY: Primary | ICD-10-CM

## 2021-05-28 LAB — 25(OH)D3+25(OH)D2 SERPL-MCNC: 65 NG/ML (ref 30–96)

## 2021-05-28 PROCEDURE — 3008F PR BODY MASS INDEX (BMI) DOCUMENTED: ICD-10-PCS | Mod: CPTII,S$GLB,, | Performed by: PSYCHIATRY & NEUROLOGY

## 2021-05-28 PROCEDURE — 99999 PR PBB SHADOW E&M-EST. PATIENT-LVL III: CPT | Mod: PBBFAC,,, | Performed by: PSYCHIATRY & NEUROLOGY

## 2021-05-28 PROCEDURE — 82306 VITAMIN D 25 HYDROXY: CPT | Performed by: PSYCHIATRY & NEUROLOGY

## 2021-05-28 PROCEDURE — 1126F PR PAIN SEVERITY QUANTIFIED, NO PAIN PRESENT: ICD-10-PCS | Mod: S$GLB,,, | Performed by: PSYCHIATRY & NEUROLOGY

## 2021-05-28 PROCEDURE — 36415 COLL VENOUS BLD VENIPUNCTURE: CPT | Performed by: PSYCHIATRY & NEUROLOGY

## 2021-05-28 PROCEDURE — 99215 PR OFFICE/OUTPT VISIT, EST, LEVL V, 40-54 MIN: ICD-10-PCS | Mod: S$GLB,,, | Performed by: PSYCHIATRY & NEUROLOGY

## 2021-05-28 PROCEDURE — 99215 OFFICE O/P EST HI 40 MIN: CPT | Mod: S$GLB,,, | Performed by: PSYCHIATRY & NEUROLOGY

## 2021-05-28 PROCEDURE — 99999 PR PBB SHADOW E&M-EST. PATIENT-LVL III: ICD-10-PCS | Mod: PBBFAC,,, | Performed by: PSYCHIATRY & NEUROLOGY

## 2021-05-28 PROCEDURE — 3008F BODY MASS INDEX DOCD: CPT | Mod: CPTII,S$GLB,, | Performed by: PSYCHIATRY & NEUROLOGY

## 2021-05-28 PROCEDURE — 1126F AMNT PAIN NOTED NONE PRSNT: CPT | Mod: S$GLB,,, | Performed by: PSYCHIATRY & NEUROLOGY

## 2021-05-31 ENCOUNTER — PATIENT MESSAGE (OUTPATIENT)
Dept: PSYCHIATRY | Facility: CLINIC | Age: 56
End: 2021-05-31

## 2021-06-11 ENCOUNTER — OCCUPATIONAL HEALTH (OUTPATIENT)
Dept: URGENT CARE | Facility: CLINIC | Age: 56
End: 2021-06-11

## 2021-06-11 DIAGNOSIS — Z02.89 ENCOUNTER FOR EXAMINATION REQUIRED BY DEPARTMENT OF TRANSPORTATION (DOT): Primary | ICD-10-CM

## 2021-06-11 PROCEDURE — 99499 UNLISTED E&M SERVICE: CPT | Mod: S$GLB,,, | Performed by: NURSE PRACTITIONER

## 2021-06-11 PROCEDURE — 99499 PHYSICAL, RECERT DOT/CDL: ICD-10-PCS | Mod: S$GLB,,, | Performed by: NURSE PRACTITIONER

## 2021-07-06 ENCOUNTER — PATIENT MESSAGE (OUTPATIENT)
Dept: ADMINISTRATIVE | Facility: HOSPITAL | Age: 56
End: 2021-07-06

## 2021-07-27 ENCOUNTER — PATIENT OUTREACH (OUTPATIENT)
Dept: ADMINISTRATIVE | Facility: OTHER | Age: 56
End: 2021-07-27

## 2021-07-27 DIAGNOSIS — Z12.31 ENCOUNTER FOR SCREENING MAMMOGRAM FOR MALIGNANT NEOPLASM OF BREAST: Primary | ICD-10-CM

## 2021-07-29 ENCOUNTER — OFFICE VISIT (OUTPATIENT)
Dept: OBSTETRICS AND GYNECOLOGY | Facility: CLINIC | Age: 56
End: 2021-07-29
Attending: OBSTETRICS & GYNECOLOGY
Payer: COMMERCIAL

## 2021-07-29 VITALS
WEIGHT: 228.88 LBS | HEIGHT: 66 IN | BODY MASS INDEX: 36.78 KG/M2 | DIASTOLIC BLOOD PRESSURE: 75 MMHG | SYSTOLIC BLOOD PRESSURE: 115 MMHG

## 2021-07-29 DIAGNOSIS — L30.4 INTERTRIGO: ICD-10-CM

## 2021-07-29 DIAGNOSIS — Z01.419 ENCOUNTER FOR GYNECOLOGICAL EXAMINATION WITHOUT ABNORMAL FINDING: Primary | ICD-10-CM

## 2021-07-29 DIAGNOSIS — Z78.0 MENOPAUSE PRESENT: ICD-10-CM

## 2021-07-29 DIAGNOSIS — Z12.31 SCREENING MAMMOGRAM, ENCOUNTER FOR: ICD-10-CM

## 2021-07-29 PROCEDURE — 1159F MED LIST DOCD IN RCRD: CPT | Mod: CPTII,S$GLB,, | Performed by: OBSTETRICS & GYNECOLOGY

## 2021-07-29 PROCEDURE — 1159F PR MEDICATION LIST DOCUMENTED IN MEDICAL RECORD: ICD-10-PCS | Mod: CPTII,S$GLB,, | Performed by: OBSTETRICS & GYNECOLOGY

## 2021-07-29 PROCEDURE — 3078F DIAST BP <80 MM HG: CPT | Mod: CPTII,S$GLB,, | Performed by: OBSTETRICS & GYNECOLOGY

## 2021-07-29 PROCEDURE — 3008F BODY MASS INDEX DOCD: CPT | Mod: CPTII,S$GLB,, | Performed by: OBSTETRICS & GYNECOLOGY

## 2021-07-29 PROCEDURE — 99396 PR PREVENTIVE VISIT,EST,40-64: ICD-10-PCS | Mod: S$GLB,,, | Performed by: OBSTETRICS & GYNECOLOGY

## 2021-07-29 PROCEDURE — 1160F PR REVIEW ALL MEDS BY PRESCRIBER/CLIN PHARMACIST DOCUMENTED: ICD-10-PCS | Mod: CPTII,S$GLB,, | Performed by: OBSTETRICS & GYNECOLOGY

## 2021-07-29 PROCEDURE — 1126F PR PAIN SEVERITY QUANTIFIED, NO PAIN PRESENT: ICD-10-PCS | Mod: CPTII,S$GLB,, | Performed by: OBSTETRICS & GYNECOLOGY

## 2021-07-29 PROCEDURE — 3074F PR MOST RECENT SYSTOLIC BLOOD PRESSURE < 130 MM HG: ICD-10-PCS | Mod: CPTII,S$GLB,, | Performed by: OBSTETRICS & GYNECOLOGY

## 2021-07-29 PROCEDURE — 1126F AMNT PAIN NOTED NONE PRSNT: CPT | Mod: CPTII,S$GLB,, | Performed by: OBSTETRICS & GYNECOLOGY

## 2021-07-29 PROCEDURE — 3044F HG A1C LEVEL LT 7.0%: CPT | Mod: CPTII,S$GLB,, | Performed by: OBSTETRICS & GYNECOLOGY

## 2021-07-29 PROCEDURE — 3074F SYST BP LT 130 MM HG: CPT | Mod: CPTII,S$GLB,, | Performed by: OBSTETRICS & GYNECOLOGY

## 2021-07-29 PROCEDURE — 3078F PR MOST RECENT DIASTOLIC BLOOD PRESSURE < 80 MM HG: ICD-10-PCS | Mod: CPTII,S$GLB,, | Performed by: OBSTETRICS & GYNECOLOGY

## 2021-07-29 PROCEDURE — 3008F PR BODY MASS INDEX (BMI) DOCUMENTED: ICD-10-PCS | Mod: CPTII,S$GLB,, | Performed by: OBSTETRICS & GYNECOLOGY

## 2021-07-29 PROCEDURE — 3044F PR MOST RECENT HEMOGLOBIN A1C LEVEL <7.0%: ICD-10-PCS | Mod: CPTII,S$GLB,, | Performed by: OBSTETRICS & GYNECOLOGY

## 2021-07-29 PROCEDURE — 1160F RVW MEDS BY RX/DR IN RCRD: CPT | Mod: CPTII,S$GLB,, | Performed by: OBSTETRICS & GYNECOLOGY

## 2021-07-29 PROCEDURE — 99396 PREV VISIT EST AGE 40-64: CPT | Mod: S$GLB,,, | Performed by: OBSTETRICS & GYNECOLOGY

## 2021-07-29 RX ORDER — CLOTRIMAZOLE AND BETAMETHASONE DIPROPIONATE 10; .64 MG/G; MG/G
CREAM TOPICAL
Qty: 15 G | Refills: 1 | Status: SHIPPED | OUTPATIENT
Start: 2021-07-29 | End: 2022-03-29

## 2021-08-17 ENCOUNTER — HOSPITAL ENCOUNTER (OUTPATIENT)
Dept: RADIOLOGY | Facility: HOSPITAL | Age: 56
Discharge: HOME | End: 2021-08-17
Attending: INTERNAL MEDICINE
Payer: COMMERCIAL

## 2021-08-17 DIAGNOSIS — Z12.31 ENCOUNTER FOR SCREENING MAMMOGRAM FOR MALIGNANT NEOPLASM OF BREAST: ICD-10-CM

## 2021-08-17 PROCEDURE — 77063 BREAST TOMOSYNTHESIS BI: CPT | Mod: 26,,, | Performed by: RADIOLOGY

## 2021-08-17 PROCEDURE — 77063 MAMMO DIGITAL SCREENING BILAT WITH TOMO: ICD-10-PCS | Mod: 26,,, | Performed by: RADIOLOGY

## 2021-08-17 PROCEDURE — 77067 SCR MAMMO BI INCL CAD: CPT | Mod: 26,,, | Performed by: RADIOLOGY

## 2021-08-17 PROCEDURE — 77067 SCR MAMMO BI INCL CAD: CPT | Mod: TC,PN

## 2021-08-17 PROCEDURE — 77067 MAMMO DIGITAL SCREENING BILAT WITH TOMO: ICD-10-PCS | Mod: 26,,, | Performed by: RADIOLOGY

## 2021-08-18 ENCOUNTER — TELEPHONE (OUTPATIENT)
Dept: INTERNAL MEDICINE | Facility: CLINIC | Age: 56
End: 2021-08-18

## 2021-08-26 ENCOUNTER — OFFICE VISIT (OUTPATIENT)
Dept: BARIATRICS | Facility: CLINIC | Age: 56
End: 2021-08-26
Payer: COMMERCIAL

## 2021-08-26 ENCOUNTER — OFFICE VISIT (OUTPATIENT)
Dept: INTERNAL MEDICINE | Facility: CLINIC | Age: 56
End: 2021-08-26
Payer: COMMERCIAL

## 2021-08-26 VITALS
WEIGHT: 229.75 LBS | DIASTOLIC BLOOD PRESSURE: 75 MMHG | BODY MASS INDEX: 36.92 KG/M2 | HEIGHT: 66 IN | OXYGEN SATURATION: 99 % | SYSTOLIC BLOOD PRESSURE: 148 MMHG | HEART RATE: 81 BPM

## 2021-08-26 VITALS
BODY MASS INDEX: 37.16 KG/M2 | OXYGEN SATURATION: 98 % | HEIGHT: 66 IN | SYSTOLIC BLOOD PRESSURE: 126 MMHG | DIASTOLIC BLOOD PRESSURE: 85 MMHG | HEART RATE: 76 BPM | WEIGHT: 231.25 LBS

## 2021-08-26 DIAGNOSIS — E66.01 CLASS 2 SEVERE OBESITY WITH BODY MASS INDEX (BMI) OF 35 TO 39.9 WITH SERIOUS COMORBIDITY: Primary | ICD-10-CM

## 2021-08-26 DIAGNOSIS — Z98.84 S/P LAPAROSCOPIC SLEEVE GASTRECTOMY: ICD-10-CM

## 2021-08-26 DIAGNOSIS — R09.81 SINUS CONGESTION: Primary | ICD-10-CM

## 2021-08-26 PROCEDURE — 3044F PR MOST RECENT HEMOGLOBIN A1C LEVEL <7.0%: ICD-10-PCS | Mod: CPTII,S$GLB,, | Performed by: INTERNAL MEDICINE

## 2021-08-26 PROCEDURE — 3044F HG A1C LEVEL LT 7.0%: CPT | Mod: CPTII,S$GLB,, | Performed by: INTERNAL MEDICINE

## 2021-08-26 PROCEDURE — 3078F PR MOST RECENT DIASTOLIC BLOOD PRESSURE < 80 MM HG: ICD-10-PCS | Mod: CPTII,S$GLB,, | Performed by: INTERNAL MEDICINE

## 2021-08-26 PROCEDURE — 3008F PR BODY MASS INDEX (BMI) DOCUMENTED: ICD-10-PCS | Mod: CPTII,S$GLB,, | Performed by: INTERNAL MEDICINE

## 2021-08-26 PROCEDURE — 99213 PR OFFICE/OUTPT VISIT, EST, LEVL III, 20-29 MIN: ICD-10-PCS | Mod: S$GLB,,, | Performed by: INTERNAL MEDICINE

## 2021-08-26 PROCEDURE — 99999 PR PBB SHADOW E&M-EST. PATIENT-LVL IV: ICD-10-PCS | Mod: PBBFAC,,, | Performed by: INTERNAL MEDICINE

## 2021-08-26 PROCEDURE — 3077F SYST BP >= 140 MM HG: CPT | Mod: CPTII,S$GLB,, | Performed by: INTERNAL MEDICINE

## 2021-08-26 PROCEDURE — 1126F PR PAIN SEVERITY QUANTIFIED, NO PAIN PRESENT: ICD-10-PCS | Mod: CPTII,S$GLB,, | Performed by: INTERNAL MEDICINE

## 2021-08-26 PROCEDURE — 99999 PR PBB SHADOW E&M-EST. PATIENT-LVL IV: CPT | Mod: PBBFAC,,, | Performed by: INTERNAL MEDICINE

## 2021-08-26 PROCEDURE — 99213 OFFICE O/P EST LOW 20 MIN: CPT | Mod: S$GLB,,, | Performed by: INTERNAL MEDICINE

## 2021-08-26 PROCEDURE — 3008F BODY MASS INDEX DOCD: CPT | Mod: CPTII,S$GLB,, | Performed by: INTERNAL MEDICINE

## 2021-08-26 PROCEDURE — 3077F PR MOST RECENT SYSTOLIC BLOOD PRESSURE >= 140 MM HG: ICD-10-PCS | Mod: CPTII,S$GLB,, | Performed by: INTERNAL MEDICINE

## 2021-08-26 PROCEDURE — 1126F AMNT PAIN NOTED NONE PRSNT: CPT | Mod: CPTII,S$GLB,, | Performed by: INTERNAL MEDICINE

## 2021-08-26 PROCEDURE — 3078F DIAST BP <80 MM HG: CPT | Mod: CPTII,S$GLB,, | Performed by: INTERNAL MEDICINE

## 2021-08-26 RX ORDER — DIETHYLPROPION HYDROCHLORIDE 75 MG/1
75 TABLET, EXTENDED RELEASE ORAL DAILY
Qty: 30 TABLET | Refills: 2 | Status: SHIPPED | OUTPATIENT
Start: 2021-08-26 | End: 2021-11-29 | Stop reason: SDUPTHER

## 2021-09-01 ENCOUNTER — PATIENT MESSAGE (OUTPATIENT)
Dept: PSYCHIATRY | Facility: CLINIC | Age: 56
End: 2021-09-01

## 2021-09-01 ENCOUNTER — PATIENT MESSAGE (OUTPATIENT)
Dept: BARIATRICS | Facility: CLINIC | Age: 56
End: 2021-09-01

## 2021-09-02 ENCOUNTER — PATIENT MESSAGE (OUTPATIENT)
Dept: PSYCHIATRY | Facility: CLINIC | Age: 56
End: 2021-09-02

## 2021-09-04 ENCOUNTER — PATIENT MESSAGE (OUTPATIENT)
Dept: NEUROLOGY | Facility: CLINIC | Age: 56
End: 2021-09-04

## 2021-09-09 ENCOUNTER — SPECIALTY PHARMACY (OUTPATIENT)
Dept: PHARMACY | Facility: CLINIC | Age: 56
End: 2021-09-09

## 2021-09-16 ENCOUNTER — PATIENT MESSAGE (OUTPATIENT)
Dept: BARIATRICS | Facility: CLINIC | Age: 56
End: 2021-09-16

## 2021-09-16 ENCOUNTER — TELEPHONE (OUTPATIENT)
Dept: BARIATRICS | Facility: CLINIC | Age: 56
End: 2021-09-16

## 2021-09-16 DIAGNOSIS — E66.01 CLASS 2 SEVERE OBESITY WITH BODY MASS INDEX (BMI) OF 35 TO 39.9 WITH SERIOUS COMORBIDITY: ICD-10-CM

## 2021-09-17 ENCOUNTER — TELEPHONE (OUTPATIENT)
Dept: BARIATRICS | Facility: CLINIC | Age: 56
End: 2021-09-17

## 2021-09-17 ENCOUNTER — PATIENT MESSAGE (OUTPATIENT)
Dept: BARIATRICS | Facility: CLINIC | Age: 56
End: 2021-09-17

## 2021-09-20 ENCOUNTER — LAB VISIT (OUTPATIENT)
Dept: LAB | Facility: HOSPITAL | Age: 56
End: 2021-09-20
Attending: INTERNAL MEDICINE
Payer: COMMERCIAL

## 2021-09-20 DIAGNOSIS — Z00.00 PREVENTATIVE HEALTH CARE: ICD-10-CM

## 2021-09-20 DIAGNOSIS — E11.9 TYPE 2 DIABETES MELLITUS WITHOUT COMPLICATION: ICD-10-CM

## 2021-09-20 LAB
ALBUMIN SERPL BCP-MCNC: 3.6 G/DL (ref 3.5–5.2)
ALP SERPL-CCNC: 83 U/L (ref 55–135)
ALT SERPL W/O P-5'-P-CCNC: 17 U/L (ref 10–44)
ANION GAP SERPL CALC-SCNC: 14 MMOL/L (ref 8–16)
AST SERPL-CCNC: 20 U/L (ref 10–40)
BASOPHILS # BLD AUTO: 0.03 K/UL (ref 0–0.2)
BASOPHILS NFR BLD: 0.4 % (ref 0–1.9)
BILIRUB SERPL-MCNC: 0.5 MG/DL (ref 0.1–1)
BUN SERPL-MCNC: 14 MG/DL (ref 6–20)
CALCIUM SERPL-MCNC: 9.9 MG/DL (ref 8.7–10.5)
CHLORIDE SERPL-SCNC: 105 MMOL/L (ref 95–110)
CHOLEST SERPL-MCNC: 151 MG/DL (ref 120–199)
CHOLEST/HDLC SERPL: 2.1 {RATIO} (ref 2–5)
CO2 SERPL-SCNC: 22 MMOL/L (ref 23–29)
CREAT SERPL-MCNC: 0.8 MG/DL (ref 0.5–1.4)
DIFFERENTIAL METHOD: ABNORMAL
EOSINOPHIL # BLD AUTO: 0.2 K/UL (ref 0–0.5)
EOSINOPHIL NFR BLD: 2.4 % (ref 0–8)
ERYTHROCYTE [DISTWIDTH] IN BLOOD BY AUTOMATED COUNT: 16.2 % (ref 11.5–14.5)
EST. GFR  (AFRICAN AMERICAN): >60 ML/MIN/1.73 M^2
EST. GFR  (NON AFRICAN AMERICAN): >60 ML/MIN/1.73 M^2
ESTIMATED AVG GLUCOSE: 111 MG/DL (ref 68–131)
ESTIMATED AVG GLUCOSE: 111 MG/DL (ref 68–131)
GLUCOSE SERPL-MCNC: 78 MG/DL (ref 70–110)
HBA1C MFR BLD: 5.5 % (ref 4–5.6)
HBA1C MFR BLD: 5.5 % (ref 4–5.6)
HCT VFR BLD AUTO: 41.2 % (ref 37–48.5)
HDLC SERPL-MCNC: 71 MG/DL (ref 40–75)
HDLC SERPL: 47 % (ref 20–50)
HGB BLD-MCNC: 12.8 G/DL (ref 12–16)
IMM GRANULOCYTES # BLD AUTO: 0.01 K/UL (ref 0–0.04)
IMM GRANULOCYTES NFR BLD AUTO: 0.1 % (ref 0–0.5)
LDLC SERPL CALC-MCNC: 69.2 MG/DL (ref 63–159)
LYMPHOCYTES # BLD AUTO: 3 K/UL (ref 1–4.8)
LYMPHOCYTES NFR BLD: 38.9 % (ref 18–48)
MCH RBC QN AUTO: 26.6 PG (ref 27–31)
MCHC RBC AUTO-ENTMCNC: 31.1 G/DL (ref 32–36)
MCV RBC AUTO: 86 FL (ref 82–98)
MONOCYTES # BLD AUTO: 0.5 K/UL (ref 0.3–1)
MONOCYTES NFR BLD: 7 % (ref 4–15)
NEUTROPHILS # BLD AUTO: 3.9 K/UL (ref 1.8–7.7)
NEUTROPHILS NFR BLD: 51.2 % (ref 38–73)
NONHDLC SERPL-MCNC: 80 MG/DL
NRBC BLD-RTO: 0 /100 WBC
PLATELET # BLD AUTO: 210 K/UL (ref 150–450)
PMV BLD AUTO: 12 FL (ref 9.2–12.9)
POTASSIUM SERPL-SCNC: 3.9 MMOL/L (ref 3.5–5.1)
PROT SERPL-MCNC: 7.4 G/DL (ref 6–8.4)
RBC # BLD AUTO: 4.81 M/UL (ref 4–5.4)
SODIUM SERPL-SCNC: 141 MMOL/L (ref 136–145)
TRIGL SERPL-MCNC: 54 MG/DL (ref 30–150)
TSH SERPL DL<=0.005 MIU/L-ACNC: 1.75 UIU/ML (ref 0.4–4)
WBC # BLD AUTO: 7.58 K/UL (ref 3.9–12.7)

## 2021-09-20 PROCEDURE — 36415 COLL VENOUS BLD VENIPUNCTURE: CPT | Mod: PO | Performed by: INTERNAL MEDICINE

## 2021-09-20 PROCEDURE — 83036 HEMOGLOBIN GLYCOSYLATED A1C: CPT | Performed by: INTERNAL MEDICINE

## 2021-09-20 PROCEDURE — 80053 COMPREHEN METABOLIC PANEL: CPT | Performed by: INTERNAL MEDICINE

## 2021-09-20 PROCEDURE — 84443 ASSAY THYROID STIM HORMONE: CPT | Performed by: INTERNAL MEDICINE

## 2021-09-20 PROCEDURE — 85025 COMPLETE CBC W/AUTO DIFF WBC: CPT | Performed by: INTERNAL MEDICINE

## 2021-09-20 PROCEDURE — 80061 LIPID PANEL: CPT | Performed by: INTERNAL MEDICINE

## 2021-09-21 ENCOUNTER — TELEPHONE (OUTPATIENT)
Dept: INTERNAL MEDICINE | Facility: CLINIC | Age: 56
End: 2021-09-21

## 2021-09-24 DIAGNOSIS — E11.9 CONTROLLED TYPE 2 DIABETES MELLITUS WITHOUT COMPLICATION, WITHOUT LONG-TERM CURRENT USE OF INSULIN: ICD-10-CM

## 2021-09-24 RX ORDER — HYDROCHLOROTHIAZIDE 25 MG/1
TABLET ORAL
Qty: 90 TABLET | Refills: 3 | Status: SHIPPED | OUTPATIENT
Start: 2021-09-24 | End: 2023-03-10 | Stop reason: SDUPTHER

## 2021-09-27 ENCOUNTER — HOSPITAL ENCOUNTER (OUTPATIENT)
Dept: RADIOLOGY | Facility: HOSPITAL | Age: 56
Discharge: HOME | End: 2021-09-27
Attending: INTERNAL MEDICINE
Payer: COMMERCIAL

## 2021-09-27 ENCOUNTER — OFFICE VISIT (OUTPATIENT)
Dept: INTERNAL MEDICINE | Facility: CLINIC | Age: 56
End: 2021-09-27
Payer: COMMERCIAL

## 2021-09-27 VITALS
TEMPERATURE: 97 F | HEIGHT: 66 IN | RESPIRATION RATE: 16 BRPM | WEIGHT: 229.06 LBS | BODY MASS INDEX: 36.81 KG/M2 | DIASTOLIC BLOOD PRESSURE: 74 MMHG | SYSTOLIC BLOOD PRESSURE: 122 MMHG | HEART RATE: 80 BPM

## 2021-09-27 DIAGNOSIS — Z00.00 ANNUAL PHYSICAL EXAM: Primary | ICD-10-CM

## 2021-09-27 DIAGNOSIS — I10 ESSENTIAL HYPERTENSION: ICD-10-CM

## 2021-09-27 DIAGNOSIS — E78.2 MIXED HYPERLIPIDEMIA: ICD-10-CM

## 2021-09-27 DIAGNOSIS — E11.40 CONTROLLED TYPE 2 DIABETES MELLITUS WITH DIABETIC NEUROPATHY, WITHOUT LONG-TERM CURRENT USE OF INSULIN: ICD-10-CM

## 2021-09-27 DIAGNOSIS — J34.89 SINUS PRESSURE: ICD-10-CM

## 2021-09-27 DIAGNOSIS — G35 MULTIPLE SCLEROSIS: ICD-10-CM

## 2021-09-27 PROCEDURE — 70220 X-RAY EXAM OF SINUSES: CPT | Mod: 26,,, | Performed by: RADIOLOGY

## 2021-09-27 PROCEDURE — 1160F RVW MEDS BY RX/DR IN RCRD: CPT | Mod: CPTII,S$GLB,, | Performed by: INTERNAL MEDICINE

## 2021-09-27 PROCEDURE — 3066F PR DOCUMENTATION OF TREATMENT FOR NEPHROPATHY: ICD-10-PCS | Mod: CPTII,S$GLB,, | Performed by: INTERNAL MEDICINE

## 2021-09-27 PROCEDURE — 3066F NEPHROPATHY DOC TX: CPT | Mod: CPTII,S$GLB,, | Performed by: INTERNAL MEDICINE

## 2021-09-27 PROCEDURE — 99396 PREV VISIT EST AGE 40-64: CPT | Mod: S$GLB,,, | Performed by: INTERNAL MEDICINE

## 2021-09-27 PROCEDURE — 1159F PR MEDICATION LIST DOCUMENTED IN MEDICAL RECORD: ICD-10-PCS | Mod: CPTII,S$GLB,, | Performed by: INTERNAL MEDICINE

## 2021-09-27 PROCEDURE — 99999 PR PBB SHADOW E&M-EST. PATIENT-LVL V: ICD-10-PCS | Mod: PBBFAC,,, | Performed by: INTERNAL MEDICINE

## 2021-09-27 PROCEDURE — 99396 PR PREVENTIVE VISIT,EST,40-64: ICD-10-PCS | Mod: S$GLB,,, | Performed by: INTERNAL MEDICINE

## 2021-09-27 PROCEDURE — 3008F BODY MASS INDEX DOCD: CPT | Mod: CPTII,S$GLB,, | Performed by: INTERNAL MEDICINE

## 2021-09-27 PROCEDURE — 4010F ACE/ARB THERAPY RXD/TAKEN: CPT | Mod: CPTII,S$GLB,, | Performed by: INTERNAL MEDICINE

## 2021-09-27 PROCEDURE — 3044F PR MOST RECENT HEMOGLOBIN A1C LEVEL <7.0%: ICD-10-PCS | Mod: CPTII,S$GLB,, | Performed by: INTERNAL MEDICINE

## 2021-09-27 PROCEDURE — 3078F PR MOST RECENT DIASTOLIC BLOOD PRESSURE < 80 MM HG: ICD-10-PCS | Mod: CPTII,S$GLB,, | Performed by: INTERNAL MEDICINE

## 2021-09-27 PROCEDURE — 99999 PR PBB SHADOW E&M-EST. PATIENT-LVL V: CPT | Mod: PBBFAC,,, | Performed by: INTERNAL MEDICINE

## 2021-09-27 PROCEDURE — 3061F PR NEG MICROALBUMINURIA RESULT DOCUMENTED/REVIEW: ICD-10-PCS | Mod: CPTII,S$GLB,, | Performed by: INTERNAL MEDICINE

## 2021-09-27 PROCEDURE — 3074F PR MOST RECENT SYSTOLIC BLOOD PRESSURE < 130 MM HG: ICD-10-PCS | Mod: CPTII,S$GLB,, | Performed by: INTERNAL MEDICINE

## 2021-09-27 PROCEDURE — 3008F PR BODY MASS INDEX (BMI) DOCUMENTED: ICD-10-PCS | Mod: CPTII,S$GLB,, | Performed by: INTERNAL MEDICINE

## 2021-09-27 PROCEDURE — 3044F HG A1C LEVEL LT 7.0%: CPT | Mod: CPTII,S$GLB,, | Performed by: INTERNAL MEDICINE

## 2021-09-27 PROCEDURE — 4010F PR ACE/ARB THEARPY RXD/TAKEN: ICD-10-PCS | Mod: CPTII,S$GLB,, | Performed by: INTERNAL MEDICINE

## 2021-09-27 PROCEDURE — 3074F SYST BP LT 130 MM HG: CPT | Mod: CPTII,S$GLB,, | Performed by: INTERNAL MEDICINE

## 2021-09-27 PROCEDURE — 1160F PR REVIEW ALL MEDS BY PRESCRIBER/CLIN PHARMACIST DOCUMENTED: ICD-10-PCS | Mod: CPTII,S$GLB,, | Performed by: INTERNAL MEDICINE

## 2021-09-27 PROCEDURE — 3078F DIAST BP <80 MM HG: CPT | Mod: CPTII,S$GLB,, | Performed by: INTERNAL MEDICINE

## 2021-09-27 PROCEDURE — 70220 X-RAY EXAM OF SINUSES: CPT | Mod: TC,PO

## 2021-09-27 PROCEDURE — 70220 XR SINUSES MIN 3 VIEWS: ICD-10-PCS | Mod: 26,,, | Performed by: RADIOLOGY

## 2021-09-27 PROCEDURE — 3061F NEG MICROALBUMINURIA REV: CPT | Mod: CPTII,S$GLB,, | Performed by: INTERNAL MEDICINE

## 2021-09-27 PROCEDURE — 1159F MED LIST DOCD IN RCRD: CPT | Mod: CPTII,S$GLB,, | Performed by: INTERNAL MEDICINE

## 2021-09-27 RX ORDER — FLUTICASONE PROPIONATE 50 MCG
2 SPRAY, SUSPENSION (ML) NASAL DAILY
COMMUNITY
End: 2024-02-19

## 2021-09-27 RX ORDER — MINERAL OIL
180 ENEMA (ML) RECTAL DAILY
COMMUNITY
End: 2022-03-29

## 2021-09-27 RX ORDER — SEMAGLUTIDE 1.34 MG/ML
1 INJECTION, SOLUTION SUBCUTANEOUS
Qty: 3 PEN | Refills: 1 | Status: SHIPPED | OUTPATIENT
Start: 2021-09-27 | End: 2021-11-29 | Stop reason: SDUPTHER

## 2021-09-27 RX ORDER — GUAIFENESIN 600 MG/1
1200 TABLET, EXTENDED RELEASE ORAL 2 TIMES DAILY
COMMUNITY
End: 2022-03-29

## 2021-09-27 RX ORDER — METHYLPREDNISOLONE 4 MG/1
TABLET ORAL
Qty: 1 PACKAGE | Refills: 0 | Status: SHIPPED | OUTPATIENT
Start: 2021-09-27 | End: 2021-10-18

## 2021-09-29 ENCOUNTER — TELEPHONE (OUTPATIENT)
Dept: INTERNAL MEDICINE | Facility: CLINIC | Age: 56
End: 2021-09-29

## 2021-10-04 ENCOUNTER — OFFICE VISIT (OUTPATIENT)
Dept: NEUROLOGY | Facility: CLINIC | Age: 56
End: 2021-10-04
Payer: COMMERCIAL

## 2021-10-04 ENCOUNTER — IMMUNIZATION (OUTPATIENT)
Dept: INTERNAL MEDICINE | Facility: CLINIC | Age: 56
End: 2021-10-04
Payer: COMMERCIAL

## 2021-10-04 VITALS
BODY MASS INDEX: 37.09 KG/M2 | HEART RATE: 79 BPM | DIASTOLIC BLOOD PRESSURE: 98 MMHG | WEIGHT: 230.81 LBS | SYSTOLIC BLOOD PRESSURE: 157 MMHG | HEIGHT: 66 IN

## 2021-10-04 DIAGNOSIS — G35 MULTIPLE SCLEROSIS: Primary | ICD-10-CM

## 2021-10-04 DIAGNOSIS — Z23 NEED FOR VACCINATION: Primary | ICD-10-CM

## 2021-10-04 DIAGNOSIS — M62.838 MUSCLE SPASM OF BOTH LOWER LEGS: ICD-10-CM

## 2021-10-04 PROCEDURE — 1159F MED LIST DOCD IN RCRD: CPT | Mod: CPTII,S$GLB,, | Performed by: PSYCHIATRY & NEUROLOGY

## 2021-10-04 PROCEDURE — 99999 PR PBB SHADOW E&M-EST. PATIENT-LVL IV: CPT | Mod: PBBFAC,,, | Performed by: PSYCHIATRY & NEUROLOGY

## 2021-10-04 PROCEDURE — 3080F PR MOST RECENT DIASTOLIC BLOOD PRESSURE >= 90 MM HG: ICD-10-PCS | Mod: CPTII,S$GLB,, | Performed by: PSYCHIATRY & NEUROLOGY

## 2021-10-04 PROCEDURE — 4010F ACE/ARB THERAPY RXD/TAKEN: CPT | Mod: CPTII,S$GLB,, | Performed by: PSYCHIATRY & NEUROLOGY

## 2021-10-04 PROCEDURE — 99215 OFFICE O/P EST HI 40 MIN: CPT | Mod: S$GLB,,, | Performed by: PSYCHIATRY & NEUROLOGY

## 2021-10-04 PROCEDURE — 3061F NEG MICROALBUMINURIA REV: CPT | Mod: CPTII,S$GLB,, | Performed by: PSYCHIATRY & NEUROLOGY

## 2021-10-04 PROCEDURE — 91300 COVID-19, MRNA, LNP-S, PF, 30 MCG/0.3 ML DOSE VACCINE: CPT | Mod: PBBFAC | Performed by: INTERNAL MEDICINE

## 2021-10-04 PROCEDURE — 99215 PR OFFICE/OUTPT VISIT, EST, LEVL V, 40-54 MIN: ICD-10-PCS | Mod: S$GLB,,, | Performed by: PSYCHIATRY & NEUROLOGY

## 2021-10-04 PROCEDURE — 99999 PR PBB SHADOW E&M-EST. PATIENT-LVL IV: ICD-10-PCS | Mod: PBBFAC,,, | Performed by: PSYCHIATRY & NEUROLOGY

## 2021-10-04 PROCEDURE — 1160F RVW MEDS BY RX/DR IN RCRD: CPT | Mod: CPTII,S$GLB,, | Performed by: PSYCHIATRY & NEUROLOGY

## 2021-10-04 PROCEDURE — 3077F SYST BP >= 140 MM HG: CPT | Mod: CPTII,S$GLB,, | Performed by: PSYCHIATRY & NEUROLOGY

## 2021-10-04 PROCEDURE — 3008F BODY MASS INDEX DOCD: CPT | Mod: CPTII,S$GLB,, | Performed by: PSYCHIATRY & NEUROLOGY

## 2021-10-04 PROCEDURE — 3008F PR BODY MASS INDEX (BMI) DOCUMENTED: ICD-10-PCS | Mod: CPTII,S$GLB,, | Performed by: PSYCHIATRY & NEUROLOGY

## 2021-10-04 PROCEDURE — 1159F PR MEDICATION LIST DOCUMENTED IN MEDICAL RECORD: ICD-10-PCS | Mod: CPTII,S$GLB,, | Performed by: PSYCHIATRY & NEUROLOGY

## 2021-10-04 PROCEDURE — 3061F PR NEG MICROALBUMINURIA RESULT DOCUMENTED/REVIEW: ICD-10-PCS | Mod: CPTII,S$GLB,, | Performed by: PSYCHIATRY & NEUROLOGY

## 2021-10-04 PROCEDURE — 0003A COVID-19, MRNA, LNP-S, PF, 30 MCG/0.3 ML DOSE VACCINE: CPT | Mod: CV19,PBBFAC | Performed by: INTERNAL MEDICINE

## 2021-10-04 PROCEDURE — 3077F PR MOST RECENT SYSTOLIC BLOOD PRESSURE >= 140 MM HG: ICD-10-PCS | Mod: CPTII,S$GLB,, | Performed by: PSYCHIATRY & NEUROLOGY

## 2021-10-04 PROCEDURE — 3066F PR DOCUMENTATION OF TREATMENT FOR NEPHROPATHY: ICD-10-PCS | Mod: CPTII,S$GLB,, | Performed by: PSYCHIATRY & NEUROLOGY

## 2021-10-04 PROCEDURE — 3044F PR MOST RECENT HEMOGLOBIN A1C LEVEL <7.0%: ICD-10-PCS | Mod: CPTII,S$GLB,, | Performed by: PSYCHIATRY & NEUROLOGY

## 2021-10-04 PROCEDURE — 1160F PR REVIEW ALL MEDS BY PRESCRIBER/CLIN PHARMACIST DOCUMENTED: ICD-10-PCS | Mod: CPTII,S$GLB,, | Performed by: PSYCHIATRY & NEUROLOGY

## 2021-10-04 PROCEDURE — 3066F NEPHROPATHY DOC TX: CPT | Mod: CPTII,S$GLB,, | Performed by: PSYCHIATRY & NEUROLOGY

## 2021-10-04 PROCEDURE — 3080F DIAST BP >= 90 MM HG: CPT | Mod: CPTII,S$GLB,, | Performed by: PSYCHIATRY & NEUROLOGY

## 2021-10-04 PROCEDURE — 3044F HG A1C LEVEL LT 7.0%: CPT | Mod: CPTII,S$GLB,, | Performed by: PSYCHIATRY & NEUROLOGY

## 2021-10-04 PROCEDURE — 4010F PR ACE/ARB THEARPY RXD/TAKEN: ICD-10-PCS | Mod: CPTII,S$GLB,, | Performed by: PSYCHIATRY & NEUROLOGY

## 2021-10-04 RX ORDER — CYCLOBENZAPRINE HCL 5 MG
5 TABLET ORAL NIGHTLY PRN
Qty: 30 TABLET | Refills: 5 | Status: SHIPPED | OUTPATIENT
Start: 2021-10-04 | End: 2021-11-03

## 2021-10-31 ENCOUNTER — PATIENT MESSAGE (OUTPATIENT)
Dept: INTERNAL MEDICINE | Facility: CLINIC | Age: 56
End: 2021-10-31
Payer: COMMERCIAL

## 2021-11-29 ENCOUNTER — OFFICE VISIT (OUTPATIENT)
Dept: BARIATRICS | Facility: CLINIC | Age: 56
End: 2021-11-29
Payer: COMMERCIAL

## 2021-11-29 VITALS
WEIGHT: 232.56 LBS | BODY MASS INDEX: 37.54 KG/M2 | DIASTOLIC BLOOD PRESSURE: 84 MMHG | HEART RATE: 91 BPM | OXYGEN SATURATION: 98 % | SYSTOLIC BLOOD PRESSURE: 138 MMHG

## 2021-11-29 DIAGNOSIS — E66.01 CLASS 2 SEVERE OBESITY WITH BODY MASS INDEX (BMI) OF 35 TO 39.9 WITH SERIOUS COMORBIDITY: ICD-10-CM

## 2021-11-29 DIAGNOSIS — E11.9 CONTROLLED TYPE 2 DIABETES MELLITUS WITHOUT COMPLICATION, WITHOUT LONG-TERM CURRENT USE OF INSULIN: ICD-10-CM

## 2021-11-29 PROCEDURE — 3061F NEG MICROALBUMINURIA REV: CPT | Mod: CPTII,S$GLB,, | Performed by: INTERNAL MEDICINE

## 2021-11-29 PROCEDURE — 3061F PR NEG MICROALBUMINURIA RESULT DOCUMENTED/REVIEW: ICD-10-PCS | Mod: CPTII,S$GLB,, | Performed by: INTERNAL MEDICINE

## 2021-11-29 PROCEDURE — 3066F PR DOCUMENTATION OF TREATMENT FOR NEPHROPATHY: ICD-10-PCS | Mod: CPTII,S$GLB,, | Performed by: INTERNAL MEDICINE

## 2021-11-29 PROCEDURE — 4010F PR ACE/ARB THEARPY RXD/TAKEN: ICD-10-PCS | Mod: CPTII,S$GLB,, | Performed by: INTERNAL MEDICINE

## 2021-11-29 PROCEDURE — 99214 OFFICE O/P EST MOD 30 MIN: CPT | Mod: S$GLB,,, | Performed by: INTERNAL MEDICINE

## 2021-11-29 PROCEDURE — 3066F NEPHROPATHY DOC TX: CPT | Mod: CPTII,S$GLB,, | Performed by: INTERNAL MEDICINE

## 2021-11-29 PROCEDURE — 99999 PR PBB SHADOW E&M-EST. PATIENT-LVL IV: CPT | Mod: PBBFAC,,, | Performed by: INTERNAL MEDICINE

## 2021-11-29 PROCEDURE — 99999 PR PBB SHADOW E&M-EST. PATIENT-LVL IV: ICD-10-PCS | Mod: PBBFAC,,, | Performed by: INTERNAL MEDICINE

## 2021-11-29 PROCEDURE — 99214 PR OFFICE/OUTPT VISIT, EST, LEVL IV, 30-39 MIN: ICD-10-PCS | Mod: S$GLB,,, | Performed by: INTERNAL MEDICINE

## 2021-11-29 PROCEDURE — 4010F ACE/ARB THERAPY RXD/TAKEN: CPT | Mod: CPTII,S$GLB,, | Performed by: INTERNAL MEDICINE

## 2021-11-29 RX ORDER — SEMAGLUTIDE 1.34 MG/ML
1 INJECTION, SOLUTION SUBCUTANEOUS
Qty: 3 PEN | Refills: 1 | Status: SHIPPED | OUTPATIENT
Start: 2021-11-29 | End: 2022-02-01

## 2021-11-29 RX ORDER — DIETHYLPROPION HYDROCHLORIDE 75 MG/1
75 TABLET, EXTENDED RELEASE ORAL DAILY
Qty: 30 TABLET | Refills: 2 | Status: SHIPPED | OUTPATIENT
Start: 2021-12-17 | End: 2022-08-02

## 2021-11-30 ENCOUNTER — SPECIALTY PHARMACY (OUTPATIENT)
Dept: PHARMACY | Facility: CLINIC | Age: 56
End: 2021-11-30
Payer: COMMERCIAL

## 2021-12-21 ENCOUNTER — PATIENT MESSAGE (OUTPATIENT)
Dept: NEUROLOGY | Facility: CLINIC | Age: 56
End: 2021-12-21
Payer: COMMERCIAL

## 2022-01-25 ENCOUNTER — PATIENT MESSAGE (OUTPATIENT)
Dept: ADMINISTRATIVE | Facility: HOSPITAL | Age: 57
End: 2022-01-25
Payer: COMMERCIAL

## 2022-01-27 LAB
LEFT EYE DM RETINOPATHY: NEGATIVE
RIGHT EYE DM RETINOPATHY: NEGATIVE

## 2022-01-31 ENCOUNTER — OFFICE VISIT (OUTPATIENT)
Dept: NEUROLOGY | Facility: CLINIC | Age: 57
End: 2022-01-31
Payer: COMMERCIAL

## 2022-01-31 DIAGNOSIS — Z71.89 COUNSELING REGARDING GOALS OF CARE: ICD-10-CM

## 2022-01-31 DIAGNOSIS — R25.2 SPASM: ICD-10-CM

## 2022-01-31 DIAGNOSIS — G35 MULTIPLE SCLEROSIS: Primary | ICD-10-CM

## 2022-01-31 DIAGNOSIS — M79.2 NEUROPATHIC PAIN: ICD-10-CM

## 2022-01-31 PROCEDURE — 99215 OFFICE O/P EST HI 40 MIN: CPT | Mod: 95,,, | Performed by: PSYCHIATRY & NEUROLOGY

## 2022-01-31 PROCEDURE — 99215 PR OFFICE/OUTPT VISIT, EST, LEVL V, 40-54 MIN: ICD-10-PCS | Mod: 95,,, | Performed by: PSYCHIATRY & NEUROLOGY

## 2022-02-04 ENCOUNTER — PATIENT OUTREACH (OUTPATIENT)
Dept: ADMINISTRATIVE | Facility: HOSPITAL | Age: 57
End: 2022-02-04
Payer: COMMERCIAL

## 2022-02-04 RX ORDER — CYCLOBENZAPRINE HCL 5 MG
5 TABLET ORAL NIGHTLY PRN
COMMUNITY
Start: 2022-01-30 | End: 2022-03-30 | Stop reason: SDUPTHER

## 2022-02-04 NOTE — PROGRESS NOTES
The patient location is: home   The chief complaint leading to consultation is: followup    Visit type: audiovisual    Face to Face time with patient: 30 minutes  40 minutes of total time spent on the encounter, which includes face to face time and non-face to face time preparing to see the patient (eg, review of tests), Obtaining and/or reviewing separately obtained history, Documenting clinical information in the electronic or other health record, Independently interpreting results (not separately reported) and communicating results to the patient/family/caregiver, or Care coordination (not separately reported).     Each patient to whom he or she provides medical services by telemedicine is:  (1) informed of the relationship between the physician and patient and the respective role of any other health care provider with respect to management of the patient; and (2) notified that he or she may decline to receive medical services by telemedicine and may withdraw from such care at any time.    Notes:     Subjective:          Patient ID: Miesha Domínguez is a 56 y.o. female who presents today for a routine clinic visit for MS.      MS HPI:  · DMT: glatiramer acetate  · Side effects from DMT? No issues  · Taking vitamin D3 as recommended? Yes - 2000 units a day.   · No new s/s of relapse or worsening.  · Doing well with the Lyrica, neuropathic pain well-controlled. Not resolved but greatly improved  · Flexeril helps spasms at night       Medications:  Current Outpatient Medications on File Prior to Visit   Medication Sig Dispense Refill Last Dose    cholecalciferol, vitamin D3, (VITAMIN D3) 50 mcg (2,000 unit) Cap Take 1 capsule by mouth once daily.       clotrimazole-betamethasone 1-0.05% (LOTRISONE) cream Apply to affected area 2 times daily 15 g 1     cyanocobalamin, vitamin B-12, (VITAMIN B-12) 2,500 mcg Subl Place 1 tablet under the tongue every other day. Pt taking every other day       diethylpropion  (TENUATE) 75 mg SR tablet Take 1 tablet (75 mg total) by mouth once daily. 30 tablet 2     fexofenadine (ALLEGRA) 180 MG tablet Take 180 mg by mouth once daily.       fluticasone propionate (FLONASE) 50 mcg/actuation nasal spray 2 sprays by Each Nostril route once daily.       glatiramer (COPAXONE, GLATOPA) 40 mg/mL injection INJECT ONE SYRINGE SUBCUTANEOUSLY 3 TIMES A WEEK AT LEAST 48 HOURS APART. ALLOW TO WARM TO ROOM TEMP FOR 20 MINUTES. REFRIGERATE. 36 Syringe 1     guaiFENesin (MUCINEX) 600 mg 12 hr tablet Take 1,200 mg by mouth 2 (two) times daily.       hydroCHLOROthiazide (HYDRODIURIL) 25 MG tablet TAKE 1 TABLET BY MOUTH EVERY DAY 90 tablet 3     losartan (COZAAR) 100 MG tablet TAKE 1 TABLET BY MOUTH EVERY DAY 90 tablet 3     metFORMIN (GLUCOPHAGE) 1000 MG tablet TAKE 1 TABLET BY MOUTH EVERY DAY WITH BREAKFAST 90 tablet 3     MULTIVITAMIN ORAL Take 1 tablet by mouth once daily. Centrum adult chews ( flavor burst)       pregabalin (LYRICA) 100 MG capsule TAKE 1 CAPSULE BY MOUTH TWICE A DAY 60 capsule 2     rosuvastatin (CRESTOR) 20 MG tablet Take 1 tablet (20 mg total) by mouth once daily. 90 tablet 3     sodium chloride (OCEAN) 0.65 % nasal spray 1 spray by Nasal route as needed for Congestion.            SOCIAL HISTORY  Social History     Tobacco Use    Smoking status: Never Smoker    Smokeless tobacco: Never Used   Substance Use Topics    Alcohol use: Yes     Comment: occassional for Hollidays    Drug use: No       Living arrangements - the patient lives with their spouse.    ROS:    Spasms improved since last visit           Objective:        1. 25 foot timed walk:  Timed 25 Foot Walk: 11/24/2020 5/28/2021   Did patient wear an AFO? No No   Was assistive device used? No No   Time for 25 Foot Walk (seconds) 4.67 4.55   Time for 25 Foot Walk (seconds) - 4.55       2. 9 Hole Peg Test:  No flowsheet data found.    Neurologic Exam - limited per telemedicine visit  MENTAL STATUS: language is  fluent, normal verbal comprehension, attention is normal, patient is alert and oriented x 3, fund of knowlege is appropriate by vocabulary.      CRANIAL NERVE EXAM:  There is no [place].  Extraocular muscles are intact. No facial asymmetry. No dysartria, language is normal.      MOTOR EXAM: moves extremities well      Imaging:     MRI brain/cervical/thoracic w/wo 3/2021:  Impression:     1. MRI brain, cervical, and thoracic spine without substantial interval change relative to prior examinations of 09/29/2020.  No evidence of disease progression.  2. No abnormal enhancement identified to indicate active inflammation or demyelination.  3. As before, in the upper thoracic region at T3-T4, there is ventral position of the cord with equivocal mild dorsal deformity which may relate to space-occupying lesion such as arachnoid cyst versus web.  CT myelogram could be performed for further characterization.  4. Additional details, as per the body of report.           Labs:     Lab Results   Component Value Date    MRFKVBOZ66TC 65 05/28/2021    [id number] 41 10/20/2020    [id number] 35 07/29/2019     No results found for: JCVINDEX, JCVANTIBODY  No results found for: MI8OQIQK, ABSOLUTECD3, ZO2KYJAX, ABSOLUTECD8, PF4WUASL, ABSOLUTECD4, LABCD48  Lab Results   Component Value Date    WBC 7.58 09/20/2021    RBC 4.81 09/20/2021    HGB 12.8 09/20/2021    HCT 41.2 09/20/2021    MCV 86 09/20/2021    MCH 26.6 (L) 09/20/2021    MCHC 31.1 (L) 09/20/2021    RDW 16.2 (H) 09/20/2021     09/20/2021    MPV 12.0 09/20/2021    GRAN 3.9 09/20/2021    GRAN 51.2 09/20/2021    LYMPH 3.0 09/20/2021    LYMPH 38.9 09/20/2021    MONO 0.5 09/20/2021    MONO 7.0 09/20/2021    EOS 0.2 09/20/2021    BASO 0.03 09/20/2021    EOSINOPHIL 2.4 09/20/2021    BASOPHIL 0.4 09/20/2021     Sodium   Date Value Ref Range Status   09/20/2021 141 136 - 145 mmol/L Final     Potassium   Date Value Ref Range Status   09/20/2021 3.9 3.5 - 5.1 mmol/L Final     Chloride    Date Value Ref Range Status   09/20/2021 105 95 - 110 mmol/L Final     CO2   Date Value Ref Range Status   09/20/2021 22 (L) 23 - 29 mmol/L Final     Glucose   Date Value Ref Range Status   09/20/2021 78 70 - 110 mg/dL Final     BUN   Date Value Ref Range Status   09/20/2021 14 6 - 20 mg/dL Final     Creatinine   Date Value Ref Range Status   09/20/2021 0.8 0.5 - 1.4 mg/dL Final     Calcium   Date Value Ref Range Status   09/20/2021 9.9 8.7 - 10.5 mg/dL Final     Total Protein   Date Value Ref Range Status   09/20/2021 7.4 6.0 - 8.4 g/dL Final     Albumin   Date Value Ref Range Status   09/20/2021 3.6 3.5 - 5.2 g/dL Final     Total Bilirubin   Date Value Ref Range Status   09/20/2021 0.5 0.1 - 1.0 mg/dL Final     Comment:     For infants and newborns, interpretation of results should be based  on gestational age, weight and in agreement with clinical  observations.    Premature Infant recommended reference ranges:  Up to 24 hours.............<8.0 mg/dL  Up to 48 hours............<12.0 mg/dL  3-5 days..................<15.0 mg/dL  6-29 days.................<15.0 mg/dL       Alkaline Phosphatase   Date Value Ref Range Status   09/20/2021 83 55 - 135 U/L Final     AST   Date Value Ref Range Status   09/20/2021 20 10 - 40 U/L Final     ALT   Date Value Ref Range Status   09/20/2021 17 10 - 44 U/L Final     Anion Gap   Date Value Ref Range Status   09/20/2021 14 8 - 16 mmol/L Final     eGFR if    Date Value Ref Range Status   09/20/2021 >60.0 >60 mL/min/1.73 m^2 Final     eGFR if non    Date Value Ref Range Status   09/20/2021 >60.0 >60 mL/min/1.73 m^2 Final     Comment:     Calculation used to obtain the estimated glomerular filtration  rate (eGFR) is the CKD-EPI equation.        No results found for: HEPBSAG, HEPBSAB, HEPBCAB        MS Impression and Plan:     NEURO MULTIPLE SCLEROSIS IMPRESSION:   MS Status:     Number of relapses in the past year?:  0    Clinical Progression:   improved    MRI Progression: last MRIs stable 3/2021    MRI Progression comment:  stable; due for annual MRIs in 3/2022    DMT:  No change in management    Symptom Management:  No change in symptom management    Implement Change in Symptom Management:  no change     Neuropathic pain / diabetic neuropathy - continue lyrica 100mg bid.   Spasms - continue 5-10mg flexeril at night  Will obtain new MRIs in 3/2022        Problem List Items Addressed This Visit        Neuro    Multiple sclerosis - Primary      Other Visit Diagnoses     Counseling regarding goals of care        Neuropathic pain        Spasm              Glenn Cortez MD, MSCS, CRND  [facility]

## 2022-02-05 ENCOUNTER — PATIENT MESSAGE (OUTPATIENT)
Dept: BARIATRICS | Facility: CLINIC | Age: 57
End: 2022-02-05
Payer: COMMERCIAL

## 2022-02-09 RX ORDER — LOSARTAN POTASSIUM 100 MG/1
100 TABLET ORAL DAILY
Qty: 90 TABLET | Refills: 3 | Status: SHIPPED | OUTPATIENT
Start: 2022-02-09 | End: 2022-12-18

## 2022-02-23 ENCOUNTER — OFFICE VISIT (OUTPATIENT)
Dept: BARIATRICS | Facility: CLINIC | Age: 57
End: 2022-02-23
Payer: COMMERCIAL

## 2022-02-23 DIAGNOSIS — E66.01 CLASS 2 SEVERE OBESITY WITH BODY MASS INDEX (BMI) OF 35 TO 39.9 WITH SERIOUS COMORBIDITY: Primary | ICD-10-CM

## 2022-02-23 DIAGNOSIS — Z98.84 S/P LAPAROSCOPIC SLEEVE GASTRECTOMY: ICD-10-CM

## 2022-02-23 PROCEDURE — 99212 PR OFFICE/OUTPT VISIT, EST, LEVL II, 10-19 MIN: ICD-10-PCS | Mod: 95,,, | Performed by: INTERNAL MEDICINE

## 2022-02-23 PROCEDURE — 99212 OFFICE O/P EST SF 10 MIN: CPT | Mod: 95,,, | Performed by: INTERNAL MEDICINE

## 2022-02-23 PROCEDURE — 4010F PR ACE/ARB THEARPY RXD/TAKEN: ICD-10-PCS | Mod: CPTII,95,, | Performed by: INTERNAL MEDICINE

## 2022-02-23 PROCEDURE — 1160F PR REVIEW ALL MEDS BY PRESCRIBER/CLIN PHARMACIST DOCUMENTED: ICD-10-PCS | Mod: CPTII,95,, | Performed by: INTERNAL MEDICINE

## 2022-02-23 PROCEDURE — 1159F PR MEDICATION LIST DOCUMENTED IN MEDICAL RECORD: ICD-10-PCS | Mod: CPTII,95,, | Performed by: INTERNAL MEDICINE

## 2022-02-23 PROCEDURE — 4010F ACE/ARB THERAPY RXD/TAKEN: CPT | Mod: CPTII,95,, | Performed by: INTERNAL MEDICINE

## 2022-02-23 PROCEDURE — 1160F RVW MEDS BY RX/DR IN RCRD: CPT | Mod: CPTII,95,, | Performed by: INTERNAL MEDICINE

## 2022-02-23 PROCEDURE — 1159F MED LIST DOCD IN RCRD: CPT | Mod: CPTII,95,, | Performed by: INTERNAL MEDICINE

## 2022-02-23 NOTE — PROGRESS NOTES
Subjective:       Patient ID: Miesha Domínguez is a 56 y.o. female.    Chief Complaint: Follow-up  The patient location is: Seated in parked car    The chief complaint leading to consultation is:   Chief Complaint   Patient presents with    Follow-up        Visit type: audiovisual    Face to Face time with patient: 10 min  12 minutes of total time spent on the encounter, which includes face to face time and non-face to face time preparing to see the patient (eg, review of tests), Obtaining and/or reviewing separately obtained history, Documenting clinical information in the electronic or other health record, Independently interpreting results (not separately reported) and communicating results to the patient/family/caregiver, or Care coordination (not separately reported).         Each patient to whom he or she provides medical services by telemedicine is:  (1) informed of the relationship between the physician and patient and the respective role of any other health care provider with respect to management of the patient; and (2) notified that he or she may decline to receive medical services by telemedicine and may withdraw from such care at any time.    Notes:     Pt here today for follow-up.   Has gained 3 more  lbs since last in the office  net neg 9 lbs.  Started ozempic 1 mg. Added diehtylpropion last filled 2/20/22. She feels she is eating less and her appetite is down. She has started to incorporate more resistance training. Has been losing some inches.   Only SE was constipation at first. Used miralax with good relief.  Having trouble planning her meals 2/2 to calcium oxalate in urine. She will eat the same things all the time and then get bored and get off plan.  BP is good today.  Had calcium oxalate in urine.   BS had been in 70-80s fasting.  She is s/p lap gastric sleeve - 7/29/13.      Prev 232#, current home weight: 232#  Lab Results   Component Value Date    HGBA1C 5.5 09/20/2021    HGBA1C 5.5  09/20/2021    HGBA1C 5.3 03/05/2021     Lab Results   Component Value Date    LDLCALC 69.2 09/20/2021    CREATININE 0.8 09/20/2021           Neurology has stopped topiramate. Being worked up with neurology for demyelinating d/o.  Follow-up  Associated symptoms include myalgias. Pertinent negatives include + arthralgias, chest pain, chills or fever.     Review of Systems   Constitutional: Negative for chills and fever.   Respiratory: Negative for shortness of breath.         Denies Snoring   Cardiovascular: Negative for chest pain and leg swelling.   Gastrointestinal: Negative for constipation and diarrhea.        Denies GERD   Genitourinary: Positive for menstrual problem. Negative for difficulty urinating.        Recent  bleeding. bx with polyps.    Musculoskeletal: Positive for myalgias. Negative for arthralgias and back pain.   Neurological: Negative for dizziness and light-headedness.   Psychiatric/Behavioral: Negative for dysphoric mood. The patient is not nervous/anxious.        Objective:     LMP 04/05/2019 (Approximate)     Physical Exam  Vitals signs reviewed.   Constitutional:       General: She is not in acute distress.     Appearance: She is well-developed.      Comments: Obese   HENT:      Head: Normocephalic and atraumatic.   Eyes:      General: No scleral icterus.     Pupils: Pupils are equal, round, and reactive to light.   Neck:      Musculoskeletal: Normal range of motion and neck supple.   Cardiovascular:      Rate and Rhythm: Normal rate.   Pulmonary:      Effort: Pulmonary effort is normal.   Musculoskeletal: Normal range of motion.   Skin:     General: Skin is warm and dry.      Findings: No erythema.   Neurological:      Mental Status: She is alert and oriented to person, place, and time.      Cranial Nerves: No cranial nerve deficit.   Psychiatric:         Behavior: Behavior normal.         Judgment: Judgment normal.         Assessment:       1. Class 2 severe obesity with body mass index  (BMI) of 35 to 39.9 with serious comorbidity    2. S/P laparoscopic sleeve gastrectomy        Plan:           Miesha was seen today for follow-up.    Diagnoses and all orders for this visit:    Class 2 severe obesity with body mass index (BMI) of 35 to 39.9 with serious comorbidity    S/P laparoscopic sleeve gastrectomy             - [employer] [facility] (code [id number])      - No soda, sweet tea, juices, sports drinks or lemonade     -Exercise daily. Continue.       Continue Ozempic once a week.     Decrease portions as soon as you start Ozempic. Some nausea in the first 2 weeks is not unusual.     If you get pain across the upper abdomen and around to your back, please call the office.    Patient warned of common side effects of diethylpropion including anxiety, insomnia, palpitations and increased blood pressure. It was also explained that it is for short-term usage along with diet and exercise, and that stopping the medication without making lifestyle changes will result in regain of weight. Patient states understanding.    Weight loss medications are controlled substances.  They require routine follow up. Prescription or pills that are lost or destroyed will not be replaced.     Pt to message before next rf due.     Snack ideas given.

## 2022-02-23 NOTE — PATIENT INSTRUCTIONS
- [employer] [facility] (code [id number])      - No soda, sweet tea, juices, sports drinks or lemonade     -Exercise daily. Continue.       Continue Ozempic once a week.     Decrease portions as soon as you start Ozempic. Some nausea in the first 2 weeks is not unusual.     If you get pain across the upper abdomen and around to your back, please call the office.    Patient warned of common side effects of diethylpropion including anxiety, insomnia, palpitations and increased blood pressure. It was also explained that it is for short-term usage along with diet and exercise, and that stopping the medication without making lifestyle changes will result in regain of weight. Patient states understanding.    Weight loss medications are controlled substances.  They require routine follow up. Prescription or pills that are lost or destroyed will not be replaced.       Snack ideas     Savory  Avocado with chili powder, garlic powder and black pepper  String cheese or cheese cubes/slices  Tuna, chicken or egg salad lettuce wraps  Ham/turkey and cheese roll-up  Turkey jerky  Hard boiled egg  Steamed edamame  Olives, pickles (watch sodium)  Baked zucchini chips with salsa  Cottage cheese with tomatoes & dill  Salad with light dressing & veggies  Nuts and Seeds: Pistachios, almonds, cashews, pecans, sunflower seeds, peanuts, walnuts, pumpkin seeds, hazelnuts, brazil nuts (salt free)     Sweet  Plain yogurt: Triple Zero Oikos, Fage or Powerful with fruit, nuts, seeds, cinnamon  Sugar free jello  Sugar free popsicles  Homemade protein shake  Atkins bars/shakes  Premier protein shakes  Power crunch bar  Emerald cocoa dusted almonds (1/4 cup)     Sweet and Savory  Grilled pineapple and ham skewers  Cottage Cheese with fruit, nuts, seeds, cinnamon  Homemade smoothie with spinach, avocado, frozen fruit & unsweetened vanilla almond milk           Peanut Butter/[place] Butter  Witwith apples, ½ banana, celery, carrots, strawberries         Hummus/Guacamole/Light Cream Cheese/Greek yogurt + Ranch powder mix        cucumber, carrots, celery, bell pepper, tomato, cauliflower, broccoli, snap peas, green        beans, hard boiled egg, turkey pepperoni slices, salami slices, ham, grilled chicken                           Tips when Cravings Hit:  Drink water or sugar free Crystal Light when a craving begins.  Avoid eating blind: pre-portion foods instead of leaving them in their original package.  Eat without distractions: phone, tv, laptop etc.    Eat slowly, chew foods well (30 times).  Find snacks high in protein to keep you full longer.

## 2022-02-24 ENCOUNTER — TELEPHONE (OUTPATIENT)
Dept: BARIATRICS | Facility: CLINIC | Age: 57
End: 2022-02-24
Payer: COMMERCIAL

## 2022-02-24 NOTE — TELEPHONE ENCOUNTER
----- Message from Huma Christine MD sent at 2/23/2022  1:13 PM CST -----  Follow up in 3 months. In person.

## 2022-02-28 ENCOUNTER — PATIENT MESSAGE (OUTPATIENT)
Dept: PSYCHIATRY | Facility: CLINIC | Age: 57
End: 2022-02-28
Payer: COMMERCIAL

## 2022-03-07 ENCOUNTER — PATIENT MESSAGE (OUTPATIENT)
Dept: BARIATRICS | Facility: CLINIC | Age: 57
End: 2022-03-07
Payer: COMMERCIAL

## 2022-03-22 ENCOUNTER — LAB VISIT (OUTPATIENT)
Dept: LAB | Facility: HOSPITAL | Age: 57
End: 2022-03-22
Attending: INTERNAL MEDICINE
Payer: COMMERCIAL

## 2022-03-22 ENCOUNTER — TELEPHONE (OUTPATIENT)
Dept: INTERNAL MEDICINE | Facility: CLINIC | Age: 57
End: 2022-03-22
Payer: COMMERCIAL

## 2022-03-22 DIAGNOSIS — G35 MULTIPLE SCLEROSIS: ICD-10-CM

## 2022-03-22 DIAGNOSIS — E11.40 CONTROLLED TYPE 2 DIABETES MELLITUS WITH DIABETIC NEUROPATHY, WITHOUT LONG-TERM CURRENT USE OF INSULIN: ICD-10-CM

## 2022-03-22 LAB
ALBUMIN SERPL BCP-MCNC: 3.6 G/DL (ref 3.5–5.2)
ALP SERPL-CCNC: 90 U/L (ref 55–135)
ALT SERPL W/O P-5'-P-CCNC: 19 U/L (ref 10–44)
ANION GAP SERPL CALC-SCNC: 9 MMOL/L (ref 8–16)
ANION GAP SERPL CALC-SCNC: 9 MMOL/L (ref 8–16)
AST SERPL-CCNC: 22 U/L (ref 10–40)
BILIRUB SERPL-MCNC: 0.6 MG/DL (ref 0.1–1)
BUN SERPL-MCNC: 14 MG/DL (ref 6–20)
BUN SERPL-MCNC: 14 MG/DL (ref 6–20)
CALCIUM SERPL-MCNC: 9.7 MG/DL (ref 8.7–10.5)
CALCIUM SERPL-MCNC: 9.7 MG/DL (ref 8.7–10.5)
CHLORIDE SERPL-SCNC: 106 MMOL/L (ref 95–110)
CHLORIDE SERPL-SCNC: 106 MMOL/L (ref 95–110)
CO2 SERPL-SCNC: 30 MMOL/L (ref 23–29)
CO2 SERPL-SCNC: 30 MMOL/L (ref 23–29)
CREAT SERPL-MCNC: 0.8 MG/DL (ref 0.5–1.4)
CREAT SERPL-MCNC: 0.8 MG/DL (ref 0.5–1.4)
EST. GFR  (AFRICAN AMERICAN): >60 ML/MIN/1.73 M^2
EST. GFR  (AFRICAN AMERICAN): >60 ML/MIN/1.73 M^2
EST. GFR  (NON AFRICAN AMERICAN): >60 ML/MIN/1.73 M^2
EST. GFR  (NON AFRICAN AMERICAN): >60 ML/MIN/1.73 M^2
ESTIMATED AVG GLUCOSE: 108 MG/DL (ref 68–131)
GLUCOSE SERPL-MCNC: 85 MG/DL (ref 70–110)
GLUCOSE SERPL-MCNC: 85 MG/DL (ref 70–110)
HBA1C MFR BLD: 5.4 % (ref 4–5.6)
POTASSIUM SERPL-SCNC: 4.1 MMOL/L (ref 3.5–5.1)
POTASSIUM SERPL-SCNC: 4.1 MMOL/L (ref 3.5–5.1)
PROT SERPL-MCNC: 7.8 G/DL (ref 6–8.4)
SODIUM SERPL-SCNC: 145 MMOL/L (ref 136–145)
SODIUM SERPL-SCNC: 145 MMOL/L (ref 136–145)

## 2022-03-22 PROCEDURE — 83036 HEMOGLOBIN GLYCOSYLATED A1C: CPT | Performed by: INTERNAL MEDICINE

## 2022-03-22 PROCEDURE — 36415 COLL VENOUS BLD VENIPUNCTURE: CPT | Mod: PO | Performed by: INTERNAL MEDICINE

## 2022-03-22 PROCEDURE — 80053 COMPREHEN METABOLIC PANEL: CPT | Performed by: INTERNAL MEDICINE

## 2022-03-22 NOTE — TELEPHONE ENCOUNTER
----- Message from Milagros Wolf MD sent at 3/22/2022  4:52 PM CDT -----  Please review your lab work and we will discuss at your pending office visit.   Milagros Mcgill

## 2022-03-25 ENCOUNTER — HOSPITAL ENCOUNTER (OUTPATIENT)
Dept: RADIOLOGY | Facility: HOSPITAL | Age: 57
Discharge: HOME | End: 2022-03-25
Attending: PSYCHIATRY & NEUROLOGY
Payer: COMMERCIAL

## 2022-03-25 DIAGNOSIS — G35 MULTIPLE SCLEROSIS: ICD-10-CM

## 2022-03-25 PROCEDURE — 72156 MRI CERVICAL SPINE DEMYELINATING W W/O CONTRAST: ICD-10-PCS | Mod: 26,,, | Performed by: RADIOLOGY

## 2022-03-25 PROCEDURE — 70553 MRI BRAIN STEM W/O & W/DYE: CPT | Mod: TC

## 2022-03-25 PROCEDURE — 70553 MRI BRAIN STEM W/O & W/DYE: CPT | Mod: 26,,, | Performed by: RADIOLOGY

## 2022-03-25 PROCEDURE — 72157 MRI THORACIC SPINE DEMYELINATING W W/O CONTRAST: ICD-10-PCS | Mod: 26,,, | Performed by: RADIOLOGY

## 2022-03-25 PROCEDURE — 70553 MRI BRAIN DEMYELINATING W/ WO CONTRAST: ICD-10-PCS | Mod: 26,,, | Performed by: RADIOLOGY

## 2022-03-25 PROCEDURE — 72157 MRI CHEST SPINE W/O & W/DYE: CPT | Mod: 26,,, | Performed by: RADIOLOGY

## 2022-03-25 PROCEDURE — 25500020 PHARM REV CODE 255: Performed by: PSYCHIATRY & NEUROLOGY

## 2022-03-25 PROCEDURE — 72156 MRI NECK SPINE W/O & W/DYE: CPT | Mod: 26,,, | Performed by: RADIOLOGY

## 2022-03-25 PROCEDURE — A9585 GADOBUTROL INJECTION: HCPCS | Performed by: PSYCHIATRY & NEUROLOGY

## 2022-03-25 PROCEDURE — 72156 MRI NECK SPINE W/O & W/DYE: CPT | Mod: TC

## 2022-03-25 PROCEDURE — 72157 MRI CHEST SPINE W/O & W/DYE: CPT | Mod: TC

## 2022-03-25 RX ORDER — GADOBUTROL 604.72 MG/ML
10 INJECTION INTRAVENOUS
Status: COMPLETED | OUTPATIENT
Start: 2022-03-25 | End: 2022-03-25

## 2022-03-25 RX ADMIN — GADOBUTROL 10 ML: 604.72 INJECTION INTRAVENOUS at 05:03

## 2022-03-26 NOTE — PROGRESS NOTES
56 y.o. femalepresents in f/u to diabetes, hypertension, and dyslipidemia  and MS, under the care of Neurology  Taking an injection 3 days /week  grateful to have a strong family that are helping her deal w/ the illness    DM w/ neuro tx w/metfomrin 1,000mg 1/2 qd, Till developed diarrhea then decreased to one daily and tolerating now   decreased intake soda drinks  Denied increased thirst, urination, or hypoglycemia episode  A1C ==> 5.4    HTN tx w/HCTZ( ave 1/qoweek) and amldipine 5mg   Denied HA or dizziness  BP today==>128/86    Dyslipidemia tx w/ rosuvastatin 20mg  Denies unusual muscle pain or chest pain  LDL==>69.2      ROS:  No fever, chills, or night sweats  No blurry vision or visual disturbance  No dysphagia or early satiety  No palpitations or tachycardia  No cough or wheezing  No GERD or abdominal pain    Remainder of review negative except as previously noted    PAST MEDICAL HX: Reviewed  PAST SURGICAL HX: Reviewed  SOCIAL HX: Reviewed  FAMILY HX: Reviewed    PHYSICAL EXAM:  VS: As noted  GENERAL: WDWN, A&O, NAD, conversant and co-operative;   pleasant as always  EYES: Conj/lids unremarkable, sclera anicteric,  NECK: Supple w/o lymphadenopathy or thyromegaly  RESPIRATORY: Efforts are unlabored. Lungs CTAP  CARDIOVASCULAR: Heart RRR. No carotid bruits noted.   1+ pedal pulses. No LE edema  MUSCULOSKELETAL: Gait normal. No CCE  Shoulders: left tender anteriorly/lateraly and upper trapezius  Discomfort w/ ROM  NEUROLOGIC: WILDER. No tremor noted  SKIN: Warm and dry  FEET: Monofilament - intact  Vibratory decreased metatarsals  No lesions or ulcers    IMPRESSION:  DM w/ neuropathy,     HLD, asx  HTN, stable  MS, under care of Neurology  Acute left shoulder pain- several days, was lifting weights      PLAN:  Diclofenac gel  Consider shoulder xray, if sx persist  Diabetic diet  Low salt, low fat diet  Exercise   Iron rich foods  Call prn   RTC 6 mos

## 2022-03-29 ENCOUNTER — OFFICE VISIT (OUTPATIENT)
Dept: INTERNAL MEDICINE | Facility: CLINIC | Age: 57
End: 2022-03-29
Payer: COMMERCIAL

## 2022-03-29 VITALS
HEART RATE: 80 BPM | BODY MASS INDEX: 37.74 KG/M2 | DIASTOLIC BLOOD PRESSURE: 86 MMHG | OXYGEN SATURATION: 98 % | TEMPERATURE: 98 F | WEIGHT: 234.81 LBS | SYSTOLIC BLOOD PRESSURE: 128 MMHG | HEIGHT: 66 IN | RESPIRATION RATE: 16 BRPM

## 2022-03-29 DIAGNOSIS — E78.2 MIXED HYPERLIPIDEMIA: ICD-10-CM

## 2022-03-29 DIAGNOSIS — E11.40 CONTROLLED TYPE 2 DIABETES MELLITUS WITH DIABETIC NEUROPATHY, WITHOUT LONG-TERM CURRENT USE OF INSULIN: Primary | ICD-10-CM

## 2022-03-29 DIAGNOSIS — I10 ESSENTIAL HYPERTENSION: ICD-10-CM

## 2022-03-29 DIAGNOSIS — M25.512 ACUTE PAIN OF LEFT SHOULDER: ICD-10-CM

## 2022-03-29 DIAGNOSIS — G35 MULTIPLE SCLEROSIS: ICD-10-CM

## 2022-03-29 PROCEDURE — 3079F PR MOST RECENT DIASTOLIC BLOOD PRESSURE 80-89 MM HG: ICD-10-PCS | Mod: CPTII,S$GLB,, | Performed by: INTERNAL MEDICINE

## 2022-03-29 PROCEDURE — 99214 OFFICE O/P EST MOD 30 MIN: CPT | Mod: S$GLB,,, | Performed by: INTERNAL MEDICINE

## 2022-03-29 PROCEDURE — 3008F BODY MASS INDEX DOCD: CPT | Mod: CPTII,S$GLB,, | Performed by: INTERNAL MEDICINE

## 2022-03-29 PROCEDURE — 3044F HG A1C LEVEL LT 7.0%: CPT | Mod: CPTII,S$GLB,, | Performed by: INTERNAL MEDICINE

## 2022-03-29 PROCEDURE — 3079F DIAST BP 80-89 MM HG: CPT | Mod: CPTII,S$GLB,, | Performed by: INTERNAL MEDICINE

## 2022-03-29 PROCEDURE — 3008F PR BODY MASS INDEX (BMI) DOCUMENTED: ICD-10-PCS | Mod: CPTII,S$GLB,, | Performed by: INTERNAL MEDICINE

## 2022-03-29 PROCEDURE — 99999 PR PBB SHADOW E&M-EST. PATIENT-LVL V: CPT | Mod: PBBFAC,,, | Performed by: INTERNAL MEDICINE

## 2022-03-29 PROCEDURE — 3044F PR MOST RECENT HEMOGLOBIN A1C LEVEL <7.0%: ICD-10-PCS | Mod: CPTII,S$GLB,, | Performed by: INTERNAL MEDICINE

## 2022-03-29 PROCEDURE — 1160F RVW MEDS BY RX/DR IN RCRD: CPT | Mod: CPTII,S$GLB,, | Performed by: INTERNAL MEDICINE

## 2022-03-29 PROCEDURE — 99214 PR OFFICE/OUTPT VISIT, EST, LEVL IV, 30-39 MIN: ICD-10-PCS | Mod: S$GLB,,, | Performed by: INTERNAL MEDICINE

## 2022-03-29 PROCEDURE — 4010F PR ACE/ARB THEARPY RXD/TAKEN: ICD-10-PCS | Mod: CPTII,S$GLB,, | Performed by: INTERNAL MEDICINE

## 2022-03-29 PROCEDURE — 1159F PR MEDICATION LIST DOCUMENTED IN MEDICAL RECORD: ICD-10-PCS | Mod: CPTII,S$GLB,, | Performed by: INTERNAL MEDICINE

## 2022-03-29 PROCEDURE — 3074F SYST BP LT 130 MM HG: CPT | Mod: CPTII,S$GLB,, | Performed by: INTERNAL MEDICINE

## 2022-03-29 PROCEDURE — 4010F ACE/ARB THERAPY RXD/TAKEN: CPT | Mod: CPTII,S$GLB,, | Performed by: INTERNAL MEDICINE

## 2022-03-29 PROCEDURE — 99999 PR PBB SHADOW E&M-EST. PATIENT-LVL V: ICD-10-PCS | Mod: PBBFAC,,, | Performed by: INTERNAL MEDICINE

## 2022-03-29 PROCEDURE — 1159F MED LIST DOCD IN RCRD: CPT | Mod: CPTII,S$GLB,, | Performed by: INTERNAL MEDICINE

## 2022-03-29 PROCEDURE — 3074F PR MOST RECENT SYSTOLIC BLOOD PRESSURE < 130 MM HG: ICD-10-PCS | Mod: CPTII,S$GLB,, | Performed by: INTERNAL MEDICINE

## 2022-03-29 PROCEDURE — 1160F PR REVIEW ALL MEDS BY PRESCRIBER/CLIN PHARMACIST DOCUMENTED: ICD-10-PCS | Mod: CPTII,S$GLB,, | Performed by: INTERNAL MEDICINE

## 2022-03-29 RX ORDER — DICLOFENAC SODIUM 10 MG/G
4 GEL TOPICAL 3 TIMES DAILY
Qty: 100 G | Refills: 2 | Status: SHIPPED | OUTPATIENT
Start: 2022-03-29 | End: 2022-08-02

## 2022-04-01 ENCOUNTER — OFFICE VISIT (OUTPATIENT)
Dept: URGENT CARE | Facility: CLINIC | Age: 57
End: 2022-04-01
Payer: COMMERCIAL

## 2022-04-01 VITALS
SYSTOLIC BLOOD PRESSURE: 150 MMHG | HEIGHT: 66 IN | TEMPERATURE: 98 F | BODY MASS INDEX: 37.61 KG/M2 | DIASTOLIC BLOOD PRESSURE: 90 MMHG | OXYGEN SATURATION: 99 % | RESPIRATION RATE: 18 BRPM | WEIGHT: 234 LBS | HEART RATE: 86 BPM

## 2022-04-01 DIAGNOSIS — S63.632A SPRAIN OF INTERPHALANGEAL JOINT OF RIGHT MIDDLE FINGER, INITIAL ENCOUNTER: ICD-10-CM

## 2022-04-01 DIAGNOSIS — M25.512 ACUTE PAIN OF LEFT SHOULDER: ICD-10-CM
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
## 2022-04-01 PROCEDURE — 99214 OFFICE O/P EST MOD 30 MIN: CPT | Mod: S$GLB,,, | Performed by: NURSE PRACTITIONER

## 2022-04-01 PROCEDURE — 4010F PR ACE/ARB THEARPY RXD/TAKEN: ICD-10-PCS | Mod: CPTII,S$GLB,, | Performed by: NURSE PRACTITIONER

## 2022-04-01 PROCEDURE — 3044F PR MOST RECENT HEMOGLOBIN A1C LEVEL <7.0%: ICD-10-PCS | Mod: CPTII,S$GLB,, | Performed by: NURSE PRACTITIONER

## 2022-04-01 PROCEDURE — 1160F RVW MEDS BY RX/DR IN RCRD: CPT | Mod: CPTII,S$GLB,, | Performed by: NURSE PRACTITIONER

## 2022-04-01 PROCEDURE — 3077F SYST BP >= 140 MM HG: CPT | Mod: CPTII,S$GLB,, | Performed by: NURSE PRACTITIONER

## 2022-04-01 PROCEDURE — 99214 PR OFFICE/OUTPT VISIT, EST, LEVL IV, 30-39 MIN: ICD-10-PCS | Mod: S$GLB,,, | Performed by: NURSE PRACTITIONER

## 2022-04-01 PROCEDURE — 1159F PR MEDICATION LIST DOCUMENTED IN MEDICAL RECORD: ICD-10-PCS | Mod: CPTII,S$GLB,, | Performed by: NURSE PRACTITIONER

## 2022-04-01 PROCEDURE — 73140 XR FINGER 2 OR MORE VIEWS: ICD-10-PCS | Mod: S$GLB,,, | Performed by: RADIOLOGY

## 2022-04-01 PROCEDURE — 3008F PR BODY MASS INDEX (BMI) DOCUMENTED: ICD-10-PCS | Mod: CPTII,S$GLB,, | Performed by: NURSE PRACTITIONER

## 2022-04-01 PROCEDURE — 1160F PR REVIEW ALL MEDS BY PRESCRIBER/CLIN PHARMACIST DOCUMENTED: ICD-10-PCS | Mod: CPTII,S$GLB,, | Performed by: NURSE PRACTITIONER

## 2022-04-01 PROCEDURE — 4010F ACE/ARB THERAPY RXD/TAKEN: CPT | Mod: CPTII,S$GLB,, | Performed by: NURSE PRACTITIONER

## 2022-04-01 PROCEDURE — 1159F MED LIST DOCD IN RCRD: CPT | Mod: CPTII,S$GLB,, | Performed by: NURSE PRACTITIONER

## 2022-04-01 PROCEDURE — 73140 X-RAY EXAM OF FINGER(S): CPT | Mod: S$GLB,,, | Performed by: RADIOLOGY

## 2022-04-01 PROCEDURE — 3080F DIAST BP >= 90 MM HG: CPT | Mod: CPTII,S$GLB,, | Performed by: NURSE PRACTITIONER

## 2022-04-01 PROCEDURE — 3080F PR MOST RECENT DIASTOLIC BLOOD PRESSURE >= 90 MM HG: ICD-10-PCS | Mod: CPTII,S$GLB,, | Performed by: NURSE PRACTITIONER

## 2022-04-01 PROCEDURE — 3008F BODY MASS INDEX DOCD: CPT | Mod: CPTII,S$GLB,, | Performed by: NURSE PRACTITIONER

## 2022-04-01 PROCEDURE — 3044F HG A1C LEVEL LT 7.0%: CPT | Mod: CPTII,S$GLB,, | Performed by: NURSE PRACTITIONER

## 2022-04-01 PROCEDURE — 3077F PR MOST RECENT SYSTOLIC BLOOD PRESSURE >= 140 MM HG: ICD-10-PCS | Mod: CPTII,S$GLB,, | Performed by: NURSE PRACTITIONER

## 2022-04-01 RX ORDER — IBUPROFEN 600 MG/1
600 TABLET ORAL 3 TIMES DAILY PRN
Qty: 21 TABLET | Refills: 0 | Status: SHIPPED | OUTPATIENT
Start: 2022-04-01 | End: 2022-04-01

## 2022-04-01 NOTE — PROGRESS NOTES
"Subjective:       Patient ID: Miesha Domínguez is a 56 y.o. female.    Vitals:  height is 5' 5.98" (1.676 m) and weight is 106.1 kg (234 lb). Her tympanic temperature is 98.2 °F (36.8 °C). Her blood pressure is 150/90 (abnormal) and her pulse is 86. Her respiration is 18 and oxygen saturation is 99%.     Chief Complaint: Fall    56 year old female presents to  today for evaluation after a fall at [employer] about 10:20 this morning. She states she slipped and fell on wet floor. She denies hitting her head or any LOC. She states she has right middle finger pain and also left shoulder and shoulder blade pain. No radiation of pain to upper ext. No numbness or tingling.   She states that she also been doing a lot of upper extremity exercising and has also been sore to the same area.     Fall  The accident occurred 6 to 12 hours ago. The fall occurred while walking. There was no blood loss. The pain is present in the left shoulder. The pain is at a severity of 2/10. The pain is moderate. Pertinent negatives include no fever, headaches, numbness, tingling, visual change or vomiting. She has tried acetaminophen for the symptoms. The treatment provided mild relief.       Constitution: Negative for fever.   Gastrointestinal: Negative for vomiting.   Skin: Negative for erythema.   Neurological: Negative for headaches and numbness.       Objective:      Physical Exam   Constitutional: She is oriented to person, place, and time. She appears well-developed.   HENT:   Head: Normocephalic and atraumatic. Head is without abrasion, without contusion and without laceration.   Ears:   Right Ear: External ear normal.   Left Ear: External ear normal.   Nose: Nose normal.   Mouth/Throat: Oropharynx is clear and moist and mucous membranes are normal.   Eyes: Conjunctivae, EOM and lids are normal. Pupils are equal, round, and reactive to light.   Neck: Trachea normal and phonation normal. Neck supple.   Cardiovascular: Normal rate, " regular rhythm and normal heart sounds.   Pulmonary/Chest: Effort normal and breath sounds normal. No stridor. No respiratory distress.   Musculoskeletal: Normal range of motion.         General: Normal range of motion.      Left shoulder: Normal.      Left elbow: Normal.      Left wrist: Normal.      Left upper arm: Normal.      Left forearm: Normal.      Right hand: Right middle finger: Exhibits swelling and tenderness (PIP joint ). There is tenderness of the PIP joint.      Comments: Some tenderness noted over left shoulder blade/chest area. No signs of contusions noted. Mild tenderness to palpation. Pain with movement.   Pain with flexion of right third digit. NVIT, sensation intact  Cap refill <2 seconds.    Neurological: She is alert and oriented to person, place, and time.   Skin: Skin is warm, dry, intact and no rash. Capillary refill takes less than 2 seconds. No abrasion, No burn, No bruising, No erythema and No ecchymosis   Psychiatric: Her speech is normal and behavior is normal. Judgment and thought content normal.   Nursing note and vitals reviewed.        X-ray finger: No acute displaced fracture or dislocation of the visualized 5th digit.  No radiopaque foreign body.  Assessment:       1. Fall, initial encounter    2. Sprain of interphalangeal joint of right middle finger, initial encounter    3. Acute pain of left shoulder          Plan:       Hx of gastric surgery, will avoid NSAID's at this time. Ice affected areas and take tylenol as needed for pain.     Fall, initial encounter      Sprain of interphalangeal joint of right middle finger, initial encounter  -     X-Ray Finger 2 or More Views; Future; Expected date: 04/01/2022    Acute pain of left shoulder                      Patient Instructions   ICE AFFECTED AREAS   TAKE TYLENOL AS NEEDED FOR PAIN  AVOID IBUPROFEN WITH YOUR HISTORY OF BARIATRIC SURGERY      You must understand that you've received an Urgent Care treatment only and that you may  be released before all your medical problems are known or treated. You, the patient, will arrange for follow up care as instructed.  If your condition worsens we recommend that you receive another evaluation at the emergency room immediately or contact your primary medical clinics after hours call service to discuss your concerns.  Please return here or go to the Emergency Department for any concerns or worsening of condition.

## 2022-04-01 NOTE — PATIENT INSTRUCTIONS
ICE AFFECTED AREAS   TAKE TYLENOL AS NEEDED FOR PAIN  AVOID IBUPROFEN WITH YOUR HISTORY OF BARIATRIC SURGERY      You must understand that you've received an Urgent Care treatment only and that you may be released before all your medical problems are known or treated. You, the patient, will arrange for follow up care as instructed.  If your condition worsens we recommend that you receive another evaluation at the emergency room immediately or contact your primary medical clinics after hours call service to discuss your concerns.  Please return here or go to the Emergency Department for any concerns or worsening of condition.

## 2022-04-03 ENCOUNTER — TELEPHONE (OUTPATIENT)
Dept: URGENT CARE | Facility: CLINIC | Age: 57
End: 2022-04-03
Payer: COMMERCIAL

## 2022-04-04 ENCOUNTER — TELEPHONE (OUTPATIENT)
Dept: INTERNAL MEDICINE | Facility: CLINIC | Age: 57
End: 2022-04-04
Payer: COMMERCIAL

## 2022-04-04 NOTE — TELEPHONE ENCOUNTER
She went to urgent care 4/1 and they did xrays. Looks like They didn't give rx.    I asked her to call with more information. Is she better?  Does she want a referral to orthopedics if not getting better?

## 2022-04-04 NOTE — TELEPHONE ENCOUNTER
----- Message from Magui Siddiqui sent at 4/4/2022  7:07 AM CDT -----  Contact: Pt [phone]  Patient would like to get medical advice.  Symptoms (please be specific): Left side shoulder & neck pain (recent fall)  How long have you had these symptoms: on going  Would you like a call back, or a response through your [facility] portal?:  Call back  Pharmacy name and phone # (copy from chart):    [id number] [place] [facility] [address]  [address]  [place] [address]  Phone: [phone] Fax: [phone]  Comments:    Patient states she spoke with you over the weekend and stated that you want her to go get xrays of the the area however they're are no orders listed.    Please call and advise.    Thank You

## 2022-04-07 ENCOUNTER — PATIENT MESSAGE (OUTPATIENT)
Dept: BARIATRICS | Facility: CLINIC | Age: 57
End: 2022-04-07
Payer: COMMERCIAL

## 2022-04-12 ENCOUNTER — PATIENT MESSAGE (OUTPATIENT)
Dept: BARIATRICS | Facility: CLINIC | Age: 57
End: 2022-04-12
Payer: COMMERCIAL

## 2022-04-29 RX ORDER — METFORMIN HYDROCHLORIDE 1000 MG/1
500 TABLET ORAL
Qty: 90 TABLET | Refills: 3 | Status: SHIPPED | OUTPATIENT
Start: 2022-04-29 | End: 2022-05-19

## 2022-05-04 ENCOUNTER — PATIENT MESSAGE (OUTPATIENT)
Dept: BARIATRICS | Facility: CLINIC | Age: 57
End: 2022-05-04
Payer: COMMERCIAL

## 2022-05-04 ENCOUNTER — TELEPHONE (OUTPATIENT)
Dept: BARIATRICS | Facility: CLINIC | Age: 57
End: 2022-05-04

## 2022-05-05 ENCOUNTER — PATIENT MESSAGE (OUTPATIENT)
Dept: BARIATRICS | Facility: CLINIC | Age: 57
End: 2022-05-05
Payer: COMMERCIAL

## 2022-05-10 ENCOUNTER — PATIENT MESSAGE (OUTPATIENT)
Dept: BARIATRICS | Facility: CLINIC | Age: 57
End: 2022-05-10
Payer: COMMERCIAL

## 2022-05-19 ENCOUNTER — OFFICE VISIT (OUTPATIENT)
Dept: BARIATRICS | Facility: CLINIC | Age: 57
End: 2022-05-19
Payer: COMMERCIAL

## 2022-05-19 VITALS
DIASTOLIC BLOOD PRESSURE: 84 MMHG | OXYGEN SATURATION: 100 % | SYSTOLIC BLOOD PRESSURE: 112 MMHG | HEIGHT: 65 IN | WEIGHT: 236.13 LBS | BODY MASS INDEX: 39.34 KG/M2 | HEART RATE: 89 BPM

## 2022-05-19 DIAGNOSIS — E66.01 CLASS 2 SEVERE OBESITY WITH BODY MASS INDEX (BMI) OF 35 TO 39.9 WITH SERIOUS COMORBIDITY: ICD-10-CM

## 2022-05-19 DIAGNOSIS — E11.9 CONTROLLED TYPE 2 DIABETES MELLITUS WITHOUT COMPLICATION, WITHOUT LONG-TERM CURRENT USE OF INSULIN: ICD-10-CM

## 2022-05-19 DIAGNOSIS — Z98.84 S/P LAPAROSCOPIC SLEEVE GASTRECTOMY: ICD-10-CM

## 2022-05-19 PROCEDURE — 3008F PR BODY MASS INDEX (BMI) DOCUMENTED: ICD-10-PCS | Mod: CPTII,S$GLB,, | Performed by: INTERNAL MEDICINE

## 2022-05-19 PROCEDURE — 3079F PR MOST RECENT DIASTOLIC BLOOD PRESSURE 80-89 MM HG: ICD-10-PCS | Mod: CPTII,S$GLB,, | Performed by: INTERNAL MEDICINE

## 2022-05-19 PROCEDURE — 4010F PR ACE/ARB THEARPY RXD/TAKEN: ICD-10-PCS | Mod: CPTII,S$GLB,, | Performed by: INTERNAL MEDICINE

## 2022-05-19 PROCEDURE — 99999 PR PBB SHADOW E&M-EST. PATIENT-LVL V: CPT | Mod: PBBFAC,,, | Performed by: INTERNAL MEDICINE

## 2022-05-19 PROCEDURE — 3079F DIAST BP 80-89 MM HG: CPT | Mod: CPTII,S$GLB,, | Performed by: INTERNAL MEDICINE

## 2022-05-19 PROCEDURE — 3074F SYST BP LT 130 MM HG: CPT | Mod: CPTII,S$GLB,, | Performed by: INTERNAL MEDICINE

## 2022-05-19 PROCEDURE — 4010F ACE/ARB THERAPY RXD/TAKEN: CPT | Mod: CPTII,S$GLB,, | Performed by: INTERNAL MEDICINE

## 2022-05-19 PROCEDURE — 3074F PR MOST RECENT SYSTOLIC BLOOD PRESSURE < 130 MM HG: ICD-10-PCS | Mod: CPTII,S$GLB,, | Performed by: INTERNAL MEDICINE

## 2022-05-19 PROCEDURE — 99214 OFFICE O/P EST MOD 30 MIN: CPT | Mod: S$GLB,,, | Performed by: INTERNAL MEDICINE

## 2022-05-19 PROCEDURE — 1159F MED LIST DOCD IN RCRD: CPT | Mod: CPTII,S$GLB,, | Performed by: INTERNAL MEDICINE

## 2022-05-19 PROCEDURE — 99214 PR OFFICE/OUTPT VISIT, EST, LEVL IV, 30-39 MIN: ICD-10-PCS | Mod: S$GLB,,, | Performed by: INTERNAL MEDICINE

## 2022-05-19 PROCEDURE — 3044F HG A1C LEVEL LT 7.0%: CPT | Mod: CPTII,S$GLB,, | Performed by: INTERNAL MEDICINE

## 2022-05-19 PROCEDURE — 3008F BODY MASS INDEX DOCD: CPT | Mod: CPTII,S$GLB,, | Performed by: INTERNAL MEDICINE

## 2022-05-19 PROCEDURE — 1159F PR MEDICATION LIST DOCUMENTED IN MEDICAL RECORD: ICD-10-PCS | Mod: CPTII,S$GLB,, | Performed by: INTERNAL MEDICINE

## 2022-05-19 PROCEDURE — 99999 PR PBB SHADOW E&M-EST. PATIENT-LVL V: ICD-10-PCS | Mod: PBBFAC,,, | Performed by: INTERNAL MEDICINE

## 2022-05-19 PROCEDURE — 3044F PR MOST RECENT HEMOGLOBIN A1C LEVEL <7.0%: ICD-10-PCS | Mod: CPTII,S$GLB,, | Performed by: INTERNAL MEDICINE

## 2022-05-19 RX ORDER — SEMAGLUTIDE 1.34 MG/ML
1 INJECTION, SOLUTION SUBCUTANEOUS
Qty: 3 PEN | Refills: 0 | Status: SHIPPED | OUTPATIENT
Start: 2022-05-19 | End: 2022-08-15

## 2022-05-19 NOTE — PATIENT INSTRUCTIONS
- To lose weight you want to cut 100% starchy carbohydrates out of your diet (bread, rice, pasta, potatoes, granola, flour, corn, peas, oatmeal, grits, tortillas, crackers, chips) and get  grams of protein.  Aim for 100 grams of protein daily.    - No soda, sweet tea, juices, sports drinks or lemonade     -Exercise daily. Continue. Add some type of resistance training 2-3 days a week. These can be body weight exercises, light weight or elastic bands. [employer] and [employer] are great sources for free work out plans and videos.           Continue Ozempic 1mg once a week.     Decrease portions as soon as you start Ozempic. Some nausea in the first 2 weeks is not unusual.     If you get pain across the upper abdomen and around to your back, please call the office.    Tips for preparing for hurricane season:    If you are on weight loss medications, please take your medication with you in case of evacuation. Injectable medications should be transported with an ice pack if unopened or room temperature if you have started to use them.   Hurricane supplies do not necessarily need to be junk food or high in carbs or sugar. Shelf stable and healthy choices to include in your supplies could include:  Canned/packet of tuna or chicken  Apples, oranges, banana, pears  Beef or turkey jerky  Sugar free pudding  Pickle, olives, dill relish (mix with the tuna or chicken!)  Low sodium or no salt added canned vegetables  If you get bread, get lite bread (40 calories a slice)  0 sugar sports drinks  Water  String cheese will be okay for a few days without refrigeration or in an ice chest.   Peanut or other nut butter.   Parmesan crisps  Pork skins  Protein shakes (20-30g protein, less than 5 g sugar)  Protein bars (15 or more g protein, less than 5 g sugar)  Don't forget disposable forks, spoons, plates in your supplies  If evacuated, manage stress by taking walks, reading or meditating.   If eating out make better choices when  possible.   Salads with a lean protein and limited dressing, cheese or other toppings  Grilled chicken sandwich or burger with out the bun.   Skip the fries!  Order water or unsweetened tea instead of soda  Grocery stores with a deli, salad/food bar can be a good quick and affordable option to replace fast food

## 2022-05-19 NOTE — PROGRESS NOTES
Subjective:       Patient ID: Miesha Domínguez is a 56 y.o. female.    Chief Complaint: Follow-up    Pt here today for follow-up.   Has gained 4more  lbs since last in the office  net neg 5 lbs.  Started ozempic 1 mg. Has been out of it for 3 weeks. Added diehtylpropion last filled 2/20/22. She feels she is eating less and her appetite is down.  She did to incorporate more resistance training. Has been losing some inches. She had some shoulder pain. Has been walking instead of weights.    Only SE was constipation at first. Used miralax with good relief.  Having trouble planning her meals 2/2 to calcium oxalate in urine.     BP is good today.  Had calcium oxalate in urine.   BS had been in 70-80s fasting.  She is s/p lap gastric sleeve - 7/29/13.    BP and labs look good.     Lab Results   Component Value Date    HGBA1C 5.4 03/22/2022    HGBA1C 5.5 09/20/2021    HGBA1C 5.5 09/20/2021     Lab Results   Component Value Date    LDLCALC 69.2 09/20/2021    CREATININE 0.8 03/22/2022    CREATININE 0.8 03/22/2022           Neurology has stopped topiramate. Being worked up with neurology for demyelinating d/o.  Follow-up  Associated symptoms include myalgias. Pertinent negatives include + arthralgias, chest pain, chills or fever.     Review of Systems   Constitutional: Negative for chills and fever.   Respiratory: Negative for shortness of breath.         Denies Snoring   Cardiovascular: Negative for chest pain and leg swelling.   Gastrointestinal: Negative for constipation and diarrhea.        Denies GERD   Genitourinary: Positive for menstrual problem. Negative for difficulty urinating.        Recent  bleeding. bx with polyps.    Musculoskeletal: Positive for myalgias. Negative for arthralgias and back pain.   Neurological: Negative for dizziness and light-headedness.   Psychiatric/Behavioral: Negative for dysphoric mood. The patient is not nervous/anxious.        Objective:     /84 (BP Location: Left arm, Patient  "Position: Sitting, BP Method: Large (Manual))   Pulse 89   Ht 5' 5" (1.651 m)   Wt 107.1 kg (236 lb 1.8 oz)   LMP 04/05/2019 (Approximate)   SpO2 100%   BMI 39.29 kg/m²     Physical Exam  Vitals signs reviewed.   Constitutional:       General: She is not in acute distress.     Appearance: She is well-developed.      Comments: Obese   HENT:      Head: Normocephalic and atraumatic.   Eyes:      General: No scleral icterus.     Pupils: Pupils are equal, round, and reactive to light.   Neck:      Musculoskeletal: Normal range of motion and neck supple.   Cardiovascular:      Rate and Rhythm: Normal rate.   Pulmonary:      Effort: Pulmonary effort is normal.   Musculoskeletal: Normal range of motion.   Skin:     General: Skin is warm and dry.      Findings: No erythema.   Neurological:      Mental Status: She is alert and oriented to person, place, and time.      Cranial Nerves: No cranial nerve deficit.   Psychiatric:         Behavior: Behavior normal.         Judgment: Judgment normal.         Assessment:       1. Controlled type 2 diabetes mellitus without complication, without long-term current use of insulin    2. Class 2 severe obesity with body mass index (BMI) of 35 to 39.9 with serious comorbidity    3. S/P laparoscopic sleeve gastrectomy        Plan:           Miesha was seen today for follow-up.    Diagnoses and all orders for this visit:    Controlled type 2 diabetes mellitus without complication, without long-term current use of insulin  -     semaglutide (OZEMPIC) 1 mg/dose (4 mg/3 mL); Inject 1 mg into the skin every 7 days.    Class 2 severe obesity with body mass index (BMI) of 35 to 39.9 with serious comorbidity    S/P laparoscopic sleeve gastrectomy         - To lose weight you want to cut 100% starchy carbohydrates out of your diet (bread, rice, pasta, potatoes, granola, flour, corn, peas, oatmeal, grits, tortillas, crackers, chips) and get  grams of protein.  Aim for 100 grams of protein " daily.    - No soda, sweet tea, juices, sports drinks or lemonade     -Exercise daily. Continue. Add some type of resistance training 2-3 days a week. These can be body weight exercises, light weight or elastic bands. [employer] and [employer] are great sources for free work out plans and videos.     Continue Ozempic 1mg once a week.     Decrease portions as soon as you start Ozempic. Some nausea in the first 2 weeks is not unusual.     If you get pain across the upper abdomen and around to your back, please call the office.        Hurricane tips given.

## 2022-05-25 ENCOUNTER — TELEPHONE (OUTPATIENT)
Dept: NEUROLOGY | Facility: CLINIC | Age: 57
End: 2022-05-25
Payer: COMMERCIAL

## 2022-05-25 NOTE — TELEPHONE ENCOUNTER
----- Message from Lisha Rodney sent at 5/25/2022 12:43 PM CDT -----  [facility].      glatiramer (COPAXONE, GLATOPA) 40 mg/mL injection          [facility] [place] [address]  [address] [place] [address]  Phone: [phone] Fax: [phone]        Confirmed patient's contact info below:  Contact Name: Miesha Domínguez  Phone Number: [phone]

## 2022-05-25 NOTE — TELEPHONE ENCOUNTER
----- Message from Rachel Valenzuela sent at 5/25/2022  5:24 PM CDT -----  Type:  Patient Returning Call    Who Called: pt   Who Left Message for Patient: pt   Does the patient know what this is regarding?: pt need a call from the nurse   Would the patient rather a call back or a response via [employer]?  Call   Best Call Back Number:[phone]  Additional Information:  call back

## 2022-05-30 ENCOUNTER — TELEPHONE (OUTPATIENT)
Dept: NEUROLOGY | Facility: CLINIC | Age: 57
End: 2022-05-30

## 2022-05-30 NOTE — TELEPHONE ENCOUNTER
----- Message from Marlene Valentin sent at 5/30/2022 11:47 AM CDT -----  Regarding: Pt appt  Contact: @ [phone]  Pt is calling to speak to someone to r/s appt on 6/15/22; is asking to be scheduled with Dr. 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 Pt would like to keep an appt for 6/15, if she can seen an MD and does not want to see a Nurse. Please call.

## 2022-06-10 ENCOUNTER — PATIENT MESSAGE (OUTPATIENT)
Dept: BARIATRICS | Facility: CLINIC | Age: 57
End: 2022-06-10
Payer: COMMERCIAL

## 2022-06-14 ENCOUNTER — PATIENT MESSAGE (OUTPATIENT)
Dept: BARIATRICS | Facility: CLINIC | Age: 57
End: 2022-06-14
Payer: COMMERCIAL

## 2022-06-14 NOTE — PROGRESS NOTES
Subjective:          Patient ID: Miesha Domínguez is a 56 y.o. female who presents today for a routine clinic visit for MS.  She was last seen by Dr. Cortez in January 2022. The history has been provided by the patient.     MS HPI:  · DMT: glatiramer acetate  · Side effects from DMT? No  · Taking vitamin D3 as recommended? Yes -  Dose: 2000 units daily   · She takes Lyrica for neuropathic pain. She feels like her pain is well controlled.   · Spasms are controlled with Flexeril.   · She had a fall on April 1st. She slipped on a wet floor. She fell on to the right side and braced herself on the right hand. She was able to get up on her own. She has had pain in the right middle finger since the fall. She had an xray, but it looks like this was done on the pinky.  She started experiencing more pain and throbbing in the middle finger in the last two weeks. She can move the finger, but it is not as mobile as the other fingers.   · She denies any new or different MS symptoms.   · She walks and rides her stationary bike for exercise.   · She works as a  for [employer].     Medications:  Current Outpatient Medications   Medication Sig    cholecalciferol, vitamin D3, (VITAMIN D3) 50 mcg (2,000 unit) Cap Take 1 capsule by mouth once daily.    cyanocobalamin, vitamin B-12, (VITAMIN B-12) 2,500 mcg Subl Place 1 tablet under the tongue once a week. Pt taking weekly    cyclobenzaprine (FLEXERIL) 5 MG tablet TAKE 1 TABLET BY MOUTH NIGHTLY AS NEEDED FOR MUSCLE SPASMS    diclofenac sodium (VOLTAREN) 1 % Gel Apply 4 g topically 3 (three) times daily.    diethylpropion (TENUATE) 75 mg SR tablet Take 1 tablet (75 mg total) by mouth once daily.    diphenhydrAMINE-acetaminophen (TYLENOL PM)  mg Tab Take 1 tablet by mouth nightly as needed.    fluticasone propionate (FLONASE) 50 mcg/actuation nasal spray 2 sprays by Each Nostril route once daily.    glatiramer (COPAXONE, GLATOPA) 40 mg/mL injection  INJECT 1 SYRINGE UNDER THE SKIN 3 TIMES A WEEK AT LEAST 48 HOURS APART. ALLOW TO WARM TO ROOM TEMP FOR 20 MINUTES. REFRIGERATE.    hydroCHLOROthiazide (HYDRODIURIL) 25 MG tablet TAKE 1 TABLET BY MOUTH EVERY DAY (Patient taking differently: Take 25 mg by mouth daily as needed.)    losartan (COZAAR) 100 MG tablet Take 1 tablet (100 mg total) by mouth once daily.    MULTIVITAMIN ORAL Take 1 tablet by mouth once daily. Centrum adult chews ( flavor burst)    pregabalin (LYRICA) 100 MG capsule TAKE 1 CAPSULE BY MOUTH TWICE A DAY    rosuvastatin (CRESTOR) 20 MG tablet Take 1 tablet (20 mg total) by mouth once daily.    semaglutide (OZEMPIC) 1 mg/dose (4 mg/3 mL) Inject 1 mg into the skin every 7 days.    sodium chloride (OCEAN) 0.65 % nasal spray 1 spray by Nasal route as needed for Congestion.       SOCIAL HISTORY  Social History     Tobacco Use    Smoking status: Never Smoker    Smokeless tobacco: Never Used   Substance Use Topics    Alcohol use: Yes     Comment: occassional for Holl[employer]    Drug use: No       Living arrangements - the patient lives with her  and niece.      ROS:  REVIEW OF SYMPTOMS 6/10/2022   Do you feel abnormally tired on most days? No   Do you feel you generally sleep well? Yes--finger discomfort at night    Do you have difficulty controlling your bladder?  No   Do you have difficulty controlling your bowels?  No   Do you have frequent muscle cramps, tightness or spasms in your limbs?  No--controlled    Do you have new visual symptoms?  No--wears reading glasses    Do you have worsening difficulty with your memory or thinking? No   Do you have worsening symptoms of anxiety or depression?  No   For patients who walk, Do you have more difficulty walking?  No   Have you fallen since your last visit?  Yes--as above   For patients who use wheelchairs: Do you have any skin wounds or breakdown? Not Applicable   Do you have difficulty using your hands?  No   Do you have shooting or burning  pain? No--just right middle finger    Do you have difficulty with sexual function?  No   If you are sexually active, are you using birth control? Y/N  N/A Not Applicable   Do you often choke when swallowing liquids or solid food?  No   Do you experience worsening symptoms when overheated? No--wears cooling towels and stays hydrated    Do you need any new equipment such as a wheelchair, walker or shower chair? No   Do you receive co-pay financial assistance for your principal MS medicine? Yes   Would you be interested in participating in an MS research trial in the future? No   For patients on Gilenya, Tecfidera, Aubagio, Rituxan, Ocrevus, Tysabri, Lemtrada or Methotrexate, are you aware that you should NOT receive live virus vaccines?  Not Applicable   Do you feel you have adequate family/friend support?  Yes   Do you have health insurance?   Yes   Are you currently employed? Yes   Do you receive SSDI/SSI?  Not Applicable   Do you use marijuana or cannabis products? No   Have you been diagnosed with a urinary tract infection since your last visit here? No   Have you been diagnosed with a respiratory tract infection since your last visit here? No   Have you been to the emergency room since your last visit here? No   Have you been hospitalized since your last visit here?  No            Objective:        1. 25 foot timed walk:  Timed 25 Foot Walk: 5/28/2021 6/15/2022   Did patient wear an AFO? No No   Was assistive device used? No No   Time for 25 Foot Walk (seconds) 4.55 4.51   Time for 25 Foot Walk (seconds) 4.55 4.51       Neurologic Exam     Mental Status   Oriented to person, place, and time.   Attention: normal. Concentration: normal.   Speech: speech is normal   Level of consciousness: alert  Knowledge: good.   Normal comprehension.     Cranial Nerves     CN II   Visual acuity: normal (20/25 OD and OS (reading glasses on right, no correction on left))    CN III, IV, VI   Pupils are equal, round, and reactive to  light.  Extraocular motions are normal.   Right pupil: Shape: regular. Reactivity: brisk.   Left pupil: Shape: regular. Reactivity: brisk.   CN III: no CN III palsy  CN VI: no CN VI palsy  Nystagmus: none     CN V   Right facial sensation deficit: none  Left facial sensation deficit: none    CN VII   Right facial weakness: none  Left facial weakness: none    CN VIII   Hearing: intact    CN IX, X   Palate: symmetric    CN XI   Right sternocleidomastoid strength: normal  Left sternocleidomastoid strength: normal  Right trapezius strength: normal  Left trapezius strength: normal    CN XII   Tongue deviation: none    Motor Exam   Muscle bulk: normal  Overall muscle tone: normal    Strength   Strength 5/5 throughout.   Right neck flexion: 5/5  Left neck flexion: 5/5  Right neck extension: 5/5  Left neck extension: 5/5  Right deltoid: 5/5  Left deltoid: 5/5  Right biceps: 5/5  Left biceps: 5/5  Right triceps: 5/5  Left triceps: 5/5  Right wrist flexion: 5/5  Left wrist flexion: 5/5  Right wrist extension: 5/5  Left wrist extension: 5/5  Right interossei: 5/5  Left interossei: 5/5  Right iliopsoas: 5/5  Left iliopsoas: 5/5  Right quadriceps: 5/5  Left quadriceps: 5/5  Right hamstrin/5  Left hamstrin/5  Right anterior tibial: 5/5  Left anterior tibial: 5/5  Right gastroc: 5/5  Left gastroc: 5/5    Sensory Exam   Right arm vibration: normal  Left arm vibration: normal  Right leg vibration: decreased from toes  Left leg vibration: decreased from toes  Mildly diminished vibratory sense in feet      Gait, Coordination, and Reflexes     Gait  Gait: normal    Coordination   Romberg: negative  Finger to nose coordination: normal  Heel to shin coordination: normal  Tandem walking coordination: normal    Tremor   Resting tremor: absent    Reflexes   Right brachioradialis: 2+  Left brachioradialis: 2+  Right biceps: 2+  Left biceps: 2+  Right triceps: 2+  Left triceps: 2+  Right patellar: 2+  Left patellar: 2+  Right  achilles: 2+  Left achilles: 2+  Right plantar: normal  Left plantar: normal  She can walk on toes and heels and hop on each foot ten times.   Normal RSM in UE and LE       Imaging:         Results for orders placed during the hospital encounter of 03/25/22    MRI Brain Demyelinating W W/O Contrast    Impression  No significant change from prior.  Stable few scattered small to punctate size regions of T2 FLAIR signal abnormality throughout the brain parenchyma remain concerning for mild degree of prior demyelinating plaque burden in light of history.  No definite new lesion or enhancing lesion to suggest significant interval or active demyelination.    Clinical correlation and follow-up advised.      Electronically signed by: Huy Villatoro DO  Date:    03/26/2022  Time:    09:40    Results for orders placed during the hospital encounter of 03/25/22    MRI Cervical Spine Demyelinating W W/O Contrast    Impression  Continued short segment regions of T2 stir signal abnormality in the cervical and thoracic cord as detailed above most compatible with prior areas of demyelination in light of history. No new cord signal abnormality or abnormal intrathecal enhancement to suggest significant interval or active demyelination.    Stable ventral positioning of the cervical cord with slight dorsal flattening underlying arachnoid cyst versus web remains in differential.    Continued scattered degenerative change of the cervical spine as detailed above without significant central canal stenosis.    See above for additional details.      Electronically signed by: Huy Villatoro DO  Date:    03/26/2022  Time:    09:40    Results for orders placed during the hospital encounter of 03/25/22    MRI Thoracic Spine Demyelinating W W/O Contrast    Impression  Continued short segment regions of T2 stir signal abnormality in the cervical and thoracic cord as detailed above most compatible with prior areas of demyelination in light of history.  No new cord signal abnormality or abnormal intrathecal enhancement to suggest significant interval or active demyelination.    Stable ventral positioning of the cervical cord with slight dorsal flattening underlying arachnoid cyst versus web remains in differential.    Continued scattered degenerative change of the cervical spine as detailed above without significant central canal stenosis.    See above for additional details.      Electronically signed by: Huy Villatoro DO  Date:    03/26/2022  Time:    09:40    Images reviewed with the patient.     Labs:     Lab Results   Component Value Date    [id number] 65 05/28/2021    [id number] 41 10/20/2020    [id number] 35 07/29/2019     Lab Results   Component Value Date    WBC 7.58 09/20/2021    RBC 4.81 09/20/2021    HGB 12.8 09/20/2021    HCT 41.2 09/20/2021    MCV 86 09/20/2021    MCH 26.6 (L) 09/20/2021    MCHC 31.1 (L) 09/20/2021    RDW 16.2 (H) 09/20/2021     09/20/2021    MPV 12.0 09/20/2021    GRAN 3.9 09/20/2021    GRAN 51.2 09/20/2021    LYMPH 3.0 09/20/2021    LYMPH 38.9 09/20/2021    MONO 0.5 09/20/2021    MONO 7.0 09/20/2021    EOS 0.2 09/20/2021    BASO 0.03 09/20/2021    EOSINOPHIL 2.4 09/20/2021    BASOPHIL 0.4 09/20/2021     Sodium   Date Value Ref Range Status   03/22/2022 145 136 - 145 mmol/L Final   03/22/2022 145 136 - 145 mmol/L Final     Potassium   Date Value Ref Range Status   03/22/2022 4.1 3.5 - 5.1 mmol/L Final   03/22/2022 4.1 3.5 - 5.1 mmol/L Final     Chloride   Date Value Ref Range Status   03/22/2022 106 95 - 110 mmol/L Final   03/22/2022 106 95 - 110 mmol/L Final     CO2   Date Value Ref Range Status   03/22/2022 30 (H) 23 - 29 mmol/L Final   03/22/2022 30 (H) 23 - 29 mmol/L Final     Glucose   Date Value Ref Range Status   03/22/2022 85 70 - 110 mg/dL Final   03/22/2022 85 70 - 110 mg/dL Final     BUN   Date Value Ref Range Status   03/22/2022 14 6 - 20 mg/dL Final   03/22/2022 14 6 - 20 mg/dL Final     Creatinine   Date  Value Ref Range Status   03/22/2022 0.8 0.5 - 1.4 mg/dL Final   03/22/2022 0.8 0.5 - 1.4 mg/dL Final     Calcium   Date Value Ref Range Status   03/22/2022 9.7 8.7 - 10.5 mg/dL Final   03/22/2022 9.7 8.7 - 10.5 mg/dL Final     Total Protein   Date Value Ref Range Status   03/22/2022 7.8 6.0 - 8.4 g/dL Final     Albumin   Date Value Ref Range Status   03/22/2022 3.6 3.5 - 5.2 g/dL Final     Total Bilirubin   Date Value Ref Range Status   03/22/2022 0.6 0.1 - 1.0 mg/dL Final     Comment:     For infants and newborns, interpretation of results should be based  on gestational age, weight and in agreement with clinical  observations.    Premature Infant recommended reference ranges:  Up to 24 hours.............<8.0 mg/dL  Up to 48 hours............<12.0 mg/dL  3-5 days..................<15.0 mg/dL  6-29 days.................<15.0 mg/dL       Alkaline Phosphatase   Date Value Ref Range Status   03/22/2022 90 55 - 135 U/L Final     AST   Date Value Ref Range Status   03/22/2022 22 10 - 40 U/L Final     ALT   Date Value Ref Range Status   03/22/2022 19 10 - 44 U/L Final     Anion Gap   Date Value Ref Range Status   03/22/2022 9 8 - 16 mmol/L Final   03/22/2022 9 8 - 16 mmol/L Final     eGFR if    Date Value Ref Range Status   03/22/2022 >60.0 >60 mL/min/1.73 m^2 Final   03/22/2022 >60.0 >60 mL/min/1.73 m^2 Final     eGFR if non    Date Value Ref Range Status   03/22/2022 >60.0 >60 mL/min/1.73 m^2 Final     Comment:     Calculation used to obtain the estimated glomerular filtration  rate (eGFR) is the CKD-EPI equation.      03/22/2022 >60.0 >60 mL/min/1.73 m^2 Final     Comment:     Calculation used to obtain the estimated glomerular filtration  rate (eGFR) is the CKD-EPI equation.            MS Impression and Plan:     NEURO MULTIPLE SCLEROSIS IMPRESSION:   MS Status:     Number of relapses in the past year?:  0    Clinical Progression:  Clinically Stable    MRI Progression:  Stable  Plan:      DMT:  No change in management    DMT comment:  Continue glatiramer and Vitamin D. We will check Vitamin D level today.     Symptom Management:  No change in symptom management       MRI brain and spine due in March 2023     She will follow up with Dr. Naidu in 5 months.     New xray of right middle finger ordered, and referral placed to hand clinic for evaluation.       Total time spent with patient: 35 minutes   Total time spent on encounter: 45 minutes         Problem List Items Addressed This Visit        Neurologic Problems    Multiple sclerosis    Relevant Orders    Vitamin D      Other Visit Diagnoses     Finger pain, right    -  Primary    Relevant Orders    Ambulatory referral/consult to Orthopedics    X-Ray Finger 2 or More Views Right          TREVOR Han, CNS

## 2022-06-15 ENCOUNTER — HOSPITAL ENCOUNTER (OUTPATIENT)
Dept: RADIOLOGY | Facility: HOSPITAL | Age: 57
Discharge: HOME | End: 2022-06-15
Attending: CLINICAL NURSE SPECIALIST
Payer: COMMERCIAL

## 2022-06-15 ENCOUNTER — OFFICE VISIT (OUTPATIENT)
Dept: NEUROLOGY | Facility: CLINIC | Age: 57
End: 2022-06-15
Payer: COMMERCIAL

## 2022-06-15 VITALS
DIASTOLIC BLOOD PRESSURE: 87 MMHG | SYSTOLIC BLOOD PRESSURE: 157 MMHG | WEIGHT: 242.94 LBS | HEART RATE: 98 BPM | BODY MASS INDEX: 40.48 KG/M2 | HEIGHT: 65 IN

## 2022-06-15 DIAGNOSIS — G35 MULTIPLE SCLEROSIS: Primary | ICD-10-CM

## 2022-06-15 DIAGNOSIS — Z29.89 PROPHYLACTIC IMMUNOTHERAPY: ICD-10-CM

## 2022-06-15 DIAGNOSIS — M79.644 FINGER PAIN, RIGHT: ICD-10-CM

## 2022-06-15 DIAGNOSIS — Z71.89 COUNSELING REGARDING GOALS OF CARE: ICD-10-CM

## 2022-06-15 PROCEDURE — 73140 X-RAY EXAM OF FINGER(S): CPT | Mod: 26,RT,, | Performed by: RADIOLOGY

## 2022-06-15 PROCEDURE — 99999 PR PBB SHADOW E&M-EST. PATIENT-LVL IV: CPT | Mod: PBBFAC,,, | Performed by: CLINICAL NURSE SPECIALIST

## 2022-06-15 PROCEDURE — 99215 OFFICE O/P EST HI 40 MIN: CPT | Mod: S$GLB,,, | Performed by: CLINICAL NURSE SPECIALIST

## 2022-06-15 PROCEDURE — 1159F PR MEDICATION LIST DOCUMENTED IN MEDICAL RECORD: ICD-10-PCS | Mod: CPTII,S$GLB,, | Performed by: CLINICAL NURSE SPECIALIST

## 2022-06-15 PROCEDURE — 3079F PR MOST RECENT DIASTOLIC BLOOD PRESSURE 80-89 MM HG: ICD-10-PCS | Mod: CPTII,S$GLB,, | Performed by: CLINICAL NURSE SPECIALIST

## 2022-06-15 PROCEDURE — 4010F PR ACE/ARB THEARPY RXD/TAKEN: ICD-10-PCS | Mod: CPTII,S$GLB,, | Performed by: CLINICAL NURSE SPECIALIST

## 2022-06-15 PROCEDURE — 3077F SYST BP >= 140 MM HG: CPT | Mod: CPTII,S$GLB,, | Performed by: CLINICAL NURSE SPECIALIST

## 2022-06-15 PROCEDURE — 4010F ACE/ARB THERAPY RXD/TAKEN: CPT | Mod: CPTII,S$GLB,, | Performed by: CLINICAL NURSE SPECIALIST

## 2022-06-15 PROCEDURE — 3044F HG A1C LEVEL LT 7.0%: CPT | Mod: CPTII,S$GLB,, | Performed by: CLINICAL NURSE SPECIALIST

## 2022-06-15 PROCEDURE — 3008F BODY MASS INDEX DOCD: CPT | Mod: CPTII,S$GLB,, | Performed by: CLINICAL NURSE SPECIALIST

## 2022-06-15 PROCEDURE — 3079F DIAST BP 80-89 MM HG: CPT | Mod: CPTII,S$GLB,, | Performed by: CLINICAL NURSE SPECIALIST

## 2022-06-15 PROCEDURE — 99999 PR PBB SHADOW E&M-EST. PATIENT-LVL IV: ICD-10-PCS | Mod: PBBFAC,,, | Performed by: CLINICAL NURSE SPECIALIST

## 2022-06-15 PROCEDURE — 3077F PR MOST RECENT SYSTOLIC BLOOD PRESSURE >= 140 MM HG: ICD-10-PCS | Mod: CPTII,S$GLB,, | Performed by: CLINICAL NURSE SPECIALIST

## 2022-06-15 PROCEDURE — 3044F PR MOST RECENT HEMOGLOBIN A1C LEVEL <7.0%: ICD-10-PCS | Mod: CPTII,S$GLB,, | Performed by: CLINICAL NURSE SPECIALIST

## 2022-06-15 PROCEDURE — 1159F MED LIST DOCD IN RCRD: CPT | Mod: CPTII,S$GLB,, | Performed by: CLINICAL NURSE SPECIALIST

## 2022-06-15 PROCEDURE — 99215 PR OFFICE/OUTPT VISIT, EST, LEVL V, 40-54 MIN: ICD-10-PCS | Mod: S$GLB,,, | Performed by: CLINICAL NURSE SPECIALIST

## 2022-06-15 PROCEDURE — 73140 X-RAY EXAM OF FINGER(S): CPT | Mod: TC,RT

## 2022-06-15 PROCEDURE — 3008F PR BODY MASS INDEX (BMI) DOCUMENTED: ICD-10-PCS | Mod: CPTII,S$GLB,, | Performed by: CLINICAL NURSE SPECIALIST

## 2022-06-15 PROCEDURE — 73140 XR FINGER 2 OR MORE VIEWS RIGHT: ICD-10-PCS | Mod: 26,RT,, | Performed by: RADIOLOGY

## 2022-06-15 RX ORDER — METFORMIN HYDROCHLORIDE 1000 MG/1
TABLET ORAL
COMMUNITY
End: 2023-07-20

## 2022-06-15 NOTE — LETTER
Janell 15, 2022          No Recipients             [facility] [address]  [address]  [facility] [address]  Phone: [phone]   Patient: Miesha Domínguez   MR Number: [id number]   YOB: 1965   Date of Visit: 6/15/2022     To Whom it May Concern:     Miesha Domínguez is a patient under my care for the treatment of multiple sclerosis, a chronic neurological condition. She was seen in my clinic today, 6/15/22. She is clinically and radiographically stable, and I have no concerns about her ability to perform her job duties as a . If you require any additional information, please contact me at [phone].    Sincerely,      TREVOR Mccarthy, CNS            CC    No Recipients    Enclosure

## 2022-06-17 ENCOUNTER — OCCUPATIONAL HEALTH (OUTPATIENT)
Dept: URGENT CARE | Facility: CLINIC | Age: 57
End: 2022-06-17

## 2022-06-17 DIAGNOSIS — Z02.89 ENCOUNTER FOR EXAMINATION REQUIRED BY DEPARTMENT OF TRANSPORTATION (DOT): Primary | ICD-10-CM

## 2022-06-17 PROCEDURE — 99499 UNLISTED E&M SERVICE: CPT | Mod: S$GLB,,, | Performed by: EMERGENCY MEDICINE

## 2022-06-17 PROCEDURE — 99499 PHYSICAL, RECERT DOT/CDL: ICD-10-PCS | Mod: S$GLB,,, | Performed by: EMERGENCY MEDICINE

## 2022-06-24 ENCOUNTER — PATIENT MESSAGE (OUTPATIENT)
Dept: PSYCHIATRY | Facility: CLINIC | Age: 57
End: 2022-06-24
Payer: COMMERCIAL

## 2022-06-28 ENCOUNTER — IMMUNIZATION (OUTPATIENT)
Dept: PRIMARY CARE CLINIC | Facility: CLINIC | Age: 57
End: 2022-06-28
Payer: COMMERCIAL

## 2022-06-28 DIAGNOSIS — Z23 NEED FOR VACCINATION: Primary | ICD-10-CM

## 2022-06-28 PROCEDURE — 91305 COVID-19, MRNA, LNP-S, PF, 30 MCG/0.3 ML DOSE VACCINE (PFIZER): CPT | Mod: PBBFAC | Performed by: INTERNAL MEDICINE

## 2022-07-05 ENCOUNTER — PATIENT MESSAGE (OUTPATIENT)
Dept: BARIATRICS | Facility: CLINIC | Age: 57
End: 2022-07-05
Payer: COMMERCIAL

## 2022-07-06 ENCOUNTER — OFFICE VISIT (OUTPATIENT)
Dept: ORTHOPEDICS | Facility: CLINIC | Age: 57
End: 2022-07-06
Payer: COMMERCIAL

## 2022-07-06 DIAGNOSIS — M19.041 PRIMARY OSTEOARTHRITIS OF RIGHT HAND: ICD-10-CM

## 2022-07-06 DIAGNOSIS — M65.331 TRIGGER MIDDLE FINGER OF RIGHT HAND: Primary | ICD-10-CM

## 2022-07-06 DIAGNOSIS — G89.29 CHRONIC HAND PAIN, RIGHT: ICD-10-CM

## 2022-07-06 DIAGNOSIS — M79.641 CHRONIC HAND PAIN, RIGHT: ICD-10-CM

## 2022-07-06 PROCEDURE — 99999 PR PBB SHADOW E&M-EST. PATIENT-LVL III: CPT | Mod: PBBFAC,,, | Performed by: ORTHOPAEDIC SURGERY

## 2022-07-06 PROCEDURE — 99204 OFFICE O/P NEW MOD 45 MIN: CPT | Mod: 25,S$GLB,, | Performed by: ORTHOPAEDIC SURGERY

## 2022-07-06 PROCEDURE — 1160F PR REVIEW ALL MEDS BY PRESCRIBER/CLIN PHARMACIST DOCUMENTED: ICD-10-PCS | Mod: CPTII,S$GLB,, | Performed by: ORTHOPAEDIC SURGERY

## 2022-07-06 PROCEDURE — 3044F PR MOST RECENT HEMOGLOBIN A1C LEVEL <7.0%: ICD-10-PCS | Mod: CPTII,S$GLB,, | Performed by: ORTHOPAEDIC SURGERY

## 2022-07-06 PROCEDURE — 1159F MED LIST DOCD IN RCRD: CPT | Mod: CPTII,S$GLB,, | Performed by: ORTHOPAEDIC SURGERY

## 2022-07-06 PROCEDURE — 20550 NJX 1 TENDON SHEATH/LIGAMENT: CPT | Mod: RT,S$GLB,, | Performed by: ORTHOPAEDIC SURGERY

## 2022-07-06 PROCEDURE — 99999 PR PBB SHADOW E&M-EST. PATIENT-LVL III: ICD-10-PCS | Mod: PBBFAC,,, | Performed by: ORTHOPAEDIC SURGERY

## 2022-07-06 PROCEDURE — 4010F PR ACE/ARB THEARPY RXD/TAKEN: ICD-10-PCS | Mod: CPTII,S$GLB,, | Performed by: ORTHOPAEDIC SURGERY

## 2022-07-06 PROCEDURE — 20550 TENDON SHEATH: ICD-10-PCS | Mod: RT,S$GLB,, | Performed by: ORTHOPAEDIC SURGERY

## 2022-07-06 PROCEDURE — 99204 PR OFFICE/OUTPT VISIT, NEW, LEVL IV, 45-59 MIN: ICD-10-PCS | Mod: 25,S$GLB,, | Performed by: ORTHOPAEDIC SURGERY

## 2022-07-06 PROCEDURE — 1159F PR MEDICATION LIST DOCUMENTED IN MEDICAL RECORD: ICD-10-PCS | Mod: CPTII,S$GLB,, | Performed by: ORTHOPAEDIC SURGERY

## 2022-07-06 PROCEDURE — 4010F ACE/ARB THERAPY RXD/TAKEN: CPT | Mod: CPTII,S$GLB,, | Performed by: ORTHOPAEDIC SURGERY

## 2022-07-06 PROCEDURE — 3044F HG A1C LEVEL LT 7.0%: CPT | Mod: CPTII,S$GLB,, | Performed by: ORTHOPAEDIC SURGERY

## 2022-07-06 PROCEDURE — 1160F RVW MEDS BY RX/DR IN RCRD: CPT | Mod: CPTII,S$GLB,, | Performed by: ORTHOPAEDIC SURGERY

## 2022-07-06 RX ADMIN — METHYLPREDNISOLONE ACETATE 40 MG: 40 INJECTION, SUSPENSION INTRA-ARTICULAR; INTRALESIONAL; INTRAMUSCULAR; SOFT TISSUE at 03:07

## 2022-07-09 RX ORDER — METHYLPREDNISOLONE ACETATE 40 MG/ML
40 INJECTION, SUSPENSION INTRA-ARTICULAR; INTRALESIONAL; INTRAMUSCULAR; SOFT TISSUE
Status: DISCONTINUED | OUTPATIENT
Start: 2022-07-06 | End: 2022-07-09 | Stop reason: HOSPADM

## 2022-07-09 NOTE — PROCEDURES
Tendon Sheath    Date/Time: 7/6/2022 3:00 PM  Performed by: Claire Richardson MD  Authorized by: Claire Richardson MD     Consent Done?:  Yes (Verbal)  Indications:  Pain  Timeout: prior to procedure the correct patient, procedure, and site was verified    Prep: patient was prepped and draped in usual sterile fashion      Local anesthesia used?: Yes    Anesthesia:  Local infiltration  Local anesthetic:  Lidocaine 1% without epinephrine  Anesthetic total (ml):  1    Location:  Long finger  Site:  R long flexor tendon sheath  Ultrasonic guidance for needle placement?: No    Needle size:  25 G  Approach:  Volar  Medications:  40 mg methylPREDNISolone acetate 40 mg/mL  Patient tolerance:  Patient tolerated the procedure well with no immediate complications

## 2022-07-09 NOTE — PROGRESS NOTES
[facility]  History & Physical  Orthopedics    SUBJECTIVE:      COVID-19 attestation:  This patient was treated during the COVID- pandemic.  This was discussed with the patient, they are aware of our current policies and procedures, were given the option of delaying their visit and or switching to a virtual visit, delaying their surgery when applicable, and they elect to proceed.    Chief Complaint:   Chief Complaint   Patient presents with    Right Hand - Pain       Referring Provider: Anahi Melo APRN,*     History of Present Illness:  Patient is a 56 y.o. right hand dominant female who presents today with complaints of right long finger pain, locking and decreased motion.  She states that she had a fall in April and sprain the right long finger.  She started with catching and locking of the finger and now she cannot make a full fist with the finger. She has tried ice packs, tylenol, creams and a brace without relief. She denies numbness/tingling.    Symptoms are aggravated by activity and movement.    Symptoms are alleviated by rest.    Symptoms consist of pain, swelling and decreased ROM.    The patient rates their pain as a 10/10.    Attempted treatment(s) and/or interventions include rest, activity modification and anti-inflammatory medications.     The patient denies any fevers, chills, N/V, D/C and presents for evaluation.       Past Medical History:   Diagnosis Date    Asthma     as a child    Endometrial polyp 2019    HLD (hyperlipidemia)     HTN (hypertension)     MS (multiple sclerosis)     2020    Neuropathy     S/P D&C (status post dilation and curettage) 2019    Type II or unspecified type diabetes mellitus without mention of complication, not stated as uncontrolled      Past Surgical History:   Procedure Laterality Date     SECTION      COLONOSCOPY N/A 2018    Procedure: COLONOSCOPY;  Surgeon: Ramez Ramírez MD;  Location: [facility]  ENDO (4TH FLR);  Service: Endoscopy;  Laterality: N/A;    HYSTEROSCOPY WITH DILATION AND CURETTAGE OF UTERUS N/A 2019    Procedure: [id number];  Surgeon: Catrina Olivier MD;  Location: [facility];  Service: OB/GYN;  Laterality: N/A;    SLEEVE GASTROPLASTY       Review of patient's allergies indicates:  No Known Allergies  Social History     Social History Narrative    SOCIAL HISTORY:     , spouse is in good health, .     for [employer],    Daughter,  @[employer], major [employer]; working @ [employer]    + exercise, walk, elliptical, Rebecca     Family History   Problem Relation Age of Onset    Hypertension Mother     Diabetes Mother     Ovarian cancer Mother 74    Cancer Father         d.64 lung -smoker    Diabetes Sister 71        1/2 sister- same Mom    Hypertension Sister     Hypertension Brother         1/2 brother - same Mom    Diabetes Brother 73    No Known Problems Daughter          1991    Colon cancer Other 46    Breast cancer Neg Hx     Stroke Neg Hx     Heart attack Neg Hx     Heart disease Neg Hx     Heart failure Neg Hx     Hyperlipidemia Neg Hx          Current Outpatient Medications:     cholecalciferol, vitamin D3, (VITAMIN D3) 50 mcg (2,000 unit) Cap, Take 1 capsule by mouth once daily., Disp: , Rfl:     cyanocobalamin, vitamin B-12, (VITAMIN B-12) 2,500 mcg Subl, Place 1 tablet under the tongue once a week. Pt taking weekly, Disp: , Rfl:     cyclobenzaprine (FLEXERIL) 5 MG tablet, TAKE 1 TABLET BY MOUTH NIGHTLY AS NEEDED FOR MUSCLE SPASMS, Disp: 30 tablet, Rfl: 5    diclofenac sodium (VOLTAREN) 1 % Gel, Apply 4 g topically 3 (three) times daily. (Patient not taking: Reported on 6/15/2022), Disp: 100 g, Rfl: 2    diethylpropion (TENUATE) 75 mg SR tablet, Take 1 tablet (75 mg total) by mouth once daily. (Patient not taking: Reported on 6/15/2022), Disp: 30 tablet, Rfl: 2     diphenhydrAMINE-acetaminophen (TYLENOL PM)  mg Tab, Take 1 tablet by mouth nightly as needed., Disp: , Rfl:     fluticasone propionate (FLONASE) 50 mcg/actuation nasal spray, 2 sprays by Each Nostril route once daily., Disp: , Rfl:     glatiramer (COPAXONE, GLATOPA) 40 mg/mL injection, INJECT 1 SYRINGE UNDER THE SKIN 3 TIMES A WEEK AT LEAST 48 HOURS APART. ALLOW TO WARM TO ROOM TEMP FOR 20 MINUTES. REFRIGERATE., Disp: 36 each, Rfl: 0    hydroCHLOROthiazide (HYDRODIURIL) 25 MG tablet, TAKE 1 TABLET BY MOUTH EVERY DAY (Patient taking differently: Take 25 mg by mouth daily as needed.), Disp: 90 tablet, Rfl: 3    losartan (COZAAR) 100 MG tablet, Take 1 tablet (100 mg total) by mouth once daily., Disp: 90 tablet, Rfl: 3    metFORMIN (GLUCOPHAGE) 1000 MG tablet, , Disp: , Rfl:     MULTIVITAMIN ORAL, Take 1 tablet by mouth once daily. Centrum adult chews ( flavor burst), Disp: , Rfl:     pregabalin (LYRICA) 100 MG capsule, Take 1 capsule (100 mg total) by mouth 2 (two) times daily., Disp: 60 capsule, Rfl: 2    rosuvastatin (CRESTOR) 20 MG tablet, TAKE 1 TABLET BY MOUTH EVERY DAY, Disp: 90 tablet, Rfl: 3    semaglutide (OZEMPIC) 1 mg/dose (4 mg/3 mL), Inject 1 mg into the skin every 7 days., Disp: 3 pen, Rfl: 0    sodium chloride (OCEAN) 0.65 % nasal spray, 1 spray by Nasal route as needed for Congestion., Disp: , Rfl:     ROS    Review of Systems:  Constitutional: no fever or chills  Eyes: no visual changes  ENT: no nasal congestion or sore throat  Respiratory: no cough or shortness of breath  Cardiovascular: no chest pain  Gastrointestinal: no nausea or vomiting, tolerating diet  Musculoskeletal: pain, soreness and decreased ROM    OBJECTIVE:      Vital Signs (Most Recent):  There were no vitals filed for this visit.  There is no height or weight on file to calculate BMI.    Physical Exam    Physical Exam:  Constitutional: The patient appears well-developed and well-nourished. No distress.   Head:  Normocephalic and atraumatic.   Nose: Nose normal.   Eyes: Conjunctivae and EOM are normal.   Neck: No tracheal deviation present.   Cardiovascular: Normal rate and intact distal pulses.    Pulmonary/Chest: Effort normal. No respiratory distress.   Abdominal: There is no guarding.   Lymphatic: Negative for adenopathy   Neurological: The patient is alert.   Psychiatric: The patient has a normal mood and affect.     Cervical Exam:  ROM full, supple  Negative Spurling's  Negative Salgado's    Bilateral Hand/Wrist Examination:    Observation/Inspection:  Swelling  none    Deformity  none  Discoloration  none     Scars   none    Atrophy  none    HAND/WRIST EXAMINATION:  Finkelstein's Test   Neg  WHAT Test    Neg  CMC grind    Neg  Circumduction test   Neg  TFCC Compression Test  Neg    Tenderness to palpation right long finger A1 pulley, palpable nodule flexor tendon, demonstrable catching and locking of finger, otherwise bilateral ROM hand/wrist/elbow full, painless    Neurovascular Exam:  Digits WWP, brisk CR < 3s throughout  2+ biceps and brachioradialis reflexes  NVI motor/LTS to M/R/U nerves, radial pulse 2+  Tinel's Test - Carpal Tunnel  Neg  Tinel's Test - Cubital Tunnel  Neg  Phalen's Test    Neg  Median Nerve Compression Test Neg    Diagnostic Results:      X-rays AP, lateral and oblique right long and ring fingers taken today are independently reviewed by me and show IP joint space narrowing and mild osteoarthritic spurring.       ASSESSMENT/PLAN:      56 y.o. yo female with   Encounter Diagnoses   Name Primary?    Chronic hand pain, right     Trigger middle finger of right hand Yes    Primary osteoarthritis of right hand       Plan: We have discussed the natural history of trigger fingers and arthritis including treatment options such as splinting, oral and topical anti-inflammatories, cortisone injections and surgery.    -I have offered her a selective injection. I have explained the risks, benefits, and  alternatives of the procedure in detail.  The patient voices understanding and all questions have been answered. The patient agrees to proceed as planned. So after a sterile prep of the skin in the normal fashion the right long finger flexor sheath was injected using a 25 gauge needle with a combination of 1cc 1% plain lidocaine and 40 mg of methylprednisolone.  The patient is cautioned and immediate relief of pain is secondary to the local anesthetic and will be temporary.  After the anesthetic wears off there may be a increase in pain that may last for a few hours or a few days and they should use ice to help alleviate this flair up of pain. Patient tolerated the procedure well.    - F/U as needed for any worsening of symptoms  - Call with any questions/concerns in the interim      The patient's pathophysiology was explained in detail with reference to x-rays, models, other visual aids as appropriate.  Treatment options were discussed in detail.  Questions were invited and answered to the patient's satisfaction. I reviewed Primary care , and other specialty's notes to better coordinate patient's care.          Claire Richardson MD     Please be aware that this note has been generated with the assistance of MModal voice-to-text.  Please excuse any spelling or grammatical errors.

## 2022-07-20 ENCOUNTER — PATIENT MESSAGE (OUTPATIENT)
Dept: ORTHOPEDICS | Facility: CLINIC | Age: 57
End: 2022-07-20
Payer: COMMERCIAL

## 2022-08-02 ENCOUNTER — PATIENT MESSAGE (OUTPATIENT)
Dept: BARIATRICS | Facility: CLINIC | Age: 57
End: 2022-08-02
Payer: COMMERCIAL

## 2022-08-02 ENCOUNTER — OFFICE VISIT (OUTPATIENT)
Dept: OBSTETRICS AND GYNECOLOGY | Facility: CLINIC | Age: 57
End: 2022-08-02
Attending: OBSTETRICS & GYNECOLOGY
Payer: COMMERCIAL

## 2022-08-02 VITALS
BODY MASS INDEX: 41.43 KG/M2 | SYSTOLIC BLOOD PRESSURE: 138 MMHG | WEIGHT: 248.69 LBS | HEIGHT: 65 IN | DIASTOLIC BLOOD PRESSURE: 79 MMHG

## 2022-08-02 DIAGNOSIS — Z01.419 ENCOUNTER FOR GYNECOLOGICAL EXAMINATION WITHOUT ABNORMAL FINDING: Primary | ICD-10-CM

## 2022-08-02 DIAGNOSIS — Z12.4 PAP SMEAR FOR CERVICAL CANCER SCREENING: ICD-10-CM

## 2022-08-02 DIAGNOSIS — Z12.31 SCREENING MAMMOGRAM, ENCOUNTER FOR: ICD-10-CM

## 2022-08-02 PROCEDURE — 3078F DIAST BP <80 MM HG: CPT | Mod: CPTII,S$GLB,, | Performed by: OBSTETRICS & GYNECOLOGY

## 2022-08-02 PROCEDURE — 87624 HPV HI-RISK TYP POOLED RSLT: CPT | Performed by: OBSTETRICS & GYNECOLOGY

## 2022-08-02 PROCEDURE — 3008F BODY MASS INDEX DOCD: CPT | Mod: CPTII,S$GLB,, | Performed by: OBSTETRICS & GYNECOLOGY

## 2022-08-02 PROCEDURE — 3075F SYST BP GE 130 - 139MM HG: CPT | Mod: CPTII,S$GLB,, | Performed by: OBSTETRICS & GYNECOLOGY

## 2022-08-02 PROCEDURE — 1160F RVW MEDS BY RX/DR IN RCRD: CPT | Mod: CPTII,S$GLB,, | Performed by: OBSTETRICS & GYNECOLOGY

## 2022-08-02 PROCEDURE — 99999 PR PBB SHADOW E&M-EST. PATIENT-LVL III: CPT | Mod: PBBFAC,,, | Performed by: OBSTETRICS & GYNECOLOGY

## 2022-08-02 PROCEDURE — 99396 PREV VISIT EST AGE 40-64: CPT | Mod: S$GLB,,, | Performed by: OBSTETRICS & GYNECOLOGY

## 2022-08-02 PROCEDURE — 3078F PR MOST RECENT DIASTOLIC BLOOD PRESSURE < 80 MM HG: ICD-10-PCS | Mod: CPTII,S$GLB,, | Performed by: OBSTETRICS & GYNECOLOGY

## 2022-08-02 PROCEDURE — 4010F ACE/ARB THERAPY RXD/TAKEN: CPT | Mod: CPTII,S$GLB,, | Performed by: OBSTETRICS & GYNECOLOGY

## 2022-08-02 PROCEDURE — 3044F HG A1C LEVEL LT 7.0%: CPT | Mod: CPTII,S$GLB,, | Performed by: OBSTETRICS & GYNECOLOGY

## 2022-08-02 PROCEDURE — 4010F PR ACE/ARB THEARPY RXD/TAKEN: ICD-10-PCS | Mod: CPTII,S$GLB,, | Performed by: OBSTETRICS & GYNECOLOGY

## 2022-08-02 PROCEDURE — 88175 CYTOPATH C/V AUTO FLUID REDO: CPT | Performed by: OBSTETRICS & GYNECOLOGY

## 2022-08-02 PROCEDURE — 99999 PR PBB SHADOW E&M-EST. PATIENT-LVL III: ICD-10-PCS | Mod: PBBFAC,,, | Performed by: OBSTETRICS & GYNECOLOGY

## 2022-08-02 PROCEDURE — 1159F PR MEDICATION LIST DOCUMENTED IN MEDICAL RECORD: ICD-10-PCS | Mod: CPTII,S$GLB,, | Performed by: OBSTETRICS & GYNECOLOGY

## 2022-08-02 PROCEDURE — 1160F PR REVIEW ALL MEDS BY PRESCRIBER/CLIN PHARMACIST DOCUMENTED: ICD-10-PCS | Mod: CPTII,S$GLB,, | Performed by: OBSTETRICS & GYNECOLOGY

## 2022-08-02 PROCEDURE — 3008F PR BODY MASS INDEX (BMI) DOCUMENTED: ICD-10-PCS | Mod: CPTII,S$GLB,, | Performed by: OBSTETRICS & GYNECOLOGY

## 2022-08-02 PROCEDURE — 1159F MED LIST DOCD IN RCRD: CPT | Mod: CPTII,S$GLB,, | Performed by: OBSTETRICS & GYNECOLOGY

## 2022-08-02 PROCEDURE — 99396 PR PREVENTIVE VISIT,EST,40-64: ICD-10-PCS | Mod: S$GLB,,, | Performed by: OBSTETRICS & GYNECOLOGY

## 2022-08-02 PROCEDURE — 3075F PR MOST RECENT SYSTOLIC BLOOD PRESS GE 130-139MM HG: ICD-10-PCS | Mod: CPTII,S$GLB,, | Performed by: OBSTETRICS & GYNECOLOGY

## 2022-08-02 PROCEDURE — 3044F PR MOST RECENT HEMOGLOBIN A1C LEVEL <7.0%: ICD-10-PCS | Mod: CPTII,S$GLB,, | Performed by: OBSTETRICS & GYNECOLOGY

## 2022-08-02 NOTE — PROGRESS NOTES
Subjective:       Patient ID: Miesha Domínguez is a 56 y.o. female.    Chief Complaint:  Annual Exam and Gynecologic Exam      History of Present Illness  HPI    Miesha Domínguez is a 56 y.o. female  here for her annual GYN exam.    She is menopausal since age 53  denies break through bleeding.   denies vaginal itching or irritation.  Denies vaginal discharge.  She is sexually active. She has had1 partner for 31 years .     History of abnormal pap: No  Last Pap: approximate date Janell 3,2019 and was normal  Last MMG: normal--routine follow-up in 12 months  Last Colonoscopy:  colonoscopy 4 years ago without abnormalities.  denies domestic violence. She does feel safe at home.     Past Medical History:   Diagnosis Date    Asthma     as a child    Endometrial polyp 2019    HLD (hyperlipidemia)     HTN (hypertension)     MS (multiple sclerosis)     2020    Neuropathy     S/P D&C (status post dilation and curettage) 2019    Type II or unspecified type diabetes mellitus without mention of complication, not stated as uncontrolled      Past Surgical History:   Procedure Laterality Date     SECTION      COLONOSCOPY N/A 2018    Procedure: COLONOSCOPY;  Surgeon: Ramez Ramírez MD;  Location: [address];  Service: Endoscopy;  Laterality: N/A;    HYSTEROSCOPY WITH DILATION AND CURETTAGE OF UTERUS N/A 2019    Procedure: [id number];  Surgeon: Catrina Olivier MD;  Location: [facility];  Service: OB/GYN;  Laterality: N/A;    SLEEVE GASTROPLASTY       Social History     Socioeconomic History    Marital status:      Spouse name: himanshu    Number of children: 1   Occupational History    Occupation:      Employer: [employer]   Tobacco Use    Smoking status: Never Smoker    Smokeless tobacco: Never Used   Substance and Sexual Activity    Alcohol use: Not Currently     Comment: occassional for  Hollidays    Drug use: No    Sexual activity: Yes     Partners: Male     Birth control/protection: None     Comment:  x 31 years since : Spouse : Justin   Social History Narrative    SOCIAL HISTORY:     , spouse is in good health, .     for [employer],    Daughter,  @"[employer] major Hotel [employer]; working @ [employer]    + exercise, walk, elliptical, Rebecca     Social Determinants of Health     Financial Resource Strain: Low Risk     Difficulty of Paying Living Expenses: Not hard at all   Food Insecurity: No Food Insecurity    Worried About Running Out of Food in the Last Year: Never true    Ran Out of Food in the Last Year: Never true   Transportation Needs: No Transportation Needs    Lack of Transportation (Medical): No    Lack of Transportation (Non-Medical): No   Physical Activity: Sufficiently Active    Days of Exercise per Week: 3 days    Minutes of Exercise per Session: 60 min   Stress: No Stress Concern Present    Feeling of Stress : Not at all   Social Connections: Unknown    Frequency of Communication with Friends and Family: More than three times a week    Frequency of Social Gatherings with Friends and Family: Twice a week    Active Member of Clubs or Organizations: No    Attends Club or Organization Meetings: Never    Marital Status:    Housing Stability: Low Risk     Unable to Pay for Housing in the Last Year: No    Number of Places Lived in the Last Year: 1    Unstable Housing in the Last Year: No     Family History   Problem Relation Age of Onset    Hypertension Mother     Diabetes Mother     Ovarian cancer Mother 74    Cancer Father         d.64 lung -smoker    Diabetes Sister 71        1/2 sister- same Mom    Hypertension Sister     Hypertension Brother         1/2 brother - same Mom    Diabetes Brother 73    No Known Problems Daughter          1991    Colon cancer Other 46    Breast cancer Neg Hx      "Stroke Neg Hx     Heart attack Neg Hx     Heart disease Neg Hx     Heart failure Neg Hx     Hyperlipidemia Neg Hx      OB History        1    Para   1    Term   1            AB        Living   1       SAB        IAB        Ectopic        Multiple        Live Births   1                 /79   Ht 5' 5" (1.651 m)   Wt 112.8 kg (248 lb 11.2 oz)   LMP 2019 (Approximate)   BMI 41.39 kg/m²         GYN & OB History  Patient's last menstrual period was 2019 (approximate).   Date of Last Pap: No result found    OB History    Para Term  AB Living   1 1 1     1   SAB IAB Ectopic Multiple Live Births           1      # Outcome Date GA Lbr Jason/2nd Weight Sex Delivery Anes PTL Lv   1 Term      CS-LTranv Spinal N SHIREEN       Review of Systems  Review of Systems   Constitutional: Negative for activity change, appetite change, fatigue and unexpected weight change.   HENT: Negative.    Eyes: Negative for visual disturbance.   Respiratory: Negative for shortness of breath and wheezing.    Cardiovascular: Negative for chest pain, palpitations and leg swelling.   Gastrointestinal: Negative for abdominal pain, bloating and blood in stool.   Endocrine: Positive for diabetes. Negative for hair loss.   Genitourinary: Negative for decreased libido, dyspareunia, hot flashes, postmenopausal bleeding and vaginal dryness.   Musculoskeletal: Negative for back pain and joint swelling.   Integumentary:  Negative for acne, hair changes and nipple discharge.   Neurological: Negative for headaches.   Hematological: Does not bruise/bleed easily.   Psychiatric/Behavioral: Negative for depression and sleep disturbance. The patient is not nervous/anxious.    Breast: Negative for mastodynia and nipple discharge          Objective:      Physical Exam:        Pulmonary/Chest:   BREASTS:  no mass, no tenderness, no deformity and no retraction. Right breast exhibits no inverted nipple, no mass, no nipple " discharge, no skin change, no tenderness, no bleeding and no swelling. Left breast exhibits no inverted nipple, no mass, no nipple discharge, no skin change, no tenderness, no bleeding and no swelling. Breasts are symmetrical.                Genitourinary:    Pelvic exam was performed with patient supine.      Genitourinary Comments: PELVIC: Normal external genitalia without lesions.  Normal hair distribution.  Adequate perineal body, normal urethral meatus.  Vagina moist and well rugated without lesions or discharge.  Cervix pink, without lesions, discharge or tenderness.  No significant cystocele or rectocele.  Bimanual exam shows uterus to be normal size, regular, mobile and nontender.  Adnexa without masses or tenderness.                                  Assessment:        1. Encounter for gynecological examination without abnormal finding    2. Pap smear for cervical cancer screening    3. Screening mammogram, encounter for                  Plan:        1. Encounter for gynecological examination without abnormal finding  COUNSELING:  The patient was counseled today on regular weight bearing exercise. Patient was counseled today on the new ACS guidelines for cervical cytology screening as well as the current recommendations for breast cancer screening. Counseling session lasted approximately 10 minutes, and all her questions were answered. She was advised to see her primary care physician for all other health maintenance.   FOLLOW-UP with me for next routine visit.         2. Pap smear for cervical cancer screening      - Liquid-Based Pap Smear, Screening  - HPV High Risk Genotypes, PCR    3. Screening mammogram, encounter for           Follow up in about 1 year (around 8/2/2023).

## 2022-08-04 ENCOUNTER — PATIENT MESSAGE (OUTPATIENT)
Dept: BARIATRICS | Facility: CLINIC | Age: 57
End: 2022-08-04
Payer: COMMERCIAL

## 2022-08-08 LAB
FINAL PATHOLOGIC DIAGNOSIS: NORMAL
Lab: NORMAL

## 2022-08-10 LAB
HPV HR 12 DNA SPEC QL NAA+PROBE: NEGATIVE
HPV16 AG SPEC QL: NEGATIVE
HPV18 DNA SPEC QL NAA+PROBE: NEGATIVE

## 2022-08-15 ENCOUNTER — OFFICE VISIT (OUTPATIENT)
Dept: BARIATRICS | Facility: CLINIC | Age: 57
End: 2022-08-15
Payer: COMMERCIAL

## 2022-08-15 VITALS
DIASTOLIC BLOOD PRESSURE: 82 MMHG | SYSTOLIC BLOOD PRESSURE: 136 MMHG | TEMPERATURE: 98 F | HEART RATE: 93 BPM | BODY MASS INDEX: 40.76 KG/M2 | OXYGEN SATURATION: 99 % | WEIGHT: 244.94 LBS

## 2022-08-15 DIAGNOSIS — Z98.84 S/P LAPAROSCOPIC SLEEVE GASTRECTOMY: ICD-10-CM

## 2022-08-15 DIAGNOSIS — E11.9 CONTROLLED TYPE 2 DIABETES MELLITUS WITHOUT COMPLICATION, WITHOUT LONG-TERM CURRENT USE OF INSULIN: ICD-10-CM

## 2022-08-15 DIAGNOSIS — E66.01 CLASS 3 SEVERE OBESITY WITH SERIOUS COMORBIDITY AND BODY MASS INDEX (BMI) OF 40.0 TO 44.9 IN ADULT, UNSPECIFIED OBESITY TYPE: Primary | ICD-10-CM

## 2022-08-15 PROCEDURE — 4010F ACE/ARB THERAPY RXD/TAKEN: CPT | Mod: CPTII,S$GLB,, | Performed by: INTERNAL MEDICINE

## 2022-08-15 PROCEDURE — 1159F PR MEDICATION LIST DOCUMENTED IN MEDICAL RECORD: ICD-10-PCS | Mod: CPTII,S$GLB,, | Performed by: INTERNAL MEDICINE

## 2022-08-15 PROCEDURE — 99214 OFFICE O/P EST MOD 30 MIN: CPT | Mod: S$GLB,,, | Performed by: INTERNAL MEDICINE

## 2022-08-15 PROCEDURE — 3008F BODY MASS INDEX DOCD: CPT | Mod: CPTII,S$GLB,, | Performed by: INTERNAL MEDICINE

## 2022-08-15 PROCEDURE — 1159F MED LIST DOCD IN RCRD: CPT | Mod: CPTII,S$GLB,, | Performed by: INTERNAL MEDICINE

## 2022-08-15 PROCEDURE — 3075F SYST BP GE 130 - 139MM HG: CPT | Mod: CPTII,S$GLB,, | Performed by: INTERNAL MEDICINE

## 2022-08-15 PROCEDURE — 3008F PR BODY MASS INDEX (BMI) DOCUMENTED: ICD-10-PCS | Mod: CPTII,S$GLB,, | Performed by: INTERNAL MEDICINE

## 2022-08-15 PROCEDURE — 3079F DIAST BP 80-89 MM HG: CPT | Mod: CPTII,S$GLB,, | Performed by: INTERNAL MEDICINE

## 2022-08-15 PROCEDURE — 4010F PR ACE/ARB THEARPY RXD/TAKEN: ICD-10-PCS | Mod: CPTII,S$GLB,, | Performed by: INTERNAL MEDICINE

## 2022-08-15 PROCEDURE — 1160F RVW MEDS BY RX/DR IN RCRD: CPT | Mod: CPTII,S$GLB,, | Performed by: INTERNAL MEDICINE

## 2022-08-15 PROCEDURE — 3079F PR MOST RECENT DIASTOLIC BLOOD PRESSURE 80-89 MM HG: ICD-10-PCS | Mod: CPTII,S$GLB,, | Performed by: INTERNAL MEDICINE

## 2022-08-15 PROCEDURE — 99999 PR PBB SHADOW E&M-EST. PATIENT-LVL IV: CPT | Mod: PBBFAC,,, | Performed by: INTERNAL MEDICINE

## 2022-08-15 PROCEDURE — 1160F PR REVIEW ALL MEDS BY PRESCRIBER/CLIN PHARMACIST DOCUMENTED: ICD-10-PCS | Mod: CPTII,S$GLB,, | Performed by: INTERNAL MEDICINE

## 2022-08-15 PROCEDURE — 99999 PR PBB SHADOW E&M-EST. PATIENT-LVL IV: ICD-10-PCS | Mod: PBBFAC,,, | Performed by: INTERNAL MEDICINE

## 2022-08-15 PROCEDURE — 3075F PR MOST RECENT SYSTOLIC BLOOD PRESS GE 130-139MM HG: ICD-10-PCS | Mod: CPTII,S$GLB,, | Performed by: INTERNAL MEDICINE

## 2022-08-15 PROCEDURE — 3044F PR MOST RECENT HEMOGLOBIN A1C LEVEL <7.0%: ICD-10-PCS | Mod: CPTII,S$GLB,, | Performed by: INTERNAL MEDICINE

## 2022-08-15 PROCEDURE — 99214 PR OFFICE/OUTPT VISIT, EST, LEVL IV, 30-39 MIN: ICD-10-PCS | Mod: S$GLB,,, | Performed by: INTERNAL MEDICINE

## 2022-08-15 PROCEDURE — 3044F HG A1C LEVEL LT 7.0%: CPT | Mod: CPTII,S$GLB,, | Performed by: INTERNAL MEDICINE

## 2022-08-15 RX ORDER — TIRZEPATIDE 2.5 MG/.5ML
2.5 INJECTION, SOLUTION SUBCUTANEOUS
Qty: 2 ML | Refills: 0 | Status: SHIPPED | OUTPATIENT
Start: 2022-08-15 | End: 2022-10-21

## 2022-08-15 RX ORDER — TIRZEPATIDE 5 MG/.5ML
5 INJECTION, SOLUTION SUBCUTANEOUS
Qty: 6 ML | Refills: 0 | Status: SHIPPED | OUTPATIENT
Start: 2022-09-15 | End: 2022-10-21

## 2022-08-15 NOTE — PATIENT INSTRUCTIONS
Start Mounjaro 2.5 mg once a week  x 4 weeks, then 5 mg weekly. Rx sent in predated      Decrease portions as soon as you start Mounjaro. Avoid fried, greasy, fatty foods.     Some nausea in the first 2 weeks is not unusual.     If you get pain across the upper abdomen and around to your back, please call the office.         [email or web address]-resources for coupon/videos.       - To lose weight you want to cut 100% starchy carbohydrates out of your diet (bread, rice, pasta, potatoes, granola, flour, corn, peas, oatmeal, grits, tortillas, crackers, chips) and get  grams of protein.  Aim for 100 grams of protein daily.    - No soda, sweet tea, juices, sports drinks or lemonade     -Exercise daily. Continue. Add some type of resistance training 2-3 days a week. These can be body weight exercises, light weight or elastic bands. [employer] and [employer] are great sources for free work out plans and videos.         Decrease portions as soon as you start Ozempic. Some nausea in the first 2 weeks is not unusual.     If you get pain across the upper abdomen and around to your back, please call the office.        Tips for preparing for hurricane season:    If you are on weight loss medications, please take your medication with you in case of evacuation. Injectable medications should be transported with an ice pack if unopened or room temperature if you have started to use them.   Hurricane supplies do not necessarily need to be junk food or high in carbs or sugar. Shelf stable and healthy choices to include in your supplies could include:  Canned/packets of tuna or chicken  Apples, oranges, banana, pears  Beef or turkey jerky  Sugar free pudding  Pickle, olives, dill relish (mix with the tuna or chicken!)  Low sodium or no salt added canned vegetables  If you get bread, get lite bread (40 calories a slice)  0 sugar sports drinks  Water  String cheese will be okay for a few days without refrigeration or in  an ice chest.   Peanut or other nut butter.   Parmesan crisps  Pork skins  Protein shakes (20-30g protein, less than 5 g sugar)  Protein bars (15 or more g protein, less than 5 g sugar)  Don't forget disposable forks, spoons, plates in your supplies  If evacuated, manage stress by taking walks, reading or meditating.   If eating out make better choices when possible.   Salads with a lean protein and limited dressing, cheese or other toppings  Grilled chicken sandwich or burger without the bun.   Skip the fries!  Order water or unsweetened tea instead of soda  Grocery stores with a deli, salad/food bar can be a good quick and affordable option to replace fast food

## 2022-08-15 NOTE — PROGRESS NOTES
Subjective:       Patient ID: Miesha Domínguez is a 56 y.o. female.    Chief Complaint: Follow-up    Pt here today for follow-up.   Has gained 8 more  lbs since last in the office  net pos 3 lbs.  Started ozempic 1 mg.  Added diehtylpropion last filled 2/20/22. She feels she was snacking more at night. Will be starting work earlier, so she is hoping that will help with it.  She has continued to incorporate more resistance training. Has been losing some inches. She had some shoulder pain. Has been walking instead of weights.    Only SE was constipation at first. Used miralax with good relief.  Having trouble planning her meals 2/2 to calcium oxalate in urine.     BP is good today.  Had calcium oxalate in urine.   BS had been in 70-80s fasting.  She is s/p lap gastric sleeve - 7/29/13.  Neurology  stopped topiramate. Being worked up with neurology and dx with MS. Neuro notes reviewed      BP and labs look good.     Lab Results   Component Value Date    HGBA1C 5.4 03/22/2022    HGBA1C 5.5 09/20/2021    HGBA1C 5.5 09/20/2021     Lab Results   Component Value Date    LDLCALC 69.2 09/20/2021    CREATININE 0.8 03/22/2022    CREATININE 0.8 03/22/2022           .  Follow-up  Associated symptoms include myalgias. Pertinent negatives include + arthralgias, chest pain, chills or fever.     Review of Systems   Constitutional: Negative for chills and fever.   Respiratory: Negative for shortness of breath.         Denies Snoring   Cardiovascular: Negative for chest pain and leg swelling.   Gastrointestinal: Negative for constipation and diarrhea.        Denies GERD   Genitourinary: Positive for menstrual problem. Negative for difficulty urinating.        Recent  bleeding. bx with polyps.    Musculoskeletal: Positive for myalgias. Negative for arthralgias and back pain.   Neurological: Negative for dizziness and light-headedness.   Psychiatric/Behavioral: Negative for dysphoric mood. The patient is not nervous/anxious.         Objective:     /82   Pulse 93   Temp 97.8 °F (36.6 °C)   Wt 111.1 kg (244 lb 14.9 oz)   LMP 04/05/2019 (Approximate)   SpO2 99%   BMI 40.76 kg/m²     Physical Exam  Vitals signs reviewed.   Constitutional:       General: She is not in acute distress.     Appearance: She is well-developed.      Comments: Obese   HENT:      Head: Normocephalic and atraumatic.   Eyes:      General: No scleral icterus.     Pupils: Pupils are equal, round, and reactive to light.   Neck:      Musculoskeletal: Normal range of motion and neck supple.   Cardiovascular:      Rate and Rhythm: Normal rate.   Pulmonary:      Effort: Pulmonary effort is normal.   Musculoskeletal: Normal range of motion.   Skin:     General: Skin is warm and dry.      Findings: No erythema.   Neurological:      Mental Status: She is alert and oriented to person, place, and time.      Cranial Nerves: No cranial nerve deficit.   Psychiatric:         Behavior: Behavior normal.         Judgment: Judgment normal.         Assessment:       1. Class 3 severe obesity with serious comorbidity and body mass index (BMI) of 40.0 to 44.9 in adult, unspecified obesity type    2. Controlled type 2 diabetes mellitus without complication, without long-term current use of insulin    3. S/P laparoscopic sleeve gastrectomy        Plan:           Miesha was seen today for follow-up.    Diagnoses and all orders for this visit:    Class 3 severe obesity with serious comorbidity and body mass index (BMI) of 40.0 to 44.9 in adult, unspecified obesity type    Controlled type 2 diabetes mellitus without complication, without long-term current use of insulin  -     tirzepatide (MOUNJARO) 2.5 mg/0.5 mL PnIj; Inject 2.5 mg into the skin every 7 days.  -     tirzepatide (MOUNJARO) 5 mg/0.5 mL PnIj; Inject 5 mg into the skin every 7 days.    S/P laparoscopic sleeve gastrectomy           Start Mounjaro 2.5 mg once a week  x 4 weeks, then 5 mg weekly. Rx sent in  predated      Decrease portions as soon as you start Mounjaro. Avoid fried, greasy, fatty foods.     Some nausea in the first 2 weeks is not unusual.     If you get pain across the upper abdomen and around to your back, please call the office.            [email or web address] for coupon/videos.           - To lose weight you want to cut 100% starchy carbohydrates out of your diet (bread, rice, pasta, potatoes, granola, flour, corn, peas, oatmeal, grits, tortillas, crackers, chips) and get  grams of protein.  Aim for 100 grams of protein daily.    - No soda, sweet tea, juices, sports drinks or lemonade     -Exercise daily. Continue. Add some type of resistance training 2-3 days a week. These can be body weight exercises, light weight or elastic bands. [employer] and [employer] are great sources for free work out plans and videos.         Decrease portions as soon as you start Ozempic. Some nausea in the first 2 weeks is not unusual.     If you get pain across the upper abdomen and around to your back, please call the office.        Hurricane tips given.

## 2022-08-29 ENCOUNTER — TELEPHONE (OUTPATIENT)
Dept: NEUROLOGY | Facility: CLINIC | Age: 57
End: 2022-08-29
Payer: COMMERCIAL

## 2022-08-29 NOTE — TELEPHONE ENCOUNTER
----- Message from Yenni Awad MA sent at 8/29/2022  1:40 PM CDT -----  Contact: Miesha Bass received a recall letter in the mail to schedule an appointment with Dr. Mejía in November. Please reach out to patient to assist her with getting scheduled.      Confirmed Contact below:  Contact Name:Miesha Domínguez  Phone Number: [phone]

## 2022-09-07 ENCOUNTER — PATIENT MESSAGE (OUTPATIENT)
Dept: BARIATRICS | Facility: CLINIC | Age: 57
End: 2022-09-07
Payer: COMMERCIAL

## 2022-09-07 ENCOUNTER — HOSPITAL ENCOUNTER (OUTPATIENT)
Dept: RADIOLOGY | Facility: HOSPITAL | Age: 57
Discharge: HOME | End: 2022-09-07
Attending: OBSTETRICS & GYNECOLOGY
Payer: COMMERCIAL

## 2022-09-07 DIAGNOSIS — Z12.31 SCREENING MAMMOGRAM, ENCOUNTER FOR: ICD-10-CM

## 2022-09-07 PROCEDURE — 77063 BREAST TOMOSYNTHESIS BI: CPT | Mod: 26,,, | Performed by: RADIOLOGY

## 2022-09-07 PROCEDURE — 77067 SCR MAMMO BI INCL CAD: CPT | Mod: 26,,, | Performed by: RADIOLOGY

## 2022-09-07 PROCEDURE — 77067 MAMMO DIGITAL SCREENING BILAT WITH TOMO: ICD-10-PCS | Mod: 26,,, | Performed by: RADIOLOGY

## 2022-09-07 PROCEDURE — 77063 BREAST TOMOSYNTHESIS BI: CPT | Mod: TC

## 2022-09-07 PROCEDURE — 77063 MAMMO DIGITAL SCREENING BILAT WITH TOMO: ICD-10-PCS | Mod: 26,,, | Performed by: RADIOLOGY

## 2022-09-07 PROCEDURE — 77067 SCR MAMMO BI INCL CAD: CPT | Mod: TC

## 2022-09-22 DIAGNOSIS — E11.9 TYPE 2 DIABETES MELLITUS WITHOUT COMPLICATION: ICD-10-CM

## 2022-09-28 DIAGNOSIS — G35 MULTIPLE SCLEROSIS: ICD-10-CM

## 2022-09-28 DIAGNOSIS — M62.838 OTHER MUSCLE SPASM: ICD-10-CM

## 2022-09-30 ENCOUNTER — PATIENT MESSAGE (OUTPATIENT)
Dept: NEUROLOGY | Facility: CLINIC | Age: 57
End: 2022-09-30
Payer: COMMERCIAL

## 2022-10-01 RX ORDER — CYCLOBENZAPRINE HCL 5 MG
5 TABLET ORAL NIGHTLY PRN
Qty: 30 TABLET | Refills: 5 | Status: SHIPPED | OUTPATIENT
Start: 2022-10-01 | End: 2023-03-29

## 2022-10-03 DIAGNOSIS — G35 MULTIPLE SCLEROSIS: ICD-10-CM

## 2022-10-03 RX ORDER — PREGABALIN 100 MG/1
CAPSULE ORAL
Qty: 60 CAPSULE | Refills: 2 | Status: SHIPPED | OUTPATIENT
Start: 2022-10-03 | End: 2023-01-03

## 2022-10-05 DIAGNOSIS — E11.9 TYPE 2 DIABETES MELLITUS WITHOUT COMPLICATION: ICD-10-CM

## 2022-10-06 ENCOUNTER — PATIENT MESSAGE (OUTPATIENT)
Dept: BARIATRICS | Facility: CLINIC | Age: 57
End: 2022-10-06
Payer: COMMERCIAL

## 2022-10-08 ENCOUNTER — LAB VISIT (OUTPATIENT)
Dept: LAB | Facility: HOSPITAL | Age: 57
End: 2022-10-08
Attending: INTERNAL MEDICINE
Payer: COMMERCIAL

## 2022-10-08 DIAGNOSIS — E11.9 TYPE 2 DIABETES MELLITUS WITHOUT COMPLICATION: ICD-10-CM

## 2022-10-08 LAB
CHOLEST SERPL-MCNC: 141 MG/DL (ref 120–199)
CHOLEST/HDLC SERPL: 2.2 {RATIO} (ref 2–5)
ESTIMATED AVG GLUCOSE: 111 MG/DL (ref 68–131)
HBA1C MFR BLD: 5.5 % (ref 4–5.6)
HDLC SERPL-MCNC: 64 MG/DL (ref 40–75)
HDLC SERPL: 45.4 % (ref 20–50)
LDLC SERPL CALC-MCNC: 62.2 MG/DL (ref 63–159)
NONHDLC SERPL-MCNC: 77 MG/DL
TRIGL SERPL-MCNC: 74 MG/DL (ref 30–150)

## 2022-10-08 PROCEDURE — 80061 LIPID PANEL: CPT | Performed by: INTERNAL MEDICINE

## 2022-10-08 PROCEDURE — 36415 COLL VENOUS BLD VENIPUNCTURE: CPT | Mod: PO | Performed by: INTERNAL MEDICINE

## 2022-10-08 PROCEDURE — 83036 HEMOGLOBIN GLYCOSYLATED A1C: CPT | Performed by: INTERNAL MEDICINE

## 2022-10-10 ENCOUNTER — TELEPHONE (OUTPATIENT)
Dept: INTERNAL MEDICINE | Facility: CLINIC | Age: 57
End: 2022-10-10
Payer: COMMERCIAL

## 2022-10-10 NOTE — TELEPHONE ENCOUNTER
----- Message from Milagros Wolf MD sent at 10/10/2022  9:10 AM CDT -----  Please review your lab work and we will discuss at your pending office visit.   zainab

## 2022-10-15 ENCOUNTER — IMMUNIZATION (OUTPATIENT)
Dept: PRIMARY CARE CLINIC | Facility: CLINIC | Age: 57
End: 2022-10-15
Payer: COMMERCIAL

## 2022-10-15 DIAGNOSIS — Z23 NEED FOR VACCINATION: Primary | ICD-10-CM

## 2022-10-15 PROCEDURE — 0124A COVID-19, MRNA, LNP-S, BIVALENT BOOSTER, PF, 30 MCG/0.3 ML DOSE: CPT | Mod: CV19,PBBFAC | Performed by: INTERNAL MEDICINE

## 2022-10-15 PROCEDURE — 91312 COVID-19, MRNA, LNP-S, BIVALENT BOOSTER, PF, 30 MCG/0.3 ML DOSE: CPT | Mod: PBBFAC | Performed by: INTERNAL MEDICINE

## 2022-10-15 NOTE — PROGRESS NOTES
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     FAMILY HISTORY:   Dad d. 64, lung cancer. He had glaucoma. Heavy Smoker   and worked w/ asbestos   Mom d. 73 of ovarian cancer, she had diabetes and hypertension.   Brother + hypertension, diabetes.   Sister + hypertension, diabetes. Colon polyps    SCREENING TESTS:   Cholesterol, reviewed,-  Colonoscopy, 2018- ,normal 10yrs  No change in bowels or blood in stool   [facility],  Dr. Olivier.  Mammo, gyn  BMD, n/a  Eye exam, UTD  Dental exam, UTD    VACCINATIONS:  Tdap, 9/20/13   Pneumovax, 2016  Flu, yearly  Covid:x 4    MEDS: In the MedCard.     REVIEW OF SYSTEMS:   No fever, chill, or night sweats  No wheezing, occ cough  No chest pain, shortness of breath, PND, or orthopnea.  No change in bowel or bladder function.   No unusual headaches or focal deficits   No weakness in arms or legs or slurred speech.   Remainder of review negative except as previously noted.       PHYSICAL EXAM:   VS: stable  GENERAL: She is alert and oriented, in no acute distress. She is well developed, well nourished, conversant and cooperative, pleasant patient.   EYES: Conjunctivae and lids unremarkable. Sclerae anicteric.  NECK: Supple. No thyromegaly noted.   RESPIRATORY: Efforts unlabored.   LUNGS: Clear  to auscultation.   HEART: Regular rate and rhythm. No carotid bruits, 1+ pedal pulse.   No edema.    ABDOMEN: Bowel sounds present, soft, nontender.   No hepatosplenomegaly.   MUSCULOSKELETAL: Gait normal.   No clubbing, cyanosis or edema appreciated.   Left shoulder decreased ROM, tenderness over the delta region  NEURO: WILDER. No tremor noted  SKIN: Warm and dry      IMPRESSION:   DM w/ neuropathy, asx, controlled - continue metformin  Hypertension associated DM , slightly elevated today, but stressed w/ flat school bus tire. Continue losartan and HCTZ prn  HLD associated DM, LDL at goal continue rosuvastatin  MS, under care of Neurology  Shoulder pain, chronic- left  -Vitamin D deficiency, goal 60 per Neurology      PLAN:  Consult Ortho  Continue present meds  Microalb: cr Saturday  Avoid salt  Recheck BP and advise  RTC 6 months w/ lab- pt requests EPP next appt

## 2022-10-18 ENCOUNTER — PATIENT MESSAGE (OUTPATIENT)
Dept: ADMINISTRATIVE | Facility: HOSPITAL | Age: 57
End: 2022-10-18
Payer: COMMERCIAL

## 2022-10-19 ENCOUNTER — PATIENT MESSAGE (OUTPATIENT)
Dept: BARIATRICS | Facility: CLINIC | Age: 57
End: 2022-10-19
Payer: COMMERCIAL

## 2022-10-21 ENCOUNTER — OFFICE VISIT (OUTPATIENT)
Dept: INTERNAL MEDICINE | Facility: CLINIC | Age: 57
End: 2022-10-21
Payer: COMMERCIAL

## 2022-10-21 VITALS
OXYGEN SATURATION: 98 % | BODY MASS INDEX: 41 KG/M2 | SYSTOLIC BLOOD PRESSURE: 140 MMHG | WEIGHT: 246.06 LBS | HEART RATE: 81 BPM | RESPIRATION RATE: 16 BRPM | HEIGHT: 65 IN | TEMPERATURE: 98 F | DIASTOLIC BLOOD PRESSURE: 90 MMHG

## 2022-10-21 DIAGNOSIS — E11.40 CONTROLLED TYPE 2 DIABETES MELLITUS WITH DIABETIC NEUROPATHY, WITHOUT LONG-TERM CURRENT USE OF INSULIN: Primary | ICD-10-CM

## 2022-10-21 DIAGNOSIS — G89.29 CHRONIC LEFT SHOULDER PAIN: ICD-10-CM

## 2022-10-21 DIAGNOSIS — M25.512 CHRONIC LEFT SHOULDER PAIN: ICD-10-CM

## 2022-10-21 DIAGNOSIS — E78.5 HYPERLIPIDEMIA ASSOCIATED WITH TYPE 2 DIABETES MELLITUS: ICD-10-CM

## 2022-10-21 DIAGNOSIS — E11.69 HYPERLIPIDEMIA ASSOCIATED WITH TYPE 2 DIABETES MELLITUS: ICD-10-CM

## 2022-10-21 DIAGNOSIS — I15.2 HYPERTENSION ASSOCIATED WITH TYPE 2 DIABETES MELLITUS: ICD-10-CM

## 2022-10-21 DIAGNOSIS — G35 MULTIPLE SCLEROSIS: ICD-10-CM

## 2022-10-21 DIAGNOSIS — E11.59 HYPERTENSION ASSOCIATED WITH TYPE 2 DIABETES MELLITUS: ICD-10-CM

## 2022-10-21 PROCEDURE — 3077F SYST BP >= 140 MM HG: CPT | Mod: CPTII,S$GLB,, | Performed by: INTERNAL MEDICINE

## 2022-10-21 PROCEDURE — 1159F MED LIST DOCD IN RCRD: CPT | Mod: CPTII,S$GLB,, | Performed by: INTERNAL MEDICINE

## 2022-10-21 PROCEDURE — 4010F ACE/ARB THERAPY RXD/TAKEN: CPT | Mod: CPTII,S$GLB,, | Performed by: INTERNAL MEDICINE

## 2022-10-21 PROCEDURE — 4010F PR ACE/ARB THEARPY RXD/TAKEN: ICD-10-PCS | Mod: CPTII,S$GLB,, | Performed by: INTERNAL MEDICINE

## 2022-10-21 PROCEDURE — 99214 OFFICE O/P EST MOD 30 MIN: CPT | Mod: S$GLB,,, | Performed by: INTERNAL MEDICINE

## 2022-10-21 PROCEDURE — 3044F PR MOST RECENT HEMOGLOBIN A1C LEVEL <7.0%: ICD-10-PCS | Mod: CPTII,S$GLB,, | Performed by: INTERNAL MEDICINE

## 2022-10-21 PROCEDURE — 1159F PR MEDICATION LIST DOCUMENTED IN MEDICAL RECORD: ICD-10-PCS | Mod: CPTII,S$GLB,, | Performed by: INTERNAL MEDICINE

## 2022-10-21 PROCEDURE — 3077F PR MOST RECENT SYSTOLIC BLOOD PRESSURE >= 140 MM HG: ICD-10-PCS | Mod: CPTII,S$GLB,, | Performed by: INTERNAL MEDICINE

## 2022-10-21 PROCEDURE — 3044F HG A1C LEVEL LT 7.0%: CPT | Mod: CPTII,S$GLB,, | Performed by: INTERNAL MEDICINE

## 2022-10-21 PROCEDURE — 3080F DIAST BP >= 90 MM HG: CPT | Mod: CPTII,S$GLB,, | Performed by: INTERNAL MEDICINE

## 2022-10-21 PROCEDURE — 99999 PR PBB SHADOW E&M-EST. PATIENT-LVL IV: ICD-10-PCS | Mod: PBBFAC,,, | Performed by: INTERNAL MEDICINE

## 2022-10-21 PROCEDURE — 3080F PR MOST RECENT DIASTOLIC BLOOD PRESSURE >= 90 MM HG: ICD-10-PCS | Mod: CPTII,S$GLB,, | Performed by: INTERNAL MEDICINE

## 2022-10-21 PROCEDURE — 99999 PR PBB SHADOW E&M-EST. PATIENT-LVL IV: CPT | Mod: PBBFAC,,, | Performed by: INTERNAL MEDICINE

## 2022-10-21 PROCEDURE — 99214 PR OFFICE/OUTPT VISIT, EST, LEVL IV, 30-39 MIN: ICD-10-PCS | Mod: S$GLB,,, | Performed by: INTERNAL MEDICINE

## 2022-10-21 NOTE — TELEPHONE ENCOUNTER
Please check with [facility] to see that they ran the mounjaro coupon as secondary insurance to her primary. Pt is diabetic. Coupon should work.

## 2022-10-22 ENCOUNTER — LAB VISIT (OUTPATIENT)
Dept: LAB | Facility: HOSPITAL | Age: 57
End: 2022-10-22
Attending: INTERNAL MEDICINE
Payer: COMMERCIAL

## 2022-10-22 DIAGNOSIS — E11.40 CONTROLLED TYPE 2 DIABETES MELLITUS WITH DIABETIC NEUROPATHY, WITHOUT LONG-TERM CURRENT USE OF INSULIN: ICD-10-CM

## 2022-10-22 LAB
ALBUMIN/CREAT UR: 10.7 UG/MG (ref 0–30)
CREAT UR-MCNC: 121 MG/DL (ref 15–325)
MICROALBUMIN UR DL<=1MG/L-MCNC: 13 UG/ML

## 2022-10-22 PROCEDURE — 82570 ASSAY OF URINE CREATININE: CPT | Performed by: INTERNAL MEDICINE

## 2022-10-22 PROCEDURE — 82043 UR ALBUMIN QUANTITATIVE: CPT | Performed by: INTERNAL MEDICINE

## 2022-10-23 ENCOUNTER — TELEPHONE (OUTPATIENT)
Dept: INTERNAL MEDICINE | Facility: CLINIC | Age: 57
End: 2022-10-23
Payer: COMMERCIAL

## 2022-10-23 NOTE — TELEPHONE ENCOUNTER
----- Message from Milagros Wolf MD sent at 10/23/2022  1:38 PM CDT -----  Your microalbumin creatinine urine test has resulted and it is in the normal range, indicating no excess protein leakage from the kidneys.   zainab

## 2022-10-26 ENCOUNTER — TELEPHONE (OUTPATIENT)
Dept: ORTHOPEDICS | Facility: CLINIC | Age: 57
End: 2022-10-26
Payer: COMMERCIAL

## 2022-10-26 DIAGNOSIS — R52 PAIN: Primary | ICD-10-CM

## 2022-10-28 ENCOUNTER — HOSPITAL ENCOUNTER (OUTPATIENT)
Dept: RADIOLOGY | Facility: HOSPITAL | Age: 57
Discharge: HOME | End: 2022-10-28
Attending: PHYSICIAN ASSISTANT
Payer: COMMERCIAL

## 2022-10-28 ENCOUNTER — OFFICE VISIT (OUTPATIENT)
Dept: ORTHOPEDICS | Facility: CLINIC | Age: 57
End: 2022-10-28
Payer: COMMERCIAL

## 2022-10-28 VITALS
DIASTOLIC BLOOD PRESSURE: 92 MMHG | SYSTOLIC BLOOD PRESSURE: 140 MMHG | WEIGHT: 245.81 LBS | BODY MASS INDEX: 40.96 KG/M2 | HEART RATE: 92 BPM | HEIGHT: 65 IN

## 2022-10-28 DIAGNOSIS — R52 PAIN: ICD-10-CM

## 2022-10-28 DIAGNOSIS — M25.512 CHRONIC LEFT SHOULDER PAIN: ICD-10-CM

## 2022-10-28 DIAGNOSIS — G89.29 CHRONIC LEFT SHOULDER PAIN: ICD-10-CM

## 2022-10-28 DIAGNOSIS — M75.82 ROTATOR CUFF TENDINITIS, LEFT: Primary | ICD-10-CM

## 2022-10-28 PROCEDURE — 3077F PR MOST RECENT SYSTOLIC BLOOD PRESSURE >= 140 MM HG: ICD-10-PCS | Mod: CPTII,S$GLB,, | Performed by: PHYSICIAN ASSISTANT

## 2022-10-28 PROCEDURE — 73030 X-RAY EXAM OF SHOULDER: CPT | Mod: TC,FY,LT

## 2022-10-28 PROCEDURE — 20610 DRAIN/INJ JOINT/BURSA W/O US: CPT | Mod: LT,S$GLB,, | Performed by: PHYSICIAN ASSISTANT

## 2022-10-28 PROCEDURE — 99203 OFFICE O/P NEW LOW 30 MIN: CPT | Mod: 25,S$GLB,, | Performed by: PHYSICIAN ASSISTANT

## 2022-10-28 PROCEDURE — 3080F DIAST BP >= 90 MM HG: CPT | Mod: CPTII,S$GLB,, | Performed by: PHYSICIAN ASSISTANT

## 2022-10-28 PROCEDURE — 1159F MED LIST DOCD IN RCRD: CPT | Mod: CPTII,S$GLB,, | Performed by: PHYSICIAN ASSISTANT

## 2022-10-28 PROCEDURE — 73030 XR SHOULDER COMPLETE 2 OR MORE VIEWS LEFT: ICD-10-PCS | Mod: 26,LT,, | Performed by: RADIOLOGY

## 2022-10-28 PROCEDURE — 3044F PR MOST RECENT HEMOGLOBIN A1C LEVEL <7.0%: ICD-10-PCS | Mod: CPTII,S$GLB,, | Performed by: PHYSICIAN ASSISTANT

## 2022-10-28 PROCEDURE — 3077F SYST BP >= 140 MM HG: CPT | Mod: CPTII,S$GLB,, | Performed by: PHYSICIAN ASSISTANT

## 2022-10-28 PROCEDURE — 3044F HG A1C LEVEL LT 7.0%: CPT | Mod: CPTII,S$GLB,, | Performed by: PHYSICIAN ASSISTANT

## 2022-10-28 PROCEDURE — 99999 PR PBB SHADOW E&M-EST. PATIENT-LVL IV: CPT | Mod: PBBFAC,,, | Performed by: PHYSICIAN ASSISTANT

## 2022-10-28 PROCEDURE — 1160F RVW MEDS BY RX/DR IN RCRD: CPT | Mod: CPTII,S$GLB,, | Performed by: PHYSICIAN ASSISTANT

## 2022-10-28 PROCEDURE — 20610 LARGE JOINT ASPIRATION/INJECTION: L SUBACROMIAL BURSA: ICD-10-PCS | Mod: LT,S$GLB,, | Performed by: PHYSICIAN ASSISTANT

## 2022-10-28 PROCEDURE — 73030 X-RAY EXAM OF SHOULDER: CPT | Mod: 26,LT,, | Performed by: RADIOLOGY

## 2022-10-28 PROCEDURE — 4010F ACE/ARB THERAPY RXD/TAKEN: CPT | Mod: CPTII,S$GLB,, | Performed by: PHYSICIAN ASSISTANT

## 2022-10-28 PROCEDURE — 99203 PR OFFICE/OUTPT VISIT, NEW, LEVL III, 30-44 MIN: ICD-10-PCS | Mod: 25,S$GLB,, | Performed by: PHYSICIAN ASSISTANT

## 2022-10-28 PROCEDURE — 3066F PR DOCUMENTATION OF TREATMENT FOR NEPHROPATHY: ICD-10-PCS | Mod: CPTII,S$GLB,, | Performed by: PHYSICIAN ASSISTANT

## 2022-10-28 PROCEDURE — 3061F NEG MICROALBUMINURIA REV: CPT | Mod: CPTII,S$GLB,, | Performed by: PHYSICIAN ASSISTANT

## 2022-10-28 PROCEDURE — 3066F NEPHROPATHY DOC TX: CPT | Mod: CPTII,S$GLB,, | Performed by: PHYSICIAN ASSISTANT

## 2022-10-28 PROCEDURE — 4010F PR ACE/ARB THEARPY RXD/TAKEN: ICD-10-PCS | Mod: CPTII,S$GLB,, | Performed by: PHYSICIAN ASSISTANT

## 2022-10-28 PROCEDURE — 3061F PR NEG MICROALBUMINURIA RESULT DOCUMENTED/REVIEW: ICD-10-PCS | Mod: CPTII,S$GLB,, | Performed by: PHYSICIAN ASSISTANT

## 2022-10-28 PROCEDURE — 99999 PR PBB SHADOW E&M-EST. PATIENT-LVL IV: ICD-10-PCS | Mod: PBBFAC,,, | Performed by: PHYSICIAN ASSISTANT

## 2022-10-28 PROCEDURE — 3080F PR MOST RECENT DIASTOLIC BLOOD PRESSURE >= 90 MM HG: ICD-10-PCS | Mod: CPTII,S$GLB,, | Performed by: PHYSICIAN ASSISTANT

## 2022-10-28 PROCEDURE — 1159F PR MEDICATION LIST DOCUMENTED IN MEDICAL RECORD: ICD-10-PCS | Mod: CPTII,S$GLB,, | Performed by: PHYSICIAN ASSISTANT

## 2022-10-28 PROCEDURE — 1160F PR REVIEW ALL MEDS BY PRESCRIBER/CLIN PHARMACIST DOCUMENTED: ICD-10-PCS | Mod: CPTII,S$GLB,, | Performed by: PHYSICIAN ASSISTANT

## 2022-10-28 RX ORDER — TRIAMCINOLONE ACETONIDE 40 MG/ML
40 INJECTION, SUSPENSION INTRA-ARTICULAR; INTRAMUSCULAR
Status: DISCONTINUED | OUTPATIENT
Start: 2022-10-28 | End: 2022-10-28 | Stop reason: HOSPADM

## 2022-10-28 RX ADMIN — TRIAMCINOLONE ACETONIDE 40 MG: 40 INJECTION, SUSPENSION INTRA-ARTICULAR; INTRAMUSCULAR at 08:10

## 2022-10-28 NOTE — PROGRESS NOTES
Subjective:      Patient ID: Miesha Domínguez is a 56 y.o. female.    Chief Complaint: Pain of the Left Shoulder      57yo RHD female with phmx MS and DM2 presents with 1 year history of left shoulder pain.  She states symptoms started after she was doing heavy rope exercise at the gym.  Her pain is superior lateral shoulder.  Worse at night while she sleeping as well as with heavy lifting and overhead motions.  She denies weakness in her shoulder.  She has only tried Voltaren gel which offers minimal relief.  She does try to stay active with exercising at the gym and light weight training.  Patient works as a  and has pain with holding onto the steering wheel for prolonged period of time.  She denies neck pain and paresthesias.      Review of Systems   Constitutional: Negative for chills and fever.   Cardiovascular:  Negative for chest pain.   Respiratory:  Negative for cough.    Hematologic/Lymphatic: Does not bruise/bleed easily.   Skin:  Negative for poor wound healing and rash.   Musculoskeletal:  Positive for joint pain, myalgias and stiffness.   Gastrointestinal:  Negative for abdominal pain.   Genitourinary:  Negative for bladder incontinence.   Neurological:  Negative for dizziness, loss of balance and weakness.   Psychiatric/Behavioral:  Negative for altered mental status.      Review of patient's allergies indicates:  No Known Allergies     Current Outpatient Medications   Medication Sig Dispense Refill    cholecalciferol, vitamin D3, (VITAMIN D3) 50 mcg (2,000 unit) Cap Take 1 capsule by mouth once daily.      cyanocobalamin, vitamin B-12, (VITAMIN B-12) 2,500 mcg Subl Place 1 tablet under the tongue once a week. Pt taking weekly      cyclobenzaprine (FLEXERIL) 5 MG tablet Take 1 tablet (5 mg total) by mouth nightly as needed. 30 tablet 5    fluticasone propionate (FLONASE) 50 mcg/actuation nasal spray 2 sprays by Each Nostril route once daily.      glatiramer (COPAXONE, GLATOPA) 40 mg/mL  "injection INJECT 1 SYRINGE UNDER THE SKIN 3 TIMES A WEEK AT LEAST 48 HOURS APART. ALLOW TO WARM TO ROOM TEMP FOR 20 MINUTES. REFRIGERATE. 36 each 0    hydroCHLOROthiazide (HYDRODIURIL) 25 MG tablet TAKE 1 TABLET BY MOUTH EVERY DAY (Patient taking differently: Take 25 mg by mouth daily as needed.) 90 tablet 3    losartan (COZAAR) 100 MG tablet Take 1 tablet (100 mg total) by mouth once daily. 90 tablet 3    metFORMIN (GLUCOPHAGE) 1000 MG tablet       MULTIVITAMIN ORAL Take 1 tablet by mouth once daily. Centrum adult chews ( flavor burst)      pregabalin (LYRICA) 100 MG capsule TAKE 1 CAPSULE BY MOUTH TWICE A DAY 60 capsule 2    rosuvastatin (CRESTOR) 20 MG tablet TAKE 1 TABLET BY MOUTH EVERY DAY 90 tablet 3    sodium chloride (OCEAN) 0.65 % nasal spray 1 spray by Nasal route as needed for Congestion.       No current facility-administered medications for this visit.        The patient's relevant past medical, surgical, and social history was reviewed in Epic.       Objective:      VITAL SIGNS: BP (!) 140/92 (BP Location: Left arm, Patient Position: Sitting, BP Method: Large (Automatic))   Pulse 92   Ht 5' 5" (1.651 m)   Wt 111.5 kg (245 lb 13 oz)   LMP 04/05/2019 (Approximate)   BMI 40.91 kg/m²     General    Nursing note and vitals reviewed.  Constitutional: She is oriented to person, place, and time. She appears well-developed and well-nourished.   Neurological: She is alert and oriented to person, place, and time.         Right Shoulder Exam     Range of Motion   Active abduction:  170   Forward Flexion:  180   External Rotation 0 degrees:  90   Internal rotation 0 degrees:  T6     Left Shoulder Exam     Tenderness   The patient is tender to palpation of the greater tuberosity and supraspinatus.    Range of Motion   Active abduction:  170   Passive abduction:  170   Forward Flexion:  180   External Rotation 0 degrees:  90   Internal rotation 0 degrees:  T12     Tests & Signs   Rose test: positive  Rotator " Cuff Painful Arc/Range: mild  Anterosuperior Escape: negative  Belly Press: negative  Speed's Test: negative    Other   Sensation: normal       Muscle Strength   Right Upper Extremity   Shoulder Abduction: 5/5   Shoulder Internal Rotation: 5/5   Shoulder External Rotation: 5/5   Supraspinatus: 5/5   Subscapularis: 5/5   Biceps: 5/5   Left Upper Extremity  Shoulder Abduction: 5/5   Shoulder Internal Rotation: 5/5   Shoulder External Rotation: 5/5   Supraspinatus: 4/5   Subscapularis: 5/5   Biceps: 5/5     Vascular Exam       Left Pulses      Radial:                    2+     X-Ray Shoulder 2 or More Views Left  Narrative: EXAMINATION:  XR SHOULDER COMPLETE 2 OR MORE VIEWS LEFT    CLINICAL HISTORY:  Pain, unspecified    TECHNIQUE:  Two or three views of the left shoulder were performed.    COMPARISON:  None    FINDINGS:  Left glenohumeral articulation is maintained.  Amorphous density superiorly adjacent to the humerus as can be seen with sequela of hydroxyapatite deposition.  AC joint is intact with DJD.  No acute fracture, dislocation, or osseous destruction.  Impression: As above.    Electronically signed by: Mulugeta Rosales  Date:    10/28/2022  Time:    08:49    I have reviewed the above radiograph and agree with the findings stated by the radiologist.         Assessment:       1. Rotator cuff tendinitis, left    2. Chronic left shoulder pain          Plan:         Miesha was seen today for pain.    Diagnoses and all orders for this visit:    Rotator cuff tendinitis, left  -     Large Joint Aspiration/Injection: L subacromial bursa    Chronic left shoulder pain  -     Ambulatory referral/consult to Orthopedics  -     Large Joint Aspiration/Injection: L subacromial bursa    Chronic rotator cuff tendinitis of the left shoulder.  Explained the natural history of rotator cuff conditions to the patient.  Her left upper extremity surprisingly pretty strong other than with forward flexion.  This is most likely due to  the home exercise program she does.  I do recommend trying a left subacromial corticosteroid injection today to help with her pain.  She agreed to this today.  Rotator cuff home exercises also given to the patient.  We will see how this does.  Follow-up will be as needed.    Diagnoses and plan discussed with the patient, as well as the expected course and duration of his symptoms.  All questions and concerns were addressed prior to the end of the visit.   Instructed patient to call office if they have any future questions/concerns or to schedule apt. Patient will return to see me if symptoms worsen or fail to improve    Note dictated with voice recognition software, please excuse any grammatical errors.        Sarah Lim PA-C   10/28/2022

## 2022-10-28 NOTE — PROCEDURES
Large Joint Aspiration/Injection: L subacromial bursa    Date/Time: 10/28/2022 8:45 AM  Performed by: Sarah Lim PA-C  Authorized by: Sarah Lim PA-C     Consent Done?:  Yes (Verbal)  Indications:  Pain  Site marked: the procedure site was marked    Timeout: prior to procedure the correct patient, procedure, and site was verified    Prep: patient was prepped and draped in usual sterile fashion      Local anesthesia used?: Yes    Local anesthetic:  Lidocaine 1% without epinephrine and topical anesthetic    Details:  Needle Size:  21 G  Ultrasonic Guidance for needle placement?: No    Approach:  Posterior  Location:  Shoulder  Site:  L subacromial bursa  Medications:  40 mg triamcinolone acetonide 40 mg/mL  Patient tolerance:  Patient tolerated the procedure well with no immediate complications

## 2022-10-30 ENCOUNTER — PATIENT MESSAGE (OUTPATIENT)
Dept: INTERNAL MEDICINE | Facility: CLINIC | Age: 57
End: 2022-10-30
Payer: COMMERCIAL

## 2022-10-31 ENCOUNTER — PATIENT MESSAGE (OUTPATIENT)
Dept: BARIATRICS | Facility: CLINIC | Age: 57
End: 2022-10-31
Payer: COMMERCIAL

## 2022-11-03 ENCOUNTER — PATIENT OUTREACH (OUTPATIENT)
Dept: ADMINISTRATIVE | Facility: HOSPITAL | Age: 57
End: 2022-11-03
Payer: COMMERCIAL

## 2022-11-04 ENCOUNTER — PATIENT MESSAGE (OUTPATIENT)
Dept: BARIATRICS | Facility: CLINIC | Age: 57
End: 2022-11-04
Payer: COMMERCIAL

## 2022-11-15 ENCOUNTER — OFFICE VISIT (OUTPATIENT)
Dept: BARIATRICS | Facility: CLINIC | Age: 57
End: 2022-11-15
Payer: COMMERCIAL

## 2022-11-15 VITALS
BODY MASS INDEX: 40.55 KG/M2 | HEIGHT: 65 IN | HEART RATE: 94 BPM | DIASTOLIC BLOOD PRESSURE: 58 MMHG | WEIGHT: 243.38 LBS | SYSTOLIC BLOOD PRESSURE: 128 MMHG | OXYGEN SATURATION: 99 %

## 2022-11-15 DIAGNOSIS — Z98.84 S/P LAPAROSCOPIC SLEEVE GASTRECTOMY: ICD-10-CM

## 2022-11-15 DIAGNOSIS — E66.01 CLASS 3 SEVERE OBESITY WITH SERIOUS COMORBIDITY AND BODY MASS INDEX (BMI) OF 40.0 TO 44.9 IN ADULT, UNSPECIFIED OBESITY TYPE: ICD-10-CM

## 2022-11-15 DIAGNOSIS — E11.9 CONTROLLED TYPE 2 DIABETES MELLITUS WITHOUT COMPLICATION, WITHOUT LONG-TERM CURRENT USE OF INSULIN: ICD-10-CM

## 2022-11-15 PROCEDURE — 99999 PR PBB SHADOW E&M-EST. PATIENT-LVL IV: CPT | Mod: PBBFAC,,, | Performed by: INTERNAL MEDICINE

## 2022-11-15 PROCEDURE — 3044F PR MOST RECENT HEMOGLOBIN A1C LEVEL <7.0%: ICD-10-PCS | Mod: CPTII,S$GLB,, | Performed by: INTERNAL MEDICINE

## 2022-11-15 PROCEDURE — 3074F SYST BP LT 130 MM HG: CPT | Mod: CPTII,S$GLB,, | Performed by: INTERNAL MEDICINE

## 2022-11-15 PROCEDURE — 1159F MED LIST DOCD IN RCRD: CPT | Mod: CPTII,S$GLB,, | Performed by: INTERNAL MEDICINE

## 2022-11-15 PROCEDURE — 3061F PR NEG MICROALBUMINURIA RESULT DOCUMENTED/REVIEW: ICD-10-PCS | Mod: CPTII,S$GLB,, | Performed by: INTERNAL MEDICINE

## 2022-11-15 PROCEDURE — 3061F NEG MICROALBUMINURIA REV: CPT | Mod: CPTII,S$GLB,, | Performed by: INTERNAL MEDICINE

## 2022-11-15 PROCEDURE — 3066F PR DOCUMENTATION OF TREATMENT FOR NEPHROPATHY: ICD-10-PCS | Mod: CPTII,S$GLB,, | Performed by: INTERNAL MEDICINE

## 2022-11-15 PROCEDURE — 4010F ACE/ARB THERAPY RXD/TAKEN: CPT | Mod: CPTII,S$GLB,, | Performed by: INTERNAL MEDICINE

## 2022-11-15 PROCEDURE — 3008F PR BODY MASS INDEX (BMI) DOCUMENTED: ICD-10-PCS | Mod: CPTII,S$GLB,, | Performed by: INTERNAL MEDICINE

## 2022-11-15 PROCEDURE — 4010F PR ACE/ARB THEARPY RXD/TAKEN: ICD-10-PCS | Mod: CPTII,S$GLB,, | Performed by: INTERNAL MEDICINE

## 2022-11-15 PROCEDURE — 99213 PR OFFICE/OUTPT VISIT, EST, LEVL III, 20-29 MIN: ICD-10-PCS | Mod: S$GLB,,, | Performed by: INTERNAL MEDICINE

## 2022-11-15 PROCEDURE — 3044F HG A1C LEVEL LT 7.0%: CPT | Mod: CPTII,S$GLB,, | Performed by: INTERNAL MEDICINE

## 2022-11-15 PROCEDURE — 99213 OFFICE O/P EST LOW 20 MIN: CPT | Mod: S$GLB,,, | Performed by: INTERNAL MEDICINE

## 2022-11-15 PROCEDURE — 3078F DIAST BP <80 MM HG: CPT | Mod: CPTII,S$GLB,, | Performed by: INTERNAL MEDICINE

## 2022-11-15 PROCEDURE — 1160F RVW MEDS BY RX/DR IN RCRD: CPT | Mod: CPTII,S$GLB,, | Performed by: INTERNAL MEDICINE

## 2022-11-15 PROCEDURE — 3078F PR MOST RECENT DIASTOLIC BLOOD PRESSURE < 80 MM HG: ICD-10-PCS | Mod: CPTII,S$GLB,, | Performed by: INTERNAL MEDICINE

## 2022-11-15 PROCEDURE — 3074F PR MOST RECENT SYSTOLIC BLOOD PRESSURE < 130 MM HG: ICD-10-PCS | Mod: CPTII,S$GLB,, | Performed by: INTERNAL MEDICINE

## 2022-11-15 PROCEDURE — 1160F PR REVIEW ALL MEDS BY PRESCRIBER/CLIN PHARMACIST DOCUMENTED: ICD-10-PCS | Mod: CPTII,S$GLB,, | Performed by: INTERNAL MEDICINE

## 2022-11-15 PROCEDURE — 3066F NEPHROPATHY DOC TX: CPT | Mod: CPTII,S$GLB,, | Performed by: INTERNAL MEDICINE

## 2022-11-15 PROCEDURE — 1159F PR MEDICATION LIST DOCUMENTED IN MEDICAL RECORD: ICD-10-PCS | Mod: CPTII,S$GLB,, | Performed by: INTERNAL MEDICINE

## 2022-11-15 PROCEDURE — 99999 PR PBB SHADOW E&M-EST. PATIENT-LVL IV: ICD-10-PCS | Mod: PBBFAC,,, | Performed by: INTERNAL MEDICINE

## 2022-11-15 PROCEDURE — 3008F BODY MASS INDEX DOCD: CPT | Mod: CPTII,S$GLB,, | Performed by: INTERNAL MEDICINE

## 2022-11-15 NOTE — PROGRESS NOTES
Subjective:       Patient ID: Miesha Domínguez is a 56 y.o. female.    Chief Complaint: Follow-up    Pt here today for follow-up.   Has lost 1 more  lbs since last in the office  net pos 2  lbs.  Switched to Mounjaro last OV, but pt did not get it or start it. Was on ozempic 1 mg. Has been off of it x 2 weeks. She feels she was snacking more at night. Will be starting work earlier, so she is hoping that will help with it.  She has continued to incorporate more resistance training. Has been losing some inches. She had some shoulder pain. Has been walking instead of weights.    Only SE was constipation at first. Used miralax with good relief.  Having trouble planning her meals 2/2 to calcium oxalate in urine.     BP is good today.  Had calcium oxalate in urine.   BS had been in 70-80s fasting.  She is s/p lap gastric sleeve - 7/29/13.  Neurology  stopped topiramate. Being worked up with neurology and dx with MS. Neuro notes reviewed  diehtylpropion last filled 2/20/22.   checked today     BP and labs look good.     Lab Results   Component Value Date    HGBA1C 5.5 10/08/2022    HGBA1C 5.4 03/22/2022    HGBA1C 5.5 09/20/2021    HGBA1C 5.5 09/20/2021     Lab Results   Component Value Date    LDLCALC 62.2 (L) 10/08/2022    CREATININE 0.8 03/22/2022    CREATININE 0.8 03/22/2022           Follow-up  Associated symptoms include myalgias. Pertinent negatives include + arthralgias, chest pain, chills or fever.     Review of Systems   Constitutional: Negative for chills and fever.   Respiratory: Negative for shortness of breath.         Denies Snoring   Cardiovascular: Negative for chest pain and leg swelling.   Gastrointestinal: Negative for constipation and diarrhea.        Denies GERD   Genitourinary: Positive for menstrual problem. Negative for difficulty urinating.        Recent  bleeding. bx with polyps.    Musculoskeletal: Positive for myalgias. Negative for arthralgias and back pain.   Neurological: Negative for  "dizziness and light-headedness.   Psychiatric/Behavioral: Negative for dysphoric mood. The patient is not nervous/anxious.        Objective:     BP (!) 128/58   Pulse 94   Ht 5' 5" (1.651 m)   Wt 110.4 kg (243 lb 6.2 oz)   LMP 04/05/2019 (Approximate)   SpO2 99%   BMI 40.50 kg/m²     Physical Exam  Vitals signs reviewed.   Constitutional:       General: She is not in acute distress.     Appearance: She is well-developed.      Comments: Obese   HENT:      Head: Normocephalic and atraumatic.   Eyes:      General: No scleral icterus.     Pupils: Pupils are equal, round, and reactive to light.   Neck:      Musculoskeletal: Normal range of motion and neck supple.   Cardiovascular:      Rate and Rhythm: Normal rate.   Pulmonary:      Effort: Pulmonary effort is normal.   Musculoskeletal: Normal range of motion.   Skin:     General: Skin is warm and dry.      Findings: No erythema.   Neurological:      Mental Status: She is alert and oriented to person, place, and time.      Cranial Nerves: No cranial nerve deficit.   Psychiatric:         Behavior: Behavior normal.         Judgment: Judgment normal.         Assessment:       1. Class 3 severe obesity with serious comorbidity and body mass index (BMI) of 40.0 to 44.9 in adult, unspecified obesity type    2. Controlled type 2 diabetes mellitus without complication, without long-term current use of insulin    3. S/P laparoscopic sleeve gastrectomy          Plan:           Miesha was seen today for follow-up.    Diagnoses and all orders for this visit:    Class 3 severe obesity with serious comorbidity and body mass index (BMI) of 40.0 to 44.9 in adult, unspecified obesity type    Controlled type 2 diabetes mellitus without complication, without long-term current use of insulin  -     Discontinue: semaglutide (OZEMPIC) 1 mg/dose (4 mg/3 mL); Inject 1 mg into the skin every 7 days.  -     semaglutide (OZEMPIC) 1 mg/dose (4 mg/3 mL); Inject 1 mg into the skin every 7 " days.    S/P laparoscopic sleeve gastrectomy       Start Ozempic 1mg  once a week.     Decrease portions as soon as you start Ozempic. Avoid fried, greasy, fatty foods.     Some nausea in the first 2 weeks is not unusual.     If you get pain across the upper abdomen and around to your back, please call the office.       [employer] for coupon/videos.         - To lose weight you want to cut 100% starchy carbohydrates out of your diet (bread, rice, pasta, potatoes, granola, flour, corn, peas, oatmeal, grits, tortillas, crackers, chips) and get  grams of protein.  Aim for 100 grams of protein daily.    - No soda, sweet tea, juices, sports drinks or lemonade     -Exercise daily. Continue. Add some type of resistance training 2-3 days a week. These can be body weight exercises, light weight or elastic bands. [employer] are great sources for free work out plans and videos.         Decrease portions as soon as you start Ozempic. Some nausea in the first 2 weeks is not unusual.     If you get pain across the upper abdomen and around to your back, please call the office.        Holiday tips given.

## 2022-11-15 NOTE — PATIENT INSTRUCTIONS
Start Ozempic 1mg once a week.     Decrease portions as soon as you start Ozempic. Avoid fried, greasy, fatty foods.     Some nausea in the first 2 weeks is not unusual.     If you get pain across the upper abdomen and around to your back, please call the office.       Www.[employer] for coupon/videos.         - To lose weight you want to cut 100% starchy carbohydrates out of your diet (bread, rice, pasta, potatoes, granola, flour, corn, peas, oatmeal, grits, tortillas, crackers, chips) and get  grams of protein.  Aim for 100 grams of protein daily.    - No soda, sweet tea, juices, sports drinks or lemonade     -Exercise daily. Continue. Add some type of resistance training 2-3 days a week. These can be body weight exercises, light weight or elastic bands. [employer] and [employer] are great sources for free work out plans and videos.         Decrease portions as soon as you start Ozempic. Some nausea in the first 2 weeks is not unusual.     If you get pain across the upper abdomen and around to your back, please call the office.      Helpful tips to survive the holidays:  Dont save yourself for the meal: Make sure you continue to eat high protein small meals every 3-4 hours to ensure to do not become over-hungry. Avoid temptation by not showing up to a holiday party or gathering hungry.   Plan ahead. Bring a dish to a party if you know there may not be an appropriate option.   Choose sugar-free drinks: Stick to water or other sugar-free beverages and make sure you are getting 6-8 cups of fluid each day (but not with meals!). Avoid alcohol, carbonated beverages, and high-fat/high-sugar beverages like hot chocolate and eggnog. Try sugar-free hot cocoa made with skim milk or water, or sugar-free spiced tea to add some holiday flair to your beverage (see sugar-free mulled cider recipe below)  Take your time: Eat mindfully. Dont graze on food throughout the day. Sit down to enjoy your small meals. Chew  slowly and thoroughly. Cut your food into small pieces before eating.  Listen to your body: Stop eating as soon as you feel full. Do not feel pressured to try certain (or all) foods or to eat all of the food on your plate. Listen to your hunger cues.   REMEMBER: Make your holidays about spending time with family and friends instead of focusing gatherings around food.  Keep up your exercise routine: Make sure you continue to get regular exercise throughout the holiday season. Encourage friends and family to be active by taking a walk together after a meal, to look at holiday lights, or to window-shop.    Good Holiday Meal Options:  Roasted Turkey, NO skin. Use low sodium broth instead of gravy.   Stuffed Bell Peppers made WITHOUT bread crumbs or Rice. Try using parmesan cheese instead  Gumbo, NO rice. Try picking out mostly the meat/seafood and vegetables with little broth.   Green Bean Casserole made with 98% fat free cream of mushroom soup and crushed almonds/pecans instead of fried onions  Side salad w/ low fat dressing. Try a different kind of salad maybe use Kale or spinach.   Roasted non-starchy vegetables like brussel sprouts, broccoli, green beans, zucchini, butternut squash, cauliflower  Cauliflower Mash (steam or roast cauliflower, puree w/ low fat cheese, dash of fat free milk and 2-3 sprays of I cant believe its not butter spray. Add garlic powder and black pepper to season). Use Low sodium broth instead of gravy.   Try Loaded Cauliflower Mash (Make cauliflower like above cauliflower mash. Top with diced turkey mcgraw, ¼ cup low fat cheddar cheese and bake @ 350* F for 5-10 minutes, until cheese is melted. Top with minced chives, black pepper and garlic to taste).   Homemade cranberry sauce using Splenda or another alternative sweetener. Boil fresh cranberries and add splenda to taste. Boil until cranberries break open and then simmer until it reaches the consistency you want (less time for more watery  sauce and simmer for longer to create a thicker sauce).   Deviled eggs: make using low fat finn, mustard, DILL relish (not sweet relish).   Vegetable tray w/ Greek yogurt Ranch Dip. Mix 1 packet of hidden valley ranch dip mix w/ 16 oz low fat plain greek yogurt.     Good Holiday Dessert Options:  High protein Pumpkin Cheesecake (see recipe below)  Pumpkin Whip (see recipe below)  Quest Apple Pie or Cinnamon Roll flavored protein bar (warm in microwave for 10-15 seconds)  Eggnog Protein shake (see recipe below)  Fresh fruit w/ low fat cheese  Sugar-free Jello Parfaits. Layer Red and Green sugar-free jello in cups and top w/ 2 tbsp Sugar-free cool-whip    Pumpkin Cheesecake    8 ounces fat free cream cheese, softened   2 scoops of vanilla protein powder (<4 g sugar per serving)   ¼ tsp Fine salt   2 eggs, at room temperature   1/3 cup fat free sour cream  1/3 cup fat free half and half  1 15 -ounce can pure pumpkin puree   1 tablespoon pumpkin pie spice, plus more for dusting   Unsalted nuts, crushed  *Add splenda to taste    Directions     1. Preheat the oven to 300 degrees F. Line 18 muffin cups with paper liners. Sprinkle 1 tsp crushed unsalted nuts at the bottom of each of muffin cup liner.     2. In a large bowl, beat the cream cheese, vanilla protein powder and 1/4 teaspoon fine salt on medium-high speed until smooth and creamy, 2 to 3 minutes. Scrape down the sides, reduce speed to low and beat in the eggs, 1 at a time, until combined. Beat in 1/3 cup fat free sour cream and fat free half and half. Stir in the pumpkin puree and pumpkin pie spice until smooth. Divide evenly among cookie-lined paper cups, filling almost all the way to the top.     3. Bake until the filling is just set, 40 to 45 minutes. A sharp knife inserted into the center will come out moist, but clean. Cool completely in tins on a wire rack. Refrigerate until cold, 4 hours, or overnight. Top with a dusting of pumpkin pie spice.    Recipe  altered from the following recipe: http://www.pickrset.com/recipes/food-network-danya/mini-pumpkin-cheesecakes-recipe.print.html?oc=linkback    Pumpkin Whip    Box of sugar-free vanilla pudding  Can of pumpkin puree  Pumpkin Pie spice (sprinkle to taste)  ½ cup of sugar-free Cool Whip    Directions:  Make sugar-free pudding according to package directions using fat free or 1% milk. Stir in pumpkin and cool whip. Add pumpkin pie spice to taste.     Egg Nog Protein shake    8 oz skim or 1% milk  1 scoop vanilla protein powder  1 tbsp sugar-free vanilla pudding mix  ½ tsp butter flavor extract  ½ tsp rum extract  ½ tsp cinnamon     Shake together or blend with ice and serve.     Sugar-Free Mulled Cider    3 oz diet cran-apple juice  6 oz water  1 packet sugar-free apple cider mix  ½ tsp apple pie spice  ½ tsp butter flavor extract  1 tbsp Sugar-free Syrup    Mix together. Warm if needed and serve w/ orange wedge and cinnamon stick.

## 2022-11-16 ENCOUNTER — PATIENT MESSAGE (OUTPATIENT)
Dept: ADMINISTRATIVE | Facility: HOSPITAL | Age: 57
End: 2022-11-16
Payer: COMMERCIAL

## 2022-11-18 ENCOUNTER — PATIENT MESSAGE (OUTPATIENT)
Dept: BARIATRICS | Facility: CLINIC | Age: 57
End: 2022-11-18
Payer: COMMERCIAL

## 2022-11-21 ENCOUNTER — TELEPHONE (OUTPATIENT)
Dept: BARIATRICS | Facility: CLINIC | Age: 57
End: 2022-11-21
Payer: COMMERCIAL

## 2022-11-23 ENCOUNTER — OFFICE VISIT (OUTPATIENT)
Dept: NEUROLOGY | Facility: CLINIC | Age: 57
End: 2022-11-23
Payer: COMMERCIAL

## 2022-11-23 VITALS
DIASTOLIC BLOOD PRESSURE: 90 MMHG | WEIGHT: 246.25 LBS | HEART RATE: 92 BPM | BODY MASS INDEX: 41.03 KG/M2 | SYSTOLIC BLOOD PRESSURE: 148 MMHG | HEIGHT: 65 IN

## 2022-11-23 DIAGNOSIS — G35 MULTIPLE SCLEROSIS: Primary | ICD-10-CM

## 2022-11-23 PROCEDURE — 3008F PR BODY MASS INDEX (BMI) DOCUMENTED: ICD-10-PCS | Mod: CPTII,S$GLB,, | Performed by: STUDENT IN AN ORGANIZED HEALTH CARE EDUCATION/TRAINING PROGRAM

## 2022-11-23 PROCEDURE — 3008F BODY MASS INDEX DOCD: CPT | Mod: CPTII,S$GLB,, | Performed by: STUDENT IN AN ORGANIZED HEALTH CARE EDUCATION/TRAINING PROGRAM

## 2022-11-23 PROCEDURE — 3080F PR MOST RECENT DIASTOLIC BLOOD PRESSURE >= 90 MM HG: ICD-10-PCS | Mod: CPTII,S$GLB,, | Performed by: STUDENT IN AN ORGANIZED HEALTH CARE EDUCATION/TRAINING PROGRAM

## 2022-11-23 PROCEDURE — 99215 OFFICE O/P EST HI 40 MIN: CPT | Mod: S$GLB,,, | Performed by: STUDENT IN AN ORGANIZED HEALTH CARE EDUCATION/TRAINING PROGRAM

## 2022-11-23 PROCEDURE — 3061F NEG MICROALBUMINURIA REV: CPT | Mod: CPTII,S$GLB,, | Performed by: STUDENT IN AN ORGANIZED HEALTH CARE EDUCATION/TRAINING PROGRAM

## 2022-11-23 PROCEDURE — 3077F PR MOST RECENT SYSTOLIC BLOOD PRESSURE >= 140 MM HG: ICD-10-PCS | Mod: CPTII,S$GLB,, | Performed by: STUDENT IN AN ORGANIZED HEALTH CARE EDUCATION/TRAINING PROGRAM

## 2022-11-23 PROCEDURE — 1160F RVW MEDS BY RX/DR IN RCRD: CPT | Mod: CPTII,S$GLB,, | Performed by: STUDENT IN AN ORGANIZED HEALTH CARE EDUCATION/TRAINING PROGRAM

## 2022-11-23 PROCEDURE — 1160F PR REVIEW ALL MEDS BY PRESCRIBER/CLIN PHARMACIST DOCUMENTED: ICD-10-PCS | Mod: CPTII,S$GLB,, | Performed by: STUDENT IN AN ORGANIZED HEALTH CARE EDUCATION/TRAINING PROGRAM

## 2022-11-23 PROCEDURE — 3044F PR MOST RECENT HEMOGLOBIN A1C LEVEL <7.0%: ICD-10-PCS | Mod: CPTII,S$GLB,, | Performed by: STUDENT IN AN ORGANIZED HEALTH CARE EDUCATION/TRAINING PROGRAM

## 2022-11-23 PROCEDURE — 99999 PR PBB SHADOW E&M-EST. PATIENT-LVL V: ICD-10-PCS | Mod: PBBFAC,,, | Performed by: STUDENT IN AN ORGANIZED HEALTH CARE EDUCATION/TRAINING PROGRAM

## 2022-11-23 PROCEDURE — 3066F NEPHROPATHY DOC TX: CPT | Mod: CPTII,S$GLB,, | Performed by: STUDENT IN AN ORGANIZED HEALTH CARE EDUCATION/TRAINING PROGRAM

## 2022-11-23 PROCEDURE — 1159F PR MEDICATION LIST DOCUMENTED IN MEDICAL RECORD: ICD-10-PCS | Mod: CPTII,S$GLB,, | Performed by: STUDENT IN AN ORGANIZED HEALTH CARE EDUCATION/TRAINING PROGRAM

## 2022-11-23 PROCEDURE — 4010F PR ACE/ARB THEARPY RXD/TAKEN: ICD-10-PCS | Mod: CPTII,S$GLB,, | Performed by: STUDENT IN AN ORGANIZED HEALTH CARE EDUCATION/TRAINING PROGRAM

## 2022-11-23 PROCEDURE — 1159F MED LIST DOCD IN RCRD: CPT | Mod: CPTII,S$GLB,, | Performed by: STUDENT IN AN ORGANIZED HEALTH CARE EDUCATION/TRAINING PROGRAM

## 2022-11-23 PROCEDURE — 3061F PR NEG MICROALBUMINURIA RESULT DOCUMENTED/REVIEW: ICD-10-PCS | Mod: CPTII,S$GLB,, | Performed by: STUDENT IN AN ORGANIZED HEALTH CARE EDUCATION/TRAINING PROGRAM

## 2022-11-23 PROCEDURE — 3080F DIAST BP >= 90 MM HG: CPT | Mod: CPTII,S$GLB,, | Performed by: STUDENT IN AN ORGANIZED HEALTH CARE EDUCATION/TRAINING PROGRAM

## 2022-11-23 PROCEDURE — 3044F HG A1C LEVEL LT 7.0%: CPT | Mod: CPTII,S$GLB,, | Performed by: STUDENT IN AN ORGANIZED HEALTH CARE EDUCATION/TRAINING PROGRAM

## 2022-11-23 PROCEDURE — 99999 PR PBB SHADOW E&M-EST. PATIENT-LVL V: CPT | Mod: PBBFAC,,, | Performed by: STUDENT IN AN ORGANIZED HEALTH CARE EDUCATION/TRAINING PROGRAM

## 2022-11-23 PROCEDURE — 99215 PR OFFICE/OUTPT VISIT, EST, LEVL V, 40-54 MIN: ICD-10-PCS | Mod: S$GLB,,, | Performed by: STUDENT IN AN ORGANIZED HEALTH CARE EDUCATION/TRAINING PROGRAM

## 2022-11-23 PROCEDURE — 3066F PR DOCUMENTATION OF TREATMENT FOR NEPHROPATHY: ICD-10-PCS | Mod: CPTII,S$GLB,, | Performed by: STUDENT IN AN ORGANIZED HEALTH CARE EDUCATION/TRAINING PROGRAM

## 2022-11-23 PROCEDURE — 3077F SYST BP >= 140 MM HG: CPT | Mod: CPTII,S$GLB,, | Performed by: STUDENT IN AN ORGANIZED HEALTH CARE EDUCATION/TRAINING PROGRAM

## 2022-11-23 PROCEDURE — 4010F ACE/ARB THERAPY RXD/TAKEN: CPT | Mod: CPTII,S$GLB,, | Performed by: STUDENT IN AN ORGANIZED HEALTH CARE EDUCATION/TRAINING PROGRAM

## 2022-11-28 ENCOUNTER — TELEPHONE (OUTPATIENT)
Dept: NEUROLOGY | Facility: CLINIC | Age: 57
End: 2022-11-28
Payer: COMMERCIAL

## 2022-11-29 NOTE — TELEPHONE ENCOUNTER
----- Message from Marlene Valentin sent at 11/28/2022 10:22 AM CST -----  Regarding: RX Order Cancellation; [facility]  Contact: Valerie @ [phone]  CallerValerie with [facility] is calling to advise the office that pt canceled the rx: glatiramer (COPAXONE, GLATOPA) 40 mg/mL injection. Caller states that if there are questions, please call [phone]. Thanks.

## 2022-12-01 ENCOUNTER — PATIENT MESSAGE (OUTPATIENT)
Dept: PSYCHIATRY | Facility: CLINIC | Age: 57
End: 2022-12-01
Payer: COMMERCIAL

## 2022-12-07 ENCOUNTER — PATIENT MESSAGE (OUTPATIENT)
Dept: BARIATRICS | Facility: CLINIC | Age: 57
End: 2022-12-07
Payer: COMMERCIAL

## 2023-01-04 ENCOUNTER — OFFICE VISIT (OUTPATIENT)
Dept: OBSTETRICS AND GYNECOLOGY | Facility: CLINIC | Age: 58
End: 2023-01-04
Payer: COMMERCIAL

## 2023-01-04 VITALS
SYSTOLIC BLOOD PRESSURE: 130 MMHG | WEIGHT: 252 LBS | DIASTOLIC BLOOD PRESSURE: 82 MMHG | BODY MASS INDEX: 41.99 KG/M2 | HEIGHT: 65 IN

## 2023-01-04 DIAGNOSIS — N93.0 POSTCOITAL BLEEDING: Primary | ICD-10-CM

## 2023-01-04 PROCEDURE — 3008F PR BODY MASS INDEX (BMI) DOCUMENTED: ICD-10-PCS | Mod: CPTII,S$GLB,, | Performed by: OBSTETRICS & GYNECOLOGY

## 2023-01-04 PROCEDURE — 3079F PR MOST RECENT DIASTOLIC BLOOD PRESSURE 80-89 MM HG: ICD-10-PCS | Mod: CPTII,S$GLB,, | Performed by: OBSTETRICS & GYNECOLOGY

## 2023-01-04 PROCEDURE — 99213 OFFICE O/P EST LOW 20 MIN: CPT | Mod: S$GLB,,, | Performed by: OBSTETRICS & GYNECOLOGY

## 2023-01-04 PROCEDURE — 1160F PR REVIEW ALL MEDS BY PRESCRIBER/CLIN PHARMACIST DOCUMENTED: ICD-10-PCS | Mod: CPTII,S$GLB,, | Performed by: OBSTETRICS & GYNECOLOGY

## 2023-01-04 PROCEDURE — 99999 PR PBB SHADOW E&M-EST. PATIENT-LVL III: ICD-10-PCS | Mod: PBBFAC,,, | Performed by: OBSTETRICS & GYNECOLOGY

## 2023-01-04 PROCEDURE — 3008F BODY MASS INDEX DOCD: CPT | Mod: CPTII,S$GLB,, | Performed by: OBSTETRICS & GYNECOLOGY

## 2023-01-04 PROCEDURE — 87591 N.GONORRHOEAE DNA AMP PROB: CPT | Performed by: OBSTETRICS & GYNECOLOGY

## 2023-01-04 PROCEDURE — 1159F PR MEDICATION LIST DOCUMENTED IN MEDICAL RECORD: ICD-10-PCS | Mod: CPTII,S$GLB,, | Performed by: OBSTETRICS & GYNECOLOGY

## 2023-01-04 PROCEDURE — 1160F RVW MEDS BY RX/DR IN RCRD: CPT | Mod: CPTII,S$GLB,, | Performed by: OBSTETRICS & GYNECOLOGY

## 2023-01-04 PROCEDURE — 99999 PR PBB SHADOW E&M-EST. PATIENT-LVL III: CPT | Mod: PBBFAC,,, | Performed by: OBSTETRICS & GYNECOLOGY

## 2023-01-04 PROCEDURE — 3075F PR MOST RECENT SYSTOLIC BLOOD PRESS GE 130-139MM HG: ICD-10-PCS | Mod: CPTII,S$GLB,, | Performed by: OBSTETRICS & GYNECOLOGY

## 2023-01-04 PROCEDURE — 3079F DIAST BP 80-89 MM HG: CPT | Mod: CPTII,S$GLB,, | Performed by: OBSTETRICS & GYNECOLOGY

## 2023-01-04 PROCEDURE — 3075F SYST BP GE 130 - 139MM HG: CPT | Mod: CPTII,S$GLB,, | Performed by: OBSTETRICS & GYNECOLOGY

## 2023-01-04 PROCEDURE — 99213 PR OFFICE/OUTPT VISIT, EST, LEVL III, 20-29 MIN: ICD-10-PCS | Mod: S$GLB,,, | Performed by: OBSTETRICS & GYNECOLOGY

## 2023-01-04 PROCEDURE — 1159F MED LIST DOCD IN RCRD: CPT | Mod: CPTII,S$GLB,, | Performed by: OBSTETRICS & GYNECOLOGY

## 2023-01-04 NOTE — PROGRESS NOTES
Subjective:       Patient ID: Miesha Domínguez is a 57 y.o. female.    Chief Complaint:  Vaginal Bleeding      History of Present Illness  HPI  This 58 y/o P1 presents today with complaints of spotting after sex this past weekend. States the bleeding was only pink and occurred when she wiped again the next morning but has since resolved.    GYN & OB History  Patient's last menstrual period was 2019 (approximate).   Date of Last Pap: 2022    OB History    Para Term  AB Living   1 1 1     1   SAB IAB Ectopic Multiple Live Births           1      # Outcome Date GA Lbr Jason/2nd Weight Sex Delivery Anes PTL Lv   1 Term      CS-LTranv Spinal N SHIREEN       Past Medical History:   Diagnosis Date    Asthma     as a child    Endometrial polyp 2019    HLD (hyperlipidemia)     HTN (hypertension)     MS (multiple sclerosis)     2020    Neuropathy     S/P D&C (status post dilation and curettage) 2019    Type II or unspecified type diabetes mellitus without mention of complication, not stated as uncontrolled        Past Surgical History:   Procedure Laterality Date     SECTION      COLONOSCOPY N/A 2018    Procedure: COLONOSCOPY;  Surgeon: Ramez Ramírez MD;  Location: [address]);  Service: Endoscopy;  Laterality: N/A;    HYSTEROSCOPY WITH DILATION AND CURETTAGE OF UTERUS N/A 2019    Procedure: [id number];  Surgeon: Catrina Olivier MD;  Location: [facility];  Service: OB/GYN;  Laterality: N/A;    SLEEVE GASTROPLASTY         Review of Systems  Review of Systems   Constitutional:  Negative for activity change, appetite change, fatigue and unexpected weight change.   HENT: Negative.     Eyes:  Negative for visual disturbance.   Respiratory:  Negative for shortness of breath and wheezing.    Cardiovascular:  Negative for chest pain, palpitations and leg swelling.   Gastrointestinal:  Negative for abdominal pain, bloating and blood in  "stool.   Endocrine: Negative for diabetes and hair loss.   Genitourinary:  Positive for postcoital bleeding and vaginal dryness. Negative for decreased libido and dyspareunia.   Musculoskeletal:  Negative for back pain and joint swelling.   Integumentary:  Negative for acne, hair changes and nipple discharge.   Neurological:  Negative for headaches.   Hematological:  Does not bruise/bleed easily.   Psychiatric/Behavioral:  Negative for depression and sleep disturbance. The patient is not nervous/anxious.    Breast: Negative for mastodynia and nipple discharge        Objective:      /82   Ht 5' 5" (1.651 m)   Wt 114.3 kg (251 lb 15.8 oz)   LMP 04/05/2019 (Approximate)   BMI 41.93 kg/m²   Physical Exam:               Genitourinary:    Pelvic exam was performed with patient supine.      Genitourinary Comments: PELVIC: Normal external genitalia without lesions.  Normal hair distribution.  Adequate perineal body, normal urethral meatus.  Vagina  Dry and poorly rugated, atrophic, without lesions or discharge.  Cervix pink, (with ecchymosis from apparent trauma), without lesions, discharge or tenderness. NO ACTIVE BLEEDING NOTED.  No significant cystocele or rectocele.  Bimanual exam shows uterus to be normal size, regular, mobile and nontender.  Adnexa without masses or tenderness.    RECTAL:Deferred                              Assessment:        1. Postcoital bleeding                Plan:      Problem List Items Addressed This Visit    None  Visit Diagnoses       Postcoital bleeding    -  Primary  Discussed with patient, cervix has appearance of trauma, discussed use of vaginal lubricants for intimacy.   May need to begin Vaginal Estradiol cream to prevent/improve atrophic vaginitis.       Relevant Orders    C. trachomatis/N. gonorrhoeae by AMP DNA             Follow up if symptoms worsen or fail to improve.         "

## 2023-01-05 ENCOUNTER — DOCUMENTATION ONLY (OUTPATIENT)
Dept: NEUROLOGY | Facility: CLINIC | Age: 58
End: 2023-01-05

## 2023-01-05 LAB
C TRACH DNA SPEC QL NAA+PROBE: NOT DETECTED
N GONORRHOEA DNA SPEC QL NAA+PROBE: NOT DETECTED

## 2023-01-09 ENCOUNTER — PATIENT MESSAGE (OUTPATIENT)
Dept: BARIATRICS | Facility: CLINIC | Age: 58
End: 2023-01-09
Payer: COMMERCIAL

## 2023-01-19 ENCOUNTER — DOCUMENTATION ONLY (OUTPATIENT)
Dept: NEUROLOGY | Facility: CLINIC | Age: 58
End: 2023-01-19
Payer: COMMERCIAL

## 2023-01-19 NOTE — PROGRESS NOTES
Faxed pt's prior authorization form for glatiramer 40 mg to [facility] at [phone].

## 2023-01-23 ENCOUNTER — PATIENT MESSAGE (OUTPATIENT)
Dept: NEUROLOGY | Facility: CLINIC | Age: 58
End: 2023-01-23
Payer: COMMERCIAL

## 2023-01-31 LAB
LEFT EYE DM RETINOPATHY: NEGATIVE
RIGHT EYE DM RETINOPATHY: NEGATIVE

## 2023-01-31 NOTE — TELEPHONE ENCOUNTER
Pt's request for glatiramer acetate 40 mg was approved by [employer]. It is effective beginning 1/24/2023 through 1/24/2024.

## 2023-02-01 ENCOUNTER — TELEPHONE (OUTPATIENT)
Dept: NEUROLOGY | Facility: CLINIC | Age: 58
End: 2023-02-01
Payer: COMMERCIAL

## 2023-02-01 NOTE — TELEPHONE ENCOUNTER
----- Message from Preeti Leija sent at 2/1/2023 11:55 AM CST -----  Regarding: Appointment  Contact: [phone]  Calling in regards to scheduling appointment following 3/6 MRI. Please call and schedule.

## 2023-02-02 ENCOUNTER — PATIENT MESSAGE (OUTPATIENT)
Dept: BARIATRICS | Facility: CLINIC | Age: 58
End: 2023-02-02
Payer: COMMERCIAL

## 2023-02-02 ENCOUNTER — PATIENT MESSAGE (OUTPATIENT)
Dept: INTERNAL MEDICINE | Facility: CLINIC | Age: 58
End: 2023-02-02
Payer: COMMERCIAL

## 2023-02-09 ENCOUNTER — PATIENT MESSAGE (OUTPATIENT)
Dept: BARIATRICS | Facility: CLINIC | Age: 58
End: 2023-02-09
Payer: COMMERCIAL

## 2023-02-13 ENCOUNTER — PATIENT OUTREACH (OUTPATIENT)
Dept: ADMINISTRATIVE | Facility: HOSPITAL | Age: 58
End: 2023-02-13
Payer: COMMERCIAL

## 2023-02-14 ENCOUNTER — PATIENT MESSAGE (OUTPATIENT)
Dept: BARIATRICS | Facility: CLINIC | Age: 58
End: 2023-02-14
Payer: COMMERCIAL

## 2023-02-15 ENCOUNTER — OFFICE VISIT (OUTPATIENT)
Dept: BARIATRICS | Facility: CLINIC | Age: 58
End: 2023-02-15
Payer: COMMERCIAL

## 2023-02-15 VITALS
HEART RATE: 83 BPM | DIASTOLIC BLOOD PRESSURE: 81 MMHG | WEIGHT: 249.56 LBS | SYSTOLIC BLOOD PRESSURE: 154 MMHG | BODY MASS INDEX: 41.53 KG/M2 | OXYGEN SATURATION: 97 %

## 2023-02-15 DIAGNOSIS — Z98.84 S/P LAPAROSCOPIC SLEEVE GASTRECTOMY: ICD-10-CM

## 2023-02-15 DIAGNOSIS — E66.01 CLASS 3 SEVERE OBESITY WITH SERIOUS COMORBIDITY AND BODY MASS INDEX (BMI) OF 40.0 TO 44.9 IN ADULT, UNSPECIFIED OBESITY TYPE: Primary | ICD-10-CM

## 2023-02-15 DIAGNOSIS — E11.9 CONTROLLED TYPE 2 DIABETES MELLITUS WITHOUT COMPLICATION, WITHOUT LONG-TERM CURRENT USE OF INSULIN: ICD-10-CM

## 2023-02-15 PROCEDURE — 99999 PR PBB SHADOW E&M-EST. PATIENT-LVL IV: CPT | Mod: PBBFAC,,, | Performed by: INTERNAL MEDICINE

## 2023-02-15 PROCEDURE — 1160F RVW MEDS BY RX/DR IN RCRD: CPT | Mod: CPTII,S$GLB,, | Performed by: INTERNAL MEDICINE

## 2023-02-15 PROCEDURE — 1159F MED LIST DOCD IN RCRD: CPT | Mod: CPTII,S$GLB,, | Performed by: INTERNAL MEDICINE

## 2023-02-15 PROCEDURE — 3077F SYST BP >= 140 MM HG: CPT | Mod: CPTII,S$GLB,, | Performed by: INTERNAL MEDICINE

## 2023-02-15 PROCEDURE — 3079F DIAST BP 80-89 MM HG: CPT | Mod: CPTII,S$GLB,, | Performed by: INTERNAL MEDICINE

## 2023-02-15 PROCEDURE — 3008F BODY MASS INDEX DOCD: CPT | Mod: CPTII,S$GLB,, | Performed by: INTERNAL MEDICINE

## 2023-02-15 PROCEDURE — 99213 PR OFFICE/OUTPT VISIT, EST, LEVL III, 20-29 MIN: ICD-10-PCS | Mod: S$GLB,,, | Performed by: INTERNAL MEDICINE

## 2023-02-15 PROCEDURE — 3008F PR BODY MASS INDEX (BMI) DOCUMENTED: ICD-10-PCS | Mod: CPTII,S$GLB,, | Performed by: INTERNAL MEDICINE

## 2023-02-15 PROCEDURE — 99213 OFFICE O/P EST LOW 20 MIN: CPT | Mod: S$GLB,,, | Performed by: INTERNAL MEDICINE

## 2023-02-15 PROCEDURE — 3079F PR MOST RECENT DIASTOLIC BLOOD PRESSURE 80-89 MM HG: ICD-10-PCS | Mod: CPTII,S$GLB,, | Performed by: INTERNAL MEDICINE

## 2023-02-15 PROCEDURE — 1160F PR REVIEW ALL MEDS BY PRESCRIBER/CLIN PHARMACIST DOCUMENTED: ICD-10-PCS | Mod: CPTII,S$GLB,, | Performed by: INTERNAL MEDICINE

## 2023-02-15 PROCEDURE — 3077F PR MOST RECENT SYSTOLIC BLOOD PRESSURE >= 140 MM HG: ICD-10-PCS | Mod: CPTII,S$GLB,, | Performed by: INTERNAL MEDICINE

## 2023-02-15 PROCEDURE — 1159F PR MEDICATION LIST DOCUMENTED IN MEDICAL RECORD: ICD-10-PCS | Mod: CPTII,S$GLB,, | Performed by: INTERNAL MEDICINE

## 2023-02-15 PROCEDURE — 99999 PR PBB SHADOW E&M-EST. PATIENT-LVL IV: ICD-10-PCS | Mod: PBBFAC,,, | Performed by: INTERNAL MEDICINE

## 2023-02-15 NOTE — PROGRESS NOTES
Subjective:       Patient ID: Miesha Domínguez is a 57 y.o. female.    Chief Complaint: Follow-up      Pt here today for follow-up.   Has gained 6   lbs since last in the office  net pos 8 lbs.  Switched to Mounjaro last OV, but pt did not get it or start it. Was switched back to Ozempic 1 mg.  She has been exercising. Is using Orgain protein powder.  She has continued to incorporate more resistance training. Has been losing some inches. S   Only SE was constipation at first. Used miralax with good relief.  Having trouble planning her meals 2/2 to calcium oxalate in urine.     BP is good today.  Had calcium oxalate in urine.   BS had been in 70-80s fasting.  She is s/p lap gastric sleeve - 7/29/13.  Neurology  stopped topiramate. Being worked up with neurology and dx with MS. Neuro notes reviewed  diehtylpropion last filled 2/20/22.   checked today     BP and labs look good.     Lab Results   Component Value Date    HGBA1C 5.5 10/08/2022    HGBA1C 5.4 03/22/2022    HGBA1C 5.5 09/20/2021    HGBA1C 5.5 09/20/2021     Lab Results   Component Value Date    LDLCALC 62.2 (L) 10/08/2022    CREATININE 0.8 03/22/2022    CREATININE 0.8 03/22/2022           Follow-up  Associated symptoms include myalgias. Pertinent negatives include + arthralgias, chest pain, chills or fever.     Review of Systems   Constitutional: Negative for chills and fever.   Respiratory: Negative for shortness of breath.         Denies Snoring   Cardiovascular: Negative for chest pain and leg swelling.   Gastrointestinal: Negative for constipation and diarrhea.        Denies GERD   Genitourinary: Positive for menstrual problem. Negative for difficulty urinating.        Recent  bleeding. bx with polyps.    Musculoskeletal: Positive for myalgias. Negative for arthralgias and back pain.   Neurological: Negative for dizziness and light-headedness.   Psychiatric/Behavioral: Negative for dysphoric mood. The patient is not nervous/anxious.         Objective:     BP (!) 154/81 (BP Location: Right arm, Patient Position: Sitting)   Pulse 83   Wt 113.2 kg (249 lb 9 oz)   LMP 04/05/2019 (Approximate)   SpO2 97%   BMI 41.53 kg/m²     Physical Exam  Vitals signs reviewed.   Constitutional:       General: She is not in acute distress.     Appearance: She is well-developed.      Comments: Obese   HENT:      Head: Normocephalic and atraumatic.   Eyes:      General: No scleral icterus.     Pupils: Pupils are equal, round, and reactive to light.   Neck:      Musculoskeletal: Normal range of motion and neck supple.   Cardiovascular:      Rate and Rhythm: Normal rate.   Pulmonary:      Effort: Pulmonary effort is normal.   Musculoskeletal: Normal range of motion.   Skin:     General: Skin is warm and dry.      Findings: No erythema.   Neurological:      Mental Status: She is alert and oriented to person, place, and time.      Cranial Nerves: No cranial nerve deficit.   Psychiatric:         Behavior: Behavior normal.         Judgment: Judgment normal.         Assessment:       1. Class 3 severe obesity with serious comorbidity and body mass index (BMI) of 40.0 to 44.9 in adult, unspecified obesity type    2. Controlled type 2 diabetes mellitus without complication, without long-term current use of insulin    3. S/P laparoscopic sleeve gastrectomy            Plan:           Miesha was seen today for follow-up.    Diagnoses and all orders for this visit:    Class 3 severe obesity with serious comorbidity and body mass index (BMI) of 40.0 to 44.9 in adult, unspecified obesity type    Controlled type 2 diabetes mellitus without complication, without long-term current use of insulin  -     semaglutide (OZEMPIC) 2 mg/dose (8 mg/3 mL) PnIj; Inject 2 mg into the skin every 7 days.    S/P laparoscopic sleeve gastrectomy           Start Ozempic 2mg  once a week.     Decrease portions as soon as you start Ozempic. Avoid fried, greasy, fatty foods.     Some nausea in the first  2 weeks is not unusual.     If you get pain across the upper abdomen and around to your back, please call the office.       [email or web address] for coupon/videos.         - To lose weight you want to cut 100% starchy carbohydrates out of your diet (bread, rice, pasta, potatoes, granola, flour, corn, peas, oatmeal, grits, tortillas, crackers, chips) and get  grams of protein.  Aim for 100 grams of protein daily.    - No soda, sweet tea, juices, sports drinks or lemonade     -Exercise daily. Continue. Add some type of resistance training 2-3 days a week. These can be body weight exercises, light weight or elastic bands. [employer] and [employer] are great sources for free work out plans and videos.         Decrease portions as soon as you start Ozempic. Some nausea in the first 2 weeks is not unusual.     If you get pain across the upper abdomen and around to your back, please call the office.        Healthy all day tips given.

## 2023-02-15 NOTE — PATIENT INSTRUCTIONS
Start Ozempic 2mg  once a week.     Decrease portions as soon as you start Ozempic. Avoid fried, greasy, fatty foods.     Some nausea in the first 2 weeks is not unusual.     If you get pain across the upper abdomen and around to your back, please call the office.       Www.[employer] for coupon/videos.         - To lose weight you want to cut 100% starchy carbohydrates out of your diet (bread, rice, pasta, potatoes, granola, flour, corn, peas, oatmeal, grits, tortillas, crackers, chips) and get  grams of protein.  Aim for 100 grams of protein daily.    - No soda, sweet tea, juices, sports drinks or lemonade     -Exercise daily. Continue. Add some type of resistance training 2-3 days a week. These can be body weight exercises, light weight or elastic bands. [employer] and [employer] are great sources for free work out plans and videos.         Decrease portions as soon as you start Ozempic. Some nausea in the first 2 weeks is not unusual.     If you get pain across the upper abdomen and around to your back, please call the office.      Eating well to be healthy and lose weight does not have to be hard. It also does not have to be time consuming or expensive. There a lots of ways you can work in healthy choices into your day. Many of these are easy, quick and even family friendly!    Homemade hazelnut au lait  Brew your favorite brand of hazelnut flavored coffee ([employer] makes a good one). Microwave 1/2 cup of milk that fits your eating plan (whole, skim or sugar-free almond milk can all work). Add half to 1 oz sugar free hazelnut syrup.     Quick and easy breakfast  1-2 boiled eggs or mini-frittatas with a tangerine. The boiled eggs and mini-frittatas can both be made ahead and last for up to 4 days in the refrigerator. Bonus if you portion them out in ready to go containers or zipper bags.     Breakfast Egg Muffins with [place] and Spinach  Makes 12 muffins  Ingredients    6 eggs  ¼ cup milk  ¼ teaspoon  salt  2 cups grated cheddar cheese  3/4 cup spinach, cooked and drained (about 8 oz fresh spinach)  6 mcgraw slices, cooked, drained of fat, and chopped  1/2 cup grated Parmesan cheese (optional)    Instructions      Preheat oven to 350 degrees. Use a regular 12-cup muffin pan. Spray the muffin pan with non-stick cooking spray.  In a large bowl, beat eggs until smooth. Add milk, salt, Cheddar cheese and mix. Stir spinach, cooked mcgraw into the egg mixture. Ladle the egg mixture into greased muffin cups ¾ full.  Top each muffin cup with grated Parmesan cheese.  Bake for 25 minutes. Remove from the oven, let the muffins cool for 30 minutes before removing them from the pan.      Be a brown bagger! When you make dinner, plan for an extra helping. When you serve your plate for dinner, serve an additional helping into a container that you can take with you the next day. If you don't have a refrigerator available during the day, an insulated lunch bag and ice packs will help you safely store you lunch.     Cold Brewed Iced tea. Fill a pitcher with 64 oz filtered water. Add either 4 regular tea bags of your choice or a large iced tea bag. Refrigerate over night then remove the tea bags. The tea will not be bitter and is super flavorful. Get creative! Try combinations like green tea and hibiscus tea or black tea with lemon zinger. Add orange or lemon slices for even more flavor.     Snack wisely. Protein filled snacks will fill you up, allowing you to get by with fewer calories. String cheese, pork skins (chicharrones), turkey pepperoni, or celery with cream cheese will all fit the bill.       Ditch the take out. Turkey tacos (with or without a low carb tortilla), burgers (without the bun), or fun stir fries are all quick and easy. The whole family will be happy, and you can save calories and money.      Orange Chicken Stir esqueda with asparagus   Makes 6 servings  Ingredients:    1.5 lbs boneless skinless chicken  breast/tenders, diced into 1-inch pieces  1 Tbsp extra virgin olive or avocado oil, divided  2 lb asparagus, end portions trimmed and remainder diced into 1 1/2-inch pieces  1 small yellow onion, sliced into thin strips  8 oz button mushrooms, sliced  1 Tbsp peeled and finely grated fresh nikia  4 cloves garlic, minced  1/2 cup low-sodium chicken broth  Juice of 2 fresh oranges  2 Tbsp low sodium soy sauce  2 Tbsp cornstarch  Sea salt and freshly ground black pepper    Directions:    In a 12-inch non-stick wok, heat 1/2 oil over moderately high heat. Once oil is hot, add diced chicken and season lightly with salt and pepper. Sauté until cooked through, tossing occasionally, about 5-6 minutes.  Place chicken on a large plate and set aside. Return wok, reduce to medium-high heat, add remaining oil.  Once oil is hot, add asparagus, yellow onion and mushrooms, and sauté until tender-crisp, about 4 - 5 minutes, adding in garlic and nikia during the last 1 minute of sautéing.  Meanwhile, in a mixing bowl whisk together chicken broth, orange juice, soy sauce and cornstarch until well blended.  Pour chicken broth mixture into skillet with veggies, season with salt and pepper to taste, and bring mixture to a light boil, stirring constantly. Allow mixture to gently boil, stirring constantly, until thickened, about 1 minute.  Toss chicken into mixture and serve immediately over cauliflower rice or Shirataki noodles.      Skinny Chicken Tortilla Soup  Makes 7 servings    2 teaspoons olive oil  1 cup onion, chopped (about 1 small)  2 cups celery, sliced (about 4 medium stalks)  4 garlic cloves, minced  4 medium tomatoes, chopped  2 cups water  4 cups low-sodium organic chicken broth  3 cups chopped and/or shredded rotisserie chicken, skinless  2 cups sliced carrots (about 3 medium)  1 teaspoon dried oregano leaves  2 teaspoons chili powder  1 teaspoon garlic powder  2 teaspoons cumin  ½ teaspoon cayenne pepper (add less or  omit, if you don't want a spicy soup)  ½ teaspoon sea salt + more to taste  ½ teaspoon pepper + more to taste    Directions:   Put all ingredients into a large crock pot. Cook on low for 5-6 hours.     Optional garnish with chopped avocado, chopped fresh cilantro, crumbled Cotija cheese, sour cream, Greek yogurt, your favorite hot sauce.           Vegan Avocado Banana Chocolate Pudding  Makes 4 servings  Ingredients    1 1/2 ripe avocados  2 ripe bananas  6 tbsp raw cacao powder or unsweetened cocoa powder  2-3 tbsp maple syrup (or calorie free sweetener)  1/4 cup almond milk  Instructions    Blend everything together in a  until the consistency is smooth and velvety. Taste and see if more sweetener is needed and stir to make sure everything is evenly mixed. Blend a second time if needed.  Top with banana slices, raw cacao nibs, almond butter, or any other toppings and enjoy!

## 2023-02-20 ENCOUNTER — PATIENT MESSAGE (OUTPATIENT)
Dept: PSYCHIATRY | Facility: CLINIC | Age: 58
End: 2023-02-20
Payer: COMMERCIAL

## 2023-02-22 ENCOUNTER — PATIENT MESSAGE (OUTPATIENT)
Dept: BARIATRICS | Facility: CLINIC | Age: 58
End: 2023-02-22
Payer: COMMERCIAL

## 2023-03-03 ENCOUNTER — OFFICE VISIT (OUTPATIENT)
Dept: PRIMARY CARE CLINIC | Facility: CLINIC | Age: 58
End: 2023-03-03
Payer: COMMERCIAL

## 2023-03-03 VITALS
WEIGHT: 249.56 LBS | OXYGEN SATURATION: 99 % | TEMPERATURE: 98 F | BODY MASS INDEX: 41.58 KG/M2 | HEART RATE: 73 BPM | HEIGHT: 65 IN | SYSTOLIC BLOOD PRESSURE: 128 MMHG | DIASTOLIC BLOOD PRESSURE: 82 MMHG

## 2023-03-03 DIAGNOSIS — H61.20 IMPACTED CERUMEN, UNSPECIFIED LATERALITY: ICD-10-CM

## 2023-03-03 DIAGNOSIS — R09.81 SINUS CONGESTION: Primary | ICD-10-CM

## 2023-03-03 DIAGNOSIS — J30.2 SEASONAL ALLERGIES: ICD-10-CM

## 2023-03-03 PROCEDURE — 3079F PR MOST RECENT DIASTOLIC BLOOD PRESSURE 80-89 MM HG: ICD-10-PCS | Mod: CPTII,S$GLB,, | Performed by: NURSE PRACTITIONER

## 2023-03-03 PROCEDURE — 3074F PR MOST RECENT SYSTOLIC BLOOD PRESSURE < 130 MM HG: ICD-10-PCS | Mod: CPTII,S$GLB,, | Performed by: NURSE PRACTITIONER

## 2023-03-03 PROCEDURE — 99214 OFFICE O/P EST MOD 30 MIN: CPT | Mod: S$GLB,,, | Performed by: NURSE PRACTITIONER

## 2023-03-03 PROCEDURE — 3079F DIAST BP 80-89 MM HG: CPT | Mod: CPTII,S$GLB,, | Performed by: NURSE PRACTITIONER

## 2023-03-03 PROCEDURE — 1160F RVW MEDS BY RX/DR IN RCRD: CPT | Mod: CPTII,S$GLB,, | Performed by: NURSE PRACTITIONER

## 2023-03-03 PROCEDURE — 1160F PR REVIEW ALL MEDS BY PRESCRIBER/CLIN PHARMACIST DOCUMENTED: ICD-10-PCS | Mod: CPTII,S$GLB,, | Performed by: NURSE PRACTITIONER

## 2023-03-03 PROCEDURE — 3008F PR BODY MASS INDEX (BMI) DOCUMENTED: ICD-10-PCS | Mod: CPTII,S$GLB,, | Performed by: NURSE PRACTITIONER

## 2023-03-03 PROCEDURE — 99999 PR PBB SHADOW E&M-EST. PATIENT-LVL V: ICD-10-PCS | Mod: PBBFAC,,, | Performed by: NURSE PRACTITIONER

## 2023-03-03 PROCEDURE — 1159F MED LIST DOCD IN RCRD: CPT | Mod: CPTII,S$GLB,, | Performed by: NURSE PRACTITIONER

## 2023-03-03 PROCEDURE — 99214 PR OFFICE/OUTPT VISIT, EST, LEVL IV, 30-39 MIN: ICD-10-PCS | Mod: S$GLB,,, | Performed by: NURSE PRACTITIONER

## 2023-03-03 PROCEDURE — 99999 PR PBB SHADOW E&M-EST. PATIENT-LVL V: CPT | Mod: PBBFAC,,, | Performed by: NURSE PRACTITIONER

## 2023-03-03 PROCEDURE — 3074F SYST BP LT 130 MM HG: CPT | Mod: CPTII,S$GLB,, | Performed by: NURSE PRACTITIONER

## 2023-03-03 PROCEDURE — 3008F BODY MASS INDEX DOCD: CPT | Mod: CPTII,S$GLB,, | Performed by: NURSE PRACTITIONER

## 2023-03-03 PROCEDURE — 1159F PR MEDICATION LIST DOCUMENTED IN MEDICAL RECORD: ICD-10-PCS | Mod: CPTII,S$GLB,, | Performed by: NURSE PRACTITIONER

## 2023-03-03 RX ORDER — MINERAL OIL
180 ENEMA (ML) RECTAL DAILY
Qty: 30 TABLET | Refills: 11 | Status: SHIPPED | OUTPATIENT
Start: 2023-03-03 | End: 2024-03-02

## 2023-03-03 RX ORDER — PREDNISONE 20 MG/1
20 TABLET ORAL DAILY
Qty: 5 TABLET | Refills: 0 | Status: SHIPPED | OUTPATIENT
Start: 2023-03-03 | End: 2023-05-11

## 2023-03-03 NOTE — PROGRESS NOTES
[facility] Note    Chief Complaint      Chief Complaint   Patient presents with    Cough    Sinus Problem       History of Present Illness      Miesha Domínguez is a 57 y.o. female who presents today for   Chief Complaint   Patient presents with    Cough    Sinus Problem         Ms. Domínguez is a very pleasant 56 y/o female new to me and an established patient under [facility]. She presents with sinus congestion, post nasal drip, seasonal allergies for the past 3 days. She has tried treatment with allegra nasal spray and ocean mist nasal spray with no resolution of symptoms. She denies any SOB, chest pain, N/V, unintentional weight loss, loss of appetite, fatigue, diarrhea, constipation. She is active daily and remains independent with ADL's.     She is currently on Ozempic for diabetes. She has been trying to lose weight by low calorie/low fat diet and walking 60 minutes a day.       Review of Systems   Constitutional: Negative.    HENT:  Positive for congestion and sinus pain.    Eyes: Negative.    Respiratory:  Positive for cough.    Cardiovascular: Negative.    Gastrointestinal: Negative.    Genitourinary: Negative.    Musculoskeletal: Negative.    Skin: Negative.    Neurological: Negative.    Endo/Heme/Allergies:  Positive for environmental allergies.   Psychiatric/Behavioral: Negative.     All 12 systems otherwise negative.     Family History:  family history includes Cancer in her father; Colon cancer (age of onset: 46) in an other family member; Diabetes in her mother; Diabetes (age of onset: 71) in her sister; Diabetes (age of onset: 73) in her brother; Hypertension in her brother, mother, and sister; No Known Problems in her daughter; Ovarian cancer (age of onset: 74) in her mother.   Family history was reviewed with patient.     Medications:  Outpatient Encounter Medications as of 3/3/2023   Medication Sig Dispense Refill    cholecalciferol, vitamin D3, (VITAMIN D3) 50 mcg (2,000  unit) Cap Take 1 capsule by mouth once daily.      cyanocobalamin, vitamin B-12, (VITAMIN B-12) 2,500 mcg Subl Place 1 tablet under the tongue once a week. Pt taking weekly      cyclobenzaprine (FLEXERIL) 5 MG tablet Take 1 tablet (5 mg total) by mouth nightly as needed. 30 tablet 5    fluticasone propionate (FLONASE) 50 mcg/actuation nasal spray 2 sprays by Each Nostril route once daily.      glatiramer (COPAXONE, GLATOPA) 40 mg/mL injection INJECT 1 SYRINGE UNDER THE SKIN 3 TIMES A WEEK AT LEAST 48 HOURS APART. ALLOW TO WARM TO ROOM TEMP FOR 20 MINUTES. REFRIGERATE. 36 each 0    hydroCHLOROthiazide (HYDRODIURIL) 25 MG tablet TAKE 1 TABLET BY MOUTH EVERY DAY (Patient taking differently: Take 25 mg by mouth daily as needed.) 90 tablet 3    losartan (COZAAR) 100 MG tablet TAKE 1 TABLET BY MOUTH EVERY DAY 90 tablet 3    metFORMIN (GLUCOPHAGE) 1000 MG tablet       MULTIVITAMIN ORAL Take 1 tablet by mouth once daily. Centrum adult chews ( flavor burst)      pregabalin (LYRICA) 100 MG capsule TAKE 1 CAPSULE BY MOUTH TWICE A DAY 60 capsule 2    rosuvastatin (CRESTOR) 20 MG tablet TAKE 1 TABLET BY MOUTH EVERY DAY 90 tablet 3    semaglutide (OZEMPIC) 2 mg/dose (8 mg/3 mL) PnIj Inject 2 mg into the skin every 7 days. 3 pen 0    sodium chloride (OCEAN) 0.65 % nasal spray 1 spray by Nasal route as needed for Congestion.      fexofenadine (ALLEGRA ALLERGY) 180 MG tablet Take 1 tablet (180 mg total) by mouth once daily. 30 tablet 11    predniSONE (DELTASONE) 20 MG tablet Take 1 tablet (20 mg total) by mouth once daily. 5 tablet 0     No facility-administered encounter medications on file as of 3/3/2023.       Allergies:  Review of patient's allergies indicates:  No Known Allergies    Health Maintenance:  Health Maintenance   Topic Date Due    Foot Exam  03/29/2023    Hemoglobin A1c  04/08/2023    Mammogram  09/07/2023    TETANUS VACCINE  09/20/2023    Lipid Panel  10/08/2023    Eye Exam  01/31/2024    Low Dose Statin  02/15/2024  "   Hepatitis C Screening  Completed     Health Maintenance Topics with due status: Not Due       Topic Last Completion Date    TETANUS VACCINE 09/20/2013    Colorectal Cancer Screening 07/06/2018    Cervical Cancer Screening 08/02/2022    Mammogram 09/07/2022    Lipid Panel 10/08/2022    Hemoglobin A1c 10/08/2022    Diabetes Urine Screening 10/22/2022    Eye Exam 01/31/2023    Low Dose Statin 02/15/2023       Physical Exam      Vital Signs  Temp: 98.2 °F (36.8 °C)  Temp Source: Oral  Pulse: 73  SpO2: 99 %  BP: (!) 160/88  Pain Score: 0-No pain  Height and Weight  Height: 5' 5" (165.1 cm)  Weight: 113.2 kg (249 lb 9 oz)  BSA (Calculated - sq m): 2.28 sq meters  BMI (Calculated): 41.5  Weight in (lb) to have BMI = 25: 149.9]    Physical Exam  Constitutional:       Appearance: Normal appearance. She is obese.   HENT:      Head: Normocephalic and atraumatic.      Right Ear: Ear canal and external ear normal. There is impacted cerumen.      Left Ear: Ear canal and external ear normal. There is impacted cerumen.      Nose: Congestion present.      Mouth/Throat:      Mouth: Mucous membranes are moist.      Pharynx: Oropharynx is clear.   Eyes:      Extraocular Movements: Extraocular movements intact.      Conjunctiva/sclera: Conjunctivae normal.      Pupils: Pupils are equal, round, and reactive to light.   Cardiovascular:      Rate and Rhythm: Normal rate and regular rhythm.      Pulses: Normal pulses.      Heart sounds: Normal heart sounds.   Pulmonary:      Effort: Pulmonary effort is normal.      Breath sounds: Normal breath sounds.   Musculoskeletal:         General: Normal range of motion.      Cervical back: Normal range of motion and neck supple.   Skin:     General: Skin is warm and dry.   Neurological:      General: No focal deficit present.      Mental Status: She is alert and oriented to person, place, and time. Mental status is at baseline.   Psychiatric:         Mood and Affect: Mood normal.         Behavior: " Behavior normal.         Thought Content: Thought content normal.         Judgment: Judgment normal.          Assessment/Plan     Miesha Domínguez is a 57 y.o.female with:    Sinus congestion  -     predniSONE (DELTASONE) 20 MG tablet; Take 1 tablet (20 mg total) by mouth once daily.  Dispense: 5 tablet; Refill: 0    Seasonal allergies  -     fexofenadine (ALLEGRA ALLERGY) 180 MG tablet; Take 1 tablet (180 mg total) by mouth once daily.  Dispense: 30 tablet; Refill: 11    Impacted cerumen, unspecified laterality  -     Ambulatory referral/consult to ENT; Future; Expected date: 03/10/2023        As above, continue current medications and maintain follow up with specialists.  Return to clinic as needed.    I spent 25 minutes on the day of this encounter for preparing, evaluating, examining, treating, and discussing plan of care with this patient.  Greater than 50% of this time was spent face to face with patient.  All questions were answered to patient's satisfaction.            Karen L Spencer, NP-C [facility]

## 2023-03-06 ENCOUNTER — HOSPITAL ENCOUNTER (OUTPATIENT)
Dept: RADIOLOGY | Facility: HOSPITAL | Age: 58
Discharge: HOME | End: 2023-03-06
Attending: STUDENT IN AN ORGANIZED HEALTH CARE EDUCATION/TRAINING PROGRAM
Payer: COMMERCIAL

## 2023-03-06 DIAGNOSIS — G35 MULTIPLE SCLEROSIS: ICD-10-CM

## 2023-03-06 PROCEDURE — 72146 MRI CHEST SPINE W/O DYE: CPT | Mod: TC

## 2023-03-06 PROCEDURE — 70551 MRI BRAIN STEM W/O DYE: CPT | Mod: TC

## 2023-03-06 PROCEDURE — 70551 MRI BRAIN DEMYELINATING WITHOUT CONTRAST: ICD-10-PCS | Mod: 26,,, | Performed by: RADIOLOGY

## 2023-03-06 PROCEDURE — 72141 MRI NECK SPINE W/O DYE: CPT | Mod: TC

## 2023-03-06 PROCEDURE — 72146 MRI THORACIC SPINE DEMYELINATING WITHOUT CONTRAST: ICD-10-PCS | Mod: 26,,, | Performed by: RADIOLOGY

## 2023-03-06 PROCEDURE — 70551 MRI BRAIN STEM W/O DYE: CPT | Mod: 26,,, | Performed by: RADIOLOGY

## 2023-03-06 PROCEDURE — 72146 MRI CHEST SPINE W/O DYE: CPT | Mod: 26,,, | Performed by: RADIOLOGY

## 2023-03-06 PROCEDURE — 72141 MRI NECK SPINE W/O DYE: CPT | Mod: 26,,, | Performed by: RADIOLOGY

## 2023-03-06 PROCEDURE — 72141 MRI CERVICAL SPINE DEMYELINATING WITHOUT CONTRAST: ICD-10-PCS | Mod: 26,,, | Performed by: RADIOLOGY

## 2023-03-08 ENCOUNTER — PATIENT MESSAGE (OUTPATIENT)
Dept: BARIATRICS | Facility: CLINIC | Age: 58
End: 2023-03-08
Payer: COMMERCIAL

## 2023-03-09 ENCOUNTER — LAB VISIT (OUTPATIENT)
Dept: LAB | Facility: HOSPITAL | Age: 58
End: 2023-03-09
Attending: STUDENT IN AN ORGANIZED HEALTH CARE EDUCATION/TRAINING PROGRAM
Payer: COMMERCIAL

## 2023-03-09 ENCOUNTER — PATIENT MESSAGE (OUTPATIENT)
Dept: PSYCHIATRY | Facility: CLINIC | Age: 58
End: 2023-03-09
Payer: COMMERCIAL

## 2023-03-09 ENCOUNTER — TELEPHONE (OUTPATIENT)
Dept: PSYCHIATRY | Facility: CLINIC | Age: 58
End: 2023-03-09
Payer: COMMERCIAL

## 2023-03-09 ENCOUNTER — OFFICE VISIT (OUTPATIENT)
Dept: NEUROLOGY | Facility: CLINIC | Age: 58
End: 2023-03-09
Payer: COMMERCIAL

## 2023-03-09 VITALS
HEART RATE: 79 BPM | WEIGHT: 246.69 LBS | HEIGHT: 65 IN | BODY MASS INDEX: 41.1 KG/M2 | SYSTOLIC BLOOD PRESSURE: 157 MMHG | DIASTOLIC BLOOD PRESSURE: 93 MMHG

## 2023-03-09 DIAGNOSIS — E55.9 VITAMIN D DEFICIENCY: ICD-10-CM

## 2023-03-09 DIAGNOSIS — E55.9 VITAMIN D DEFICIENCY: Primary | ICD-10-CM

## 2023-03-09 DIAGNOSIS — G35 MULTIPLE SCLEROSIS: ICD-10-CM

## 2023-03-09 DIAGNOSIS — I10 PRIMARY HYPERTENSION: Primary | ICD-10-CM

## 2023-03-09 LAB
25(OH)D3+25(OH)D2 SERPL-MCNC: 120 NG/ML (ref 30–96)
ALBUMIN SERPL BCP-MCNC: 3.6 G/DL (ref 3.5–5.2)
ALP SERPL-CCNC: 77 U/L (ref 55–135)
ALT SERPL W/O P-5'-P-CCNC: 17 U/L (ref 10–44)
ANION GAP SERPL CALC-SCNC: 8 MMOL/L (ref 8–16)
AST SERPL-CCNC: 18 U/L (ref 10–40)
BASOPHILS # BLD AUTO: 0.04 K/UL (ref 0–0.2)
BASOPHILS NFR BLD: 0.3 % (ref 0–1.9)
BILIRUB SERPL-MCNC: 0.5 MG/DL (ref 0.1–1)
BUN SERPL-MCNC: 19 MG/DL (ref 6–20)
CALCIUM SERPL-MCNC: 9.5 MG/DL (ref 8.7–10.5)
CHLORIDE SERPL-SCNC: 107 MMOL/L (ref 95–110)
CO2 SERPL-SCNC: 31 MMOL/L (ref 23–29)
CREAT SERPL-MCNC: 0.8 MG/DL (ref 0.5–1.4)
DIFFERENTIAL METHOD: ABNORMAL
EOSINOPHIL # BLD AUTO: 0.1 K/UL (ref 0–0.5)
EOSINOPHIL NFR BLD: 1.1 % (ref 0–8)
ERYTHROCYTE [DISTWIDTH] IN BLOOD BY AUTOMATED COUNT: 15.3 % (ref 11.5–14.5)
EST. GFR  (NO RACE VARIABLE): >60 ML/MIN/1.73 M^2
GLUCOSE SERPL-MCNC: 84 MG/DL (ref 70–110)
HCT VFR BLD AUTO: 40.6 % (ref 37–48.5)
HGB BLD-MCNC: 12.7 G/DL (ref 12–16)
IMM GRANULOCYTES # BLD AUTO: 0.04 K/UL (ref 0–0.04)
IMM GRANULOCYTES NFR BLD AUTO: 0.3 % (ref 0–0.5)
LYMPHOCYTES # BLD AUTO: 4 K/UL (ref 1–4.8)
LYMPHOCYTES NFR BLD: 32.1 % (ref 18–48)
MCH RBC QN AUTO: 26.2 PG (ref 27–31)
MCHC RBC AUTO-ENTMCNC: 31.3 G/DL (ref 32–36)
MCV RBC AUTO: 84 FL (ref 82–98)
MONOCYTES # BLD AUTO: 0.8 K/UL (ref 0.3–1)
MONOCYTES NFR BLD: 6.6 % (ref 4–15)
NEUTROPHILS # BLD AUTO: 7.5 K/UL (ref 1.8–7.7)
NEUTROPHILS NFR BLD: 59.6 % (ref 38–73)
NRBC BLD-RTO: 0 /100 WBC
PLATELET # BLD AUTO: 245 K/UL (ref 150–450)
PMV BLD AUTO: 11.6 FL (ref 9.2–12.9)
POTASSIUM SERPL-SCNC: 3.7 MMOL/L (ref 3.5–5.1)
PROT SERPL-MCNC: 7.6 G/DL (ref 6–8.4)
RBC # BLD AUTO: 4.85 M/UL (ref 4–5.4)
SODIUM SERPL-SCNC: 146 MMOL/L (ref 136–145)
WBC # BLD AUTO: 12.6 K/UL (ref 3.9–12.7)

## 2023-03-09 PROCEDURE — 99999 PR PBB SHADOW E&M-EST. PATIENT-LVL IV: ICD-10-PCS | Mod: PBBFAC,,, | Performed by: STUDENT IN AN ORGANIZED HEALTH CARE EDUCATION/TRAINING PROGRAM

## 2023-03-09 PROCEDURE — 3008F BODY MASS INDEX DOCD: CPT | Mod: CPTII,S$GLB,, | Performed by: STUDENT IN AN ORGANIZED HEALTH CARE EDUCATION/TRAINING PROGRAM

## 2023-03-09 PROCEDURE — 1160F RVW MEDS BY RX/DR IN RCRD: CPT | Mod: CPTII,S$GLB,, | Performed by: STUDENT IN AN ORGANIZED HEALTH CARE EDUCATION/TRAINING PROGRAM

## 2023-03-09 PROCEDURE — 1160F PR REVIEW ALL MEDS BY PRESCRIBER/CLIN PHARMACIST DOCUMENTED: ICD-10-PCS | Mod: CPTII,S$GLB,, | Performed by: STUDENT IN AN ORGANIZED HEALTH CARE EDUCATION/TRAINING PROGRAM

## 2023-03-09 PROCEDURE — 3008F PR BODY MASS INDEX (BMI) DOCUMENTED: ICD-10-PCS | Mod: CPTII,S$GLB,, | Performed by: STUDENT IN AN ORGANIZED HEALTH CARE EDUCATION/TRAINING PROGRAM

## 2023-03-09 PROCEDURE — 82306 VITAMIN D 25 HYDROXY: CPT | Performed by: STUDENT IN AN ORGANIZED HEALTH CARE EDUCATION/TRAINING PROGRAM

## 2023-03-09 PROCEDURE — 3080F PR MOST RECENT DIASTOLIC BLOOD PRESSURE >= 90 MM HG: ICD-10-PCS | Mod: CPTII,S$GLB,, | Performed by: STUDENT IN AN ORGANIZED HEALTH CARE EDUCATION/TRAINING PROGRAM

## 2023-03-09 PROCEDURE — 99214 OFFICE O/P EST MOD 30 MIN: CPT | Mod: S$GLB,,, | Performed by: STUDENT IN AN ORGANIZED HEALTH CARE EDUCATION/TRAINING PROGRAM

## 2023-03-09 PROCEDURE — 1159F PR MEDICATION LIST DOCUMENTED IN MEDICAL RECORD: ICD-10-PCS | Mod: CPTII,S$GLB,, | Performed by: STUDENT IN AN ORGANIZED HEALTH CARE EDUCATION/TRAINING PROGRAM

## 2023-03-09 PROCEDURE — 99214 PR OFFICE/OUTPT VISIT, EST, LEVL IV, 30-39 MIN: ICD-10-PCS | Mod: S$GLB,,, | Performed by: STUDENT IN AN ORGANIZED HEALTH CARE EDUCATION/TRAINING PROGRAM

## 2023-03-09 PROCEDURE — 36415 COLL VENOUS BLD VENIPUNCTURE: CPT | Performed by: STUDENT IN AN ORGANIZED HEALTH CARE EDUCATION/TRAINING PROGRAM

## 2023-03-09 PROCEDURE — 80053 COMPREHEN METABOLIC PANEL: CPT | Performed by: STUDENT IN AN ORGANIZED HEALTH CARE EDUCATION/TRAINING PROGRAM

## 2023-03-09 PROCEDURE — 85025 COMPLETE CBC W/AUTO DIFF WBC: CPT | Performed by: STUDENT IN AN ORGANIZED HEALTH CARE EDUCATION/TRAINING PROGRAM

## 2023-03-09 PROCEDURE — 3080F DIAST BP >= 90 MM HG: CPT | Mod: CPTII,S$GLB,, | Performed by: STUDENT IN AN ORGANIZED HEALTH CARE EDUCATION/TRAINING PROGRAM

## 2023-03-09 PROCEDURE — 3077F PR MOST RECENT SYSTOLIC BLOOD PRESSURE >= 140 MM HG: ICD-10-PCS | Mod: CPTII,S$GLB,, | Performed by: STUDENT IN AN ORGANIZED HEALTH CARE EDUCATION/TRAINING PROGRAM

## 2023-03-09 PROCEDURE — 1159F MED LIST DOCD IN RCRD: CPT | Mod: CPTII,S$GLB,, | Performed by: STUDENT IN AN ORGANIZED HEALTH CARE EDUCATION/TRAINING PROGRAM

## 2023-03-09 PROCEDURE — 99999 PR PBB SHADOW E&M-EST. PATIENT-LVL IV: CPT | Mod: PBBFAC,,, | Performed by: STUDENT IN AN ORGANIZED HEALTH CARE EDUCATION/TRAINING PROGRAM

## 2023-03-09 PROCEDURE — 3077F SYST BP >= 140 MM HG: CPT | Mod: CPTII,S$GLB,, | Performed by: STUDENT IN AN ORGANIZED HEALTH CARE EDUCATION/TRAINING PROGRAM

## 2023-03-09 RX ORDER — PREGABALIN 100 MG/1
100 CAPSULE ORAL 3 TIMES DAILY
Qty: 270 CAPSULE | Refills: 2 | Status: SHIPPED | OUTPATIENT
Start: 2023-03-09 | End: 2023-09-23 | Stop reason: SDUPTHER

## 2023-03-09 NOTE — PATIENT INSTRUCTIONS
[email or web address]    Lab today    Follow up in 6 months    MRI in March 2024

## 2023-03-09 NOTE — LETTER
[address]  [address]  [facility] [address]  Phone: [phone]           Patient: Miesha Domínguez   MR Number: [id number]   YOB: 1965   Date of Visit: .03/09/2023     To Whom it May Concern:     Miesha Domínguez is a patient under my care for the treatment of multiple sclerosis, a chronic neurological condition. She was seen in my clinic today, 3/9/2023. She is clinically and radiographically stable, and I have no concerns about her ability to perform her job duties as a . If you require any additional information, please contact me at [phone].    Sincerely,    Renetta Mejía MD        JF:poppy

## 2023-03-09 NOTE — PROGRESS NOTES
[facility]  Follow Up Patient Visit      Disease Summary     Principle neurological diagnosis: MS     Date of symptom onset: 2018  Date of diagnosis: 2020  Disease type at diagnosis: RR  Disease type currently: RR  Previous therapy: NA  Current therapy: Glatiramer acetate 2020 - present  Last MRI Brain: 3/6/23  Last MRI C-spine: 3/6/23  Last MRI T-spine: 3/6/23  CSF: 11/3/2020: W2, R49, KFLC 0.0299, P20  JCV status: NA  Other relevant labs and tests: Vitamin D 49 6/22    Interval history:     She has been injecting GA in her hips without issues.  She has been taking increased dose 33075 IU of vitamin D daily.     Updated MRI stable.    Patient denies any new symptoms.     Currently only has tingling sensation in R fingers when she is on her laptop, does not interfere with function. She takes Lyrica 100mg BID for her leg pain.     ROS:     SOCIAL HISTORY  Living arrangements - the patient lives with their family.    Social History     Socioeconomic History    Marital status:      Spouse name: justin    Number of children: 1   Occupational History    Occupation:      Employer: [employer]   Tobacco Use    Smoking status: Never    Smokeless tobacco: Never   Substance and Sexual Activity    Alcohol use: Not Currently     Comment: occassional for Hollidays    Drug use: No    Sexual activity: Yes     Partners: Male     Birth control/protection: None     Comment:  x 31 years since 1991: Spouse : Justin   Social History Narrative    SOCIAL HISTORY:     , spouse is in good health, .     for [employer],    Daughter,  @[employer], major [employer]; working @ [employer]    + exercise, walk, elliptical, Rebecca     Social Determinants of Health     Financial Resource Strain: Low Risk     Difficulty of Paying Living Expenses: Not hard at all   Food Insecurity: No Food Insecurity    Worried About Running Out of Food in the Last Year: Never  true    Ran Out of Food in the Last Year: Never true   Transportation Needs: No Transportation Needs    Lack of Transportation (Medical): No    Lack of Transportation (Non-Medical): No   Physical Activity: Sufficiently Active    Days of Exercise per Week: 5 days    Minutes of Exercise per Session: 60 min   Stress: No Stress Concern Present    Feeling of Stress : Not at all   Social Connections: Unknown    Frequency of Communication with Friends and Family: More than three times a week    Frequency of Social Gatherings with Friends and Family: Once a week    Active Member of Clubs or Organizations: No    Attends Club or Organization Meetings: Never    Marital Status:    Housing Stability: Low Risk     Unable to Pay for Housing in the Last Year: No    Number of Places Lived in the Last Year: 1    Unstable Housing in the Last Year: No       REVIEW OF SYMPTOMS 3/6/2023   Do you feel abnormally tired on most days? No   Do you feel you generally sleep well? Yes   Do you have difficulty controlling your bladder?  No   Do you have difficulty controlling your bowels?  No   Do you have frequent muscle cramps, tightness or spasms in your limbs?  No   Do you have new visual symptoms?  No   Do you have worsening difficulty with your memory or thinking? No   Do you have worsening symptoms of anxiety or depression?  No   For patients who walk, Do you have more difficulty walking?  No   Have you fallen since your last visit?  No   For patients who use wheelchairs: Do you have any skin wounds or breakdown? Not Applicable   Do you have difficulty using your hands?  No   Do you have shooting or burning pain? No   Do you have difficulty with sexual function?  No   If you are sexually active, are you using birth control? Y/N  N/A Not Applicable   Do you often choke when swallowing liquids or solid food?  No   Do you experience worsening symptoms when overheated? No   Do you need any new equipment such as a wheelchair, walker or  shower chair? No   Do you receive co-pay financial assistance for your principal MS medicine? Yes   Would you be interested in participating in an MS research trial in the future? No   For patients on Gilenya, Tecfidera, Aubagio, Rituxan, Ocrevus, Tysabri, Lemtrada or Methotrexate, are you aware that you should NOT receive live virus vaccines?  Not Applicable   Do you feel you have adequate family/friend support?  Yes   Do you have health insurance?   Yes   Are you currently employed? Yes   Do you receive SSDI/SSI?  Not Applicable   Do you use marijuana or cannabis products? No   Have you been diagnosed with a urinary tract infection since your last visit here? No   Have you been diagnosed with a respiratory tract infection since your last visit here? No   Have you been to the emergency room since your last visit here? No   Have you been hospitalized since your last visit here?  No     FSS SCORE & INTERPRETATION 5/19/2022   FSS SCORE  9   FSS SCORE INTERPRETATION May not be suffering from fatigue     MS CONNIE-D SCORE & INTERPRETATION 5/19/2022   CONNIE-D SCORE  0   CONNIE-D INTERPRETATION  No indication of Depression     MS TIMMY-7 SCORE & INTERPRETATION 5/19/2022   TIMMY-7 SCORE  0   TIMMY-7 SCORE INTERPRETATION Normal     PEQ MS MOS PAIN EFFECTS SCORE & INTERPRETATION 5/19/2022   PES SCORE 6   PES SCORE INTERPRETATION Scores can range from 6-30.  Items are scaled so that higher scores indicate a greater impact of pain on a patients mood and behavior.     PEQ MS SEXUAL SATISFACTION SCORE & INTERPRETATION 5/19/2022   SSS SCORE  4   SSS SCORE INTERPRETATION Scores can range from 4-24.  Higher scores indicate greater problems with sexual satisfaction.     MS BLADDER CONTROL SCORE & INTERPRETATION 5/19/2022   BLCS SCORE 0   BLCS SCORE INTERPRETATION  Scores can range from 0-22, with higher scores indicating greater bladder control problems.     MS BOWEL CONTROL SCORE & INTERPRETATION 5/19/2022   BWCS SCORE 0   BWCS SCORE  "INTERPRETATION Scores can range from 0-26, with higher scores indicating greater bowel control problems.     PEQ MS IMPACT OF VISUAL IMPAIRMENT SCORE & INTERPRETATION 5/19/2022   JAMAL SCALE SCORE  0   JAMAL SCORE INTERPRETATION Scores can range from 0-15, with higher scores indicating greater impact of visual problems on daily activites.     MS PDQ SCORE & INTERPRETATION 5/19/2022   PDQ RETROSPECTIVE MEMORY SUBSCALE 0   PDQ ATTENTION/CONCENTRATION SUBSCALE 0   PDQ PROSPECTIVE MEMORY SUBSCALE 0   PDQ PLANNING/ORGANIZATION SUBSCALE 0   PDQ TOTAL SCORE 0   PDQ SCORE INTERPRETATION Scores can range from 0-80, with higher scores indicating greater perceived cognitive impairment.     MSSS SCORE & INTERPRETATION 5/19/2022   MSSS TANGIBLE SUPPORT SUBSCALE 100   MSSS EMOTIONAL/INFORMATIONAL SUPPORT SUBSCALE 100   MSSS AFFECTIONATE SUPPORT SUBSCALE 100   MSSS POSITIVE SOCIAL INTERACTION SUBSCALE 100   MSSS TOTAL SCORE 100   MSSS SCORE INTERPRETATION Scores can range from 0-100, with higher scores indicating greater perceived support.         Exam:     Vitals:    03/09/23 0849   BP: (!) 157/93   Pulse: 79   Weight: 111.9 kg (246 lb 11.1 oz)   Height: 5' 5" (1.651 m)          In general, the patient is well nourished and appears to be in no acute distress.    MENTAL STATUS: language is fluent, normal verbal comprehension, short-term and remote memory is intact, attention is normal, patient is alert and oriented x 3, fund of knowlege is appropriate by vocabulary.     CRANIAL NERVE EXAM:  There is no intrernuclear ophthalmoplegia.  Extraocular muscles are intact. No facial asymmetry. Facial sensation is intact bilaterally. There is no dysarthria. Uvula is midline, and palate moves symmetrically. Shoulder shrug intact bilaterlly. Tongue protrusion is midline.     MOTOR EXAM: Normal bulk and tone throughout UE and LE bilaterally.   No pronator drift; rapid sequential movements are normal; Strength is  5/5 in all groups in the lower " extremities and upper extremities    SENSORY EXAM: Normal to light touch throughout.    COORDINATION: Normal finger-to-nose exam. Normal heel-to-shin exam.    GAIT: Narrow based and stable.     Negative Romberg's    Timed 25 Foot Walk: 11/23/2022 3/9/2023   Did patient wear an AFO? No No   Was assistive device used? No No   Time for 25 Foot Walk (seconds) 4.48 4.28   Time for 25 Foot Walk (seconds) 4.08 4.03         Imaging (personally reviewed):     Results for orders placed during the hospital encounter of 03/06/23    MRI Brain Demyelinating Without Contrast    Impression  No significant change from prior with continued several scattered punctate foci of T2 FLAIR signal hyperintensity throughout the brain parenchyma while nonspecific remain concerning for mild degree of prior demyelinating plaque burden.    No definite new lesion to suggest significant interval or active demyelination.    Clinical correlation and follow-up advised.      Electronically signed by: Huy Villatoro DO  Date:    03/06/2023  Time:    13:41    Results for orders placed during the hospital encounter of 03/06/23    MRI Cervical Spine Demyelinating Without Contrast    Impression  Continued short segment regions of T2 stir signal abnormality in the cervical and thoracic cord as detailed above most compatible with prior areas of demyelination in light of history. No new cord signal abnormality to suggest significant interval or active demyelination.    Continued degenerative change of the cervical spine as detailed above.      Electronically signed by: Huy Villatoro DO  Date:    03/06/2023  Time:    13:41    Results for orders placed during the hospital encounter of 03/06/23    MRI Thoracic Spine Demyelinating Without Contrast    Impression  Continued short segment regions of T2 stir signal abnormality in the cervical and thoracic cord as detailed above most compatible with prior areas of demyelination in light of history. No new cord signal  abnormality to suggest significant interval or active demyelination.    Continued degenerative change of the cervical spine as detailed above.      Electronically signed by: Huy Villatoro DO  Date:    03/06/2023  Time:    13:41    Results for orders placed during the hospital encounter of 03/25/22    MRI Brain Demyelinating W W/O Contrast    Impression  No significant change from prior.  Stable few scattered small to punctate size regions of T2 FLAIR signal abnormality throughout the brain parenchyma remain concerning for mild degree of prior demyelinating plaque burden in light of history.  No definite new lesion or enhancing lesion to suggest significant interval or active demyelination.    Clinical correlation and follow-up advised.      Electronically signed by: Huy Villatoro DO  Date:    03/26/2022  Time:    09:40    Results for orders placed during the hospital encounter of 03/25/22    MRI Cervical Spine Demyelinating W W/O Contrast    Impression  Continued short segment regions of T2 stir signal abnormality in the cervical and thoracic cord as detailed above most compatible with prior areas of demyelination in light of history. No new cord signal abnormality or abnormal intrathecal enhancement to suggest significant interval or active demyelination.    Stable ventral positioning of the cervical cord with slight dorsal flattening underlying arachnoid cyst versus web remains in differential.    Continued scattered degenerative change of the cervical spine as detailed above without significant central canal stenosis.    See above for additional details.      Electronically signed by: Huy Villatoro DO  Date:    03/26/2022  Time:    09:40    Results for orders placed during the hospital encounter of 03/25/22    MRI Thoracic Spine Demyelinating W W/O Contrast    Impression  Continued short segment regions of T2 stir signal abnormality in the cervical and thoracic cord as detailed above most compatible with prior  areas of demyelination in light of history. No new cord signal abnormality or abnormal intrathecal enhancement to suggest significant interval or active demyelination.    Stable ventral positioning of the cervical cord with slight dorsal flattening underlying arachnoid cyst versus web remains in differential.    Continued scattered degenerative change of the cervical spine as detailed above without significant central canal stenosis.    See above for additional details.      Electronically signed by: Huy Villatoro DO  Date:    03/26/2022  Time:    09:40      Labs:     Lab Results   Component Value Date    LUPDKECU02BF 49 06/15/2022    OWRBIYQS84HE 65 05/28/2021    LPAWEBCM96RN 41 10/20/2020     No results found for: JCVINDEX, JCVANTIBODY  No results found for: FB6QYCIG, ABSOLUTECD3, XB8ZATIC, ABSOLUTECD8, GT2XRJWN, ABSOLUTECD4, LABCD48  Lab Results   Component Value Date    WBC 7.58 09/20/2021    RBC 4.81 09/20/2021    HGB 12.8 09/20/2021    HCT 41.2 09/20/2021    MCV 86 09/20/2021    MCH 26.6 (L) 09/20/2021    MCHC 31.1 (L) 09/20/2021    RDW 16.2 (H) 09/20/2021     09/20/2021    MPV 12.0 09/20/2021    GRAN 3.9 09/20/2021    GRAN 51.2 09/20/2021    LYMPH 3.0 09/20/2021    LYMPH 38.9 09/20/2021    MONO 0.5 09/20/2021    MONO 7.0 09/20/2021    EOS 0.2 09/20/2021    BASO 0.03 09/20/2021    EOSINOPHIL 2.4 09/20/2021    BASOPHIL 0.4 09/20/2021     Sodium   Date Value Ref Range Status   03/22/2022 145 136 - 145 mmol/L Final   03/22/2022 145 136 - 145 mmol/L Final     Potassium   Date Value Ref Range Status   03/22/2022 4.1 3.5 - 5.1 mmol/L Final   03/22/2022 4.1 3.5 - 5.1 mmol/L Final     Chloride   Date Value Ref Range Status   03/22/2022 106 95 - 110 mmol/L Final   03/22/2022 106 95 - 110 mmol/L Final     CO2   Date Value Ref Range Status   03/22/2022 30 (H) 23 - 29 mmol/L Final   03/22/2022 30 (H) 23 - 29 mmol/L Final     Glucose   Date Value Ref Range Status   03/22/2022 85 70 - 110 mg/dL Final   03/22/2022 85  70 - 110 mg/dL Final     BUN   Date Value Ref Range Status   03/22/2022 14 6 - 20 mg/dL Final   03/22/2022 14 6 - 20 mg/dL Final     Creatinine   Date Value Ref Range Status   03/22/2022 0.8 0.5 - 1.4 mg/dL Final   03/22/2022 0.8 0.5 - 1.4 mg/dL Final     Calcium   Date Value Ref Range Status   03/22/2022 9.7 8.7 - 10.5 mg/dL Final   03/22/2022 9.7 8.7 - 10.5 mg/dL Final     Total Protein   Date Value Ref Range Status   03/22/2022 7.8 6.0 - 8.4 g/dL Final     Albumin   Date Value Ref Range Status   03/22/2022 3.6 3.5 - 5.2 g/dL Final     Total Bilirubin   Date Value Ref Range Status   03/22/2022 0.6 0.1 - 1.0 mg/dL Final     Comment:     For infants and newborns, interpretation of results should be based  on gestational age, weight and in agreement with clinical  observations.    Premature Infant recommended reference ranges:  Up to 24 hours.............<8.0 mg/dL  Up to 48 hours............<12.0 mg/dL  3-5 days..................<15.0 mg/dL  6-29 days.................<15.0 mg/dL       Alkaline Phosphatase   Date Value Ref Range Status   03/22/2022 90 55 - 135 U/L Final     AST   Date Value Ref Range Status   03/22/2022 22 10 - 40 U/L Final     ALT   Date Value Ref Range Status   03/22/2022 19 10 - 44 U/L Final     Anion Gap   Date Value Ref Range Status   03/22/2022 9 8 - 16 mmol/L Final   03/22/2022 9 8 - 16 mmol/L Final     eGFR if    Date Value Ref Range Status   03/22/2022 >60.0 >60 mL/min/1.73 m^2 Final   03/22/2022 >60.0 >60 mL/min/1.73 m^2 Final     eGFR if non    Date Value Ref Range Status   03/22/2022 >60.0 >60 mL/min/1.73 m^2 Final     Comment:     Calculation used to obtain the estimated glomerular filtration  rate (eGFR) is the CKD-EPI equation.      03/22/2022 >60.0 >60 mL/min/1.73 m^2 Final     Comment:     Calculation used to obtain the estimated glomerular filtration  rate (eGFR) is the CKD-EPI equation.                   Diagnosis/Assessment/Plan:     Multiple  Sclerosis  -Assessment: RRMS stable on glatiramer acetate. Exam without focal deficits and stable. MRI stable.   -Imaging: Updated MRI March 2024  -Disease Modifying Therapies: Continue glatiramer acetate, labs today (CBC, CMP).   Symptom management              -Check vitamin D today, may need to decrease dose   -Neuropathy: Increase to 100mg TID, however, if not tolerating due to side effects, advised patient to resume 100mg BID dose.  Lifestyle modifications for brain health discussed.  Return to clinic in 6 months               Total time spent with the patient: 32 minutes, including face to face consultation, chart review and coordination of care, on the day of the visit. This includes face to face time and non-face to face time preparing to see the patient (eg, review of tests), obtaining and/or reviewing separately obtained history, documenting clinical information in the electronic or other health record, independently interpreting resultsand communicating results to the patient/family/caregiver, or care coordination.         Renetta Mejía MD, MSc  Attending neurologist

## 2023-03-09 NOTE — TELEPHONE ENCOUNTER
Patient's blood pressure was elevated at Neurology and earlier this month in urgent Care, she has been ill with a URI and received steroids from ,  both or either could elevate her blood pressure    In review of her last visit at the office her blood pressure was 140/90, and in an effort to try to see if that is an ongoing issue   An order to enroll her in digital hypertension will be placed so her blood pressure can be monitored at home in addition to her office visit    The order was placed and they should be calling her or reaching out via the portal    In the interim she should be taking her losartan once daily, and she should take her diuretic daily until we have further blood pressure readings to determine medication requirements

## 2023-03-09 NOTE — TELEPHONE ENCOUNTER
----- Message from Riddhi Snyder sent at 3/9/2023  2:14 PM CST -----  Contact: Self/[phone]  Pt said that she is calling in regards to needing to get a return call from the nurse pt stated that she went to see her Neurologist and her blood pressure was elevated , pt was told to contact her pcp to discuss. Please advise

## 2023-03-09 NOTE — Clinical Note
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 She had a letter last June, and she needs it again this June, could you help with that? Thanks!

## 2023-03-10 RX ORDER — HYDROCHLOROTHIAZIDE 25 MG/1
25 TABLET ORAL DAILY
Qty: 90 TABLET | Refills: 3 | Status: SHIPPED | OUTPATIENT
Start: 2023-03-10 | End: 2023-09-11 | Stop reason: SDUPTHER

## 2023-03-10 NOTE — TELEPHONE ENCOUNTER
Patient went to sign up for digital med but with her insurance it will cost her 30 dollars a month to enroll   She will need a refill of the diuretic she was out and had not been taking it.

## 2023-03-14 ENCOUNTER — PATIENT MESSAGE (OUTPATIENT)
Dept: BARIATRICS | Facility: CLINIC | Age: 58
End: 2023-03-14
Payer: COMMERCIAL

## 2023-03-26 NOTE — PROGRESS NOTES
57 y.o. femalepresents in f/u to diabetes, hypertension, and dyslipidemia  DM tx w/metformin 1000 mg b.i.d. and Ozempic 2 mg q.week  Denied increased thirst, urination, or hypoglycemia episodes  A1C ==>    Lab Results   Component Value Date    HGBA1C 5.5 10/08/2022         HTN tx w/HCTZ 25 mg q.day and  losartan 100 mg q.d.  Denied HA or dizziness  BP today==>130/80  HER MACHINE 130/90  Home readings range /<84  Denied weakness or lightheadedness  HyperNa was noted on her NONFASTING lab     Dyslipidemia tx w/rosuvastatin 20 mg q.day  Denied unusual muscle pain or chest pain  LDL==>  Lab Results   Component Value Date    CHOL 141 10/08/2022    CHOL 151 09/20/2021    CHOL 145 08/21/2020     Lab Results   Component Value Date    HDL 64 10/08/2022    HDL 71 09/20/2021    HDL 72 08/21/2020     Lab Results   Component Value Date    LDLCALC 62.2 (L) 10/08/2022    LDLCALC 69.2 09/20/2021    LDLCALC 63.4 08/21/2020     Lab Results   Component Value Date    TRIG 74 10/08/2022    TRIG 54 09/20/2021    TRIG 48 08/21/2020     Lab Results   Component Value Date    CHOLHDL 45.4 10/08/2022    CHOLHDL 47.0 09/20/2021    CHOLHDL 49.7 08/21/2020         ROS:  No fever, chills, or night sweats  No blurry vision or visual disturbance  No dysphagia or early satiety  No palpitations or tachycardia  No cough or wheezing  No GERD or abdominal pain  No numbness or focal deficits  Remainder of review negative except as previously noted    PAST MEDICAL HX: Reviewed  PAST SURGICAL HX: Reviewed  SOCIAL HX: Reviewed  FAMILY HX: Reviewed    PHYSICAL EXAM:  VS: As noted  GENERAL: WDWN, A&O, NAD, conversant and co-operative  EYES: Conj/lids unremarkable, sclera anicteric, PERRL, EOMI  ENT: Hearing intacNECK: Supple w/o lymphadenopathy or thyromegaly  RESPIRATORY: Efforts are unlabored. Lungs CTAP  CARDIOVASCULAR: Heart RRR. No carotid bruits noted.   1+ pedal pulses. No LE edema  GASTROINTESTINAL: BS+, soft , NT/ND, no HSM  noted  MUSCULOSKELETAL: Gait normal. No CCE  NEUROLOGIC: WILDER. No tremor noted  SKIN: Warm and dry,   FEET:  Monofilament - intact  Pedal pulses 1+, L>R  Ulcers/lesions; n/a  DYytrophic nails: n/a    IMPRESSION:  HTN associated with diabetes, stable continue losartan 100 mg q.day and HCTZ 25 mg q.day  REC SHE TAKE 1/2 HCTZ w/ low BP readings and hyperNa     HYPERNa++, new finding    DM, stable continue Ozempic and metformin    HLD associated with diabetes asymptomatic continue rosuvastatin 20 mg q.day    PLAN:  Med recommendations as noted above  DECREASE HCTZ 25 mg to 1/2 table qd  Increase water intake, now drinking 64 oz qd  Will add another 20oz  Low salt diet  Consider Prevnar- to review w/Neurologist  Call [facility]  RT 9/2023 as scheduled w/ lab

## 2023-03-27 ENCOUNTER — PATIENT MESSAGE (OUTPATIENT)
Dept: ADMINISTRATIVE | Facility: OTHER | Age: 58
End: 2023-03-27
Payer: COMMERCIAL

## 2023-03-28 ENCOUNTER — OFFICE VISIT (OUTPATIENT)
Dept: INTERNAL MEDICINE | Facility: CLINIC | Age: 58
End: 2023-03-28
Payer: COMMERCIAL

## 2023-03-28 VITALS
OXYGEN SATURATION: 97 % | DIASTOLIC BLOOD PRESSURE: 80 MMHG | BODY MASS INDEX: 38.79 KG/M2 | HEIGHT: 67 IN | SYSTOLIC BLOOD PRESSURE: 130 MMHG | WEIGHT: 247.13 LBS | TEMPERATURE: 98 F | HEART RATE: 76 BPM

## 2023-03-28 DIAGNOSIS — E11.40 CONTROLLED TYPE 2 DIABETES MELLITUS WITH DIABETIC NEUROPATHY, WITHOUT LONG-TERM CURRENT USE OF INSULIN: ICD-10-CM

## 2023-03-28 DIAGNOSIS — I15.2 HYPERTENSION ASSOCIATED WITH TYPE 2 DIABETES MELLITUS: Primary | ICD-10-CM

## 2023-03-28 DIAGNOSIS — R09.81 SINUS CONGESTION: ICD-10-CM

## 2023-03-28 DIAGNOSIS — E87.0 HYPERNATREMIA: ICD-10-CM

## 2023-03-28 DIAGNOSIS — E78.5 HYPERLIPIDEMIA ASSOCIATED WITH TYPE 2 DIABETES MELLITUS: ICD-10-CM

## 2023-03-28 DIAGNOSIS — E11.69 HYPERLIPIDEMIA ASSOCIATED WITH TYPE 2 DIABETES MELLITUS: ICD-10-CM

## 2023-03-28 DIAGNOSIS — E11.59 HYPERTENSION ASSOCIATED WITH TYPE 2 DIABETES MELLITUS: Primary | ICD-10-CM

## 2023-03-28 PROCEDURE — 3079F DIAST BP 80-89 MM HG: CPT | Mod: CPTII,S$GLB,, | Performed by: INTERNAL MEDICINE

## 2023-03-28 PROCEDURE — 99214 OFFICE O/P EST MOD 30 MIN: CPT | Mod: S$GLB,,, | Performed by: INTERNAL MEDICINE

## 2023-03-28 PROCEDURE — 4010F ACE/ARB THERAPY RXD/TAKEN: CPT | Mod: CPTII,S$GLB,, | Performed by: INTERNAL MEDICINE

## 2023-03-28 PROCEDURE — 3075F SYST BP GE 130 - 139MM HG: CPT | Mod: CPTII,S$GLB,, | Performed by: INTERNAL MEDICINE

## 2023-03-28 PROCEDURE — 99999 PR PBB SHADOW E&M-EST. PATIENT-LVL V: ICD-10-PCS | Mod: PBBFAC,,, | Performed by: INTERNAL MEDICINE

## 2023-03-28 PROCEDURE — 3008F BODY MASS INDEX DOCD: CPT | Mod: CPTII,S$GLB,, | Performed by: INTERNAL MEDICINE

## 2023-03-28 PROCEDURE — 1159F MED LIST DOCD IN RCRD: CPT | Mod: CPTII,S$GLB,, | Performed by: INTERNAL MEDICINE

## 2023-03-28 PROCEDURE — 3008F PR BODY MASS INDEX (BMI) DOCUMENTED: ICD-10-PCS | Mod: CPTII,S$GLB,, | Performed by: INTERNAL MEDICINE

## 2023-03-28 PROCEDURE — 1159F PR MEDICATION LIST DOCUMENTED IN MEDICAL RECORD: ICD-10-PCS | Mod: CPTII,S$GLB,, | Performed by: INTERNAL MEDICINE

## 2023-03-28 PROCEDURE — 99214 PR OFFICE/OUTPT VISIT, EST, LEVL IV, 30-39 MIN: ICD-10-PCS | Mod: S$GLB,,, | Performed by: INTERNAL MEDICINE

## 2023-03-28 PROCEDURE — 4010F PR ACE/ARB THEARPY RXD/TAKEN: ICD-10-PCS | Mod: CPTII,S$GLB,, | Performed by: INTERNAL MEDICINE

## 2023-03-28 PROCEDURE — 3079F PR MOST RECENT DIASTOLIC BLOOD PRESSURE 80-89 MM HG: ICD-10-PCS | Mod: CPTII,S$GLB,, | Performed by: INTERNAL MEDICINE

## 2023-03-28 PROCEDURE — 3075F PR MOST RECENT SYSTOLIC BLOOD PRESS GE 130-139MM HG: ICD-10-PCS | Mod: CPTII,S$GLB,, | Performed by: INTERNAL MEDICINE

## 2023-03-28 PROCEDURE — 99999 PR PBB SHADOW E&M-EST. PATIENT-LVL V: CPT | Mod: PBBFAC,,, | Performed by: INTERNAL MEDICINE

## 2023-03-31 ENCOUNTER — CLINICAL SUPPORT (OUTPATIENT)
Dept: OTOLARYNGOLOGY | Facility: CLINIC | Age: 58
End: 2023-03-31
Payer: COMMERCIAL

## 2023-03-31 ENCOUNTER — OFFICE VISIT (OUTPATIENT)
Dept: OTOLARYNGOLOGY | Facility: CLINIC | Age: 58
End: 2023-03-31
Payer: COMMERCIAL

## 2023-03-31 VITALS
WEIGHT: 241.94 LBS | HEART RATE: 81 BPM | DIASTOLIC BLOOD PRESSURE: 89 MMHG | SYSTOLIC BLOOD PRESSURE: 130 MMHG | HEIGHT: 67 IN | BODY MASS INDEX: 37.97 KG/M2

## 2023-03-31 DIAGNOSIS — J30.9 CHRONIC ALLERGIC RHINITIS: Chronic | ICD-10-CM

## 2023-03-31 DIAGNOSIS — H60.8X3 CHRONIC ECZEMATOUS OTITIS EXTERNA OF BOTH EARS: Primary | Chronic | ICD-10-CM

## 2023-03-31 DIAGNOSIS — H93.293 ABNORMAL AUDITORY PERCEPTION, BILATERAL: Primary | ICD-10-CM

## 2023-03-31 DIAGNOSIS — H61.23 BILATERAL IMPACTED CERUMEN: ICD-10-CM

## 2023-03-31 DIAGNOSIS — H61.20 IMPACTED CERUMEN, UNSPECIFIED LATERALITY: ICD-10-CM

## 2023-03-31 PROCEDURE — 4010F ACE/ARB THERAPY RXD/TAKEN: CPT | Mod: CPTII,S$GLB,, | Performed by: OTOLARYNGOLOGY

## 2023-03-31 PROCEDURE — 92556 PR SPEECH AUDIOMETRY, COMPLETE: ICD-10-PCS | Mod: S$GLB,,, | Performed by: AUDIOLOGIST

## 2023-03-31 PROCEDURE — 69210 REMOVE IMPACTED EAR WAX UNI: CPT | Mod: S$GLB,,, | Performed by: OTOLARYNGOLOGY

## 2023-03-31 PROCEDURE — 99999 PR PBB SHADOW E&M-EST. PATIENT-LVL IV: CPT | Mod: PBBFAC,,, | Performed by: OTOLARYNGOLOGY

## 2023-03-31 PROCEDURE — 1160F RVW MEDS BY RX/DR IN RCRD: CPT | Mod: CPTII,S$GLB,, | Performed by: OTOLARYNGOLOGY

## 2023-03-31 PROCEDURE — 92567 PR TYMPA2METRY: ICD-10-PCS | Mod: S$GLB,,, | Performed by: AUDIOLOGIST

## 2023-03-31 PROCEDURE — 92567 TYMPANOMETRY: CPT | Mod: S$GLB,,, | Performed by: AUDIOLOGIST

## 2023-03-31 PROCEDURE — 69210 PR REMOVAL IMPACTED CERUMEN REQUIRING INSTRUMENTATION, UNILATERAL: ICD-10-PCS | Mod: S$GLB,,, | Performed by: OTOLARYNGOLOGY

## 2023-03-31 PROCEDURE — 1159F MED LIST DOCD IN RCRD: CPT | Mod: CPTII,S$GLB,, | Performed by: OTOLARYNGOLOGY

## 2023-03-31 PROCEDURE — 3008F BODY MASS INDEX DOCD: CPT | Mod: CPTII,S$GLB,, | Performed by: OTOLARYNGOLOGY

## 2023-03-31 PROCEDURE — 92556 SPEECH AUDIOMETRY COMPLETE: CPT | Mod: S$GLB,,, | Performed by: AUDIOLOGIST

## 2023-03-31 PROCEDURE — 99999 PR PBB SHADOW E&M-EST. PATIENT-LVL I: ICD-10-PCS | Mod: PBBFAC,,, | Performed by: AUDIOLOGIST

## 2023-03-31 PROCEDURE — 4010F PR ACE/ARB THEARPY RXD/TAKEN: ICD-10-PCS | Mod: CPTII,S$GLB,, | Performed by: OTOLARYNGOLOGY

## 2023-03-31 PROCEDURE — 3075F SYST BP GE 130 - 139MM HG: CPT | Mod: CPTII,S$GLB,, | Performed by: OTOLARYNGOLOGY

## 2023-03-31 PROCEDURE — 1160F PR REVIEW ALL MEDS BY PRESCRIBER/CLIN PHARMACIST DOCUMENTED: ICD-10-PCS | Mod: CPTII,S$GLB,, | Performed by: OTOLARYNGOLOGY

## 2023-03-31 PROCEDURE — 1159F PR MEDICATION LIST DOCUMENTED IN MEDICAL RECORD: ICD-10-PCS | Mod: CPTII,S$GLB,, | Performed by: OTOLARYNGOLOGY

## 2023-03-31 PROCEDURE — 99204 OFFICE O/P NEW MOD 45 MIN: CPT | Mod: 25,S$GLB,, | Performed by: OTOLARYNGOLOGY

## 2023-03-31 PROCEDURE — 92552 PR PURE TONE AUDIOMETRY, AIR: ICD-10-PCS | Mod: S$GLB,,, | Performed by: AUDIOLOGIST

## 2023-03-31 PROCEDURE — 99204 PR OFFICE/OUTPT VISIT, NEW, LEVL IV, 45-59 MIN: ICD-10-PCS | Mod: 25,S$GLB,, | Performed by: OTOLARYNGOLOGY

## 2023-03-31 PROCEDURE — 3079F DIAST BP 80-89 MM HG: CPT | Mod: CPTII,S$GLB,, | Performed by: OTOLARYNGOLOGY

## 2023-03-31 PROCEDURE — 3075F PR MOST RECENT SYSTOLIC BLOOD PRESS GE 130-139MM HG: ICD-10-PCS | Mod: CPTII,S$GLB,, | Performed by: OTOLARYNGOLOGY

## 2023-03-31 PROCEDURE — 99999 PR PBB SHADOW E&M-EST. PATIENT-LVL I: CPT | Mod: PBBFAC,,, | Performed by: AUDIOLOGIST

## 2023-03-31 PROCEDURE — 99999 PR PBB SHADOW E&M-EST. PATIENT-LVL IV: ICD-10-PCS | Mod: PBBFAC,,, | Performed by: OTOLARYNGOLOGY

## 2023-03-31 PROCEDURE — 3079F PR MOST RECENT DIASTOLIC BLOOD PRESSURE 80-89 MM HG: ICD-10-PCS | Mod: CPTII,S$GLB,, | Performed by: OTOLARYNGOLOGY

## 2023-03-31 PROCEDURE — 92552 PURE TONE AUDIOMETRY AIR: CPT | Mod: S$GLB,,, | Performed by: AUDIOLOGIST

## 2023-03-31 PROCEDURE — 3008F PR BODY MASS INDEX (BMI) DOCUMENTED: ICD-10-PCS | Mod: CPTII,S$GLB,, | Performed by: OTOLARYNGOLOGY

## 2023-03-31 RX ORDER — AZELASTINE 1 MG/ML
1 SPRAY, METERED NASAL 2 TIMES DAILY
Qty: 30 ML | Refills: 3 | Status: SHIPPED | OUTPATIENT
Start: 2023-03-31 | End: 2023-12-10 | Stop reason: SDUPTHER

## 2023-03-31 NOTE — PROGRESS NOTES
Chief Complaint   Patient presents with    Cerumen Impaction     Bilateral cerum impaction, right more than left, also sinus pressure    .     HPI:  Miesha Domínguez is here to see me today for evaluation of aural fullness in bilateral ears for several months.  She has complaints of hearing loss in the affected ears, but denies pain or drainage.  She has not had prior history of otologic surgery. She denies noise exposure.  She denies tinnitus. She does report nasal congestion and post-nasal drip. Worse in mornings. Worse seasonally.       Past Medical History:   Diagnosis Date    Asthma     as a child    Endometrial polyp 8/2/2019    HLD (hyperlipidemia)     HTN (hypertension)     MS (multiple sclerosis)     11/2020    Neuropathy     S/P D&C (status post dilation and curettage) 8/2/2019    Type II or unspecified type diabetes mellitus without mention of complication, not stated as uncontrolled      Social History     Socioeconomic History    Marital status:      Spouse name: himanshu    Number of children: 1   Occupational History    Occupation:      Employer: [employer]   Tobacco Use    Smoking status: Never     Passive exposure: Never    Smokeless tobacco: Never   Substance and Sexual Activity    Alcohol use: Not Currently     Comment: occassional for Hollidays    Drug use: No    Sexual activity: Yes     Partners: Male     Birth control/protection: None     Comment:  x 31 years since 1991: Spouse : Himanshu   Social History Narrative    SOCIAL HISTORY:     , spouse is in good health, .     for [employer],    Daughter,  @[employer], major [employer]; working @ [employer]    + exercise, walk, elliptical, Rebecca     Social Determinants of Health     Financial Resource Strain: Low Risk     Difficulty of Paying Living Expenses: Not hard at all   Food Insecurity: No Food Insecurity    Worried About Running Out of Food in the Last Year: Never true     Ran Out of Food in the Last Year: Never true   Transportation Needs: No Transportation Needs    Lack of Transportation (Medical): No    Lack of Transportation (Non-Medical): No   Physical Activity: Sufficiently Active    Days of Exercise per Week: 4 days    Minutes of Exercise per Session: 60 min   Stress: No Stress Concern Present    Feeling of Stress : Not at all   Social Connections: Unknown    Frequency of Communication with Friends and Family: More than three times a week    Frequency of Social Gatherings with Friends and Family: Twice a week    Active Member of Clubs or Organizations: No    Attends Club or Organization Meetings: Never    Marital Status:    Housing Stability: Low Risk     Unable to Pay for Housing in the Last Year: No    Number of Places Lived in the Last Year: 1    Unstable Housing in the Last Year: No     Past Surgical History:   Procedure Laterality Date     SECTION      COLONOSCOPY N/A 2018    Procedure: COLONOSCOPY;  Surgeon: Ramez Ramírez MD;  Location: [address];  Service: Endoscopy;  Laterality: N/A;    HYSTEROSCOPY WITH DILATION AND CURETTAGE OF UTERUS N/A 2019    Procedure: [id number];  Surgeon: Catrina Olivier MD;  Location: [facility];  Service: OB/GYN;  Laterality: N/A;    SLEEVE GASTROPLASTY       Family History   Problem Relation Age of Onset    Hypertension Mother     Diabetes Mother     Ovarian cancer Mother 74    Cancer Father         d.64 lung -smoker    Diabetes Sister 71        1/2 sister- same Mom    Hypertension Sister     Hypertension Brother         1/2 brother - same Mom    Diabetes Brother 73    No Known Problems Daughter          1991    Colon cancer Other 46    Breast cancer Neg Hx     Stroke Neg Hx     Heart attack Neg Hx     Heart disease Neg Hx     Heart failure Neg Hx     Hyperlipidemia Neg Hx            Review of Systems  General: negative for chills, fever or weight  loss  Psychological: negative for mood changes or depression  Ophthalmic: negative for blurry vision, photophobia or eye pain  ENT: see HPI  Respiratory ROS: no cough, shortness of breath, or wheezing  Cardiovascular: no chest pain or dyspnea on exertion  Gastrointestinal ROS: no abdominal pain, change in bowel habits, or black/ bloody stools  Musculoskeletal ROS: negative for gait disturbance or muscular weakness  Neurological: no syncope or seizures; no ataxia  Dermatological: negative for puritis,  rash and jaundice  Hematologic/lymphatic: no easy bruising, no new lumps or bumps      Physical Exam:    Vitals:    03/31/23 0837   BP: 130/89   Pulse: 81       Constitutional: Well appearing / communicating without difficutly.  NAD.  Eyes: EOM I Bilaterally  Head/Face: Normocephalic.  Negative paranasal sinus pressure/tenderness.  Salivary glands WNL.  House Brackmann I Bilaterally.    Right Ear: External ear normal and ear canal occluded.      Left Ear: External ear normal and ear canal normal occluded.   Bilateral complete cerumen impactions, removal described in a separate procedure note    Nose: No gross nasal septal deviation. Inferior Turbinates 3+ bilaterally. No septal perforation. No masses/lesions. External nasal skin appears normal without masses/lesions.  Oral Cavity: Gingiva/lips within normal limits.  Dentition/gingiva healthy appearing. Mucus membranes moist. Floor of mouth soft, no masses palpated. Oral Tongue mobile. Hard Palate appears normal.    Oropharynx: Base of tongue appears normal. No masses/lesions noted. Tonsillar fossa/pharyngeal wall without lesions. Posterior oropharynx WNL.  Soft palate without masses. Midline uvula.   Neck/Lymphatic: No LAD I-VI bilaterally.  No thyromegaly.  No masses noted on exam.    Mirror laryngoscopy/nasopharyngoscopy: Active gag reflex.  Unable to perform.                Assessment:    ICD-10-CM ICD-9-CM    1. Chronic eczematous otitis externa of both ears   H60.8X3 380.23       2. Chronic allergic rhinitis  J30.9 477.9       3. Bilateral impacted cerumen  H61.23 380.4       4. Impacted cerumen, unspecified laterality  H61.20 380.4 Ambulatory referral/consult to ENT        The primary encounter diagnosis was Chronic eczematous otitis externa of both ears. Diagnoses of Chronic allergic rhinitis, Bilateral impacted cerumen, and Impacted cerumen, unspecified laterality were also pertinent to this visit.      Plan:  No orders of the defined types were placed in this encounter.      Start nasal saline rinses BID  Start astelin 1 spray per nostril BID  Continue xyzal 5mg PO daily    Cerumen impaction:  Removed today without difficulty.  I would recommend the use of a wax softening drop, either over the counter Debrox or mineral oil, on a weekly basis.  I also instructed the patient to avoid Qtips.      Sherita Sarah MD

## 2023-03-31 NOTE — PROGRESS NOTES
Miesha Domínguez was seen today in the clinic for an audiologic evaluation.  Her main complaint was feeling like her ears were blocked.    Tympanometry revealed a Type a tympanogram for both ears.  Audiogram results revealed hearing within normal limits bilaterally.  Speech reception thresholds were obtained at 0dB for both ears and speech discrimination scores were 96% for the right ear and 100% for the left ear.    Recommendations:  Otologic evaluation  Follow up hearing test as needed  Hearing protection in noise

## 2023-04-05 ENCOUNTER — PATIENT MESSAGE (OUTPATIENT)
Dept: BARIATRICS | Facility: CLINIC | Age: 58
End: 2023-04-05
Payer: COMMERCIAL

## 2023-04-11 ENCOUNTER — PATIENT MESSAGE (OUTPATIENT)
Dept: BARIATRICS | Facility: CLINIC | Age: 58
End: 2023-04-11
Payer: COMMERCIAL

## 2023-04-19 DIAGNOSIS — E11.9 TYPE 2 DIABETES MELLITUS WITHOUT COMPLICATION: ICD-10-CM

## 2023-04-24 ENCOUNTER — OFFICE VISIT (OUTPATIENT)
Dept: ORTHOPEDICS | Facility: CLINIC | Age: 58
End: 2023-04-24
Payer: COMMERCIAL

## 2023-04-24 VITALS
BODY MASS INDEX: 39.24 KG/M2 | SYSTOLIC BLOOD PRESSURE: 124 MMHG | DIASTOLIC BLOOD PRESSURE: 82 MMHG | HEIGHT: 67 IN | HEART RATE: 99 BPM | WEIGHT: 250 LBS

## 2023-04-24 DIAGNOSIS — M75.22 BICEPS TENDINITIS OF LEFT UPPER EXTREMITY: Primary | ICD-10-CM

## 2023-04-24 PROCEDURE — 99213 PR OFFICE/OUTPT VISIT, EST, LEVL III, 20-29 MIN: ICD-10-PCS | Mod: S$GLB,,, | Performed by: PHYSICIAN ASSISTANT

## 2023-04-24 PROCEDURE — 3074F SYST BP LT 130 MM HG: CPT | Mod: CPTII,S$GLB,, | Performed by: PHYSICIAN ASSISTANT

## 2023-04-24 PROCEDURE — 3079F DIAST BP 80-89 MM HG: CPT | Mod: CPTII,S$GLB,, | Performed by: PHYSICIAN ASSISTANT

## 2023-04-24 PROCEDURE — 1159F PR MEDICATION LIST DOCUMENTED IN MEDICAL RECORD: ICD-10-PCS | Mod: CPTII,S$GLB,, | Performed by: PHYSICIAN ASSISTANT

## 2023-04-24 PROCEDURE — 3008F BODY MASS INDEX DOCD: CPT | Mod: CPTII,S$GLB,, | Performed by: PHYSICIAN ASSISTANT

## 2023-04-24 PROCEDURE — 99999 PR PBB SHADOW E&M-EST. PATIENT-LVL IV: CPT | Mod: PBBFAC,,, | Performed by: PHYSICIAN ASSISTANT

## 2023-04-24 PROCEDURE — 3008F PR BODY MASS INDEX (BMI) DOCUMENTED: ICD-10-PCS | Mod: CPTII,S$GLB,, | Performed by: PHYSICIAN ASSISTANT

## 2023-04-24 PROCEDURE — 3074F PR MOST RECENT SYSTOLIC BLOOD PRESSURE < 130 MM HG: ICD-10-PCS | Mod: CPTII,S$GLB,, | Performed by: PHYSICIAN ASSISTANT

## 2023-04-24 PROCEDURE — 1159F MED LIST DOCD IN RCRD: CPT | Mod: CPTII,S$GLB,, | Performed by: PHYSICIAN ASSISTANT

## 2023-04-24 PROCEDURE — 99213 OFFICE O/P EST LOW 20 MIN: CPT | Mod: S$GLB,,, | Performed by: PHYSICIAN ASSISTANT

## 2023-04-24 PROCEDURE — 4010F PR ACE/ARB THEARPY RXD/TAKEN: ICD-10-PCS | Mod: CPTII,S$GLB,, | Performed by: PHYSICIAN ASSISTANT

## 2023-04-24 PROCEDURE — 3079F PR MOST RECENT DIASTOLIC BLOOD PRESSURE 80-89 MM HG: ICD-10-PCS | Mod: CPTII,S$GLB,, | Performed by: PHYSICIAN ASSISTANT

## 2023-04-24 PROCEDURE — 99999 PR PBB SHADOW E&M-EST. PATIENT-LVL IV: ICD-10-PCS | Mod: PBBFAC,,, | Performed by: PHYSICIAN ASSISTANT

## 2023-04-24 PROCEDURE — 4010F ACE/ARB THERAPY RXD/TAKEN: CPT | Mod: CPTII,S$GLB,, | Performed by: PHYSICIAN ASSISTANT

## 2023-04-24 RX ORDER — CELECOXIB 200 MG/1
200 CAPSULE ORAL 2 TIMES DAILY
Qty: 60 CAPSULE | Refills: 1 | Status: SHIPPED | OUTPATIENT
Start: 2023-04-24 | End: 2023-05-24

## 2023-04-24 NOTE — PROGRESS NOTES
Subjective:      Patient ID: Miesha Domínguez is a 57 y.o. female.    Chief Complaint: Pain of the Left Shoulder      57-year-old female presents with 4 month history of left arm and biceps pain.  She previously seen me 5 months ago for left rotator cuff tendinitis.  I gave her a subacromial corticosteroid injection home exercises.  She states that pain has subsided but then she started having pain in her biceps.  Worse with pushing off like out of her bed and overhead motions.  She does not note any weakness, paresthesias or neck pain.      Review of Systems   Constitutional: Negative for chills and fever.   Cardiovascular:  Negative for chest pain.   Respiratory:  Negative for cough.    Hematologic/Lymphatic: Does not bruise/bleed easily.   Skin:  Negative for poor wound healing and rash.   Musculoskeletal:  Positive for joint pain, myalgias and stiffness.   Gastrointestinal:  Negative for abdominal pain.   Genitourinary:  Negative for bladder incontinence.   Neurological:  Negative for dizziness, loss of balance and weakness.   Psychiatric/Behavioral:  Negative for altered mental status.      Review of patient's allergies indicates:  No Known Allergies     Current Outpatient Medications   Medication Sig Dispense Refill    azelastine (ASTELIN) 137 mcg (0.1 %) nasal spray 1 spray (137 mcg total) by Nasal route 2 (two) times daily. 30 mL 3    cholecalciferol, vitamin D3, (VITAMIN D3) 50 mcg (2,000 unit) Cap Take 1 capsule by mouth once daily.      cyanocobalamin, vitamin B-12, (VITAMIN B-12) 2,500 mcg Subl Place 1 tablet under the tongue once a week. Pt taking weekly      cyclobenzaprine (FLEXERIL) 5 MG tablet TAKE 1 TABLET BY MOUTH NIGHTLY AS NEEDED. 30 tablet 5    fexofenadine (ALLEGRA ALLERGY) 180 MG tablet Take 1 tablet (180 mg total) by mouth once daily. 30 tablet 11    fluticasone propionate (FLONASE) 50 mcg/actuation nasal spray 2 sprays by Each Nostril route once daily.      glatiramer (COPAXONE, GLATOPA)  "40 mg/mL injection INJECT 1 SYRINGE UNDER THE SKIN 3 TIMES A WEEK AT LEAST 48 HOURS APART. ALLOW TO WARM TO ROOM TEMP FOR 20 MINUTES. REFRIGERATE. 36 each 1    hydroCHLOROthiazide (HYDRODIURIL) 25 MG tablet Take 1 tablet (25 mg total) by mouth once daily. 90 tablet 3    losartan (COZAAR) 100 MG tablet TAKE 1 TABLET BY MOUTH EVERY DAY 90 tablet 3    metFORMIN (GLUCOPHAGE) 1000 MG tablet       MULTIVITAMIN ORAL Take 1 tablet by mouth once daily. Centrum adult chews ( flavor burst)      pregabalin (LYRICA) 100 MG capsule Take 1 capsule (100 mg total) by mouth 3 (three) times daily. 270 capsule 2    rosuvastatin (CRESTOR) 20 MG tablet TAKE 1 TABLET BY MOUTH EVERY DAY 90 tablet 3    semaglutide (OZEMPIC) 2 mg/dose (8 mg/3 mL) PnIj Inject 2 mg into the skin every 7 days. 3 pen 0    sodium chloride (OCEAN) 0.65 % nasal spray 1 spray by Nasal route as needed for Congestion.      celecoxib (CELEBREX) 200 MG capsule Take 1 capsule (200 mg total) by mouth 2 (two) times daily. 60 capsule 1    predniSONE (DELTASONE) 20 MG tablet Take 1 tablet (20 mg total) by mouth once daily. 5 tablet 0     No current facility-administered medications for this visit.        The patient's relevant past medical, surgical, and social history was reviewed in Epic.       Objective:      VITAL SIGNS: /82   Pulse 99   Ht 5' 7" (1.702 m)   Wt 113.4 kg (250 lb)   LMP 04/03/2019 (Approximate)   BMI 39.16 kg/m²     General    Nursing note and vitals reviewed.  Constitutional: She is oriented to person, place, and time. She appears well-developed and well-nourished.   Neurological: She is alert and oriented to person, place, and time.             Left Shoulder Exam     Tenderness   The patient is tender to palpation of the biceps tendon.    Range of Motion   Active abduction:  170   Passive abduction:  170   Forward Flexion:  180   External Rotation 0 degrees:  70   Internal rotation 0 degrees:  T6     Tests & Signs   Speed's Test: " positive    Other   Sensation: normal     Comments:  Distal biceps pain      Muscle Strength   Left Upper Extremity  Shoulder Abduction: 5/5   Shoulder Internal Rotation: 5/5   Shoulder External Rotation: 5/5   Supraspinatus: 5/5   Subscapularis: 5/5   Biceps: 4/5          Assessment:       1. Biceps tendinitis of left upper extremity          Plan:         Miesha was seen today for pain.    Diagnoses and all orders for this visit:    Biceps tendinitis of left upper extremity  -     celecoxib (CELEBREX) 200 MG capsule; Take 1 capsule (200 mg total) by mouth 2 (two) times daily.  -     Ambulatory referral/consult to Physical/Occupational Therapy; Future    Left biceps tendinitis, more distal than proximal.  Explained to the patient I am unable to give her an injection in the distal biceps but I believe she would benefit from formal physical therapy.  I will also prescribe Celebrex for pain.  If she still noting pain after 6-8 weeks of physical therapy, recommend she follow back up at the office.    Diagnoses and plan discussed with the patient, as well as the expected course and duration of his symptoms.  All questions and concerns were addressed prior to the end of the visit.   Instructed patient to call office if they have any future questions/concerns or to schedule apt. Patient will return to see me if symptoms worsen or fail to improve    Note dictated with voice recognition software, please excuse any grammatical errors.        Sarah Lim PA-C   04/24/2023

## 2023-05-01 RX ORDER — ROSUVASTATIN CALCIUM 20 MG/1
TABLET, COATED ORAL
Qty: 90 TABLET | Refills: 3 | Status: SHIPPED | OUTPATIENT
Start: 2023-05-01 | End: 2023-09-11 | Stop reason: SDUPTHER

## 2023-05-02 ENCOUNTER — PATIENT MESSAGE (OUTPATIENT)
Dept: BARIATRICS | Facility: CLINIC | Age: 58
End: 2023-05-02
Payer: COMMERCIAL

## 2023-05-03 ENCOUNTER — CLINICAL SUPPORT (OUTPATIENT)
Dept: REHABILITATION | Facility: HOSPITAL | Age: 58
End: 2023-05-03
Payer: COMMERCIAL

## 2023-05-03 ENCOUNTER — PATIENT MESSAGE (OUTPATIENT)
Dept: BARIATRICS | Facility: CLINIC | Age: 58
End: 2023-05-03
Payer: COMMERCIAL

## 2023-05-03 DIAGNOSIS — M79.602 PAIN OF LEFT UPPER EXTREMITY: ICD-10-CM

## 2023-05-03 DIAGNOSIS — R53.1 WEAKNESS: ICD-10-CM

## 2023-05-03 DIAGNOSIS — M75.22 BICEPS TENDINITIS OF LEFT UPPER EXTREMITY: ICD-10-CM

## 2023-05-03 DIAGNOSIS — M25.60 STIFFNESS IN JOINT: ICD-10-CM

## 2023-05-03 PROCEDURE — 97165 OT EVAL LOW COMPLEX 30 MIN: CPT | Mod: PN

## 2023-05-03 PROCEDURE — 97110 THERAPEUTIC EXERCISES: CPT | Mod: PN

## 2023-05-03 NOTE — PLAN OF CARE
[facility] Occupational Therapy  Initial Evaluation     Date: 5/3/2023  [id number]    Therapy Diagnosis:   Encounter Diagnoses   Name Primary?    Biceps tendinitis of left upper extremity     Pain of left upper extremity     Weakness     Stiffness in joint      Physician: Sarah Lim PA-C    Physician Orders: OT evaluate and treat  Medical Diagnosis: M75.22 (ICD-10-CM) - Biceps tendinitis of left upper extremity  Date of Injury/Onset: 2-3 months  Evaluation Date: 5/3/2023  Insurance Authorization Period Expiration: 12/31/2023  Plan of Care Expiration: 7/28/2023  Date of Return to MD: none scheduled  Visit # / Visits authorized: 1 / 20  FOTO: initial eval     Precautions:  Standard    Time In: 8:30  Time Out: 9:15  Total Appointment Time (timed & untimed codes): 45 minutes    Subjective     History of Current Condition/Mechanism of Injury: Miesha reports: that her shoulder pain started about 2-3 months ago, insidious onset with progressively worsening symptoms. She received a cortisone shot which helped some however she continues to have pain in her biceps region left shoulder which limit her.     Involved Side: Left  Dominant Side: Right  Imaging: Left glenohumeral articulation is maintained.  Amorphous density superiorly adjacent to the humerus as can be seen with sequela of hydroxyapatite deposition.  AC joint is intact with DJD.  No acute fracture, dislocation, or osseous destruction; No MRI  Prior Therapy: none  Occupation:  Drives school bus  Working presently: employed  Duties: driving, pushing, pulling    Functional Limitations/Social History:    Previous functional status includes: Independent with all ADLs.     Current Functional Status   Home/Living environment: lives with their spouse      Limitation of Functional Status as follows:   ADLs/IADLs:     - Feeding: Independent    - Bathing: Independent    - Dressing/Grooming: Independent    -  Driving: Independent     Leisure: Independent    Pain:  Functional Pain Scale Rating 0-10: Current 0/10, worst 5/10, best 0/10   Location: Left bicep area  Description: Aching  Aggravating Factors: Night Time and Lifting  Easing Factors: pain medication and injection    Patient's Goals for Therapy: to decrease pain and increase functional use    Medical History:   Past Medical History:   Diagnosis Date    Asthma     as a child    Endometrial polyp 2019    HLD (hyperlipidemia)     HTN (hypertension)     MS (multiple sclerosis)     2020    Neuropathy     S/P D&C (status post dilation and curettage) 2019    Type II or unspecified type diabetes mellitus without mention of complication, not stated as uncontrolled        Surgical History:    has a past surgical history that includes  section (); Sleeve Gastroplasty (); Colonoscopy (N/A, 2018); and Hysteroscopy with dilation and curettage of uterus (N/A, 2019).    Medications:   has a current medication list which includes the following prescription(s): azelastine, celecoxib, cholecalciferol (vitamin d3), cyanocobalamin (vitamin b-12), cyclobenzaprine, fexofenadine, fluticasone propionate, glatiramer, hydrochlorothiazide, losartan, metformin, multivitamin, prednisone, pregabalin, rosuvastatin, ozempic, and sodium chloride.    Allergies:   Review of patient's allergies indicates:  No Known Allergies     Objective     Sensation Test: Patient denies any numbness/tingling    Observation/Inspection:  rounded shoulders    Range of Motion/Strength:   Shoulder Left  Right  Pain/Dysfunction with Movement    AROM MMT AROM MMT    Flexion 130 4/5 145 5/5    Extension 60 4/5 60 5/5    Abduction 130 4/5 145 5/5    HorizAdduction 20 4/5 25 5/5    Internal rotation L4 4/5 L4 5/5    ER at 90° abd 75 4/5 90 5/5    ER at 0° abd 50 4/5 60 5/5    NT=Not tested  ROM Comments: Pain at end range    Painful Arc: none noted    Tenderness upon Palpation:     "  Positive: Bicipital Groove    Special Tests:  AC Joint Left Right   AC Joint Compression Test - -   Empty Can Test - -   Drop Arm test - -   Champagne Toast - -   Resisted External Rotation 45* Internal Roatation - -   Bear Hug - -   Laura's + -   Hawkin's Kenndy + -   Neer's Test + -   Yergason's + -   Anterior Apprehension test - -     Scapular Control/Dyskinesis:    Normal / Subtle / Obvious  Comments    Left  Normal -    Right  Normal -       CMS Impairment/Limitation/Restriction for FOTO Shoulder Survey    Therapist reviewed FOTO scores for Miesha Domínguez on 5/3/2023.   FOTO documents entered into [employer] - see Media section.    Limitation Score: 2%  Category: Self Care         Treatment     Total Treatment time (time-based codes) separate from Evaluation: 10 minutes    Miesha received therapeutic exercises for 10 minutes including:  -Shoulder flexion with the wand 5 repetitions, Sidelying Abduction 5 repetitions, Sidelying External Rotation 5 repetitions, Theraband pull downs 5 repetitions, Theraband Rows 5 repetitions, Theraband External Rotation 5 repetitions, Theraband Internal Rotation 5 repetitions, Theraband Horizontal Abduction 5 repetitions, corner pectoralis stretch 1/30", Internal rotation pulley stretch 1/30"    Patient Education and Home Exercises      Education provided:   - home exercise program     Written Home Exercises Provided: Patient instructed to cont prior HEP.  Exercises were reviewed and Miesha was able to demonstrate them prior to the end of the session.  Miesha demonstrated good  understanding of the education provided. See EMR under Patient Instructions for exercises provided during therapy sessions.     Pt was advised to perform these exercises free of pain, and to stop performing them if pain occurs.    Patient/Family Education: role of OT, goals for OT, scheduling/cancellations - pt verbalized understanding. Discussed insurance limitations with patient.    Assessment "     Miesha Domínguez is a 57 y.o. female referred to outpatient occupational therapy and presents with a medical diagnosis of Left shoulder pain.  Patient presents with the following therapy deficits: Decreased ROM, Decreased muscle strength, Increased pain, and Joint Stiffness and demonstrates limitations as described in the chart below. Following medical record review it is determined that pt will benefit from occupational therapy services in order to maximize pain free and/or functional use of left shoulder. The following goals were discussed with the patient and patient is in agreement with them as to be addressed in the treatment plan. The patient's rehab potential is Good.     Anticipated barriers to occupational therapy: none  Pt has no cultural, educational or language barriers to learning provided.    Profile and History Assessment of Occupational Performance Level of Clinical Decision Making Complexity Score   Occupational Profile:   Miesha Domínguez is a 57 y.o. female who lives with their spouse and is currently employed Miesha Domínguez has difficulty with  ADLs and IADLs as listed previously, which  Affecting herdaily functional abilities.      Comorbidities:    has a past medical history of Asthma, Endometrial polyp, HLD (hyperlipidemia), HTN (hypertension), MS (multiple sclerosis), Neuropathy, S/P D&C (status post dilation and curettage), and Type II or unspecified type diabetes mellitus without mention of complication, not stated as uncontrolled.    Medical and Therapy History Review:   Expanded               Performance Deficits    Physical:  Joint Mobility  Joint Stability  Muscle Power/Strength  Muscle Endurance  Muscle Tone  Pain    Cognitive:  No Deficits    Psychosocial:    No Deficits     Clinical Decision Making:  low    Assessment Process:  Problem-Focused Assessments    Modification/Need for Assistance:  Not Necessary    Intervention Selection:  Several Treatment Options        low  Based on PMHX, co morbidities , data from assessments and functional level of assistance required with task and clinical presentation directly impacting function.         Goals:   The following goals were discussed with the patient and patient is in agreement with them as to be addressed in the treatment plan.       Long Term Goals to be met by discharge:  1) Independent with HEP  2) Pt will demonstrate (Left) shoulder AROM WNL grossly for [place] with ADL's  3) Pt will demonstrate (Left) shoulder MMT WNL grossly for [place] with functional activities  4) Independent and pain free with ADL's and IADL's  5) Patient will be able to achieve less than or equal to 0% on FOTO shoulder survey demonstrating overall improved functional ability with upper extremity.       Plan   Plan of Care Certification: 5/3/2023 to 7/28/2023.     Pt's work schedule is very busy over the next few weeks. Plan to see her on 5/31/2023 to reassess and discuss continuation of services. Outpatient Occupational Therapy 1 times weekly for 10 weeks to include the following interventions: Manual therapy/joint mobilizations, Modalities for pain management, Therapeutic exercises/activities., Strengthening, Joint Protection, and Energy Conservation.      VIRGINIA Bender      I CERTIFY THE NEED FOR THESE SERVICES FURNISHED UNDER THIS PLAN OF TREATMENT AND WHILE UNDER MY CARE  Physician's comments:      Physician's Signature: ___________________________________________________

## 2023-05-09 ENCOUNTER — PATIENT MESSAGE (OUTPATIENT)
Dept: BARIATRICS | Facility: CLINIC | Age: 58
End: 2023-05-09
Payer: COMMERCIAL

## 2023-05-11 ENCOUNTER — OFFICE VISIT (OUTPATIENT)
Dept: PRIMARY CARE CLINIC | Facility: CLINIC | Age: 58
End: 2023-05-11
Payer: COMMERCIAL

## 2023-05-11 VITALS
WEIGHT: 250 LBS | BODY MASS INDEX: 39.24 KG/M2 | OXYGEN SATURATION: 98 % | DIASTOLIC BLOOD PRESSURE: 86 MMHG | SYSTOLIC BLOOD PRESSURE: 132 MMHG | HEART RATE: 76 BPM | HEIGHT: 67 IN

## 2023-05-11 DIAGNOSIS — E11.9 CONTROLLED TYPE 2 DIABETES MELLITUS WITHOUT COMPLICATION, WITHOUT LONG-TERM CURRENT USE OF INSULIN: ICD-10-CM

## 2023-05-11 DIAGNOSIS — G35 MULTIPLE SCLEROSIS: ICD-10-CM

## 2023-05-11 DIAGNOSIS — R09.81 SINUS CONGESTION: Primary | ICD-10-CM

## 2023-05-11 DIAGNOSIS — J04.0 LARYNGITIS: ICD-10-CM

## 2023-05-11 DIAGNOSIS — R05.1 ACUTE COUGH: ICD-10-CM

## 2023-05-11 PROBLEM — R26.89 IMPAIRMENT OF BALANCE: Status: RESOLVED | Noted: 2020-11-30 | Resolved: 2023-05-11

## 2023-05-11 PROBLEM — R00.2 HEART PALPITATIONS: Chronic | Status: RESOLVED | Noted: 2017-11-07 | Resolved: 2023-05-11

## 2023-05-11 PROBLEM — R53.1 WEAKNESS: Status: RESOLVED | Noted: 2023-05-03 | Resolved: 2023-05-11

## 2023-05-11 LAB
CTP QC/QA: YES
FLUAV AG NPH QL: NEGATIVE
FLUBV AG NPH QL: NEGATIVE
S PYO RRNA THROAT QL PROBE: NEGATIVE
SARS-COV-2 RDRP RESP QL NAA+PROBE: NEGATIVE

## 2023-05-11 PROCEDURE — 96372 PR INJECTION,THERAP/PROPH/DIAG2ST, IM OR SUBCUT: ICD-10-PCS | Mod: S$GLB,,, | Performed by: STUDENT IN AN ORGANIZED HEALTH CARE EDUCATION/TRAINING PROGRAM

## 2023-05-11 PROCEDURE — 99214 OFFICE O/P EST MOD 30 MIN: CPT | Mod: 25,S$GLB,, | Performed by: STUDENT IN AN ORGANIZED HEALTH CARE EDUCATION/TRAINING PROGRAM

## 2023-05-11 PROCEDURE — 3079F PR MOST RECENT DIASTOLIC BLOOD PRESSURE 80-89 MM HG: ICD-10-PCS | Mod: CPTII,S$GLB,, | Performed by: STUDENT IN AN ORGANIZED HEALTH CARE EDUCATION/TRAINING PROGRAM

## 2023-05-11 PROCEDURE — 3079F DIAST BP 80-89 MM HG: CPT | Mod: CPTII,S$GLB,, | Performed by: STUDENT IN AN ORGANIZED HEALTH CARE EDUCATION/TRAINING PROGRAM

## 2023-05-11 PROCEDURE — 87635 SARS-COV-2 COVID-19 AMP PRB: CPT | Mod: QW,S$GLB,, | Performed by: STUDENT IN AN ORGANIZED HEALTH CARE EDUCATION/TRAINING PROGRAM

## 2023-05-11 PROCEDURE — 1159F MED LIST DOCD IN RCRD: CPT | Mod: CPTII,S$GLB,, | Performed by: STUDENT IN AN ORGANIZED HEALTH CARE EDUCATION/TRAINING PROGRAM

## 2023-05-11 PROCEDURE — 3075F SYST BP GE 130 - 139MM HG: CPT | Mod: CPTII,S$GLB,, | Performed by: STUDENT IN AN ORGANIZED HEALTH CARE EDUCATION/TRAINING PROGRAM

## 2023-05-11 PROCEDURE — 87804 POCT INFLUENZA A/B: ICD-10-PCS | Mod: QW,S$GLB,, | Performed by: STUDENT IN AN ORGANIZED HEALTH CARE EDUCATION/TRAINING PROGRAM

## 2023-05-11 PROCEDURE — 3008F PR BODY MASS INDEX (BMI) DOCUMENTED: ICD-10-PCS | Mod: CPTII,S$GLB,, | Performed by: STUDENT IN AN ORGANIZED HEALTH CARE EDUCATION/TRAINING PROGRAM

## 2023-05-11 PROCEDURE — 96372 THER/PROPH/DIAG INJ SC/IM: CPT | Mod: S$GLB,,, | Performed by: STUDENT IN AN ORGANIZED HEALTH CARE EDUCATION/TRAINING PROGRAM

## 2023-05-11 PROCEDURE — 1159F PR MEDICATION LIST DOCUMENTED IN MEDICAL RECORD: ICD-10-PCS | Mod: CPTII,S$GLB,, | Performed by: STUDENT IN AN ORGANIZED HEALTH CARE EDUCATION/TRAINING PROGRAM

## 2023-05-11 PROCEDURE — 87880 STREP A ASSAY W/OPTIC: CPT | Mod: QW,S$GLB,, | Performed by: STUDENT IN AN ORGANIZED HEALTH CARE EDUCATION/TRAINING PROGRAM

## 2023-05-11 PROCEDURE — 99999 PR PBB SHADOW E&M-EST. PATIENT-LVL IV: CPT | Mod: PBBFAC,,, | Performed by: STUDENT IN AN ORGANIZED HEALTH CARE EDUCATION/TRAINING PROGRAM

## 2023-05-11 PROCEDURE — 4010F ACE/ARB THERAPY RXD/TAKEN: CPT | Mod: CPTII,S$GLB,, | Performed by: STUDENT IN AN ORGANIZED HEALTH CARE EDUCATION/TRAINING PROGRAM

## 2023-05-11 PROCEDURE — 99214 PR OFFICE/OUTPT VISIT, EST, LEVL IV, 30-39 MIN: ICD-10-PCS | Mod: 25,S$GLB,, | Performed by: STUDENT IN AN ORGANIZED HEALTH CARE EDUCATION/TRAINING PROGRAM

## 2023-05-11 PROCEDURE — 87804 INFLUENZA ASSAY W/OPTIC: CPT | Mod: QW,S$GLB,, | Performed by: STUDENT IN AN ORGANIZED HEALTH CARE EDUCATION/TRAINING PROGRAM

## 2023-05-11 PROCEDURE — 87635: ICD-10-PCS | Mod: QW,S$GLB,, | Performed by: STUDENT IN AN ORGANIZED HEALTH CARE EDUCATION/TRAINING PROGRAM

## 2023-05-11 PROCEDURE — 3075F PR MOST RECENT SYSTOLIC BLOOD PRESS GE 130-139MM HG: ICD-10-PCS | Mod: CPTII,S$GLB,, | Performed by: STUDENT IN AN ORGANIZED HEALTH CARE EDUCATION/TRAINING PROGRAM

## 2023-05-11 PROCEDURE — 3008F BODY MASS INDEX DOCD: CPT | Mod: CPTII,S$GLB,, | Performed by: STUDENT IN AN ORGANIZED HEALTH CARE EDUCATION/TRAINING PROGRAM

## 2023-05-11 PROCEDURE — 87880 POCT RAPID STREP A: ICD-10-PCS | Mod: QW,S$GLB,, | Performed by: STUDENT IN AN ORGANIZED HEALTH CARE EDUCATION/TRAINING PROGRAM

## 2023-05-11 PROCEDURE — 4010F PR ACE/ARB THEARPY RXD/TAKEN: ICD-10-PCS | Mod: CPTII,S$GLB,, | Performed by: STUDENT IN AN ORGANIZED HEALTH CARE EDUCATION/TRAINING PROGRAM

## 2023-05-11 PROCEDURE — 99999 PR PBB SHADOW E&M-EST. PATIENT-LVL IV: ICD-10-PCS | Mod: PBBFAC,,, | Performed by: STUDENT IN AN ORGANIZED HEALTH CARE EDUCATION/TRAINING PROGRAM

## 2023-05-11 RX ORDER — AZITHROMYCIN 250 MG/1
TABLET, FILM COATED ORAL
Qty: 6 TABLET | Refills: 0 | Status: SHIPPED | OUTPATIENT
Start: 2023-05-11 | End: 2023-11-02 | Stop reason: ALTCHOICE

## 2023-05-11 NOTE — PROGRESS NOTES
Two patient identifiers verified.  Allergies reviewed.  Solu-Medrol 125 mg IM administered to right gluteal per MD order.  Patient tolerated injection well; no redness, bleeding, or bruising noted to injection site.  Patient instructed to remain in clinic setting for 15 minutes.  Verbalizes understanding.

## 2023-05-11 NOTE — PROGRESS NOTES
Miesha Domínguez  1965        Subjective     Chief Complaint: URI    History of Present Illness:  Ms. Miesha Domínguez is a 57 y.o. female who presents to clinic for URI and hoarseness.  Last few days.  Reports was at home and her father had an upper respiratory infection.  Unsure if he had COVID or the flu.      She lost her voice back in March last time she an upper respiratory infection.      She adamantly denies any shortness of breath, trouble breathing, issues with swallowing.  Mild cough.  No fevers or chills.  Has been checking her sugars at home, reports running in the 80s.      Denies any weakness or MS flare-type symptoms.  On glatiramer. Saw neuro 3/2023.     Other than losing her voice, she is not having trouble speaking.    Lab Results   Component Value Date    HGBA1C 5.5 10/08/2022      Review of Systems   Constitutional:  Positive for malaise/fatigue. Negative for chills and fever.   HENT:  Positive for congestion, ear pain, sinus pain and sore throat.    Respiratory:  Positive for cough and sputum production. Negative for shortness of breath, wheezing and stridor.    Cardiovascular:  Negative for leg swelling.   Gastrointestinal:  Negative for abdominal pain, diarrhea, nausea and vomiting.   Musculoskeletal:  Negative for myalgias.   Neurological:  Negative for sensory change, speech change, focal weakness, weakness and headaches.      PAST HISTORY:     Past Medical History:   Diagnosis Date    Asthma     as a child    Endometrial polyp 2019    HLD (hyperlipidemia)     HTN (hypertension)     MS (multiple sclerosis)     2020    Neuropathy     S/P D&C (status post dilation and curettage) 2019    Type II or unspecified type diabetes mellitus without mention of complication, not stated as uncontrolled        Past Surgical History:   Procedure Laterality Date     SECTION      COLONOSCOPY N/A 2018    Procedure: COLONOSCOPY;  Surgeon: Ramez Ramírez MD;  Location: [facility]  ENDO (4TH FLR);  Service: Endoscopy;  Laterality: N/A;    HYSTEROSCOPY WITH DILATION AND CURETTAGE OF UTERUS N/A 2019    Procedure: SCOPEIFCJPEA-KNSLMUAZ-ISFOXEEYH;  Surgeon: Catrina Olivier MD;  Location: [facility];  Service: OB/GYN;  Laterality: N/A;    SLEEVE GASTROPLASTY         Family History   Problem Relation Age of Onset    Hypertension Mother     Diabetes Mother     Ovarian cancer Mother 74    Cancer Father         d.64 lung -smoker    Diabetes Sister 71        1/2 sister- same Mom    Hypertension Sister     Hypertension Brother         1/2 brother - same Mom    Diabetes Brother 73    No Known Problems Daughter          1991    Colon cancer Other 46    Breast cancer Neg Hx     Stroke Neg Hx     Heart attack Neg Hx     Heart disease Neg Hx     Heart failure Neg Hx     Hyperlipidemia Neg Hx        Social History     Socioeconomic History    Marital status:      Spouse name: himanshu    Number of children: 1   Occupational History    Occupation:      Employer: [employer]   Tobacco Use    Smoking status: Never     Passive exposure: Never    Smokeless tobacco: Never   Substance and Sexual Activity    Alcohol use: Not Currently     Comment: occassional for Hollidays    Drug use: No    Sexual activity: Yes     Partners: Male     Birth control/protection: None     Comment:  x 31 years since : Spouse : Himanshu   Social History Narrative    SOCIAL HISTORY:     , spouse is in good health, .     for [employer],    Daughter,  @[employer], major [employer]; working @ [employer]    + exercise, walk, elliptical, Rebecca     Social Determinants of Health     Financial Resource Strain: Low Risk     Difficulty of Paying Living Expenses: Not hard at all   Food Insecurity: No Food Insecurity    Worried About Running Out of Food in the Last Year: Never true    Ran Out of Food in the Last Year: Never true   Transportation Needs: No  Transportation Needs    Lack of Transportation (Medical): No    Lack of Transportation (Non-Medical): No   Physical Activity: Insufficiently Active    Days of Exercise per Week: 3 days    Minutes of Exercise per Session: 30 min   Stress: No Stress Concern Present    Feeling of Stress : Not at all   Social Connections: Unknown    Frequency of Communication with Friends and Family: More than three times a week    Frequency of Social Gatherings with Friends and Family: Three times a week    Active Member of Clubs or Organizations: No    Attends Club or Organization Meetings: Never    Marital Status:    Housing Stability: Low Risk     Unable to Pay for Housing in the Last Year: No    Number of Places Lived in the Last Year: 1    Unstable Housing in the Last Year: No       MEDICATIONS & ALLERGIES:     Current Outpatient Medications on File Prior to Visit   Medication Sig    azelastine (ASTELIN) 137 mcg (0.1 %) nasal spray 1 spray (137 mcg total) by Nasal route 2 (two) times daily.    celecoxib (CELEBREX) 200 MG capsule Take 1 capsule (200 mg total) by mouth 2 (two) times daily.    cholecalciferol, vitamin D3, (VITAMIN D3) 50 mcg (2,000 unit) Cap Take 1 capsule by mouth once daily.    cyanocobalamin, vitamin B-12, (VITAMIN B-12) 2,500 mcg Subl Place 1 tablet under the tongue once a week. Pt taking weekly    cyclobenzaprine (FLEXERIL) 5 MG tablet TAKE 1 TABLET BY MOUTH NIGHTLY AS NEEDED.    fexofenadine (ALLEGRA ALLERGY) 180 MG tablet Take 1 tablet (180 mg total) by mouth once daily.    fluticasone propionate (FLONASE) 50 mcg/actuation nasal spray 2 sprays by Each Nostril route once daily.    glatiramer (COPAXONE, GLATOPA) 40 mg/mL injection INJECT 1 SYRINGE UNDER THE SKIN 3 TIMES A WEEK AT LEAST 48 HOURS APART. ALLOW TO WARM TO ROOM TEMP FOR 20 MINUTES. REFRIGERATE.    hydroCHLOROthiazide (HYDRODIURIL) 25 MG tablet Take 1 tablet (25 mg total) by mouth once daily.    losartan (COZAAR) 100 MG tablet TAKE 1 TABLET BY  "MOUTH EVERY DAY    metFORMIN (GLUCOPHAGE) 1000 MG tablet     MULTIVITAMIN ORAL Take 1 tablet by mouth once daily. Centrum adult chews ( flavor burst)    pregabalin (LYRICA) 100 MG capsule Take 1 capsule (100 mg total) by mouth 3 (three) times daily.    rosuvastatin (CRESTOR) 20 MG tablet TAKE 1 TABLET BY MOUTH EVERY DAY    semaglutide (OZEMPIC) 2 mg/dose (8 mg/3 mL) PnIj INJECT 2 MG SUBCUTANEOUSLY EVERY 7 DAYS    sodium chloride (OCEAN) 0.65 % nasal spray 1 spray by Nasal route as needed for Congestion.    [DISCONTINUED] predniSONE (DELTASONE) 20 MG tablet Take 1 tablet (20 mg total) by mouth once daily.     No current facility-administered medications on file prior to visit.       Review of patient's allergies indicates:  No Known Allergies    OBJECTIVE:     Vital Signs:  Vitals:    05/11/23 1051   BP: 132/86   BP Location: Left arm   Patient Position: Sitting   BP Method: Large (Manual)   Pulse: 76   SpO2: 98%   Weight: 113.4 kg (250 lb)   Height: 5' 7" (1.702 m)       Body mass index is 39.16 kg/m².     Physical Exam:  Physical Exam  Vitals and nursing note reviewed. Exam conducted with a chaperone present.   Constitutional:       General: She is not in acute distress.     Appearance: Normal appearance. She is not ill-appearing, toxic-appearing or diaphoretic.      Comments: No stridor, voice is hoarse. No resp distress   HENT:      Head: Normocephalic and atraumatic.      Right Ear: Tympanic membrane, ear canal and external ear normal.      Left Ear: Tympanic membrane, ear canal and external ear normal.      Nose: Congestion present.      Mouth/Throat:      Mouth: Mucous membranes are moist.      Pharynx: No oropharyngeal exudate or posterior oropharyngeal erythema.   Eyes:      General: No scleral icterus.  Cardiovascular:      Rate and Rhythm: Normal rate and regular rhythm.      Heart sounds: No murmur heard.  Pulmonary:      Effort: Pulmonary effort is normal. No respiratory distress.      Breath sounds: " Normal breath sounds. No stridor. No wheezing, rhonchi or rales.   Musculoskeletal:      Right lower leg: No edema.      Left lower leg: No edema.   Skin:     General: Skin is warm and dry.   Neurological:      Mental Status: She is alert and oriented to person, place, and time.   Psychiatric:         Mood and Affect: Mood and affect normal.         Behavior: Behavior normal.          Laboratory  Lab Results   Component Value Date    WBC 12.60 03/09/2023    HGB 12.7 03/09/2023    HCT 40.6 03/09/2023    MCV 84 03/09/2023     03/09/2023     Lab Results   Component Value Date    GLU 84 03/09/2023     (H) 03/09/2023    K 3.7 03/09/2023     03/09/2023    CO2 31 (H) 03/09/2023    BUN 19 03/09/2023    CREATININE 0.8 03/09/2023    CALCIUM 9.5 03/09/2023    MG 2.0 05/22/2020     Lab Results   Component Value Date    INR 0.9 07/08/2013     Lab Results   Component Value Date    HGBA1C 5.5 10/08/2022           Health Maintenance         Date Due Completion Date    Shingles Vaccine (1 of 2) Never done ---    Pneumococcal Vaccines (Age 0-64) (2 - PCV) 06/29/2017 6/29/2016    Hemoglobin A1c 04/08/2023 10/8/2022    Mammogram 09/07/2023 9/7/2022    TETANUS VACCINE 09/20/2023 9/20/2013    Lipid Panel 10/08/2023 10/8/2022    Diabetes Urine Screening 10/22/2023 10/22/2022    Eye Exam 01/31/2024 1/31/2023    Override on 4/1/2017: Done ([facility], ?? dilated exam)    Foot Exam 03/28/2024 3/28/2023 (Done)    Override on 3/28/2023: Done    Override on 3/29/2022: Done    Override on 3/5/2021: Done    Override on 2/24/2020: Done    Override on 8/28/2017: Done    Override on 9/8/2015: Done    Low Dose Statin 05/01/2024 5/1/2023    Cervical Cancer Screening 08/02/2027 8/2/2022    Colorectal Cancer Screening 07/06/2028 7/6/2018              ASSESSMENT & PLAN:   Ms. Miesha Domínguez is a 57 y.o. female who was seen today in clinic for URI.      1. Sinus congestion  -     POCT rapid strep A  -     POCT Influenza A/B  -      POCT COVID-19 Rapid Screening; Future; Expected date: 05/11/2023  -     methylPREDNISolone sod suc(PF) injection 125 mg  -     azithromycin (Z-ALHAJI) 250 MG tablet; Take 2 tablets by mouth on day 1; Take 1 tablet by mouth on days 2-5  Dispense: 6 tablet; Refill: 0    2. Laryngitis  -     POCT rapid strep A  -     POCT Influenza A/B  -     POCT COVID-19 Rapid Screening; Future; Expected date: 05/11/2023  -     methylPREDNISolone sod suc(PF) injection 125 mg  -     azithromycin (Z-ALHAJI) 250 MG tablet; Take 2 tablets by mouth on day 1; Take 1 tablet by mouth on days 2-5  Dispense: 6 tablet; Refill: 0    3. Acute cough  -     POCT rapid strep A  -     POCT Influenza A/B  -     POCT COVID-19 Rapid Screening; Future; Expected date: 05/11/2023  -     methylPREDNISolone sod suc(PF) injection 125 mg  -     azithromycin (Z-ALHAJI) 250 MG tablet; Take 2 tablets by mouth on day 1; Take 1 tablet by mouth on days 2-5  Dispense: 6 tablet; Refill: 0    4. Multiple sclerosis    5. Controlled type 2 diabetes mellitus without complication, without long-term current use of insulin      Per three online drug interaction checkers, no issues with Zpck and glatiramer . Understands need to go to ED if trouble breathing or SOB. Or if trouble speaking. Or if MS flare starting, muscle weakness develops. Explained to daughter who is here with her as well. Both in agreement with plan.  Given her medical history and hoarseness, will administer steroid shot today in clinic.  Understands may cause transient increase in blood pressure and sugars.  Continue checking both at home.    Lianne Marquez MD  Internal Medicine

## 2023-05-29 ENCOUNTER — OFFICE VISIT (OUTPATIENT)
Dept: BARIATRICS | Facility: CLINIC | Age: 58
End: 2023-05-29
Payer: COMMERCIAL

## 2023-05-29 VITALS
DIASTOLIC BLOOD PRESSURE: 72 MMHG | HEART RATE: 97 BPM | SYSTOLIC BLOOD PRESSURE: 148 MMHG | BODY MASS INDEX: 38.98 KG/M2 | OXYGEN SATURATION: 98 % | WEIGHT: 248.88 LBS

## 2023-05-29 DIAGNOSIS — E66.01 CLASS 2 SEVERE OBESITY WITH BODY MASS INDEX (BMI) OF 35 TO 39.9 WITH SERIOUS COMORBIDITY: Primary | ICD-10-CM

## 2023-05-29 DIAGNOSIS — E11.9 CONTROLLED TYPE 2 DIABETES MELLITUS WITHOUT COMPLICATION, WITHOUT LONG-TERM CURRENT USE OF INSULIN: ICD-10-CM

## 2023-05-29 DIAGNOSIS — Z98.84 S/P LAPAROSCOPIC SLEEVE GASTRECTOMY: ICD-10-CM

## 2023-05-29 PROCEDURE — 99999 PR PBB SHADOW E&M-EST. PATIENT-LVL IV: CPT | Mod: PBBFAC,,, | Performed by: INTERNAL MEDICINE

## 2023-05-29 PROCEDURE — 99999 PR PBB SHADOW E&M-EST. PATIENT-LVL IV: ICD-10-PCS | Mod: PBBFAC,,, | Performed by: INTERNAL MEDICINE

## 2023-05-29 PROCEDURE — 1159F MED LIST DOCD IN RCRD: CPT | Mod: CPTII,S$GLB,, | Performed by: INTERNAL MEDICINE

## 2023-05-29 PROCEDURE — 99214 OFFICE O/P EST MOD 30 MIN: CPT | Mod: S$GLB,,, | Performed by: INTERNAL MEDICINE

## 2023-05-29 PROCEDURE — 99214 PR OFFICE/OUTPT VISIT, EST, LEVL IV, 30-39 MIN: ICD-10-PCS | Mod: S$GLB,,, | Performed by: INTERNAL MEDICINE

## 2023-05-29 PROCEDURE — 3078F DIAST BP <80 MM HG: CPT | Mod: CPTII,S$GLB,, | Performed by: INTERNAL MEDICINE

## 2023-05-29 PROCEDURE — 3077F SYST BP >= 140 MM HG: CPT | Mod: CPTII,S$GLB,, | Performed by: INTERNAL MEDICINE

## 2023-05-29 PROCEDURE — 1160F RVW MEDS BY RX/DR IN RCRD: CPT | Mod: CPTII,S$GLB,, | Performed by: INTERNAL MEDICINE

## 2023-05-29 PROCEDURE — 4010F PR ACE/ARB THEARPY RXD/TAKEN: ICD-10-PCS | Mod: CPTII,S$GLB,, | Performed by: INTERNAL MEDICINE

## 2023-05-29 PROCEDURE — 3078F PR MOST RECENT DIASTOLIC BLOOD PRESSURE < 80 MM HG: ICD-10-PCS | Mod: CPTII,S$GLB,, | Performed by: INTERNAL MEDICINE

## 2023-05-29 PROCEDURE — 4010F ACE/ARB THERAPY RXD/TAKEN: CPT | Mod: CPTII,S$GLB,, | Performed by: INTERNAL MEDICINE

## 2023-05-29 PROCEDURE — 3008F BODY MASS INDEX DOCD: CPT | Mod: CPTII,S$GLB,, | Performed by: INTERNAL MEDICINE

## 2023-05-29 PROCEDURE — 1160F PR REVIEW ALL MEDS BY PRESCRIBER/CLIN PHARMACIST DOCUMENTED: ICD-10-PCS | Mod: CPTII,S$GLB,, | Performed by: INTERNAL MEDICINE

## 2023-05-29 PROCEDURE — 1159F PR MEDICATION LIST DOCUMENTED IN MEDICAL RECORD: ICD-10-PCS | Mod: CPTII,S$GLB,, | Performed by: INTERNAL MEDICINE

## 2023-05-29 PROCEDURE — 3077F PR MOST RECENT SYSTOLIC BLOOD PRESSURE >= 140 MM HG: ICD-10-PCS | Mod: CPTII,S$GLB,, | Performed by: INTERNAL MEDICINE

## 2023-05-29 PROCEDURE — 3008F PR BODY MASS INDEX (BMI) DOCUMENTED: ICD-10-PCS | Mod: CPTII,S$GLB,, | Performed by: INTERNAL MEDICINE

## 2023-05-29 RX ORDER — DIETHYLPROPION HYDROCHLORIDE 75 MG/1
75 TABLET, EXTENDED RELEASE ORAL DAILY
Qty: 30 TABLET | Refills: 2 | Status: SHIPPED | OUTPATIENT
Start: 2023-05-29 | End: 2023-12-12

## 2023-05-29 RX ORDER — SEMAGLUTIDE 2.68 MG/ML
INJECTION, SOLUTION SUBCUTANEOUS
Qty: 9 ML | Refills: 1 | Status: SHIPPED | OUTPATIENT
Start: 2023-05-29 | End: 2023-12-12

## 2023-05-29 NOTE — PATIENT INSTRUCTIONS
Patient warned of common side effects of diethylpropion including anxiety, insomnia, palpitations and increased blood pressure. It was also explained that it is for short-term usage along with diet and exercise, and that stopping the medication without making lifestyle changes will result in regain of weight. Patient states understanding.    Weight loss medications are controlled substances.  They require routine follow up. Prescription or pills that are lost or destroyed will not be replaced.     Check the website www.Giftly for price comparisons and discounts on medications.       Start Ozempic 2mg  once a week.     Decrease portions as soon as you start Ozempic. Avoid fried, greasy, fatty foods.     Some nausea in the first 2 weeks is not unusual.     If you get pain across the upper abdomen and around to your back, please call the office.       Www.BlackJet for coupon/videos.         - To lose weight you want to cut 100% starchy carbohydrates out of your diet (bread, rice, pasta, potatoes, granola, flour, corn, peas, oatmeal, grits, tortillas, crackers, chips) and get  grams of protein.  Aim for 100 grams of protein daily.    - No soda, sweet tea, juices, sports drinks or lemonade     -Exercise daily. Continue. Add some type of resistance training 2-3 days a week. These can be body weight exercises, light weight or elastic bands. [employer] and [employer] are great sources for free work out plans and videos.         Decrease portions as soon as you start Ozempic. Some nausea in the first 2 weeks is not unusual.     If you get pain across the upper abdomen and around to your back, please call the office.      January 28, 2019  Hot Spinach and Artichoke Dip (Recipe)  BY RODRIGUE ARRIETA, ANDREW, CSSD    This creamy spinach dip can double as a protein-rich, gluten-free side dish, game day appetizer or parade route snack.  Calories 85 calories    Fat 3 grams saturated fat    Prep Time 5 minutes  Cook Time10  minutes  Yield Serves 5-10 people  Ingredients  1/2 cup onion, finely chopped  3 (10 ounce) packs of frozen chopped spinach, thawed and squeezed dry  1 (8 ounce) package reduced-fat cream cheese  1 (8 ounce) carton fat-free plain Greek yogurt  1/2 cup Parmesan cheese, grated  1 (14 ounce) can artichoke hearts  1/4 teaspoon salt (optional)  1/4 teaspoon black pepper  1/8 teaspoon crushed red pepper flakes  Instructions  Lightly coat a skillet with cooking spray.  Cook and stir onion over medium heat until transparent (about 5 minutes).  Add spinach. Cook until thoroughly heated (about 1-2 minutes).  Reduce heat and add cream cheese. Stir until melted and smooth.  Stir in Greek yogurt, Parmesan cheese and artichokes. Remove from heat.  Season with salt (optional) and black and red pepper.  Serve with raw vegetables.                          January 31, 2019  Pizza Cups (Recipe)    Trying to eat better but craving pizza? These protein-packed pizza cups will do the trick.    Calories 65 calories per cup  Fat 2.7 grams per cup  Prep Time5 minutes  Cook Time 25 minutes  Yield 12 pizza cups  Ingredients  1 ½ cups liquid egg whites  2 large eggs  1 cup fat free or low moisture shredded mozzarella cheese  37 turkey pepperonis (25 chopped and 12 sliced)  ¼ cup Parmesan cheese  ¼ tsp oregano  ¼ tsp black pepper  Instructions  Preheat oven to 350 degrees Fahrenheit.  Chop up 25 turkey pepperonis into small pieces. In a bowl, combine all ingredients except the remaining 12 sliced turkey pepperoni.  Spray a muffin pan with nonstick spray.  Place a whole sliced turkey pepperoni in the bottom of each of the 12 muffin molds.  Pour or spoon in the mixture in your bowl into each muffin mold evenly ¾ full.  Bake in the oven for 20-25 minutes. Place a toothpick in the center of the pizza cup to ensure all liquid egg has cooked. For a crispier pizza cup, leave in the oven a little longer.  Let cool for a few minutes before serving.  Enjoy!

## 2023-05-29 NOTE — PROGRESS NOTES
Subjective:       Patient ID: Miesha Domínguez is a 57 y.o. female.    Chief Complaint: Follow-up      Pt here today for follow-up.   Has lost 1 lbs since last in the office  net pos 8 lbs.  Switched to Mounjaro last OV, but pt did not get it or start it. Was switched back to Ozempic 2 mg.  She has been exercising. Is using Orgain protein powder.  She has continued to incorporate more resistance training. Has been losing some inches.    Only SE was constipation at first. Used miralax with good relief.  Having trouble planning her meals 2/2 to calcium oxalate in urine.     BP is good today.  Had calcium oxalate in urine.   BS had been in 70-80s fasting.  She is s/p lap gastric sleeve - 7/29/13.  Neurology  stopped topiramate. Being worked up with neurology and dx with MS. Neuro notes reviewed. Pt did send in a message about starting it again.   diehtylpropion last filled 2/20/22.   checked today     BP and labs look good.     Lab Results   Component Value Date    HGBA1C 5.5 10/08/2022    HGBA1C 5.4 03/22/2022    HGBA1C 5.5 09/20/2021    HGBA1C 5.5 09/20/2021     Lab Results   Component Value Date    LDLCALC 62.2 (L) 10/08/2022    CREATININE 0.8 03/09/2023           Follow-up  Associated symptoms include myalgias. Pertinent negatives include + arthralgias, chest pain, chills or fever.     Review of Systems   Constitutional: Negative for chills and fever.   Respiratory: Negative for shortness of breath.         Denies Snoring   Cardiovascular: Negative for chest pain and leg swelling.   Gastrointestinal: Negative for constipation and diarrhea.        Denies GERD   Genitourinary: Positive for menstrual problem. Negative for difficulty urinating.        Recent  bleeding. bx with polyps.    Musculoskeletal: Positive for myalgias. Negative for arthralgias and back pain.   Neurological: Negative for dizziness and light-headedness.   Psychiatric/Behavioral: Negative for dysphoric mood. The patient is not  nervous/anxious.        Objective:     BP (!) 148/72 (BP Location: Left arm, Patient Position: Sitting)   Pulse 97   Wt 112.9 kg (248 lb 14.4 oz)   LMP 04/03/2019 (Approximate)   SpO2 98%   BMI 38.98 kg/m²     Physical Exam  Vitals signs reviewed.   Constitutional:       General: She is not in acute distress.     Appearance: She is well-developed.      Comments: Obese   HENT:      Head: Normocephalic and atraumatic.   Eyes:      General: No scleral icterus.     Pupils: Pupils are equal, round, and reactive to light.   Neck:      Musculoskeletal: Normal range of motion and neck supple.   Cardiovascular:      Rate and Rhythm: Normal rate.   Pulmonary:      Effort: Pulmonary effort is normal.   Musculoskeletal: Normal range of motion.   Skin:     General: Skin is warm and dry.      Findings: No erythema.   Neurological:      Mental Status: She is alert and oriented to person, place, and time.      Cranial Nerves: No cranial nerve deficit.   Psychiatric:         Behavior: Behavior normal.         Judgment: Judgment normal.         Assessment:       1. Class 2 severe obesity with body mass index (BMI) of 35 to 39.9 with serious comorbidity    2. Controlled type 2 diabetes mellitus without complication, without long-term current use of insulin    3. S/P laparoscopic sleeve gastrectomy              Plan:           Miesha was seen today for follow-up.    Diagnoses and all orders for this visit:    Class 2 severe obesity with body mass index (BMI) of 35 to 39.9 with serious comorbidity  -     diethylpropion (TENUATE) 75 mg SR tablet; Take 1 tablet (75 mg total) by mouth once daily.    Controlled type 2 diabetes mellitus without complication, without long-term current use of insulin  -     semaglutide (OZEMPIC) 2 mg/dose (8 mg/3 mL) PnIj; INJECT 2 MG SUBCUTANEOUSLY EVERY 7 DAYS    S/P laparoscopic sleeve gastrectomy        Patient warned of common side effects of diethylpropion including anxiety, insomnia, palpitations  and increased blood pressure. It was also explained that it is for short-term usage along with diet and exercise, and that stopping the medication without making lifestyle changes will result in regain of weight. Patient states understanding.    Weight loss medications are controlled substances.  They require routine follow up. Prescription or pills that are lost or destroyed will not be replaced.          Start Ozempic 2mg  once a week.     Decrease portions as soon as you start Ozempic. Avoid fried, greasy, fatty foods.     Some nausea in the first 2 weeks is not unusual.     If you get pain across the upper abdomen and around to your back, please call the office.       Www.[employer]LIVE for coupon/videos.         - To lose weight you want to cut 100% starchy carbohydrates out of your diet (bread, rice, pasta, potatoes, granola, flour, corn, peas, oatmeal, grits, tortillas, crackers, chips) and get  grams of protein.  Aim for 100 grams of protein daily.    - No soda, sweet tea, juices, sports drinks or lemonade     -Exercise daily. Continue. Add some type of resistance training 2-3 days a week. These can be body weight exercises, light weight or elastic bands. [employer] and [employer] are great sources for free work out plans and videos.         Decrease portions as soon as you start Ozempic. Some nausea in the first 2 weeks is not unusual.     If you get pain across the upper abdomen and around to your back, please call the office.        Gilma leiva recipes given.

## 2023-05-30 ENCOUNTER — PATIENT MESSAGE (OUTPATIENT)
Dept: BARIATRICS | Facility: CLINIC | Age: 58
End: 2023-05-30
Payer: COMMERCIAL

## 2023-05-30 NOTE — TELEPHONE ENCOUNTER
Vickie at [facility] pharmacy stated rx Ozempic and rx Diethylpropion are on hold and were never picked up. Both medication Cancellations are confirmed .    Called in both prescriptions at preferred pharmacy .

## 2023-06-07 ENCOUNTER — PATIENT MESSAGE (OUTPATIENT)
Dept: BARIATRICS | Facility: CLINIC | Age: 58
End: 2023-06-07
Payer: COMMERCIAL

## 2023-06-13 ENCOUNTER — PATIENT MESSAGE (OUTPATIENT)
Dept: BARIATRICS | Facility: CLINIC | Age: 58
End: 2023-06-13
Payer: COMMERCIAL

## 2023-06-19 ENCOUNTER — OCCUPATIONAL HEALTH (OUTPATIENT)
Dept: URGENT CARE | Facility: CLINIC | Age: 58
End: 2023-06-19
Payer: COMMERCIAL

## 2023-06-19 DIAGNOSIS — Z02.89 ENCOUNTER FOR EXAMINATION REQUIRED BY DEPARTMENT OF TRANSPORTATION (DOT): Primary | ICD-10-CM

## 2023-06-19 PROCEDURE — 99499 UNLISTED E&M SERVICE: CPT | Mod: S$GLB,,, | Performed by: NURSE PRACTITIONER

## 2023-06-19 PROCEDURE — 99499 PHYSICAL, RECERT DOT/CDL: ICD-10-PCS | Mod: S$GLB,,, | Performed by: NURSE PRACTITIONER

## 2023-07-18 DIAGNOSIS — M79.641 RIGHT HAND PAIN: Primary | ICD-10-CM

## 2023-07-19 ENCOUNTER — OFFICE VISIT (OUTPATIENT)
Dept: ORTHOPEDICS | Facility: CLINIC | Age: 58
End: 2023-07-19
Payer: COMMERCIAL

## 2023-07-19 ENCOUNTER — HOSPITAL ENCOUNTER (OUTPATIENT)
Dept: RADIOLOGY | Facility: OTHER | Age: 58
Discharge: HOME | End: 2023-07-19
Attending: ORTHOPAEDIC SURGERY
Payer: COMMERCIAL

## 2023-07-19 DIAGNOSIS — G56.01 RIGHT CARPAL TUNNEL SYNDROME: ICD-10-CM

## 2023-07-19 DIAGNOSIS — M19.041 PRIMARY OSTEOARTHRITIS OF RIGHT HAND: ICD-10-CM

## 2023-07-19 DIAGNOSIS — M65.30 STENOSING TENOSYNOVITIS OF FINGER: Primary | ICD-10-CM

## 2023-07-19 DIAGNOSIS — M79.641 RIGHT HAND PAIN: ICD-10-CM

## 2023-07-19 PROCEDURE — 1160F PR REVIEW ALL MEDS BY PRESCRIBER/CLIN PHARMACIST DOCUMENTED: ICD-10-PCS | Mod: CPTII,S$GLB,, | Performed by: ORTHOPAEDIC SURGERY

## 2023-07-19 PROCEDURE — 20550 NJX 1 TENDON SHEATH/LIGAMENT: CPT | Mod: RT,S$GLB,, | Performed by: ORTHOPAEDIC SURGERY

## 2023-07-19 PROCEDURE — 73130 X-RAY EXAM OF HAND: CPT | Mod: TC,FY,RT

## 2023-07-19 PROCEDURE — 99999 PR PBB SHADOW E&M-EST. PATIENT-LVL III: CPT | Mod: PBBFAC,,, | Performed by: ORTHOPAEDIC SURGERY

## 2023-07-19 PROCEDURE — 1159F PR MEDICATION LIST DOCUMENTED IN MEDICAL RECORD: ICD-10-PCS | Mod: CPTII,S$GLB,, | Performed by: ORTHOPAEDIC SURGERY

## 2023-07-19 PROCEDURE — 99999 PR PBB SHADOW E&M-EST. PATIENT-LVL III: ICD-10-PCS | Mod: PBBFAC,,, | Performed by: ORTHOPAEDIC SURGERY

## 2023-07-19 PROCEDURE — 99214 PR OFFICE/OUTPT VISIT, EST, LEVL IV, 30-39 MIN: ICD-10-PCS | Mod: 25,S$GLB,, | Performed by: ORTHOPAEDIC SURGERY

## 2023-07-19 PROCEDURE — 1160F RVW MEDS BY RX/DR IN RCRD: CPT | Mod: CPTII,S$GLB,, | Performed by: ORTHOPAEDIC SURGERY

## 2023-07-19 PROCEDURE — 1159F MED LIST DOCD IN RCRD: CPT | Mod: CPTII,S$GLB,, | Performed by: ORTHOPAEDIC SURGERY

## 2023-07-19 PROCEDURE — 4010F PR ACE/ARB THEARPY RXD/TAKEN: ICD-10-PCS | Mod: CPTII,S$GLB,, | Performed by: ORTHOPAEDIC SURGERY

## 2023-07-19 PROCEDURE — 99214 OFFICE O/P EST MOD 30 MIN: CPT | Mod: 25,S$GLB,, | Performed by: ORTHOPAEDIC SURGERY

## 2023-07-19 PROCEDURE — 4010F ACE/ARB THERAPY RXD/TAKEN: CPT | Mod: CPTII,S$GLB,, | Performed by: ORTHOPAEDIC SURGERY

## 2023-07-19 PROCEDURE — 73130 XR HAND COMPLETE 3 VIEW RIGHT: ICD-10-PCS | Mod: 26,RT,, | Performed by: RADIOLOGY

## 2023-07-19 PROCEDURE — 20550 TENDON SHEATH: ICD-10-PCS | Mod: RT,S$GLB,, | Performed by: ORTHOPAEDIC SURGERY

## 2023-07-19 PROCEDURE — 73130 X-RAY EXAM OF HAND: CPT | Mod: 26,RT,, | Performed by: RADIOLOGY

## 2023-07-19 RX ADMIN — METHYLPREDNISOLONE ACETATE 40 MG: 40 INJECTION, SUSPENSION INTRA-ARTICULAR; INTRALESIONAL; INTRAMUSCULAR; SOFT TISSUE at 08:07

## 2023-07-19 NOTE — PROGRESS NOTES
Miesha Domínguez presents for follow up evaluation of   Encounter Diagnoses   Name Primary?    Right carpal tunnel syndrome     Primary osteoarthritis of right hand     Stenosing tenosynovitis of finger Yes     She is here today following up from 7.6.22 right long trigger finger csi. She had immediate relief following last injection and wishes to have another. She denies any pain but notes stiffness with decreased ROM in the index finger for some time. She also has new c/o paresthesias in her thumb and index finger. She is a Type 2 DM, Last HbA1c was 5.5. Denies any peripheral neuropathy. Recently had her Gabapentin increased but without relief. She does wear a splint at night which provides relief. She is a  but does state she in on the computer a lot and notices these symptoms during this time.     PE:    AA&O x 4.  NAD  HEENT:  NCAT, sclera nonicteric  Lungs:  Respirations are equal and unlabored.  CV:  2+ bilateral upper and lower extremity pulses.  MSK:  Neurovascularly intact bilaterally.  5/5 thenar and intrinsic musculature strength.  Full range of motion hands, wrists and elbows. + Tinels, Neg Phalens at Right wrist. Right long trigger finger fullness of flexor sheath, decreased ROM, lacks 1 cm flexion from palm.    X-rays AP, lateral and oblique right hand taken today are independently reviewed by me and shows Eaton stage II basilar thumb arthritis and IP joint space narrowing.     A/P: Right long finger stenosing flexor tenosynovitis, right carpal tunnel syndrome, right hand osteoarthritis  1) We have discussed the natural history of stenosing flexor tenosynovitis, carpal tunnel and arthritis including treatment options such as splinting, oral and topical anti-inflammatories, cortisone injections and surgery. I have ordered an EMG/NCS to assess the severity of her carpal tunnel.    2) Continue to wear splint at night    3) -I have offered her a selective injection. I have explained the risks,  benefits, and alternatives of the procedure in detail.  The patient voices understanding and all questions have been answered. The patient agrees to proceed as planned. So after a sterile prep of the skin in the normal fashion the right long finger flexor sheath was injected using a 25 gauge needle with a combination of 1cc 1% plain lidocaine and 40 mg of methylprednisolone.  The patient is cautioned and immediate relief of pain is secondary to the local anesthetic and will be temporary.  After the anesthetic wears off there may be a increase in pain that may last for a few hours or a few days and they should use ice to help alleviate this flair up of pain. Patient tolerated the procedure well.    - F/U after EMG/NCS    3) Call with any questions/concerns in the interim        Claire Richardson MD    Please be aware that this note has been generated with the assistance of MModal voice-to-text.  Please excuse any spelling or grammatical errors.

## 2023-07-20 RX ORDER — METFORMIN HYDROCHLORIDE 1000 MG/1
TABLET ORAL
Qty: 45 TABLET | Refills: 7 | Status: SHIPPED | OUTPATIENT
Start: 2023-07-20 | End: 2023-09-11 | Stop reason: SDUPTHER

## 2023-07-21 ENCOUNTER — PATIENT MESSAGE (OUTPATIENT)
Dept: PSYCHIATRY | Facility: CLINIC | Age: 58
End: 2023-07-21
Payer: COMMERCIAL

## 2023-07-25 RX ORDER — METHYLPREDNISOLONE ACETATE 40 MG/ML
40 INJECTION, SUSPENSION INTRA-ARTICULAR; INTRALESIONAL; INTRAMUSCULAR; SOFT TISSUE
Status: DISCONTINUED | OUTPATIENT
Start: 2023-07-19 | End: 2023-07-25 | Stop reason: HOSPADM

## 2023-07-25 NOTE — PROCEDURES
Tendon Sheath    Date/Time: 7/19/2023 8:45 AM  Performed by: Claire Richardson MD  Authorized by: Claire Richardson MD     Consent Done?:  Yes (Verbal)  Indications:  Pain  Timeout: prior to procedure the correct patient, procedure, and site was verified    Prep: patient was prepped and draped in usual sterile fashion      Local anesthesia used?: Yes    Anesthesia:  Local infiltration  Local anesthetic:  Lidocaine 1% without epinephrine  Anesthetic total (ml):  1    Location:  Long finger  Site:  R long flexor tendon sheath  Ultrasonic guidance for needle placement?: No    Needle size:  25 G  Approach:  Volar  Medications:  40 mg methylPREDNISolone acetate 40 mg/mL  Patient tolerance:  Patient tolerated the procedure well with no immediate complications

## 2023-08-02 ENCOUNTER — PATIENT MESSAGE (OUTPATIENT)
Dept: BARIATRICS | Facility: CLINIC | Age: 58
End: 2023-08-02
Payer: COMMERCIAL

## 2023-08-09 ENCOUNTER — TELEPHONE (OUTPATIENT)
Dept: NEUROLOGY | Facility: CLINIC | Age: 58
End: 2023-08-09
Payer: COMMERCIAL

## 2023-08-09 NOTE — TELEPHONE ENCOUNTER
Called pt to r/s her 9/11 appt with RIK. I offered pt appt times this week (8/10 and 8/11), but pt stated she needed a later appt date. Pt explained how her  recently lost his job and she currently does not have insurance. She said she wanted to schedule an appt later and hopefully by that time her  will have a job with insurance. I offered pt appt on 10/18 at 10:00 AM and pt agreed. Will get Dom to schedule.

## 2023-08-16 ENCOUNTER — TELEPHONE (OUTPATIENT)
Dept: INTERNAL MEDICINE | Facility: CLINIC | Age: 58
End: 2023-08-16
Payer: COMMERCIAL

## 2023-08-16 NOTE — TELEPHONE ENCOUNTER
----- Message from Ana María Block sent at 8/16/2023  8:22 AM CDT -----  Contact: pt [phone]  Patient has lost her insurance and needs to change her appt ti October.    Please call and advise.    Thank You

## 2023-08-16 NOTE — TELEPHONE ENCOUNTER
Spoke to pt. Pt stated that her  was laid off. She stated that he is interviewing for two other jobs. One may give him insurance coverage on Day 1, and the other for 90 days out. She stated that she has an appointment scheduled with Dr. Mcgill for 9/5/23. She stated that she will know just before if she has insurance or not. She will let us know if she has to reschedule. She has asked to be added to the Wait List.    Pt added to Wait List.

## 2023-08-20 ENCOUNTER — PATIENT MESSAGE (OUTPATIENT)
Dept: BARIATRICS | Facility: CLINIC | Age: 58
End: 2023-08-20
Payer: COMMERCIAL

## 2023-09-03 ENCOUNTER — PATIENT MESSAGE (OUTPATIENT)
Dept: NEUROLOGY | Facility: CLINIC | Age: 58
End: 2023-09-03
Payer: COMMERCIAL

## 2023-09-03 DIAGNOSIS — G35 MULTIPLE SCLEROSIS: ICD-10-CM

## 2023-09-03 DIAGNOSIS — M62.838 OTHER MUSCLE SPASM: ICD-10-CM

## 2023-09-05 ENCOUNTER — PATIENT MESSAGE (OUTPATIENT)
Dept: ADMINISTRATIVE | Facility: HOSPITAL | Age: 58
End: 2023-09-05
Payer: COMMERCIAL

## 2023-09-05 DIAGNOSIS — E11.9 TYPE 2 DIABETES MELLITUS WITHOUT COMPLICATION, UNSPECIFIED WHETHER LONG TERM INSULIN USE: ICD-10-CM

## 2023-09-05 RX ORDER — CYCLOBENZAPRINE HCL 5 MG
TABLET ORAL
Qty: 30 TABLET | Refills: 5 | Status: SHIPPED | OUTPATIENT
Start: 2023-09-05 | End: 2023-12-29 | Stop reason: SDUPTHER

## 2023-09-06 ENCOUNTER — PATIENT MESSAGE (OUTPATIENT)
Dept: BARIATRICS | Facility: CLINIC | Age: 58
End: 2023-09-06
Payer: COMMERCIAL

## 2023-09-10 ENCOUNTER — PATIENT MESSAGE (OUTPATIENT)
Dept: INTERNAL MEDICINE | Facility: CLINIC | Age: 58
End: 2023-09-10
Payer: COMMERCIAL

## 2023-09-11 RX ORDER — LOSARTAN POTASSIUM 100 MG/1
100 TABLET ORAL DAILY
Qty: 90 TABLET | Refills: 3 | Status: SHIPPED | OUTPATIENT
Start: 2023-09-11 | End: 2023-10-26 | Stop reason: SDUPTHER

## 2023-09-11 RX ORDER — ROSUVASTATIN CALCIUM 20 MG/1
20 TABLET, COATED ORAL DAILY
Qty: 90 TABLET | Refills: 3 | Status: SHIPPED | OUTPATIENT
Start: 2023-09-11 | End: 2023-12-10 | Stop reason: SDUPTHER

## 2023-09-11 RX ORDER — HYDROCHLOROTHIAZIDE 25 MG/1
25 TABLET ORAL DAILY
Qty: 90 TABLET | Refills: 3 | Status: SHIPPED | OUTPATIENT
Start: 2023-09-11 | End: 2023-12-28 | Stop reason: SDUPTHER

## 2023-09-11 RX ORDER — METFORMIN HYDROCHLORIDE 1000 MG/1
TABLET ORAL
Qty: 45 TABLET | Refills: 3 | Status: SHIPPED | OUTPATIENT
Start: 2023-09-11 | End: 2023-12-28 | Stop reason: SDUPTHER

## 2023-09-12 ENCOUNTER — PATIENT MESSAGE (OUTPATIENT)
Dept: BARIATRICS | Facility: CLINIC | Age: 58
End: 2023-09-12
Payer: COMMERCIAL

## 2023-09-12 DIAGNOSIS — G35 MULTIPLE SCLEROSIS: ICD-10-CM

## 2023-09-13 RX ORDER — GLATIRAMER 40 MG/ML
INJECTION, SOLUTION SUBCUTANEOUS
Qty: 36 EACH | Refills: 1 | Status: SHIPPED | OUTPATIENT
Start: 2023-09-13 | End: 2023-10-17 | Stop reason: SDUPTHER

## 2023-09-15 LAB
COMMENTS: ABNORMAL
EST. AVERAGE GLUCOSE BLD GHB EST-MCNC: 128 MG/DL
HBA1C MFR BLD: 6.1 % (ref 4.8–5.6)

## 2023-09-18 ENCOUNTER — TELEPHONE (OUTPATIENT)
Dept: INTERNAL MEDICINE | Facility: CLINIC | Age: 58
End: 2023-09-18
Payer: COMMERCIAL

## 2023-09-18 NOTE — TELEPHONE ENCOUNTER
----- Message from Senia Salgado LPN sent at 9/18/2023 11:59 AM CDT -----    ----- Message -----  From: Ruba Burns MD  Sent: 9/15/2023   5:35 PM CDT  To: Katy EDMOND Staff    I am covering for Dr. Mcgill.  Your hemoglobin A1c is elevated from previous reading of 5.5.  Encouraged healthy diet and regular exercise, as well as adherence to medication regimen.  Please advise if there has been any change in your diet, medication regimen or exercise.

## 2023-09-20 ENCOUNTER — TELEPHONE (OUTPATIENT)
Dept: INTERNAL MEDICINE | Facility: CLINIC | Age: 58
End: 2023-09-20
Payer: COMMERCIAL

## 2023-09-20 DIAGNOSIS — Z12.31 OTHER SCREENING MAMMOGRAM: ICD-10-CM

## 2023-09-20 DIAGNOSIS — Z00.00 PREVENTATIVE HEALTH CARE: Primary | ICD-10-CM

## 2023-09-20 NOTE — TELEPHONE ENCOUNTER
----- Message from Milagros Wolf MD sent at 9/19/2023  3:13 PM CDT -----  Pt will need lab before 3/2024 visit , please include A1C

## 2023-09-20 NOTE — TELEPHONE ENCOUNTER
Fasting lab and urine, including A1C ordered.    Spoke to pt. Pt stated that her 's work is losing their insurance coverage. They has not gotten a new one. She stated that once they have one, she will call to schedule lab.

## 2023-09-23 DIAGNOSIS — G35 MULTIPLE SCLEROSIS: ICD-10-CM

## 2023-09-26 ENCOUNTER — PATIENT MESSAGE (OUTPATIENT)
Dept: INTERNAL MEDICINE | Facility: CLINIC | Age: 58
End: 2023-09-26
Payer: COMMERCIAL

## 2023-09-27 RX ORDER — PREGABALIN 100 MG/1
100 CAPSULE ORAL 3 TIMES DAILY
Qty: 270 CAPSULE | Refills: 1 | Status: SHIPPED | OUTPATIENT
Start: 2023-09-27 | End: 2023-12-24 | Stop reason: SDUPTHER

## 2023-10-04 ENCOUNTER — PATIENT MESSAGE (OUTPATIENT)
Dept: BARIATRICS | Facility: CLINIC | Age: 58
End: 2023-10-04
Payer: COMMERCIAL

## 2023-10-10 ENCOUNTER — PATIENT MESSAGE (OUTPATIENT)
Dept: BARIATRICS | Facility: CLINIC | Age: 58
End: 2023-10-10
Payer: COMMERCIAL

## 2023-10-16 ENCOUNTER — PATIENT MESSAGE (OUTPATIENT)
Dept: INTERNAL MEDICINE | Facility: CLINIC | Age: 58
End: 2023-10-16
Payer: COMMERCIAL

## 2023-10-17 ENCOUNTER — TELEPHONE (OUTPATIENT)
Dept: NEUROLOGY | Facility: CLINIC | Age: 58
End: 2023-10-17
Payer: COMMERCIAL

## 2023-10-17 DIAGNOSIS — G35 MULTIPLE SCLEROSIS: ICD-10-CM

## 2023-10-17 RX ORDER — GLATIRAMER 40 MG/ML
INJECTION, SOLUTION SUBCUTANEOUS
Qty: 12 EACH | Refills: 0 | Status: ACTIVE | OUTPATIENT
Start: 2023-10-17 | End: 2023-10-27 | Stop reason: SDUPTHER

## 2023-10-17 NOTE — TELEPHONE ENCOUNTER
Patient does not have insurance at the moment. Requesting for Glatiramer to be sent to [facility]. Will use good Rx card. Aware to let office know if too expensive

## 2023-10-17 NOTE — TELEPHONE ENCOUNTER
----- Message from Nicole Aldana RN sent at 10/17/2023 12:09 PM CDT -----  Regarding: FW: Refill  Contact: [phone]    ----- Message -----  From: Naomie Andres  Sent: 10/17/2023  10:23 AM CDT  To: Alfonzo CHAIDEZ Staff  Subject: Refill                                           Type:  RX Refill Request        Who Called:  MELIDA HAYS [[id number]]        Refill or New Rx:  Refill        RX Name and Strength:  glatiramer (COPAXONE, GLATOPA) 40 mg/mL injection        Preferred Pharmacy with phone number:      [id number] [address] [address]  Phone: [phone] Fax: [phone]              Local or Mail Order:  Local        Would the patient rather a call back or a response via [facility]?        Best Call Back Number:   [phone]        Additional Information:  Pt states her insurance becomes effective until 12/01/2023.  Pt is currently using Good RX card.  Pharmacist advised her to call to have it sent in and she will refill it for her.  Please call.

## 2023-10-26 ENCOUNTER — PATIENT MESSAGE (OUTPATIENT)
Dept: NEUROLOGY | Facility: CLINIC | Age: 58
End: 2023-10-26
Payer: COMMERCIAL

## 2023-10-26 DIAGNOSIS — G35 MULTIPLE SCLEROSIS: ICD-10-CM

## 2023-10-27 RX ORDER — LOSARTAN POTASSIUM 100 MG/1
100 TABLET ORAL DAILY
Qty: 90 TABLET | Refills: 3 | Status: SHIPPED | OUTPATIENT
Start: 2023-10-27

## 2023-10-27 RX ORDER — GLATIRAMER 40 MG/ML
INJECTION, SOLUTION SUBCUTANEOUS
Qty: 12 EACH | Refills: 0 | Status: ACTIVE | OUTPATIENT
Start: 2023-10-27 | End: 2023-11-30 | Stop reason: SDUPTHER

## 2023-11-14 ENCOUNTER — PATIENT MESSAGE (OUTPATIENT)
Dept: BARIATRICS | Facility: CLINIC | Age: 58
End: 2023-11-14

## 2023-11-30 DIAGNOSIS — G35 MULTIPLE SCLEROSIS: ICD-10-CM

## 2023-12-01 RX ORDER — GLATIRAMER 40 MG/ML
INJECTION, SOLUTION SUBCUTANEOUS
Qty: 12 EACH | Refills: 5 | Status: ACTIVE | OUTPATIENT
Start: 2023-12-01

## 2023-12-02 ENCOUNTER — PATIENT MESSAGE (OUTPATIENT)
Dept: INTERNAL MEDICINE | Facility: CLINIC | Age: 58
End: 2023-12-02
Payer: COMMERCIAL

## 2023-12-04 ENCOUNTER — PATIENT OUTREACH (OUTPATIENT)
Dept: ADMINISTRATIVE | Facility: HOSPITAL | Age: 58
End: 2023-12-04
Payer: COMMERCIAL

## 2023-12-04 ENCOUNTER — PATIENT MESSAGE (OUTPATIENT)
Dept: ADMINISTRATIVE | Facility: HOSPITAL | Age: 58
End: 2023-12-04
Payer: COMMERCIAL

## 2023-12-05 ENCOUNTER — TELEPHONE (OUTPATIENT)
Dept: NEUROLOGY | Facility: CLINIC | Age: 58
End: 2023-12-05
Payer: COMMERCIAL

## 2023-12-11 RX ORDER — AZELASTINE 1 MG/ML
1 SPRAY, METERED NASAL 2 TIMES DAILY
Qty: 30 ML | Refills: 3 | Status: SHIPPED | OUTPATIENT
Start: 2023-12-11 | End: 2024-12-10

## 2023-12-11 RX ORDER — ROSUVASTATIN CALCIUM 20 MG/1
20 TABLET, COATED ORAL DAILY
Qty: 90 TABLET | Refills: 3 | Status: SHIPPED | OUTPATIENT
Start: 2023-12-11

## 2023-12-12 ENCOUNTER — OFFICE VISIT (OUTPATIENT)
Dept: OBSTETRICS AND GYNECOLOGY | Facility: CLINIC | Age: 58
End: 2023-12-12
Attending: OBSTETRICS & GYNECOLOGY
Payer: COMMERCIAL

## 2023-12-12 ENCOUNTER — HOSPITAL ENCOUNTER (OUTPATIENT)
Dept: RADIOLOGY | Facility: OTHER | Age: 58
Discharge: HOME | End: 2023-12-12
Attending: INTERNAL MEDICINE
Payer: COMMERCIAL

## 2023-12-12 ENCOUNTER — PATIENT MESSAGE (OUTPATIENT)
Dept: BARIATRICS | Facility: CLINIC | Age: 58
End: 2023-12-12
Payer: COMMERCIAL

## 2023-12-12 VITALS
HEIGHT: 66 IN | SYSTOLIC BLOOD PRESSURE: 129 MMHG | DIASTOLIC BLOOD PRESSURE: 83 MMHG | HEART RATE: 96 BPM | BODY MASS INDEX: 41.46 KG/M2 | WEIGHT: 258 LBS

## 2023-12-12 DIAGNOSIS — Z12.31 OTHER SCREENING MAMMOGRAM: ICD-10-CM

## 2023-12-12 DIAGNOSIS — Z01.419 ENCOUNTER FOR GYNECOLOGICAL EXAMINATION WITHOUT ABNORMAL FINDING: Primary | ICD-10-CM

## 2023-12-12 DIAGNOSIS — N95.2 POSTMENOPAUSAL ATROPHIC VAGINITIS: ICD-10-CM

## 2023-12-12 DIAGNOSIS — Z12.31 SCREENING MAMMOGRAM, ENCOUNTER FOR: ICD-10-CM

## 2023-12-12 PROCEDURE — 77067 MAMMO DIGITAL SCREENING BILAT WITH TOMO: ICD-10-PCS | Mod: 26,,, | Performed by: RADIOLOGY

## 2023-12-12 PROCEDURE — 4010F ACE/ARB THERAPY RXD/TAKEN: CPT | Mod: CPTII,S$GLB,, | Performed by: OBSTETRICS & GYNECOLOGY

## 2023-12-12 PROCEDURE — 77067 SCR MAMMO BI INCL CAD: CPT | Mod: 26,,, | Performed by: RADIOLOGY

## 2023-12-12 PROCEDURE — 4010F PR ACE/ARB THEARPY RXD/TAKEN: ICD-10-PCS | Mod: CPTII,S$GLB,, | Performed by: OBSTETRICS & GYNECOLOGY

## 2023-12-12 PROCEDURE — 3008F PR BODY MASS INDEX (BMI) DOCUMENTED: ICD-10-PCS | Mod: CPTII,S$GLB,, | Performed by: OBSTETRICS & GYNECOLOGY

## 2023-12-12 PROCEDURE — 99396 PR PREVENTIVE VISIT,EST,40-64: ICD-10-PCS | Mod: S$GLB,,, | Performed by: OBSTETRICS & GYNECOLOGY

## 2023-12-12 PROCEDURE — 3079F DIAST BP 80-89 MM HG: CPT | Mod: CPTII,S$GLB,, | Performed by: OBSTETRICS & GYNECOLOGY

## 2023-12-12 PROCEDURE — 1160F PR REVIEW ALL MEDS BY PRESCRIBER/CLIN PHARMACIST DOCUMENTED: ICD-10-PCS | Mod: CPTII,S$GLB,, | Performed by: OBSTETRICS & GYNECOLOGY

## 2023-12-12 PROCEDURE — 99999 PR PBB SHADOW E&M-EST. PATIENT-LVL IV: ICD-10-PCS | Mod: PBBFAC,,, | Performed by: OBSTETRICS & GYNECOLOGY

## 2023-12-12 PROCEDURE — 99396 PREV VISIT EST AGE 40-64: CPT | Mod: S$GLB,,, | Performed by: OBSTETRICS & GYNECOLOGY

## 2023-12-12 PROCEDURE — 3044F HG A1C LEVEL LT 7.0%: CPT | Mod: CPTII,S$GLB,, | Performed by: OBSTETRICS & GYNECOLOGY

## 2023-12-12 PROCEDURE — 1160F RVW MEDS BY RX/DR IN RCRD: CPT | Mod: CPTII,S$GLB,, | Performed by: OBSTETRICS & GYNECOLOGY

## 2023-12-12 PROCEDURE — 99999 PR PBB SHADOW E&M-EST. PATIENT-LVL IV: CPT | Mod: PBBFAC,,, | Performed by: OBSTETRICS & GYNECOLOGY

## 2023-12-12 PROCEDURE — 3074F PR MOST RECENT SYSTOLIC BLOOD PRESSURE < 130 MM HG: ICD-10-PCS | Mod: CPTII,S$GLB,, | Performed by: OBSTETRICS & GYNECOLOGY

## 2023-12-12 PROCEDURE — 1159F MED LIST DOCD IN RCRD: CPT | Mod: CPTII,S$GLB,, | Performed by: OBSTETRICS & GYNECOLOGY

## 2023-12-12 PROCEDURE — 77063 BREAST TOMOSYNTHESIS BI: CPT | Mod: 26,,, | Performed by: RADIOLOGY

## 2023-12-12 PROCEDURE — 3008F BODY MASS INDEX DOCD: CPT | Mod: CPTII,S$GLB,, | Performed by: OBSTETRICS & GYNECOLOGY

## 2023-12-12 PROCEDURE — 77067 SCR MAMMO BI INCL CAD: CPT | Mod: TC

## 2023-12-12 PROCEDURE — 3044F PR MOST RECENT HEMOGLOBIN A1C LEVEL <7.0%: ICD-10-PCS | Mod: CPTII,S$GLB,, | Performed by: OBSTETRICS & GYNECOLOGY

## 2023-12-12 PROCEDURE — 3074F SYST BP LT 130 MM HG: CPT | Mod: CPTII,S$GLB,, | Performed by: OBSTETRICS & GYNECOLOGY

## 2023-12-12 PROCEDURE — 77063 MAMMO DIGITAL SCREENING BILAT WITH TOMO: ICD-10-PCS | Mod: 26,,, | Performed by: RADIOLOGY

## 2023-12-12 PROCEDURE — 1159F PR MEDICATION LIST DOCUMENTED IN MEDICAL RECORD: ICD-10-PCS | Mod: CPTII,S$GLB,, | Performed by: OBSTETRICS & GYNECOLOGY

## 2023-12-12 PROCEDURE — 3079F PR MOST RECENT DIASTOLIC BLOOD PRESSURE 80-89 MM HG: ICD-10-PCS | Mod: CPTII,S$GLB,, | Performed by: OBSTETRICS & GYNECOLOGY

## 2023-12-12 RX ORDER — ESTRADIOL 0.1 MG/G
1 CREAM VAGINAL
Qty: 42 G | Refills: 3 | Status: SHIPPED | OUTPATIENT
Start: 2023-12-14

## 2023-12-12 NOTE — PROGRESS NOTES
Subjective:       Patient ID: Miesha Domínguez is a 57 y.o. female.    Chief Complaint:  Gynecologic Exam      History of Present Illness  Gynecologic Exam  Pertinent negatives include no abdominal pain, back pain or headaches.       Miesha Domínguez is a 57 y.o. female  here for her annual GYN exam.    She is menopausal since age 54 and is not on HRT.  She does report vaginal dryness and some discomfort with sex. Vasomotor symptoms are mostly resolved.   denies break through bleeding.   denies vaginal itching or irritation.  Denies vaginal discharge.  She is sexually active. She has had1 partner for 33 years .     History of abnormal pap: No  Last Pap: approximate date  and was normal(and negative for HR HPV)  Last MMG: normal-2022: BI-RADS Category 1: Negative-routine follow-up in 12 months  Last Colonoscopy:  colonoscopy 5 years ago without abnormalities.  denies domestic violence. She does feel safe at home.     Past Medical History:   Diagnosis Date    Asthma     as a child    Endometrial polyp 2019    HLD (hyperlipidemia)     HTN (hypertension)     MS (multiple sclerosis)     2020    Neuropathy     S/P D&C (status post dilation and curettage) 2019    Type II or unspecified type diabetes mellitus without mention of complication, not stated as uncontrolled      Past Surgical History:   Procedure Laterality Date     SECTION      COLONOSCOPY N/A 2018    Procedure: COLONOSCOPY;  Surgeon: Ramez Ramírez MD;  Location: [address];  Service: Endoscopy;  Laterality: N/A;    HYSTEROSCOPY WITH DILATION AND CURETTAGE OF UTERUS N/A 2019    Procedure: [id number];  Surgeon: Catrina Olivier MD;  Location: [facility];  Service: OB/GYN;  Laterality: N/A;    SLEEVE GASTROPLASTY       Social History     Socioeconomic History    Marital status:      Spouse name: himanshu    Number of children: 1   Occupational History     Occupation:      Employer: [employer]   Tobacco Use    Smoking status: Never     Passive exposure: Never    Smokeless tobacco: Never   Substance and Sexual Activity    Alcohol use: Not Currently     Comment: occassional for Hollidays    Drug use: No    Sexual activity: Yes     Partners: Male     Birth control/protection: None     Comment:  x 33 years since 1991: Spouse : Justin   Social History Narrative    SOCIAL HISTORY:     , spouse is in good health, .     for [employer],    Daughter,  @[employer], [employer]; working @ [employer]    + exercise, walk, elliptical, Rebecca     Social Determinants of Health     Financial Resource Strain: Low Risk  (5/11/2023)    Overall Financial Resource Strain (CARDIA)     Difficulty of Paying Living Expenses: Not hard at all   Food Insecurity: No Food Insecurity (5/11/2023)    Hunger Vital Sign     Worried About Running Out of Food in the Last Year: Never true     Ran Out of Food in the Last Year: Never true   Transportation Needs: No Transportation Needs (5/11/2023)    PRAPARE - Transportation     Lack of Transportation (Medical): No     Lack of Transportation (Non-Medical): No   Physical Activity: Insufficiently Active (5/11/2023)    Exercise Vital Sign     Days of Exercise per Week: 3 days     Minutes of Exercise per Session: 30 min   Stress: No Stress Concern Present (5/11/2023)    [place] [employer] - Occupational Stress Questionnaire     Feeling of Stress : Not at all   Social Connections: Unknown (5/11/2023)    Social Connection and Isolation Panel [NHANES]     Frequency of Communication with Friends and Family: More than three times a week     Frequency of Social Gatherings with Friends and Family: Three times a week     Active Member of Clubs or Organizations: No     Attends Club or Organization Meetings: Never     Marital Status:    Housing Stability: Low Risk   "(2023)    Housing Stability Vital Sign     Unable to Pay for Housing in the Last Year: No     Number of Places Lived in the Last Year: 1     Unstable Housing in the Last Year: No     Family History   Problem Relation Age of Onset    Cancer Father         d.64 lung -smoker    Hypertension Mother     Diabetes Mother     Ovarian cancer Mother 74    Hypertension Brother         1/2 brother - same Mom    Diabetes Brother 73    Diabetes Sister 71        1/2 sister- same Mom    Hypertension Sister     No Known Problems Daughter          1991    Colon cancer Other 46    Breast cancer Neg Hx     Stroke Neg Hx     Heart attack Neg Hx     Heart disease Neg Hx     Heart failure Neg Hx     Hyperlipidemia Neg Hx     Cervical cancer Neg Hx     Uterine cancer Neg Hx      OB History          1    Para   1    Term   1            AB        Living   1         SAB        IAB        Ectopic        Multiple        Live Births   1                 /83   Pulse 96   Ht 5' 6" (1.676 m)   Wt 117 kg (258 lb)   LMP 2019 (Approximate)   BMI 41.64 kg/m²         GYN & OB History  Patient's last menstrual period was 2019 (approximate).   Date of Last Pap: 2022    OB History    Para Term  AB Living   1 1 1     1   SAB IAB Ectopic Multiple Live Births           1      # Outcome Date GA Lbr Jason/2nd Weight Sex Delivery Anes PTL Lv   1 Term      CS-LTranv Spinal N SHIREEN       Review of Systems  Review of Systems   Constitutional:  Negative for activity change, appetite change, fatigue and unexpected weight change.   HENT: Negative.     Eyes:  Negative for visual disturbance.   Respiratory:  Negative for shortness of breath and wheezing.    Cardiovascular:  Negative for chest pain, palpitations and leg swelling.   Gastrointestinal:  Negative for abdominal pain, bloating and blood in stool.   Endocrine: Positive for diabetes. Negative for hair loss.   Genitourinary:  Positive for hot flashes " and vaginal dryness. Negative for decreased libido and dyspareunia.   Musculoskeletal:  Negative for back pain and joint swelling.   Integumentary:  Negative for acne, hair changes and nipple discharge.   Neurological:  Negative for headaches.   Hematological:  Does not bruise/bleed easily.   Psychiatric/Behavioral:  Negative for depression and sleep disturbance. The patient is not nervous/anxious.    Breast: Negative for mastodynia and nipple discharge          Objective:      Physical Exam:   Constitutional: She is oriented to person, place, and time. She appears well-developed and well-nourished.    HENT:   Head: Normocephalic and atraumatic.    Eyes: Pupils are equal, round, and reactive to light. EOM are normal.     Cardiovascular:  Normal rate and regular rhythm.             Pulmonary/Chest: Effort normal and breath sounds normal.   BREASTS:  no mass, no tenderness, no deformity and no retraction. Right breast exhibits no inverted nipple, no mass, no nipple discharge, no skin change, no tenderness, no bleeding and no swelling. Left breast exhibits no inverted nipple, no mass, no nipple discharge, no skin change, no tenderness, no bleeding and no swelling. Breasts are symmetrical.              Abdominal: Soft. Bowel sounds are normal.     Genitourinary:    Pelvic exam was performed with patient supine.      Genitourinary Comments: PELVIC: Normal external genitalia without lesions.  Normal hair distribution.  Adequate perineal body, normal urethral meatus.  Vagina  Dry and poorly rugated, atrophic, without lesions or discharge.  Cervix pink, without lesions, discharge or tenderness.  No significant cystocele or rectocele.  Bimanual exam shows uterus to be normal size, regular, mobile and nontender.  Adnexa without masses or tenderness.    RECTAL:Deferred                 Musculoskeletal: Normal range of motion and moves all extremeties.       Neurological: She is alert and oriented to person, place, and time.     Skin: Skin is warm and dry.    Psychiatric: She has a normal mood and affect.              Assessment:        1. Encounter for gynecological examination without abnormal finding    2. Postmenopausal atrophic vaginitis    3. Screening mammogram, encounter for                Plan:        Problem List Items Addressed This Visit    None  Visit Diagnoses       Encounter for gynecological examination without abnormal finding    -  Primary  COUNSELING:  The patient was counseled today on regular weight bearing exercise. Patient was counseled today on the new ACS guidelines for cervical cytology screening as well as the current recommendations for breast cancer screening. Counseling session lasted approximately 10 minutes, and all her questions were answered. She was advised to see her primary care physician for all other health maintenance.   FOLLOW-UP with me for next routine visit.       Postmenopausal atrophic vaginitis        Relevant Medications    estradioL (ESTRACE) 0.01 % (0.1 mg/gram) vaginal cream (Start on 12/14/2023)    Screening mammogram, encounter for                 Follow up in about 1 year (around 12/12/2024).

## 2023-12-13 ENCOUNTER — PATIENT MESSAGE (OUTPATIENT)
Dept: BARIATRICS | Facility: CLINIC | Age: 58
End: 2023-12-13
Payer: COMMERCIAL

## 2023-12-21 ENCOUNTER — TELEPHONE (OUTPATIENT)
Dept: INTERNAL MEDICINE | Facility: CLINIC | Age: 58
End: 2023-12-21
Payer: COMMERCIAL

## 2023-12-22 NOTE — TELEPHONE ENCOUNTER
----- Message from Milagros Wolf MD sent at 12/21/2023  8:48 PM CST -----  Please note that your mammogram was read as follows  Impression:  There is no mammographic evidence of malignancy.   zainab

## 2023-12-24 DIAGNOSIS — G35 MULTIPLE SCLEROSIS: ICD-10-CM

## 2023-12-24 DIAGNOSIS — M62.838 OTHER MUSCLE SPASM: ICD-10-CM

## 2023-12-26 RX ORDER — CYCLOBENZAPRINE HCL 5 MG
TABLET ORAL
Qty: 90 TABLET | Refills: 1 | Status: CANCELLED | OUTPATIENT
Start: 2023-12-26

## 2023-12-27 ENCOUNTER — TELEPHONE (OUTPATIENT)
Dept: INTERNAL MEDICINE | Facility: CLINIC | Age: 58
End: 2023-12-27
Payer: COMMERCIAL

## 2023-12-27 DIAGNOSIS — E11.40 TYPE 2 DIABETES MELLITUS WITH DIABETIC NEUROPATHY, WITHOUT LONG-TERM CURRENT USE OF INSULIN: Primary | ICD-10-CM

## 2023-12-27 NOTE — TELEPHONE ENCOUNTER
Prescription written and E scripted to [facility] for tirzepatide 7.5mg weekly.    Also consult placed to diabetic educator to review injection as well as dietary management.  Please facilitate appointment and request patient not start the toes appetite until she has seen the diabetic educator.

## 2023-12-27 NOTE — TELEPHONE ENCOUNTER
Spoke to pt. Stated prescription was sent to pharmacy and that referral was ordered to Dietary Management. Pt Vu

## 2023-12-27 NOTE — TELEPHONE ENCOUNTER
----- Message from Lyle Alfonso sent at 12/27/2023  1:56 PM CST -----  Contact: [phone]  Patient states insurance will only cover weight loss medication  ( Mounjaro) if presribed by PCP needs pre approval.  Patient states she is a diabetic. Please call and advise.    Thank you and have a great day.

## 2023-12-27 NOTE — TELEPHONE ENCOUNTER
Spoke to pt regarding prescription Mounjaro will be approve through pt insurance. Patient states she is a diabetic & wants it for weight loss as well .Pt stated with insurance do not cover seeing Bariatric Doctor she stated.

## 2023-12-28 RX ORDER — HYDROCHLOROTHIAZIDE 25 MG/1
25 TABLET ORAL DAILY
Qty: 90 TABLET | Refills: 3 | Status: SHIPPED | OUTPATIENT
Start: 2023-12-28

## 2023-12-28 RX ORDER — METFORMIN HYDROCHLORIDE 1000 MG/1
TABLET ORAL
Qty: 45 TABLET | Refills: 3 | Status: SHIPPED | OUTPATIENT
Start: 2023-12-28

## 2023-12-29 ENCOUNTER — PATIENT MESSAGE (OUTPATIENT)
Dept: INTERNAL MEDICINE | Facility: CLINIC | Age: 58
End: 2023-12-29
Payer: COMMERCIAL

## 2023-12-29 ENCOUNTER — OFFICE VISIT (OUTPATIENT)
Dept: NEUROLOGY | Facility: CLINIC | Age: 58
End: 2023-12-29
Payer: COMMERCIAL

## 2023-12-29 VITALS
HEIGHT: 66 IN | SYSTOLIC BLOOD PRESSURE: 146 MMHG | HEART RATE: 99 BPM | BODY MASS INDEX: 41.33 KG/M2 | DIASTOLIC BLOOD PRESSURE: 86 MMHG | WEIGHT: 257.19 LBS

## 2023-12-29 DIAGNOSIS — G35 MULTIPLE SCLEROSIS: Primary | ICD-10-CM

## 2023-12-29 DIAGNOSIS — E11.40 TYPE 2 DIABETES MELLITUS WITH DIABETIC NEUROPATHY, WITHOUT LONG-TERM CURRENT USE OF INSULIN: ICD-10-CM

## 2023-12-29 DIAGNOSIS — M62.838 OTHER MUSCLE SPASM: ICD-10-CM

## 2023-12-29 PROCEDURE — 99999 PR PBB SHADOW E&M-EST. PATIENT-LVL V: CPT | Mod: PBBFAC,,,

## 2023-12-29 PROCEDURE — 1159F PR MEDICATION LIST DOCUMENTED IN MEDICAL RECORD: ICD-10-PCS | Mod: CPTII,S$GLB,,

## 2023-12-29 PROCEDURE — 3008F PR BODY MASS INDEX (BMI) DOCUMENTED: ICD-10-PCS | Mod: CPTII,S$GLB,,

## 2023-12-29 PROCEDURE — 3044F PR MOST RECENT HEMOGLOBIN A1C LEVEL <7.0%: ICD-10-PCS | Mod: CPTII,S$GLB,,

## 2023-12-29 PROCEDURE — 3079F PR MOST RECENT DIASTOLIC BLOOD PRESSURE 80-89 MM HG: ICD-10-PCS | Mod: CPTII,S$GLB,,

## 2023-12-29 PROCEDURE — 3079F DIAST BP 80-89 MM HG: CPT | Mod: CPTII,S$GLB,,

## 2023-12-29 PROCEDURE — 99999 PR PBB SHADOW E&M-EST. PATIENT-LVL V: ICD-10-PCS | Mod: PBBFAC,,,

## 2023-12-29 PROCEDURE — 4010F PR ACE/ARB THEARPY RXD/TAKEN: ICD-10-PCS | Mod: CPTII,S$GLB,,

## 2023-12-29 PROCEDURE — 4010F ACE/ARB THERAPY RXD/TAKEN: CPT | Mod: CPTII,S$GLB,,

## 2023-12-29 PROCEDURE — 99215 PR OFFICE/OUTPT VISIT, EST, LEVL V, 40-54 MIN: ICD-10-PCS | Mod: S$GLB,,,

## 2023-12-29 PROCEDURE — 99215 OFFICE O/P EST HI 40 MIN: CPT | Mod: S$GLB,,,

## 2023-12-29 PROCEDURE — 3008F BODY MASS INDEX DOCD: CPT | Mod: CPTII,S$GLB,,

## 2023-12-29 PROCEDURE — 3077F PR MOST RECENT SYSTOLIC BLOOD PRESSURE >= 140 MM HG: ICD-10-PCS | Mod: CPTII,S$GLB,,

## 2023-12-29 PROCEDURE — 1159F MED LIST DOCD IN RCRD: CPT | Mod: CPTII,S$GLB,,

## 2023-12-29 PROCEDURE — 3044F HG A1C LEVEL LT 7.0%: CPT | Mod: CPTII,S$GLB,,

## 2023-12-29 PROCEDURE — 1160F RVW MEDS BY RX/DR IN RCRD: CPT | Mod: CPTII,S$GLB,,

## 2023-12-29 PROCEDURE — 1160F PR REVIEW ALL MEDS BY PRESCRIBER/CLIN PHARMACIST DOCUMENTED: ICD-10-PCS | Mod: CPTII,S$GLB,,

## 2023-12-29 PROCEDURE — 3077F SYST BP >= 140 MM HG: CPT | Mod: CPTII,S$GLB,,

## 2023-12-29 RX ORDER — PREGABALIN 100 MG/1
100 CAPSULE ORAL 3 TIMES DAILY
Qty: 270 CAPSULE | Refills: 1 | Status: SHIPPED | OUTPATIENT
Start: 2023-12-29 | End: 2024-01-08 | Stop reason: SDUPTHER

## 2023-12-29 RX ORDER — CYCLOBENZAPRINE HCL 5 MG
TABLET ORAL
Qty: 30 TABLET | Refills: 5 | Status: SHIPPED | OUTPATIENT
Start: 2023-12-29

## 2023-12-29 NOTE — TELEPHONE ENCOUNTER
Can you please send Rx to [facility] for PA approval.    Patient insurance denied without a PA. She is a diabetic so they may be able to get it approved.

## 2023-12-29 NOTE — PROGRESS NOTES
Patient ID: Miesha Domínguez is a 58 y.o. female who presents today for a routine clinic visit for MS.  She was last seen by Dr. Mejía on 3/9/2023.  The history was provided by the patient.     Principle neurological diagnosis: MS  Date of symptom onset: 2018  Date of diagnosis: 2020  Disease type at diagnosis: RR  Disease type currently: RR  Previous therapy: NA  Current therapy: Glatiramer acetate 2020 - present  Vitamin D Dose: 5000IU daily   Last MRI Brain: 3/6/23  Last MRI C-spine: 3/6/23  Last MRI T-spine: 3/6/23  CSF: 11/3/2020: W2, R49, KFLC 0.0299, P20  JCV status: NA  Other relevant labs and tests: 9/20/2023: Vitamin D - 120    Subjective:     She has arch discomfort in her right foot while walking.     MS wise she is feeling stable.     She continues with Glatopa without injection site reactions.      ROS:      12/25/2023     9:05 PM   REVIEW OF SYMPTOMS   Do you feel abnormally tired on most days? No   Do you feel you generally sleep well? Yes   Do you have difficulty controlling your bladder?  No   Do you have difficulty controlling your bowels?  No   Do you have frequent muscle cramps, tightness or spasms in your limbs?  No   Do you have new visual symptoms?  No   Do you have worsening difficulty with your memory or thinking? No   Do you have worsening symptoms of anxiety or depression?  No   For patients who walk, Do you have more difficulty walking?  No   Have you fallen since your last visit?  No   For patients who use wheelchairs: Do you have any skin wounds or breakdown? No   Do you have difficulty using your hands?  No   Do you have shooting or burning pain? No   Do you have difficulty with sexual function?  No   If you are sexually active, are you using birth control? Y/N  N/A Not Applicable   Do you often choke when swallowing liquids or solid food?  No   Do you experience worsening symptoms when overheated? No   Do you need any new equipment such as a wheelchair, walker or shower chair? No    Do you receive co-pay financial assistance for your principal MS medicine? Yes   Would you be interested in participating in an MS research trial in the future? No   For patients on Gilenya, Tecfidera, Aubagio, Rituxan, Ocrevus, Tysabri, Lemtrada or Methotrexate, are you aware that you should NOT receive live virus vaccines?  Not Applicable   Do you feel you have adequate family/friend support?  Yes   Do you have health insurance?   Yes   Are you currently employed? Yes   Do you receive SSDI/SSI?  Not Applicable   Do you use marijuana or cannabis products? No   Have you been diagnosed with a urinary tract infection since your last visit here? No   Have you been diagnosed with a respiratory tract infection since your last visit here? No   Have you been to the emergency room since your last visit here? No   Have you been hospitalized since your last visit here?  No            12/25/2023     9:13 PM   FSS SCORE & INTERPRETATION   FSS SCORE  9   FSS SCORE INTERPRETATION May not be suffering from fatigue         12/25/2023     9:10 PM   MS CONNIE-D SCORE & INTERPRETATION   CONNIE-D SCORE  0   CONNIE-D INTERPRETATION  No indication of Depression         12/25/2023     9:07 PM   MS TIMMY-7 SCORE & INTERPRETATION   TIMMY-7 SCORE  0   TIMMY-7 SCORE INTERPRETATION Normal         12/25/2023     9:15 PM   PEQ MS MOS PAIN EFFECTS SCORE & INTERPRETATION   PES SCORE 7   PES SCORE INTERPRETATION Scores can range from 6-30.  Items are scaled so that higher scores indicate a greater impact of pain on a patients mood and behavior.         12/25/2023     9:16 PM   PEQ MS SEXUAL SATISFACTION SCORE & INTERPRETATION   SSS SCORE  4   SSS SCORE INTERPRETATION Scores can range from 4-24.  Higher scores indicate greater problems with sexual satisfaction.         12/25/2023     9:17 PM   MS BLADDER CONTROL SCORE & INTERPRETATION   BLCS SCORE 0   BLCS SCORE INTERPRETATION  Scores can range from 0-22, with higher scores indicating greater bladder control  problems.         12/25/2023     9:22 PM   MS BOWEL CONTROL SCORE & INTERPRETATION   BWCS SCORE 0   BWCS SCORE INTERPRETATION Scores can range from 0-26, with higher scores indicating greater bowel control problems.         12/25/2023     9:18 PM   PEQ MS IMPACT OF VISUAL IMPAIRMENT SCORE & INTERPRETATION   JAMAL SCALE SCORE  0   JAMAL SCORE INTERPRETATION Scores can range from 0-15, with higher scores indicating greater impact of visual problems on daily activites.         12/25/2023     9:22 PM   MS PDQ SCORE & INTERPRETATION   PDQ RETROSPECTIVE MEMORY SUBSCALE 0   PDQ ATTENTION/CONCENTRATION SUBSCALE 0   PDQ PROSPECTIVE MEMORY SUBSCALE 0   PDQ PLANNING/ORGANIZATION SUBSCALE 0   PDQ TOTAL SCORE 0   PDQ SCORE INTERPRETATION Scores can range from 0-80, with higher scores indicating greater perceived cognitive impairment.         12/25/2023     9:27 PM   MSSS SCORE & INTERPRETATION   MSSS TANGIBLE SUPPORT SUBSCALE 100   MSSS EMOTIONAL/INFORMATIONAL SUPPORT SUBSCALE 100   MSSS AFFECTIONATE SUPPORT SUBSCALE 100   MSSS POSITIVE SOCIAL INTERACTION SUBSCALE 100   MSSS TOTAL SCORE 100   MSSS SCORE INTERPRETATION Scores can range from 0-100, with higher scores indicating greater perceived support.        SOCIAL HISTORY  Living arrangements - the patient lives with their family.  Social History     Socioeconomic History    Marital status:      Spouse name: justin    Number of children: 1   Occupational History    Occupation:      Employer: [employer]   Tobacco Use    Smoking status: Never     Passive exposure: Never    Smokeless tobacco: Never   Substance and Sexual Activity    Alcohol use: Not Currently     Comment: occassional for Hollidays    Drug use: No    Sexual activity: Yes     Partners: Male     Birth control/protection: None     Comment:  x 33 years since 1991: Spouse : Justin   Social History Narrative    SOCIAL HISTORY:     , spouse is in good health, .    School   for [employer],    Daughter,  @[employer], major Hotel Tourism; working @ [employer]    + exercise, walk, elliptical, Rebecca     Social Determinants of Health     Financial Resource Strain: Low Risk  (12/20/2023)    Overall Financial Resource Strain (CARDIA)     Difficulty of Paying Living Expenses: Not hard at all   Food Insecurity: No Food Insecurity (12/20/2023)    Hunger Vital Sign     Worried About Running Out of Food in the Last Year: Never true     Ran Out of Food in the Last Year: Never true   Transportation Needs: No Transportation Needs (12/20/2023)    PRAPARE - Transportation     Lack of Transportation (Medical): No     Lack of Transportation (Non-Medical): No   Physical Activity: Insufficiently Active (12/20/2023)    Exercise Vital Sign     Days of Exercise per Week: 2 days     Minutes of Exercise per Session: 30 min   Stress: No Stress Concern Present (12/20/2023)    [place] [employer] - Occupational Stress Questionnaire     Feeling of Stress : Not at all   Social Connections: Unknown (12/20/2023)    Social Connection and Isolation Panel [NHANES]     Frequency of Communication with Friends and Family: More than three times a week     Frequency of Social Gatherings with Friends and Family: Once a week     Active Member of Clubs or Organizations: No     Attends Club or Organization Meetings: Never     Marital Status:    Housing Stability: Low Risk  (12/20/2023)    Housing Stability Vital Sign     Unable to Pay for Housing in the Last Year: No     Number of Places Lived in the Last Year: 1     Unstable Housing in the Last Year: No       Current Outpatient Medications on File Prior to Visit   Medication Sig Dispense Refill    azelastine (ASTELIN) 137 mcg (0.1 %) nasal spray 1 spray (137 mcg total) by Nasal route 2 (two) times daily. 30 mL 3    cholecalciferol, vitamin D3, (VITAMIN D3) 50 mcg (2,000 unit) Cap Take 1 capsule by mouth once daily.      cyanocobalamin,  vitamin B-12, (VITAMIN B-12) 2,500 mcg Subl Place 1 tablet under the tongue once a week. Pt taking weekly      cyclobenzaprine (FLEXERIL) 5 MG tablet TAKE 1 TABLET BY MOUTH NIGHTLY AS NEEDED. 30 tablet 5    estradioL (ESTRACE) 0.01 % (0.1 mg/gram) vaginal cream Place 1 g vaginally twice a week. 42 g 3    fexofenadine (ALLEGRA ALLERGY) 180 MG tablet Take 1 tablet (180 mg total) by mouth once daily. 30 tablet 11    fluticasone propionate (FLONASE) 50 mcg/actuation nasal spray 2 sprays by Each Nostril route once daily.      glatiramer (GLATOPA) 40 mg/mL injection INJECT 1 SYRINGE UNDER THE SKIN 3 TIMES A WEEK AT LEAST 48 HOURS APART. ALLOW TO WARM TO ROOM TEMP FOR 20 MINUTES. REFRIGERATE. 12 each 5    hydroCHLOROthiazide (HYDRODIURIL) 25 MG tablet Take 1 tablet (25 mg total) by mouth once daily. 90 tablet 3    losartan (COZAAR) 100 MG tablet Take 1 tablet (100 mg total) by mouth once daily. 90 tablet 3    metFORMIN (GLUCOPHAGE) 1000 MG tablet TAKE 1/2 TABLETS (500 MG TOTAL) BY MOUTH DAILY WITH BREAKFAST. 45 tablet 3    MULTIVITAMIN ORAL Take 1 tablet by mouth once daily. Centrum adult chews ( flavor burst)      pregabalin (LYRICA) 100 MG capsule Take 1 capsule (100 mg total) by mouth 3 (three) times daily. 270 capsule 1    rosuvastatin (CRESTOR) 20 MG tablet Take 1 tablet (20 mg total) by mouth once daily. 90 tablet 3    sodium chloride (OCEAN) 0.65 % nasal spray 1 spray by Nasal route as needed for Congestion.      tirzepatide 7.5 mg/0.5 mL PnIj Inject 7.5 mg into the skin every 7 days. 4 Pen 2     No current facility-administered medications on file prior to visit.       Objective:     1. 25 foot timed walk:      3/9/2023    12:01 AM 12/29/2023    12:01 AM   Timed 25 Foot Walk:   Did patient wear an AFO? No No   Was assistive device used? No No   Time for 25 Foot Walk (seconds) 4.28 4.05   Time for 25 Foot Walk (seconds) 4.03 4.18           NEURO EXAM    In general, the patient is well nourished and appears to be in  no acute distress.    MENTAL STATUS: language is fluent, normal verbal comprehension, short-term and remote memory is intact, attention is normal, patient is alert and oriented x 3, fund of knowlege is appropriate by vocabulary.     CRANIAL NERVE EXAM:  There is no internuclear ophthalmoplegia.  Extraocular muscles are intact. No facial asymmetry. Facial sensation is intact bilaterally. There is no dysarthria. Uvula is midline, and palate moves symmetrically. Shoulder shrug intact bilaterlly. Tongue protrusion is midline. Hearing is intact to finger rub bilaterally. Neck is supple with full ROM    MOTOR EXAM: Normal bulk and tone throughout UE and LE bilaterally.   No pronator drift; rapid sequential movements are normal; Strength is  5/5 in all groups in the lower extremities and upper extremities    REFLEXES: 2+ and symmetric throughout in all four extremities    SENSORY EXAM: Normal to light touch, and decreased vibration throughout.    COORDINATION: Normal finger-to-nose exam. Normal heel-to-shin exam.    GAIT: Narrow based and stable. Able to heel walk, toe walk, and unable to perform tandem gait independently     Negative Romberg's with slight sway     Imaging:     Personally reviewed.     Results for orders placed during the hospital encounter of 03/06/23    MRI Brain Demyelinating Without Contrast    Impression  No significant change from prior with continued several scattered punctate foci of T2 FLAIR signal hyperintensity throughout the brain parenchyma while nonspecific remain concerning for mild degree of prior demyelinating plaque burden.    No definite new lesion to suggest significant interval or active demyelination.    Clinical correlation and follow-up advised.      Electronically signed by: Huy Villatoro DO  Date:    03/06/2023  Time:    13:41    Results for orders placed during the hospital encounter of 03/06/23    MRI Cervical Spine Demyelinating Without Contrast    Impression  Continued short  segment regions of T2 stir signal abnormality in the cervical and thoracic cord as detailed above most compatible with prior areas of demyelination in light of history. No new cord signal abnormality to suggest significant interval or active demyelination.    Continued degenerative change of the cervical spine as detailed above.      Electronically signed by: Huy Villatoro DO  Date:    03/06/2023  Time:    13:41    Results for orders placed during the hospital encounter of 03/06/23    MRI Thoracic Spine Demyelinating Without Contrast    Impression  Continued short segment regions of T2 stir signal abnormality in the cervical and thoracic cord as detailed above most compatible with prior areas of demyelination in light of history. No new cord signal abnormality to suggest significant interval or active demyelination.    Continued degenerative change of the cervical spine as detailed above.      Electronically signed by: Huy Villatoro DO  Date:    03/06/2023  Time:    13:41    Results for orders placed during the hospital encounter of 03/25/22    MRI Brain Demyelinating W W/O Contrast    Impression  No significant change from prior.  Stable few scattered small to punctate size regions of T2 FLAIR signal abnormality throughout the brain parenchyma remain concerning for mild degree of prior demyelinating plaque burden in light of history.  No definite new lesion or enhancing lesion to suggest significant interval or active demyelination.    Clinical correlation and follow-up advised.      Electronically signed by: Huy Villatoro DO  Date:    03/26/2022  Time:    09:40    Results for orders placed during the hospital encounter of 03/25/22    MRI Cervical Spine Demyelinating W W/O Contrast    Impression  Continued short segment regions of T2 stir signal abnormality in the cervical and thoracic cord as detailed above most compatible with prior areas of demyelination in light of history. No new cord signal abnormality or  "abnormal intrathecal enhancement to suggest significant interval or active demyelination.    Stable ventral positioning of the cervical cord with slight dorsal flattening underlying arachnoid cyst versus web remains in differential.    Continued scattered degenerative change of the cervical spine as detailed above without significant central canal stenosis.    See above for additional details.      Electronically signed by: Huy Villatoro DO  Date:    03/26/2022  Time:    09:40    Results for orders placed during the hospital encounter of 03/25/22    MRI Thoracic Spine Demyelinating W W/O Contrast    Impression  Continued short segment regions of T2 stir signal abnormality in the cervical and thoracic cord as detailed above most compatible with prior areas of demyelination in light of history. No new cord signal abnormality or abnormal intrathecal enhancement to suggest significant interval or active demyelination.    Stable ventral positioning of the cervical cord with slight dorsal flattening underlying arachnoid cyst versus web remains in differential.    Continued scattered degenerative change of the cervical spine as detailed above without significant central canal stenosis.    See above for additional details.      Electronically signed by: Huy Villatoro DO  Date:    03/26/2022  Time:    09:40        Labs:     Lab Results   Component Value Date    PMIFGWRK94YX 120 (H) 03/09/2023    MHHTANID54CZ 49 06/15/2022    JUVZIHVR11CQ 65 05/28/2021     No results found for: "JCVINDEX", "JCVANTIBODY"  No results found for: "HG9VIBJR", "ABSOLUTECD3", "XY1HNLVS", "ABSOLUTECD8", "SL6UWDVI", "ABSOLUTECD4", "LABCD48"  Lab Results   Component Value Date    WBC 12.60 03/09/2023    RBC 4.85 03/09/2023    HGB 12.7 03/09/2023    HCT 40.6 03/09/2023    MCV 84 03/09/2023    MCH 26.2 (L) 03/09/2023    MCHC 31.3 (L) 03/09/2023    RDW 15.3 (H) 03/09/2023     03/09/2023    MPV 11.6 03/09/2023    GRAN 7.5 03/09/2023    GRAN 59.6 " 03/09/2023    LYMPH 4.0 03/09/2023    LYMPH 32.1 03/09/2023    MONO 0.8 03/09/2023    MONO 6.6 03/09/2023    EOS 0.1 03/09/2023    BASO 0.04 03/09/2023    EOSINOPHIL 1.1 03/09/2023    BASOPHIL 0.3 03/09/2023     Sodium   Date Value Ref Range Status   03/09/2023 146 (H) 136 - 145 mmol/L Final     Potassium   Date Value Ref Range Status   03/09/2023 3.7 3.5 - 5.1 mmol/L Final     Chloride   Date Value Ref Range Status   03/09/2023 107 95 - 110 mmol/L Final     CO2   Date Value Ref Range Status   03/09/2023 31 (H) 23 - 29 mmol/L Final     Glucose   Date Value Ref Range Status   03/09/2023 84 70 - 110 mg/dL Final     BUN   Date Value Ref Range Status   03/09/2023 19 6 - 20 mg/dL Final     Creatinine   Date Value Ref Range Status   03/09/2023 0.8 0.5 - 1.4 mg/dL Final     Calcium   Date Value Ref Range Status   03/09/2023 9.5 8.7 - 10.5 mg/dL Final     Total Protein   Date Value Ref Range Status   03/09/2023 7.6 6.0 - 8.4 g/dL Final     Albumin   Date Value Ref Range Status   03/09/2023 3.6 3.5 - 5.2 g/dL Final     Total Bilirubin   Date Value Ref Range Status   03/09/2023 0.5 0.1 - 1.0 mg/dL Final     Comment:     For infants and newborns, interpretation of results should be based  on gestational age, weight and in agreement with clinical  observations.    Premature Infant recommended reference ranges:  Up to 24 hours.............<8.0 mg/dL  Up to 48 hours............<12.0 mg/dL  3-5 days..................<15.0 mg/dL  6-29 days.................<15.0 mg/dL       Alkaline Phosphatase   Date Value Ref Range Status   03/09/2023 77 55 - 135 U/L Final     AST   Date Value Ref Range Status   03/09/2023 18 10 - 40 U/L Final     ALT   Date Value Ref Range Status   03/09/2023 17 10 - 44 U/L Final     Anion Gap   Date Value Ref Range Status   03/09/2023 8 8 - 16 mmol/L Final     eGFR if    Date Value Ref Range Status   03/22/2022 >60.0 >60 mL/min/1.73 m^2 Final   03/22/2022 >60.0 >60 mL/min/1.73 m^2 Final     eGFR  "if non    Date Value Ref Range Status   03/22/2022 >60.0 >60 mL/min/1.73 m^2 Final     Comment:     Calculation used to obtain the estimated glomerular filtration  rate (eGFR) is the CKD-EPI equation.      03/22/2022 >60.0 >60 mL/min/1.73 m^2 Final     Comment:     Calculation used to obtain the estimated glomerular filtration  rate (eGFR) is the CKD-EPI equation.        No results found for: "HEPBSAG", "HEPBSAB", "HEPBCAB"        MS Impression and Plan:     NEURO MULTIPLE SCLEROSIS IMPRESSION:   MS Status:     Number of relapses in the past year?:  0    Clinical Progression:  Clinically Stable    MRI Progression:  Stable  Plan:     DMT:  No change in management    DMT comment:  Continue Glatopa and vitamin D supplementation     Symptom Management:  No change in symptom management       Discussed making sure she is wearing proper footwear while walking and possibly going to shoe store that assesses gait for best shoes.    MRIs in March   Follow up with Dr. Mejía in 6 months    Plan discussed and questions were answered to satisfaction.     Problem List Items Addressed This Visit          Neuro    Multiple sclerosis - Primary    Relevant Orders    MRI Brain Demyelinating Without Contrast    MRI Cervical Spine Demyelinating Without Contrast    MRI Thoracic Spine Demyelinating Without Contrast       I spent a total of 48 minutes on the day of the visit.This includes face to face time and non-face to face time preparing to see the patient (eg, review of tests), obtaining and/or reviewing separately obtained history, documenting clinical information in the electronic or other health record, independently interpreting results and communicating results to the patient/family/caregiver, or care coordinator.       ISMAEL Mcdowell    "

## 2023-12-29 NOTE — ADDENDUM NOTE
Addended by: GABRIEL MARTINEZ on: 12/29/2023 10:15 AM     Modules accepted: Orders     Nelsy Marino is a 84 y.o. woman with history of HTN, T2DM, DVT, and PAD who underwent left to right fem to SFA bypass and right SFA angioplasty on 3/2/23 and was admitted until 3/20. She was seen by Dr. Dunbar in the office earlier today complaining of left rest pain and also found to have dehiscence of her left groin incision.  S/p wound washout and flip with plastics 3/31, LLE angio on 4/4.     - patient seen and examined  - continues to endorse left foot pain despite triphasic distal signals  - continue aspirin and plavix, as ordered  - Eliquis held given persistent nose bleed  - Insulin SS  - Home meds  - Asa, plavix, statin  - Podiatry following for right TMA wound  - Plan for EGD with GI tomorrow   - NPO at midnight   - Okay for d/c after EGD  - Awaiting insurance authorization for LTACH    ID Plan:  Recommendations / Plan:   Continue IV-micafungin 100mg Q24 (cxs +C.glabrata; +C.albicans)   Continue IV-Cefepime 2 g Q12h (renally dosed CrCl 34)  -- [Note: recent h/o R foot TMA wound dehiscence cx+ PSA (03/10/23), s/p cipro course for SSTI].   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   -- ID will schedule pt for f/u appt in ID clinic in 2-3wks    Dispo: LTAC accepted, has PICC for IV abx

## 2024-01-01 ENCOUNTER — PATIENT MESSAGE (OUTPATIENT)
Dept: ADMINISTRATIVE | Facility: OTHER | Age: 59
End: 2024-01-01
Payer: COMMERCIAL

## 2024-01-02 ENCOUNTER — TELEPHONE (OUTPATIENT)
Dept: INTERNAL MEDICINE | Facility: CLINIC | Age: 59
End: 2024-01-02
Payer: COMMERCIAL

## 2024-01-02 DIAGNOSIS — E11.40 TYPE 2 DIABETES MELLITUS WITH DIABETIC NEUROPATHY, WITHOUT LONG-TERM CURRENT USE OF INSULIN: ICD-10-CM

## 2024-01-03 ENCOUNTER — TELEPHONE (OUTPATIENT)
Dept: INTERNAL MEDICINE | Facility: CLINIC | Age: 59
End: 2024-01-03
Payer: COMMERCIAL

## 2024-01-04 LAB
LEFT EYE DM RETINOPATHY: NEGATIVE
LEFT EYE DM RETINOPATHY: NEGATIVE
RIGHT EYE DM RETINOPATHY: NEGATIVE
RIGHT EYE DM RETINOPATHY: NEGATIVE

## 2024-01-06 ENCOUNTER — PATIENT MESSAGE (OUTPATIENT)
Dept: NEUROLOGY | Facility: CLINIC | Age: 59
End: 2024-01-06
Payer: COMMERCIAL

## 2024-01-06 DIAGNOSIS — G35 MULTIPLE SCLEROSIS: ICD-10-CM

## 2024-01-08 RX ORDER — PREGABALIN 100 MG/1
100 CAPSULE ORAL 3 TIMES DAILY
Qty: 270 CAPSULE | Refills: 1 | Status: SHIPPED | OUTPATIENT
Start: 2024-01-08

## 2024-01-09 ENCOUNTER — PATIENT MESSAGE (OUTPATIENT)
Dept: BARIATRICS | Facility: CLINIC | Age: 59
End: 2024-01-09
Payer: COMMERCIAL

## 2024-01-10 ENCOUNTER — PATIENT OUTREACH (OUTPATIENT)
Dept: ADMINISTRATIVE | Facility: HOSPITAL | Age: 59
End: 2024-01-10
Payer: COMMERCIAL

## 2024-01-10 ENCOUNTER — CLINICAL SUPPORT (OUTPATIENT)
Dept: DIABETES | Facility: CLINIC | Age: 59
End: 2024-01-10
Payer: COMMERCIAL

## 2024-01-10 VITALS — WEIGHT: 252.44 LBS | BODY MASS INDEX: 40.57 KG/M2 | HEIGHT: 66 IN

## 2024-01-10 DIAGNOSIS — E11.40 TYPE 2 DIABETES MELLITUS WITH DIABETIC NEUROPATHY, WITHOUT LONG-TERM CURRENT USE OF INSULIN: ICD-10-CM

## 2024-01-10 PROCEDURE — 99999 PR PBB SHADOW E&M-EST. PATIENT-LVL II: CPT | Mod: PBBFAC,,,

## 2024-01-10 PROCEDURE — G0108 DIAB MANAGE TRN  PER INDIV: HCPCS | Mod: S$GLB,,,

## 2024-01-10 NOTE — PROGRESS NOTES

## 2024-01-11 NOTE — PROGRESS NOTES
"Diabetes Care Specialist Progress Note  Author: Mandi Ring RN, CDE  Date: 1/11/2024    Program Intake  Reason for Diabetes Program Visit:: Initial Diabetes Assessment  Current diabetes risk level:: low    Lab Results   Component Value Date    HGBA1C 5.5 10/08/2022     Clinical    Weight: 114.5 kg (252 lb 6.8 oz)   Height: 5' 6" (167.6 cm)   Body mass index is 40.74 kg/m².    Patient Health Rating  Compared to other people your age, how would you rate your health?: Fair    Problem Review  Reviewed Problem List with Patient: yes  Active comorbidities affecting diabetes self-care.: yes  Comorbidities: Other (comment) (recently diagnosed MS)  Reviewed health maintenance: yes    Clinical Assessment  Current Diabetes Treatment: Oral Medication, Injectable    Nutritional Status  Meal Plan 24 Hour Recall: Breakfast, Lunch, Dinner, Snack  Meal Plan 24 Hour Recall - Breakfast: coffee w/ 3 splendas, apple - water  Meal Plan 24 Hour Recall - Lunch: air fried french fries, half a hamburger w/ onions, lettuce, tomato, pickles - water  Meal Plan 24 Hour Recall - Dinner: pizza ([employer]) - half a slice - water  Meal Plan 24 Hour Recall - Snack: apple    Additional Social History    Support  Does anyone support you with your diabetes care?: yes  Who supports you?: spouse  Who takes you to your medical appointments?: self  Does the current support meet the patient's needs?: Yes  Is Support an area impacting ability to self-manage diabetes?: No    Access to Mass Media & Technology  Does the patient have access to any of the following devices or technologies?: Smart phone  Media or technology needs impacting ability to self-manage diabetes?: No    Cognitive/Behavioral Health  Alert and Oriented: Yes  Difficulty Thinking: No  Requires Prompting: No  Requires assistance for routine expression?: No  Cognitive or behavioral barriers impacting ability to self-manage diabetes?: No    Culture/Yarsani  Culture or Religion beliefs that may " impact ability to access healthcare: No    Communication  Language preference: English  Hearing Problems: No  Vision Problems: No  Communication needs impacting ability to self-manage diabetes?: No    Health Literacy  Preferred Learning Method: Face to Face  How often do you need to have someone help you read instructions, pamphlets, or written material from your doctor or pharmacy?: Rarely    Diabetes Self-Management Skills Assessment    Physical Activity/Exercise  Patient's daily activity level:: sedentary  Patient formally exercises outside of work.: yes  Exercise Type: using exercise equipment (stationary bike)  Intensity: Low  Frequency: twice a week  Duration: 30 min  Patient can identify forms of physical activity.: yes  Stated forms of physical activity:: any movement performed by muscles that uses energy  Physical Activity/Exercise Skills Assessment Completed: : Yes  Assessment indicates:: Knowledge deficit, Instruction Needed  Area of need?: Yes     Home Blood Glucose Monitoring  Patient states that blood sugar is checked at home daily.: yes  Monitoring Method:: home glucometer  How often do you check your blood sugar?: Once a day  When you check what is your typical blood sugar range? : 93, 84, 96, 86, 79 (Pre-Mounjaro: 120, 124, 116, 114, 105, 169, 80)  Blood glucose logs:: yes, encouraged to keep logs, encouraged to bring logs to provider visits  Blood glucose logs reviewed today?: yes  Home Blood Glucose Monitoring Skills Assessment Completed: : Yes  Assessment indicates:: Adequate understanding  Area of need?: No    Assessment Summary and Plan    Based on today's diabetes care assessment, the following areas of need were identified:          1/10/2024    12:01 AM   Social   Support No   Access to Mass Media/Tech No   Cognitive/Behavioral Health No   Culture/Adventism No   Communication No          1/10/2024    12:01 AM   Diabetes Self-Management Skills   Physical Activity/Exercise Yes: pt has been  having foot pain recently and stopped going to gym. Discussed restructuring activity to lessen impact on feet vs. Stopping altogether. Has been experimenting with different orthotics and plans to discuss w/ PCP/potentially podiatrist.     Ed on ADA recs for physical activity and how activity reduces insulin resistance      Home Blood Glucose Monitoring No      Today's interventions were provided through individual discussion, instruction, and written materials were provided.      Patient verbalized understanding of instruction and written materials.  Pt was able to return back demonstration of instructions today. Patient understood key points, needs reinforcement and further instruction.     Diabetes Self-Management Care Plan:    Today's Diabetes Self-Management Care Plan was developed with Miesha's input. Miesha has agreed to work toward the following goal(s) to improve his/her overall diabetes control.      Care Plan: Diabetes Management   Updates made since 12/12/2023 12:00 AM        Problem: Medications         Goal: Pt will continue to take Mounjaro 7.5 mg weekly.    Start Date: 1/10/2024   Expected End Date: 2/10/2024   Priority: High   Barriers: No Barriers Identified        Task: Reviewed with patient all current diabetes medications and provided basic review of the purpose, dosage, frequency, side effects, and storage of both oral and injectable diabetes medications. Completed 1/11/2024        Task: Discussed guidelines for preventing, detecting and treating hypoglycemia and hyperglycemia and reviewed the importance of meal and medication timing with diabetes mediations for prevention of hypoglycemia and maximum drug benefit. Completed 1/11/2024        Follow Up Plan     No follow-ups on file.    Today's care plan and follow up schedule was discussed with patient.  Miesha verbalized understanding of the care plan, goals, and agrees to follow up plan.        The patient was encouraged to communicate with  his/her health care provider/physician and care team regarding his/her condition(s) and treatment.  I provided the patient with my contact information today and encouraged to contact me via phone or [facility] Patient Portal as needed.     Length of Visit   Total Time: 60 Minutes

## 2024-01-22 ENCOUNTER — PATIENT MESSAGE (OUTPATIENT)
Dept: INTERNAL MEDICINE | Facility: CLINIC | Age: 59
End: 2024-01-22
Payer: COMMERCIAL

## 2024-01-22 ENCOUNTER — PATIENT MESSAGE (OUTPATIENT)
Dept: PSYCHIATRY | Facility: CLINIC | Age: 59
End: 2024-01-22
Payer: COMMERCIAL

## 2024-01-24 ENCOUNTER — PATIENT MESSAGE (OUTPATIENT)
Dept: ADMINISTRATIVE | Facility: OTHER | Age: 59
End: 2024-01-24
Payer: COMMERCIAL

## 2024-01-31 ENCOUNTER — OFFICE VISIT (OUTPATIENT)
Dept: ORTHOPEDICS | Facility: CLINIC | Age: 59
End: 2024-01-31
Payer: COMMERCIAL

## 2024-01-31 VITALS — HEIGHT: 66 IN | WEIGHT: 252 LBS | BODY MASS INDEX: 40.5 KG/M2

## 2024-01-31 DIAGNOSIS — M25.532 CHRONIC PAIN OF LEFT WRIST: ICD-10-CM

## 2024-01-31 DIAGNOSIS — G35 MULTIPLE SCLEROSIS: ICD-10-CM

## 2024-01-31 DIAGNOSIS — G89.29 CHRONIC PAIN OF LEFT WRIST: ICD-10-CM

## 2024-01-31 DIAGNOSIS — G56.03 CARPAL TUNNEL SYNDROME, BILATERAL: Primary | ICD-10-CM

## 2024-01-31 PROCEDURE — 99999 PR PBB SHADOW E&M-EST. PATIENT-LVL III: CPT | Mod: PBBFAC,,, | Performed by: ORTHOPAEDIC SURGERY

## 2024-01-31 PROCEDURE — 1159F MED LIST DOCD IN RCRD: CPT | Mod: CPTII,S$GLB,, | Performed by: ORTHOPAEDIC SURGERY

## 2024-01-31 PROCEDURE — 3061F NEG MICROALBUMINURIA REV: CPT | Mod: CPTII,S$GLB,, | Performed by: ORTHOPAEDIC SURGERY

## 2024-01-31 PROCEDURE — 99214 OFFICE O/P EST MOD 30 MIN: CPT | Mod: S$GLB,,, | Performed by: ORTHOPAEDIC SURGERY

## 2024-01-31 PROCEDURE — 3008F BODY MASS INDEX DOCD: CPT | Mod: CPTII,S$GLB,, | Performed by: ORTHOPAEDIC SURGERY

## 2024-01-31 PROCEDURE — 1160F RVW MEDS BY RX/DR IN RCRD: CPT | Mod: CPTII,S$GLB,, | Performed by: ORTHOPAEDIC SURGERY

## 2024-01-31 PROCEDURE — 3066F NEPHROPATHY DOC TX: CPT | Mod: CPTII,S$GLB,, | Performed by: ORTHOPAEDIC SURGERY

## 2024-01-31 PROCEDURE — 3044F HG A1C LEVEL LT 7.0%: CPT | Mod: CPTII,S$GLB,, | Performed by: ORTHOPAEDIC SURGERY

## 2024-01-31 PROCEDURE — 4010F ACE/ARB THERAPY RXD/TAKEN: CPT | Mod: CPTII,S$GLB,, | Performed by: ORTHOPAEDIC SURGERY

## 2024-02-14 ENCOUNTER — LAB VISIT (OUTPATIENT)
Dept: LAB | Facility: HOSPITAL | Age: 59
End: 2024-02-14
Attending: INTERNAL MEDICINE
Payer: COMMERCIAL

## 2024-02-14 ENCOUNTER — PATIENT MESSAGE (OUTPATIENT)
Dept: BARIATRICS | Facility: CLINIC | Age: 59
End: 2024-02-14
Payer: COMMERCIAL

## 2024-02-14 DIAGNOSIS — Z00.00 PREVENTATIVE HEALTH CARE: ICD-10-CM

## 2024-02-14 LAB
25(OH)D3+25(OH)D2 SERPL-MCNC: 89 NG/ML (ref 30–96)
ALBUMIN SERPL BCP-MCNC: 3.5 G/DL (ref 3.5–5.2)
ALP SERPL-CCNC: 65 U/L (ref 55–135)
ALT SERPL W/O P-5'-P-CCNC: 17 U/L (ref 10–44)
ANION GAP SERPL CALC-SCNC: 8 MMOL/L (ref 8–16)
AST SERPL-CCNC: 25 U/L (ref 10–40)
BASOPHILS # BLD AUTO: 0.05 K/UL (ref 0–0.2)
BASOPHILS NFR BLD: 0.6 % (ref 0–1.9)
BILIRUB SERPL-MCNC: 0.5 MG/DL (ref 0.1–1)
BUN SERPL-MCNC: 18 MG/DL (ref 6–20)
CALCIUM SERPL-MCNC: 9.7 MG/DL (ref 8.7–10.5)
CHLORIDE SERPL-SCNC: 102 MMOL/L (ref 95–110)
CHOLEST SERPL-MCNC: 132 MG/DL (ref 120–199)
CHOLEST/HDLC SERPL: 2.2 {RATIO} (ref 2–5)
CO2 SERPL-SCNC: 28 MMOL/L (ref 23–29)
CREAT SERPL-MCNC: 0.7 MG/DL (ref 0.5–1.4)
DIFFERENTIAL METHOD BLD: ABNORMAL
EOSINOPHIL # BLD AUTO: 0.2 K/UL (ref 0–0.5)
EOSINOPHIL NFR BLD: 2.5 % (ref 0–8)
ERYTHROCYTE [DISTWIDTH] IN BLOOD BY AUTOMATED COUNT: 15.9 % (ref 11.5–14.5)
EST. GFR  (NO RACE VARIABLE): >60 ML/MIN/1.73 M^2
ESTIMATED AVG GLUCOSE: 123 MG/DL (ref 68–131)
GLUCOSE SERPL-MCNC: 77 MG/DL (ref 70–110)
HBA1C MFR BLD: 5.9 % (ref 4–5.6)
HCT VFR BLD AUTO: 40.4 % (ref 37–48.5)
HDLC SERPL-MCNC: 61 MG/DL (ref 40–75)
HDLC SERPL: 46.2 % (ref 20–50)
HGB BLD-MCNC: 12.5 G/DL (ref 12–16)
IMM GRANULOCYTES # BLD AUTO: 0.01 K/UL (ref 0–0.04)
IMM GRANULOCYTES NFR BLD AUTO: 0.1 % (ref 0–0.5)
LDLC SERPL CALC-MCNC: 61.4 MG/DL (ref 63–159)
LYMPHOCYTES # BLD AUTO: 3.4 K/UL (ref 1–4.8)
LYMPHOCYTES NFR BLD: 38.3 % (ref 18–48)
MCH RBC QN AUTO: 26 PG (ref 27–31)
MCHC RBC AUTO-ENTMCNC: 30.9 G/DL (ref 32–36)
MCV RBC AUTO: 84 FL (ref 82–98)
MONOCYTES # BLD AUTO: 0.6 K/UL (ref 0.3–1)
MONOCYTES NFR BLD: 6.8 % (ref 4–15)
NEUTROPHILS # BLD AUTO: 4.6 K/UL (ref 1.8–7.7)
NEUTROPHILS NFR BLD: 51.7 % (ref 38–73)
NONHDLC SERPL-MCNC: 71 MG/DL
NRBC BLD-RTO: 0 /100 WBC
PLATELET # BLD AUTO: 221 K/UL (ref 150–450)
PMV BLD AUTO: 12.3 FL (ref 9.2–12.9)
POTASSIUM SERPL-SCNC: 3.5 MMOL/L (ref 3.5–5.1)
PROT SERPL-MCNC: 7.5 G/DL (ref 6–8.4)
RBC # BLD AUTO: 4.81 M/UL (ref 4–5.4)
SODIUM SERPL-SCNC: 138 MMOL/L (ref 136–145)
TRIGL SERPL-MCNC: 48 MG/DL (ref 30–150)
TSH SERPL DL<=0.005 MIU/L-ACNC: 2.83 UIU/ML (ref 0.4–4)
WBC # BLD AUTO: 8.94 K/UL (ref 3.9–12.7)

## 2024-02-14 PROCEDURE — 83036 HEMOGLOBIN GLYCOSYLATED A1C: CPT | Performed by: INTERNAL MEDICINE

## 2024-02-14 PROCEDURE — 82306 VITAMIN D 25 HYDROXY: CPT | Performed by: INTERNAL MEDICINE

## 2024-02-14 PROCEDURE — 80053 COMPREHEN METABOLIC PANEL: CPT | Performed by: INTERNAL MEDICINE

## 2024-02-14 PROCEDURE — 36415 COLL VENOUS BLD VENIPUNCTURE: CPT | Mod: PO | Performed by: INTERNAL MEDICINE

## 2024-02-14 PROCEDURE — 85025 COMPLETE CBC W/AUTO DIFF WBC: CPT | Performed by: INTERNAL MEDICINE

## 2024-02-14 PROCEDURE — 80061 LIPID PANEL: CPT | Performed by: INTERNAL MEDICINE

## 2024-02-14 PROCEDURE — 84443 ASSAY THYROID STIM HORMONE: CPT | Performed by: INTERNAL MEDICINE

## 2024-02-14 NOTE — PROGRESS NOTES
CC: Annual PE    55 year-old female presents for PE  Nonsmoker,   Rare social alcohol consumer.     FAMILY HISTORY:   Dad d. 64, lung cancer. He had glaucoma. Heavy Smoker   and worked w/ asbestos   Mom d. 73 of ovarian cancer, she had diabetes and hypertension.   Brother + hypertension, diabetes.   Sister + hypertension, diabetes. Colon polyps    SCREENING TESTS:   Cholesterol, reviewed,-  Colonoscopy, 2018- ,normal 10yrs  No change in bowels or blood in stool   ACRHANA,  Dr. Olivier.  Mammo, gyn  BMD, n/a  Eye exam, UTD, 1/2024  Dental exam, pending    VACCINATIONS:  Tdap, 9/20/13   Pneumovax, 2016  Flu, yearly  Covid: 2/2    MEDS: In the MedCard.     REVIEW OF SYSTEMS:   No fever, chill, or night sweats  ++ PND, and has some clear mucus, chronic , Tx Mucinex and Xyzal  No wheezing, occ cough  No chest pain, shortness of breath, PND, or orthopnea.  No change in bowel or bladder function.   No unusual headaches or focal deficits   No weakness in arms or legs or slurred speech.  + right foot pain  Remainder of review negative except as previously noted.       PHYSICAL EXAM:   VS: stable  GENERAL: She is alert and oriented, in no acute distress. She is well developed, well nourished, conversant and cooperative, pleasant patient.   EYES: Conjunctivae and lids unremarkable. Sclerae anicteric. Pupils are reactive.   NECK: Supple. No thyromegaly noted.   RESPIRATORY: Efforts unlabored.   LUNGS: Clear to auscultation.   HEART: Regular rate and rhythm. No carotid bruits, 1+ pedal pulse.   No edema.    ABDOMEN: Bowel sounds present, soft, nontender.   No hepatosplenomegaly.   MUSCULOSKELETAL: Gait normal.   No clubbing, cyanosis or edema appreciated.   NEURO: WILDER. No tremor noted  SKIN: Warm and dry  Feet:  Monofilament-intact  Nails- no dystrophy  MSK-no deformities, no pain when instep palpated(where pain occurs on right foot)  Pulses-2+      IMPRESSION/PLAN:   Annual PE  Family history of ovarian cancer.   DM w/ neuropathy,  increased A1C @ 5.9 (was 5.5)  -continue metformin 500mg 1/2 qd  -continue tirzepitide 7.5mg q week  -continue Lyrica for neuropathy  -continue diabetic diet  Hypertension associated DM, stable.   -continue HCTZ 25mg  -continue losartan 100mg qd  -continue low salt diet  Dyslipidemia associated DM, stable  -continue metformin 500mg 1/2 qd  -continue tirzepatide 7.5mg q week  -continue low fat diet  MS, stable  -continue glatiramer under care of Neurology  Right foot pain  -consult Podiatry  HM  -reviewed vaccine recs ]    Wt Readings from Last 10 Encounters:   02/19/24 112.9 kg (248 lb 14.4 oz)   01/31/24 114.3 kg (252 lb)   01/10/24 114.5 kg (252 lb 6.8 oz)   12/29/23 116.7 kg (257 lb 2.7 oz)   12/27/23 115.8 kg (255 lb 4.7 oz)   12/12/23 117 kg (258 lb)   05/29/23 112.9 kg (248 lb 14.4 oz)   05/11/23 113.4 kg (250 lb)   04/24/23 113.4 kg (250 lb)   03/31/23 109.8 kg (241 lb 15.3 oz)   ]]             Answers submitted by the patient for this visit:  Review of Systems Questionnaire (Submitted on 2/14/2024)  activity change: No  unexpected weight change: No  neck pain: No  hearing loss: No  rhinorrhea: No  trouble swallowing: No  eye discharge: No  visual disturbance: No  chest tightness: No  wheezing: No  chest pain: No  palpitations: No  blood in stool: No  constipation: No  vomiting: No  diarrhea: No  polydipsia: No  polyuria: No  difficulty urinating: No  hematuria: No  menstrual problem: No  dysuria: No  joint swelling: No  arthralgias: No  headaches: No  weakness: No  confusion: No  dysphoric mood: No

## 2024-02-15 ENCOUNTER — TELEPHONE (OUTPATIENT)
Dept: INTERNAL MEDICINE | Facility: CLINIC | Age: 59
End: 2024-02-15
Payer: COMMERCIAL

## 2024-02-15 NOTE — TELEPHONE ENCOUNTER
----- Message from Milagros Wolf MD sent at 2/14/2024  3:57 PM CST -----  Please review your lab work and we will discuss at your pending office visit.   zainab

## 2024-02-19 ENCOUNTER — OFFICE VISIT (OUTPATIENT)
Dept: INTERNAL MEDICINE | Facility: CLINIC | Age: 59
End: 2024-02-19
Payer: COMMERCIAL

## 2024-02-19 VITALS
DIASTOLIC BLOOD PRESSURE: 86 MMHG | TEMPERATURE: 97 F | RESPIRATION RATE: 18 BRPM | HEART RATE: 85 BPM | HEIGHT: 67 IN | SYSTOLIC BLOOD PRESSURE: 120 MMHG | OXYGEN SATURATION: 98 % | BODY MASS INDEX: 39.06 KG/M2 | WEIGHT: 248.88 LBS

## 2024-02-19 DIAGNOSIS — E11.40 CONTROLLED TYPE 2 DIABETES MELLITUS WITH DIABETIC NEUROPATHY, WITHOUT LONG-TERM CURRENT USE OF INSULIN: ICD-10-CM

## 2024-02-19 DIAGNOSIS — M79.671 RIGHT FOOT PAIN: ICD-10-CM

## 2024-02-19 DIAGNOSIS — G35 MULTIPLE SCLEROSIS: ICD-10-CM

## 2024-02-19 DIAGNOSIS — I15.2 HYPERTENSION ASSOCIATED WITH TYPE 2 DIABETES MELLITUS: ICD-10-CM

## 2024-02-19 DIAGNOSIS — E78.5 HYPERLIPIDEMIA ASSOCIATED WITH TYPE 2 DIABETES MELLITUS: ICD-10-CM

## 2024-02-19 DIAGNOSIS — E11.69 HYPERLIPIDEMIA ASSOCIATED WITH TYPE 2 DIABETES MELLITUS: ICD-10-CM

## 2024-02-19 DIAGNOSIS — Z00.00 ANNUAL PHYSICAL EXAM: Primary | ICD-10-CM

## 2024-02-19 DIAGNOSIS — E11.59 HYPERTENSION ASSOCIATED WITH TYPE 2 DIABETES MELLITUS: ICD-10-CM

## 2024-02-19 PROCEDURE — 3066F NEPHROPATHY DOC TX: CPT | Mod: CPTII,S$GLB,, | Performed by: INTERNAL MEDICINE

## 2024-02-19 PROCEDURE — 3061F NEG MICROALBUMINURIA REV: CPT | Mod: CPTII,S$GLB,, | Performed by: INTERNAL MEDICINE

## 2024-02-19 PROCEDURE — 2023F DILAT RTA XM W/O RTNOPTHY: CPT | Mod: CPTII,S$GLB,, | Performed by: INTERNAL MEDICINE

## 2024-02-19 PROCEDURE — 1159F MED LIST DOCD IN RCRD: CPT | Mod: CPTII,S$GLB,, | Performed by: INTERNAL MEDICINE

## 2024-02-19 PROCEDURE — 99999 PR PBB SHADOW E&M-EST. PATIENT-LVL V: CPT | Mod: PBBFAC,,, | Performed by: INTERNAL MEDICINE

## 2024-02-19 PROCEDURE — 3008F BODY MASS INDEX DOCD: CPT | Mod: CPTII,S$GLB,, | Performed by: INTERNAL MEDICINE

## 2024-02-19 PROCEDURE — 99396 PREV VISIT EST AGE 40-64: CPT | Mod: S$GLB,,, | Performed by: INTERNAL MEDICINE

## 2024-02-19 PROCEDURE — 4010F ACE/ARB THERAPY RXD/TAKEN: CPT | Mod: CPTII,S$GLB,, | Performed by: INTERNAL MEDICINE

## 2024-02-19 PROCEDURE — 3044F HG A1C LEVEL LT 7.0%: CPT | Mod: CPTII,S$GLB,, | Performed by: INTERNAL MEDICINE

## 2024-02-19 PROCEDURE — 3079F DIAST BP 80-89 MM HG: CPT | Mod: CPTII,S$GLB,, | Performed by: INTERNAL MEDICINE

## 2024-02-19 PROCEDURE — 3074F SYST BP LT 130 MM HG: CPT | Mod: CPTII,S$GLB,, | Performed by: INTERNAL MEDICINE

## 2024-02-20 ENCOUNTER — PATIENT MESSAGE (OUTPATIENT)
Dept: BARIATRICS | Facility: CLINIC | Age: 59
End: 2024-02-20
Payer: COMMERCIAL

## 2024-02-21 ENCOUNTER — PATIENT MESSAGE (OUTPATIENT)
Dept: INTERNAL MEDICINE | Facility: CLINIC | Age: 59
End: 2024-02-21
Payer: COMMERCIAL

## 2024-02-26 ENCOUNTER — PATIENT MESSAGE (OUTPATIENT)
Dept: ADMINISTRATIVE | Facility: OTHER | Age: 59
End: 2024-02-26
Payer: COMMERCIAL

## 2024-02-29 ENCOUNTER — PATIENT MESSAGE (OUTPATIENT)
Dept: BARIATRICS | Facility: CLINIC | Age: 59
End: 2024-02-29
Payer: COMMERCIAL

## 2024-03-02 ENCOUNTER — HOSPITAL ENCOUNTER (OUTPATIENT)
Dept: RADIOLOGY | Facility: HOSPITAL | Age: 59
Discharge: HOME | End: 2024-03-02
Payer: COMMERCIAL

## 2024-03-02 DIAGNOSIS — G35 MULTIPLE SCLEROSIS: ICD-10-CM

## 2024-03-02 PROCEDURE — 70551 MRI BRAIN STEM W/O DYE: CPT | Mod: 26,,, | Performed by: RADIOLOGY

## 2024-03-02 PROCEDURE — 72141 MRI NECK SPINE W/O DYE: CPT | Mod: 26,,, | Performed by: RADIOLOGY

## 2024-03-02 PROCEDURE — 72146 MRI CHEST SPINE W/O DYE: CPT | Mod: 26,,, | Performed by: RADIOLOGY

## 2024-03-02 PROCEDURE — 70551 MRI BRAIN STEM W/O DYE: CPT | Mod: TC

## 2024-03-02 PROCEDURE — 72146 MRI CHEST SPINE W/O DYE: CPT | Mod: TC

## 2024-03-02 PROCEDURE — 72141 MRI NECK SPINE W/O DYE: CPT | Mod: TC

## 2024-03-02 NOTE — PROGRESS NOTES
"  Miesha Domínguez presents for follow up evaluation of   Encounter Diagnoses   Name Primary?    Carpal tunnel syndrome, bilateral Yes    Multiple sclerosis     Chronic pain of left wrist      History of Present Illness    She returns for pain, numbness and tingling in bilateral hands. A EMG was ordered 7/2023 but she was not able to schedule this due to changes in her insurance.  She states that her right trigger finger resolved. She sleeps with night braces, these give her some relief. She also has multiple sclerosis.    Vitals:    01/31/24 1110   Weight: 114.3 kg (252 lb)   Height: 5' 6" (1.676 m)   PainSc: 0-No pain   PainLoc: Hand       PE:    AA&O x 4.  NAD  HEENT:  NCAT, sclera nonicteric  Lungs:  Respirations are equal and unlabored.  CV:  2+ bilateral upper and lower extremity pulses.  MSK:   Physical Exam      + compression, Tinels and Phalens bilateral wrists. Neurovascularly intact bilaterally.  5/5 thenar and intrinsic musculature strength.  Full range of motion hands, wrists and elbows.      Diagnostic studies and other clinical records review:  Results        Assessment/Plan: bilateral carpal tunnel syndrome  Assessment & Plan       We have discussed the natural history of carpal tunnel syndrome including treatment options such as splinting, oral and topical anti-inflammatories, cortisone injections and surgery.     Follow up after EMG/NCS with new xrays of bilateral hands          Claire Richardson MD    Please be aware that this note has been generated with the assistance of [employer] [place] [employer] [place] voice-to-text.  Please excuse any spelling or grammatical errors.  This note was generated with the assistance of ambient listening technology. Verbal consent was obtained by the patient and accompanying visitor(s) for the recording of patient appointment to facilitate this note. I attest to having reviewed and edited the generated note for accuracy, though some syntax or spelling errors may persist. Please contact the " author of this note for any clarification.

## 2024-03-06 ENCOUNTER — OFFICE VISIT (OUTPATIENT)
Dept: PODIATRY | Facility: CLINIC | Age: 59
End: 2024-03-06
Payer: COMMERCIAL

## 2024-03-06 ENCOUNTER — PATIENT MESSAGE (OUTPATIENT)
Dept: BARIATRICS | Facility: CLINIC | Age: 59
End: 2024-03-06
Payer: COMMERCIAL

## 2024-03-06 ENCOUNTER — HOSPITAL ENCOUNTER (OUTPATIENT)
Dept: RADIOLOGY | Facility: HOSPITAL | Age: 59
Discharge: HOME | End: 2024-03-06
Attending: STUDENT IN AN ORGANIZED HEALTH CARE EDUCATION/TRAINING PROGRAM
Payer: COMMERCIAL

## 2024-03-06 VITALS
SYSTOLIC BLOOD PRESSURE: 129 MMHG | BODY MASS INDEX: 38.98 KG/M2 | DIASTOLIC BLOOD PRESSURE: 73 MMHG | HEART RATE: 47 BPM | HEIGHT: 67 IN

## 2024-03-06 DIAGNOSIS — M77.9 BONE SPUR: ICD-10-CM

## 2024-03-06 DIAGNOSIS — M19.079 ARTHRITIS OF MIDFOOT: ICD-10-CM

## 2024-03-06 DIAGNOSIS — M79.2 NEURITIS: ICD-10-CM

## 2024-03-06 DIAGNOSIS — E11.40 CONTROLLED TYPE 2 DIABETES MELLITUS WITH DIABETIC NEUROPATHY, WITHOUT LONG-TERM CURRENT USE OF INSULIN: ICD-10-CM

## 2024-03-06 DIAGNOSIS — M79.671 RIGHT FOOT PAIN: ICD-10-CM

## 2024-03-06 DIAGNOSIS — E11.9 ENCOUNTER FOR COMPREHENSIVE DIABETIC FOOT EXAMINATION, TYPE 2 DIABETES MELLITUS: Primary | ICD-10-CM

## 2024-03-06 PROCEDURE — 3044F HG A1C LEVEL LT 7.0%: CPT | Mod: CPTII,S$GLB,, | Performed by: STUDENT IN AN ORGANIZED HEALTH CARE EDUCATION/TRAINING PROGRAM

## 2024-03-06 PROCEDURE — 73630 X-RAY EXAM OF FOOT: CPT | Mod: TC,PN,RT

## 2024-03-06 PROCEDURE — 73630 X-RAY EXAM OF FOOT: CPT | Mod: 26,RT,, | Performed by: STUDENT IN AN ORGANIZED HEALTH CARE EDUCATION/TRAINING PROGRAM

## 2024-03-06 PROCEDURE — 3074F SYST BP LT 130 MM HG: CPT | Mod: CPTII,S$GLB,, | Performed by: STUDENT IN AN ORGANIZED HEALTH CARE EDUCATION/TRAINING PROGRAM

## 2024-03-06 PROCEDURE — 99203 OFFICE O/P NEW LOW 30 MIN: CPT | Mod: S$GLB,,, | Performed by: STUDENT IN AN ORGANIZED HEALTH CARE EDUCATION/TRAINING PROGRAM

## 2024-03-06 PROCEDURE — 1159F MED LIST DOCD IN RCRD: CPT | Mod: CPTII,S$GLB,, | Performed by: STUDENT IN AN ORGANIZED HEALTH CARE EDUCATION/TRAINING PROGRAM

## 2024-03-06 PROCEDURE — 99999 PR PBB SHADOW E&M-EST. PATIENT-LVL V: CPT | Mod: PBBFAC,,, | Performed by: STUDENT IN AN ORGANIZED HEALTH CARE EDUCATION/TRAINING PROGRAM

## 2024-03-06 PROCEDURE — 3066F NEPHROPATHY DOC TX: CPT | Mod: CPTII,S$GLB,, | Performed by: STUDENT IN AN ORGANIZED HEALTH CARE EDUCATION/TRAINING PROGRAM

## 2024-03-06 PROCEDURE — 3008F BODY MASS INDEX DOCD: CPT | Mod: CPTII,S$GLB,, | Performed by: STUDENT IN AN ORGANIZED HEALTH CARE EDUCATION/TRAINING PROGRAM

## 2024-03-06 PROCEDURE — 4010F ACE/ARB THERAPY RXD/TAKEN: CPT | Mod: CPTII,S$GLB,, | Performed by: STUDENT IN AN ORGANIZED HEALTH CARE EDUCATION/TRAINING PROGRAM

## 2024-03-06 PROCEDURE — 3078F DIAST BP <80 MM HG: CPT | Mod: CPTII,S$GLB,, | Performed by: STUDENT IN AN ORGANIZED HEALTH CARE EDUCATION/TRAINING PROGRAM

## 2024-03-06 PROCEDURE — 3061F NEG MICROALBUMINURIA REV: CPT | Mod: CPTII,S$GLB,, | Performed by: STUDENT IN AN ORGANIZED HEALTH CARE EDUCATION/TRAINING PROGRAM

## 2024-03-06 RX ORDER — DICLOFENAC SODIUM 10 MG/G
2 GEL TOPICAL 4 TIMES DAILY
Qty: 100 G | Refills: 1 | Status: SHIPPED | OUTPATIENT
Start: 2024-03-06 | End: 2024-03-16

## 2024-03-06 NOTE — PROGRESS NOTES
Subjective:     Patient ID: Miesha Domínguez is a 58 y.o. female.    Chief Complaint: Diabetes Mellitus (PCP Dr. Mcgill, 2/19/24), Diabetic Foot Exam, and Routine Foot Care    Miesha is a 58 y.o. female who presents to the clinic upon referral from Dr. Wolf  for evaluation and treatment of diabetic feet. Miesha has a past medical history of Asthma, Endometrial polyp (08/02/2019), HLD (hyperlipidemia), HTN (hypertension), MS (multiple sclerosis), Neuropathy, S/P D&C (status post dilation and curettage) (08/02/2019), and Type II or unspecified type diabetes mellitus without mention of complication, not stated as uncontrolled. Patient relates no major problem with feet. Only complaints today consist of occasional shooting pain to right foot across medial plantar arch. Relates it has improved when she started taking vitamin b12 supplements. No further pedal complaints.     PCP: Milagros Wolf MD    Date Last Seen by PCP: per above    Current shoe gear: Tennis shoes    Hemoglobin A1C   Date Value Ref Range Status   02/14/2024 5.9 (H) 4.0 - 5.6 % Final     Comment:     ADA Screening Guidelines:  5.7-6.4%  Consistent with prediabetes  >or=6.5%  Consistent with diabetes    High levels of fetal hemoglobin interfere with the HbA1C  assay. Heterozygous hemoglobin variants (HbS, HgC, etc)do  not significantly interfere with this assay.   However, presence of multiple variants may affect accuracy.     10/08/2022 5.5 4.0 - 5.6 % Final     Comment:     ADA Screening Guidelines:  5.7-6.4%  Consistent with prediabetes  >or=6.5%  Consistent with diabetes    High levels of fetal hemoglobin interfere with the HbA1C  assay. Heterozygous hemoglobin variants (HbS, HgC, etc)do  not significantly interfere with this assay.   However, presence of multiple variants may affect accuracy.     03/22/2022 5.4 4.0 - 5.6 % Final     Comment:     ADA Screening Guidelines:  5.7-6.4%  Consistent with  prediabetes  >or=6.5%  Consistent with diabetes    High levels of fetal hemoglobin interfere with the HbA1C  assay. Heterozygous hemoglobin variants (HbS, HgC, etc)do  not significantly interfere with this assay.   However, presence of multiple variants may affect accuracy.           Review of Systems   Constitutional: Negative for chills, decreased appetite, diaphoresis and fever.   HENT:  Negative for congestion and hearing loss.    Cardiovascular:  Negative for chest pain, claudication, leg swelling and syncope.   Respiratory:  Negative for cough and shortness of breath.    Skin:  Negative for color change, dry skin, flushing, itching, nail changes, poor wound healing and rash.   Musculoskeletal:  Positive for arthritis and joint pain. Negative for back pain and joint swelling.   Gastrointestinal:  Negative for nausea and vomiting.   Neurological:  Positive for paresthesias. Negative for focal weakness and weakness.   Psychiatric/Behavioral:  Negative for altered mental status. The patient is not nervous/anxious.         Objective:     Physical Exam  Constitutional:       General: She is not in acute distress.     Appearance: She is well-developed. She is not diaphoretic.   Cardiovascular:      Comments: Dorsalis pedis and posterior tibial pulses are within normal limits. Skin temperature is within normal limits. Toes are cool to touch and feet are warm proximally. Hair growth is within normal limits. Skin is normotrophic and without hyperpigmentation. No edema noted. No spider veins or varicosities noted, bilaterally.   Musculoskeletal:         General: No tenderness.      Comments: Adequate joint range of motion without pain, limitation, nor crepitation to bilateral feet and ankle joints. Muscle strength is 5/5 in all groups bilaterally.    Dorsomedial bone spur to right foot overlying 1st and 2nd TMTJ. No tenderness on palpation or pinpoint tenderness on exam today    Neutral arches, mild reduction with  weightbearing, bilaterally        Lymphadenopathy:      Comments: Negative lymphangitic streaking    Skin:     General: Skin is warm and dry.      Findings: No lesion.      Comments: Skin is warm and dry, no acute signs of infection noted. No open wounds, macerations or hyperkeratotic lesions, bilaterally.     Toenails are well trimmed and of normal morphology, bilaterally.      Neurological:      Mental Status: She is alert and oriented to person, place, and time.      Sensory: Sensory deficit present.      Motor: No abnormal muscle tone.      Comments: Light touch within normal limits. [place] 5.07 monofilamant testing is within normal limits. Vibratory sensation within normal limits, bilaterally.      Psychiatric:         Behavior: Behavior normal.         Thought Content: Thought content normal.         Judgment: Judgment normal.           Assessment:      Encounter Diagnoses   Name Primary?    Controlled type 2 diabetes mellitus with diabetic neuropathy, without long-term current use of insulin     Right foot pain     Arthritis of midfoot     Bone spur     Neuritis     Encounter for comprehensive diabetic foot examination, type 2 diabetes mellitus Yes     Plan:     Miesha was seen today for diabetes mellitus, diabetic foot exam and routine foot care.    Diagnoses and all orders for this visit:    Encounter for comprehensive diabetic foot examination, type 2 diabetes mellitus    Controlled type 2 diabetes mellitus with diabetic neuropathy, without long-term current use of insulin  -     Ambulatory referral/consult to Podiatry  -     DIABETIC SHOES FOR HOME USE    Right foot pain  -     Ambulatory referral/consult to Podiatry    Arthritis of midfoot  -     DIABETIC SHOES FOR HOME USE  -     X-Ray Foot Complete Right; Future    Bone spur  -     DIABETIC SHOES FOR HOME USE  -     X-Ray Foot Complete Right; Future    Neuritis    Other orders  -     diclofenac sodium (VOLTAREN) 1 % Gel; Apply 2 g topically  4 (four) times daily. for 10 days      I counseled the patient on her conditions, their implications and medical management.  Baseline xray ordered  Discussed patient with bone spur to dorsomedial foot, and likely with midfoot arthritis and neuritis. Recommend continue vitamin b12. Supportive and protective tennis/diabetic shoes at all times while ambulating. OTC recommendations dispensed. Rx diabetic shoes  RICE PRN  Shoe inspection. Diabetic Foot Education. Patient reminded of the importance of good nutrition and blood sugar control to help prevent podiatric complications of diabetes. Patient instructed on proper foot hygeine. We discussed wearing proper shoe gear, daily foot inspections, never walking without protective shoe gear, never putting sharp instruments to feet, she is considered low risk for diabetic foot complication   Return to clinic in 1 year, sooner PRN

## 2024-03-06 NOTE — PATIENT INSTRUCTIONS
[employer] [address] [facility] supportsIvette

## 2024-03-22 ENCOUNTER — TELEPHONE (OUTPATIENT)
Dept: NEUROLOGY | Facility: CLINIC | Age: 59
End: 2024-03-22
Payer: COMMERCIAL

## 2024-03-23 DIAGNOSIS — E11.40 TYPE 2 DIABETES MELLITUS WITH DIABETIC NEUROPATHY, WITHOUT LONG-TERM CURRENT USE OF INSULIN: ICD-10-CM

## 2024-03-24 RX ORDER — TIRZEPATIDE 7.5 MG/.5ML
INJECTION, SOLUTION SUBCUTANEOUS
Qty: 2 PEN | Refills: 2 | Status: SHIPPED | OUTPATIENT
Start: 2024-03-24 | End: 2024-04-29 | Stop reason: DRUGHIGH

## 2024-03-25 ENCOUNTER — TELEPHONE (OUTPATIENT)
Dept: NEUROLOGY | Facility: CLINIC | Age: 59
End: 2024-03-25
Payer: COMMERCIAL

## 2024-03-25 NOTE — TELEPHONE ENCOUNTER
Patient filling at [facility] and will continue to fill at [facility]. Aware to let us know if she changes her mind. Discussed hurricane procedures

## 2024-03-25 NOTE — TELEPHONE ENCOUNTER
----- Message from Nicole Aldana RN sent at 3/25/2024 12:45 PM CDT -----  Contact: [phone]    ----- Message -----  From: Alisia Maynard  Sent: 3/25/2024  12:45 PM CDT  To: Alfonzo CHAIDEZ Staff    Pt calling in reference to the 90 day prescription for glatiramer (GLATOPA) 40 mg/mL injection but needing to be send [facility]  mail  order. Pls call                 [facility] [place] - [facility] AT Portal to Registered [facility] Sites   Phone: [phone]  Fax: [phone]        ..

## 2024-03-26 ENCOUNTER — PATIENT MESSAGE (OUTPATIENT)
Dept: PSYCHIATRY | Facility: CLINIC | Age: 59
End: 2024-03-26
Payer: COMMERCIAL

## 2024-03-26 ENCOUNTER — PROCEDURE VISIT (OUTPATIENT)
Dept: NEUROLOGY | Facility: CLINIC | Age: 59
End: 2024-03-26
Payer: COMMERCIAL

## 2024-03-26 DIAGNOSIS — G56.01 RIGHT CARPAL TUNNEL SYNDROME: ICD-10-CM

## 2024-03-26 PROCEDURE — 95886 MUSC TEST DONE W/N TEST COMP: CPT | Mod: S$GLB,,, | Performed by: PHYSICAL MEDICINE & REHABILITATION

## 2024-03-26 PROCEDURE — 95911 NRV CNDJ TEST 9-10 STUDIES: CPT | Mod: S$GLB,,, | Performed by: PHYSICAL MEDICINE & REHABILITATION

## 2024-03-26 NOTE — PROCEDURES
Test Date:  3/26/2024    Patient: meg domínguez : 1965 Physician: Roque Head D.O.   ID#: [id number] SEX: Female Ref. Phys: Claire Richardson MD     HPI: Meg Domínguez is a 58 y.o.female who presents for NCS/EMG to evaluate for CTS bilaterally, R>L.      PROCEDURE:  Prior to the procedure, the procedure was discussed in detail with the patient.  All questions were answered, and verbal consent was obtained.  For nerve conduction studies, a combination of surface electrodes, bar electrodes, and/or ring electrodes were used as needed.  For needle EMG, each site was cleaned and prepped in usual fashion with an alcohol pad.  A monopolar needle (28G) was used.  There was no significant bleeding, and bandages were applied as needed.  The procedure was tolerated without adverse effect.  The patient was instructed on post-procedure care including ice if needed for 10-15 minutes up to 4 times/day for any sore muscles.  I discussed with the patient that the data would be reviewed and a report sent to the referring provider, where any follow up questions regarding next steps should be directed.        NCV & EMG Findings:  Evaluation of the right median motor nerve showed prolonged distal onset latency and decreased conduction velocity.  The left median sensory nerve showed prolonged distal onset latency, prolonged distal peak latency, and decreased conduction velocity.  The right median sensory nerve showed prolonged distal onset latency, prolonged distal peak latency, reduced amplitude, and decreased conduction velocity.  All remaining nerves (as indicated in the following tables) were within normal limits.  Needle evaluation of the right Abductor Pollicis Brevis muscle showed increased insertional activity, slightly increased spontaneous activity, increased motor unit amplitude, moderately increased polyphasic potentials, and diminished recruitment.  All remaining muscles (as indicated in the following  table) showed no evidence of electrical instability.      IMPRESSIONS:  There is electrophysiologic evidence of a bilateral (sensory on the left, sensorimotor on the right) median mononeuropathy across the wrist (I.e. Carpal tunnel syndrome).  There is no motor axonal loss.  There is active denervation on the right.  This appears chronic on the right.  This is graded as Severe in severity on the right, and Mild on the left.            ___________________________  Roque Head D.O.        NCS+  Motor Nerve Results      Latency Amplitude F-Lat Segment Distance CV Comment   Site (ms) Norm (mV) Norm (ms)  (cm) (m/s) Norm    Left Median (APB)   Wrist 4.3  < 4.4 13.2  > 4.2         Elbow 9.1 - 11.0 -  Elbow-Wrist 25 52  > 51    Right Median (APB)   Wrist *8.3  < 4.4 8.0  > 4.2         Elbow 13.1 - 7.6 -  Elbow-Wrist 22 *46  > 51    Left Ulnar (ADM)   Wrist 2.8  < 3.7 9.1  > 3.0         Bel Elbow 7.1 - 7.8 -  Bel Elbow-Wrist 26 60  > 52    Right Ulnar (ADM)   Wrist 3.1  < 3.7 6.7  > 3.0         Bel Elbow 6.6 - 7.1 -  Bel Elbow-Wrist 25.5 73  > 52    Abv Elbow 7.8 - 7.7 -  Abv Elbow-Bel Elbow 10 83  > 43      Sensory Nerve Results      Start Lat Latency (Peak) Amplitude (P-P) Segment Distance Start CV Comment   Site (ms) Norm (ms) (µV) Norm  (cm) (m/s) Norm    Left Median (Rec:Dig II)   Wrist *3.5  < 3.3 *4.5 56  > 8 Wrist-Dig II 14 *40  > 42    Right Median (Rec:Dig II)   Wrist *7.5  < 3.3 *8.7 *6  > 8 Wrist-Dig II 14 *19  > 42    Left Ulnar (Rec:Dig V)   Wrist 2.4  < 3.1 3.1 39  > 4 Wrist-Dig V 14 58  > 45    Right Ulnar (Rec:Dig V)   Wrist 2.1  < 3.1 2.9 58  > 4 Wrist-Dig V 14 67  > 45    Right Radial (Rec:Wrist)   Forearm 1.53  < 2.2 2.1 37  > 11 Forearm-Wrist 10 65 -      EMG+     Side Muscle Nerve Root Ins Act Fibs Psw Amp Dur Poly Recrt Int Pat Comment   Right Deltoid Axillary C5-C6 Nml Nml Nml Nml Nml 0 Nml Nml    Right Biceps Musculocut C5-C6 Nml Nml Nml Nml Nml 0 Nml Nml    Right Triceps Radial C6-C8 Nml  Nml Nml Nml Nml 0 Nml Nml    Right Pronator Teres Median C6-C7 Nml Nml Nml Nml Nml 0 Nml Nml    Right APB Median C8-T1 *Incr *1+ *1+ *Incr Nml *2+ *Reduced Nml            Waveforms:    Motor              Sensory

## 2024-03-27 ENCOUNTER — PATIENT MESSAGE (OUTPATIENT)
Dept: ADMINISTRATIVE | Facility: OTHER | Age: 59
End: 2024-03-27
Payer: COMMERCIAL

## 2024-03-27 ENCOUNTER — OFFICE VISIT (OUTPATIENT)
Dept: ORTHOPEDICS | Facility: CLINIC | Age: 59
End: 2024-03-27
Payer: COMMERCIAL

## 2024-03-27 VITALS — WEIGHT: 248.88 LBS | HEIGHT: 67 IN | BODY MASS INDEX: 39.06 KG/M2

## 2024-03-27 DIAGNOSIS — G56.01 CARPAL TUNNEL SYNDROME OF RIGHT WRIST: Primary | ICD-10-CM

## 2024-03-27 DIAGNOSIS — G56.02 CARPAL TUNNEL SYNDROME OF LEFT WRIST: ICD-10-CM

## 2024-03-27 PROCEDURE — 3044F HG A1C LEVEL LT 7.0%: CPT | Mod: CPTII,S$GLB,, | Performed by: ORTHOPAEDIC SURGERY

## 2024-03-27 PROCEDURE — 3061F NEG MICROALBUMINURIA REV: CPT | Mod: CPTII,S$GLB,, | Performed by: ORTHOPAEDIC SURGERY

## 2024-03-27 PROCEDURE — 99214 OFFICE O/P EST MOD 30 MIN: CPT | Mod: 25,S$GLB,, | Performed by: ORTHOPAEDIC SURGERY

## 2024-03-27 PROCEDURE — 3066F NEPHROPATHY DOC TX: CPT | Mod: CPTII,S$GLB,, | Performed by: ORTHOPAEDIC SURGERY

## 2024-03-27 PROCEDURE — 4010F ACE/ARB THERAPY RXD/TAKEN: CPT | Mod: CPTII,S$GLB,, | Performed by: ORTHOPAEDIC SURGERY

## 2024-03-27 PROCEDURE — 1160F RVW MEDS BY RX/DR IN RCRD: CPT | Mod: CPTII,S$GLB,, | Performed by: ORTHOPAEDIC SURGERY

## 2024-03-27 PROCEDURE — 99999 PR PBB SHADOW E&M-EST. PATIENT-LVL V: CPT | Mod: PBBFAC,,, | Performed by: ORTHOPAEDIC SURGERY

## 2024-03-27 PROCEDURE — 1159F MED LIST DOCD IN RCRD: CPT | Mod: CPTII,S$GLB,, | Performed by: ORTHOPAEDIC SURGERY

## 2024-03-27 PROCEDURE — 20526 THER INJECTION CARP TUNNEL: CPT | Mod: RT,S$GLB,, | Performed by: ORTHOPAEDIC SURGERY

## 2024-03-27 PROCEDURE — 3008F BODY MASS INDEX DOCD: CPT | Mod: CPTII,S$GLB,, | Performed by: ORTHOPAEDIC SURGERY

## 2024-03-27 RX ADMIN — METHYLPREDNISOLONE ACETATE 40 MG: 40 INJECTION, SUSPENSION INTRA-ARTICULAR; INTRALESIONAL; INTRAMUSCULAR; SOFT TISSUE at 09:03

## 2024-03-27 NOTE — PATIENT INSTRUCTIONS
[facility]  HAND CLINIC SURGERY INSTRUCTIONS  Claire Richardson MD  Date of Surgery: 6.4.24    Location of Surgery:      [x] [place], [address]    [address] [place] [address]    [] [facility]  [address] [place] [address]    [] [facility]  [address]nd [place] [address]    PRE-OPERATIVE INSTRUCTIONS:    Dr. Richardson's clinic staff will call you THE DAY BEFORE SURGERY with your arrival time. You will be notified in the afternoon between 3:00p-5:00pm. We will attempt to provide your arrival time earlier and will call you if this is at all possible.     DO NOT eat or drink after midnight, this includes gum/hard candy, coffee.   You may have ONLY SMALL sips of WATER the morning of surgery to take your medication, NOTHING ELSE.    You ARE NOT to drive or take an Uber/Lyft/taxi home from surgery. Please arrange a friend/family member to accompany you at the surgery center and drive you home.  Transportation:     PLEASE REMOVE nail polish and/or artificial nails prior to your surgery date if applicable. [x] remove from right hand    PRE-OPERATIVE MEDICATION INSTRUCTIONS:    If you are diabetic, DO NOT take any diabetic medication the night before surgery or morning of surgery. If you are type I diabetic on an insulin pump, please bring your monitor the morning of surgery.   [x] DM2 - hold metformin PM prior to sx and AM of sx    MUST HOLD SEMAGLUTIDE MEDICATIONS 7 DAYS PRIOR TO SURGERY, examples: Mounjaro, Ozempic, Trulicity.   [x] Mounjaro (Mondays) - hold 6.3.24 and 6.27.24    If you have high blood pressure please take your medication in the morning like normal with a SIP OF WATER; with the exception of any Angiotensin receptor blocker (end in sartan) and ACE inhibitors (end in pril).  [x] losartan - hold in AM of sx    If you are taking blood thinners (Aspirin, Eliquis,  Lovenox, Coumadin, etc), our office will contact the prescribing physician for guidance on when you are to stop/re-start your blood thinner medication.   [x] NA    Other medications to dose with a SIP OF WATER AM of surgery: atorvastatin in AM with a sip of water    Please HOLD Vitamins 1-7 days prior to surgery, CONTINUE Vitamin D up until the AM of your surgery.    Avoid anti-inflammatory medications 5-7 days before your surgery. This includes Aleve/Naproxen, Celebrex, Meloxicam/Mobic, Voltaren/Diclofenac.   You may dose ibuprofen/Advil/Motrin up until the day before your surgery.  You may take extra strength Tylenol/Acetaminophen.    HOLD ALL OTHER MEDICATIONS AM OF SURGERY THAT ARE NOT DISCUSSED ABOVE        POST-OPERATIVE MEDICATION INSTRUCTIONS:    Your post-operative pain medication will be DELIVERED BEDSIDE to you at the surgery center by the [facility]. You DO NOT need to pick this medication up from the [facility].     TAKE post-operative pain medication as directed.   You may also take over-the-counter extra strength Tylenol/acetaminophen as directed on the bottle for breakthrough pain between dosed of prescribed pain medication.   Please note, some pain medications contain Tylenol/acetaminophen.   DO NOT exceed 3000mg of Tylenol/acetaminophen in 1 day.  You may also use an over-the-counter anti-inflammatory medication also known as an NSAID,  (Aleve/Naproxen) as directed on the bottle for breakthrough pain between doses of prescribed pain medication.  DO NOT take NSAIDs if you have been previously instructed not to by a physician.     POST-OPERATIVE INSTRUCTIONS:    DO NOT let your operative area become wet, Your dressings/bandages/splints must remain dry.   Recommend waterproof arm cast cover to be worn when showering and bathing and can be purchased on Amazon. Garbage bags, plastic shopping bags, or umbrella bags can also be used instead.    DO NOT remove any post-operative  dressings/bandages/splints until you are seen by Dr. Laura or Occupational Therapy   These dressings/bandages/splints are in place to keep the operative site clean, dry, and in optimal healing position     ELEVATE your hand/arm above the level of your heart as much as possible to decrease post-operative swelling for first 48 hours.  Swelling after surgery is TO BE EXPECTED.      ICE the operative site following surgery for 20-30 minutes, 4-5 times per day.  Be sure to have a towel between your dressings/bandages/splint to keep from getting wet.    MOVE the parts of your hand/arm that are not immobilized in a sling or splint.   Make a gentle fist with your fingers, bend/straighten your elbow, etc.   DO NOT attempt any strengthening exercises (hand putty, exercise balls, etc) on your operative side as this can increase swelling.     *CALL the [facility] at [phone]*  If at any time following surgery your dressings/bandages/splints: get wet, come loose, feels tight, feels uncomfortable.  Or if you are concerned about possible infection, worsening pain, worsening swelling or have any other concerns regarding your surgery.   Signs of infection:  fever (over 100.3), chills, body aches, increased warmth, redness, significant increase in swelling & pain at surgical site.     OUTPATIENT FOLLOW UP:  YOU WILL BE SEEN FIRST BY []  DR. INO LAURA  [x] OCCUPATIONAL THERAPY 13-14 DAYS AFTER YOUR SURGERY. Your splint / soft dressing will be removed and your sutures removed if applicable.  YOU WILL BE SEEN BY DR. LAURA IN CLINIC 3 WEEKS AFTER SURGERY.    FMLA or Disability paperwork can be sent to [facility]  *Only needed if you are going to be out of work 2 weeks or more*  (P) [phone] (F) [phone] or email [email or web address]

## 2024-03-27 NOTE — PROCEDURES
Carpal Tunnel    Date/Time: 3/27/2024 9:15 AM    Performed by: Claire Richardson MD  Authorized by: Claire Richardson MD    Consent Done?:  Yes (Verbal)  Indications:  Pain  Timeout: prior to procedure the correct patient, procedure, and site was verified    Prep: patient was prepped and draped in usual sterile fashion      Local anesthesia used?: Yes    Anesthesia:  Local infiltration  Local anesthetic:  Lidocaine 1% without epinephrine  Location:  Wrist  Needle size:  25 G  Medications:  40 mg methylPREDNISolone acetate 40 mg/mL  Patient tolerance:  Patient tolerated the procedure well with no immediate complications

## 2024-03-27 NOTE — PROGRESS NOTES
"Miesha Domínguez presents for follow up evaluation of   Encounter Diagnoses   Name Primary?    Carpal tunnel syndrome of right wrist Yes    Carpal tunnel syndrome of left wrist    The patient has had an EMG/NCS which we reviewed together today and it showed: severe carpal tunnel syndrome on the right and mild carpal tunnel syndrome on the left. She states she continues to have numbness and tingling most significant on the right hand. She sleeps with night braces, these help some.    Vitals:    03/27/24 0913   Weight: 112.9 kg (248 lb 14.4 oz)   Height: 5' 7" (1.702 m)   PainSc: 0-No pain   PainLoc: Hand     History of Present Illness    PE:    AA&O x 4.  NAD  HEENT:  NCAT, sclera nonicteric  Lungs:  Respirations are equal and unlabored.  CV:  2+ bilateral upper and lower extremity pulses.  MSK:  Bilateral hand: + median n. compression, + Tinel's + Phalens. Neurovascularly intact. 5/5 thenar and intrinsic musculature strength, otherwise full range of motion hands, wrists and elbows.      Physical Exam      Diagnostic studies and other clinical records review:  EMG/NCS 3.26.24 Impression: There is electrophysiologic evidence of a bilateral (sensory on the left, sensorimotor on the right) median mononeuropathy across the wrist (I.e. Carpal tunnel syndrome).  There is no motor axonal loss.  There is active denervation on the right.  This appears chronic on the right.  This is graded as Severe in severity on the right, and Mild on the left.      Results      ASSESSMENT:  Bilateral carpal tunnel syndrome  Assessment & Plan      PLAN: We have discussed the natural history of carpal tunnel syndrome including treatment options such as splinting, oral and topical anti-inflammatories, cortisone injections and surgery. 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    -I have offered her a selective injection. I have explained the risks, benefits, and alternatives of the procedure in detail.  The patient voices " understanding and all questions have been answered. The patient agrees to proceed as planned. So after a sterile prep of the skin in the normal fashion the right carpal tunnel was injected using a 25 gauge needle with a combination of 1cc 1% plain lidocaine and 40 mg of methylprednisolone.  The patient is cautioned and immediate relief of pain is secondary to the local anesthetic and will be temporary.  After the anesthetic wears off there may be a increase in pain that may last for a few hours or a few days and they should use ice to help alleviate this flair up of pain. Patient tolerated the procedure well.    We have discussed conservative vs. surgical treatment as well as risks, benefits and alternatives for right carpal tunnel syndrome.  Conservative measures have been exhausted and she would like to proceed with surgery. Surgery would include right carpal tunnel release. Consent signed today in clinic. Light use of the hand will be indicated for the first 4-6 weeks.     We have discussed risks of hand surgery which include but are not limited to blood clots in the legs that can travel to the lungs (pulmonary embolism). Pulmonary embolism can cause shortness of breath, chest pain, and even shock. Other risks include urinary tract infection, nausea and vomiting (usually related to pain medication), chronic pain, bleeding, nerve damage, blood vessel injury, scarring and infection of the hand which can require re-operation. Furthermore, the risks of anesthesia include potential heart, lung, kidney, and liver damage.  Informed consent was obtained.  The patient understands and would like to proceed with surgery in the near future.    Call with any questions/concerns in the interim        Claire Richardson MD    Please be aware that this note has been generated with the assistance of [facility] voice-to-text.  Please excuse any spelling or grammatical errors.  This note was generated with the assistance of [facility]  listening technology. Verbal consent was obtained by the patient and accompanying visitor(s) for the recording of patient appointment to facilitate this note. I attest to having reviewed and edited the generated note for accuracy, though some syntax or spelling errors may persist. Please contact the author of this note for any clarification.

## 2024-03-29 RX ORDER — CEFAZOLIN SODIUM 2 G/50ML
2 SOLUTION INTRAVENOUS
Status: CANCELLED | OUTPATIENT
Start: 2024-03-29

## 2024-03-29 RX ORDER — MUPIROCIN 20 MG/G
OINTMENT TOPICAL
Status: CANCELLED | OUTPATIENT
Start: 2024-03-29

## 2024-03-29 RX ORDER — METHYLPREDNISOLONE ACETATE 40 MG/ML
40 INJECTION, SUSPENSION INTRA-ARTICULAR; INTRALESIONAL; INTRAMUSCULAR; SOFT TISSUE
Status: DISCONTINUED | OUTPATIENT
Start: 2024-03-27 | End: 2024-03-29 | Stop reason: HOSPADM

## 2024-04-03 ENCOUNTER — PATIENT MESSAGE (OUTPATIENT)
Dept: BARIATRICS | Facility: CLINIC | Age: 59
End: 2024-04-03
Payer: COMMERCIAL

## 2024-04-04 ENCOUNTER — PATIENT MESSAGE (OUTPATIENT)
Dept: NEUROLOGY | Facility: CLINIC | Age: 59
End: 2024-04-04
Payer: COMMERCIAL

## 2024-04-04 ENCOUNTER — PATIENT MESSAGE (OUTPATIENT)
Dept: INTERNAL MEDICINE | Facility: CLINIC | Age: 59
End: 2024-04-04
Payer: COMMERCIAL

## 2024-04-04 DIAGNOSIS — G35 MULTIPLE SCLEROSIS: ICD-10-CM

## 2024-04-04 DIAGNOSIS — H61.20 IMPACTED CERUMEN, UNSPECIFIED LATERALITY: Primary | ICD-10-CM

## 2024-04-04 RX ORDER — PREGABALIN 100 MG/1
100 CAPSULE ORAL 3 TIMES DAILY
Qty: 270 CAPSULE | Refills: 1 | OUTPATIENT
Start: 2024-04-04

## 2024-04-04 RX ORDER — PREGABALIN 100 MG/1
100 CAPSULE ORAL 3 TIMES DAILY
Qty: 270 CAPSULE | Refills: 0 | Status: SHIPPED | OUTPATIENT
Start: 2024-04-04

## 2024-04-09 ENCOUNTER — PATIENT MESSAGE (OUTPATIENT)
Dept: BARIATRICS | Facility: CLINIC | Age: 59
End: 2024-04-09
Payer: COMMERCIAL

## 2024-04-16 ENCOUNTER — OFFICE VISIT (OUTPATIENT)
Dept: INTERNAL MEDICINE | Facility: CLINIC | Age: 59
End: 2024-04-16
Payer: COMMERCIAL

## 2024-04-16 VITALS
HEIGHT: 66 IN | HEART RATE: 46 BPM | DIASTOLIC BLOOD PRESSURE: 62 MMHG | RESPIRATION RATE: 16 BRPM | OXYGEN SATURATION: 97 % | BODY MASS INDEX: 38.94 KG/M2 | WEIGHT: 242.31 LBS | TEMPERATURE: 98 F | SYSTOLIC BLOOD PRESSURE: 112 MMHG

## 2024-04-16 DIAGNOSIS — J01.40 ACUTE PANSINUSITIS, RECURRENCE NOT SPECIFIED: Primary | ICD-10-CM

## 2024-04-16 DIAGNOSIS — E11.9 CONTROLLED TYPE 2 DIABETES MELLITUS WITHOUT COMPLICATION, WITHOUT LONG-TERM CURRENT USE OF INSULIN: ICD-10-CM

## 2024-04-16 DIAGNOSIS — H61.21 IMPACTED CERUMEN OF RIGHT EAR: ICD-10-CM

## 2024-04-16 DIAGNOSIS — I10 ESSENTIAL HYPERTENSION: Chronic | ICD-10-CM

## 2024-04-16 PROCEDURE — 3074F SYST BP LT 130 MM HG: CPT | Mod: CPTII,S$GLB,, | Performed by: NURSE PRACTITIONER

## 2024-04-16 PROCEDURE — 1160F RVW MEDS BY RX/DR IN RCRD: CPT | Mod: CPTII,S$GLB,, | Performed by: NURSE PRACTITIONER

## 2024-04-16 PROCEDURE — 3066F NEPHROPATHY DOC TX: CPT | Mod: CPTII,S$GLB,, | Performed by: NURSE PRACTITIONER

## 2024-04-16 PROCEDURE — 99214 OFFICE O/P EST MOD 30 MIN: CPT | Mod: S$GLB,,, | Performed by: NURSE PRACTITIONER

## 2024-04-16 PROCEDURE — 2023F DILAT RTA XM W/O RTNOPTHY: CPT | Mod: CPTII,S$GLB,, | Performed by: NURSE PRACTITIONER

## 2024-04-16 PROCEDURE — 4010F ACE/ARB THERAPY RXD/TAKEN: CPT | Mod: CPTII,S$GLB,, | Performed by: NURSE PRACTITIONER

## 2024-04-16 PROCEDURE — 3008F BODY MASS INDEX DOCD: CPT | Mod: CPTII,S$GLB,, | Performed by: NURSE PRACTITIONER

## 2024-04-16 PROCEDURE — 3078F DIAST BP <80 MM HG: CPT | Mod: CPTII,S$GLB,, | Performed by: NURSE PRACTITIONER

## 2024-04-16 PROCEDURE — 3061F NEG MICROALBUMINURIA REV: CPT | Mod: CPTII,S$GLB,, | Performed by: NURSE PRACTITIONER

## 2024-04-16 PROCEDURE — 1159F MED LIST DOCD IN RCRD: CPT | Mod: CPTII,S$GLB,, | Performed by: NURSE PRACTITIONER

## 2024-04-16 PROCEDURE — 99999 PR PBB SHADOW E&M-EST. PATIENT-LVL V: CPT | Mod: PBBFAC,,, | Performed by: NURSE PRACTITIONER

## 2024-04-16 PROCEDURE — 3044F HG A1C LEVEL LT 7.0%: CPT | Mod: CPTII,S$GLB,, | Performed by: NURSE PRACTITIONER

## 2024-04-16 RX ORDER — CETIRIZINE HYDROCHLORIDE 10 MG/1
10 TABLET ORAL DAILY
Qty: 30 TABLET | Refills: 0 | Status: SHIPPED | OUTPATIENT
Start: 2024-04-16 | End: 2024-05-09

## 2024-04-16 RX ORDER — FLUTICASONE PROPIONATE 50 MCG
1 SPRAY, SUSPENSION (ML) NASAL DAILY
Qty: 16 G | Refills: 0 | Status: SHIPPED | OUTPATIENT
Start: 2024-04-16 | End: 2024-06-07

## 2024-04-16 RX ORDER — AMOXICILLIN AND CLAVULANATE POTASSIUM 875; 125 MG/1; MG/1
1 TABLET, FILM COATED ORAL EVERY 12 HOURS
Qty: 14 TABLET | Refills: 0 | Status: SHIPPED | OUTPATIENT
Start: 2024-04-16 | End: 2024-04-23

## 2024-04-16 NOTE — PROGRESS NOTES
Subjective:       Patient ID: Miesha Domínguez is a 58 y.o. female.    Chief Complaint: Sinusitis (Sinus pressure in forehead and bridge of nose ), sneezing, Sinus Problem (Sinus drip), and Ear Fullness (Bilateral ear fullness)    Patient is a 58 y.o. female who traditionally follows with Milagros Wolf MD presenting today for:     Upper Respiratory Infection  Patient complains of symptoms of a URI, possible sinusitis. Symptoms include post nasal drip and sinus pressure. Onset of symptoms was 1 week ago, and has been unchanged since that time. Treatment to date: antihistamines and azelastine .    Review of patient's allergies indicates:  No Known Allergies    Medication List with Changes/Refills   New Medications    AMOXICILLIN-CLAVULANATE 875-125MG (AUGMENTIN) 875-125 MG PER TABLET    Take 1 tablet by mouth every 12 (twelve) hours. for 7 days    CETIRIZINE (ZYRTEC) 10 MG TABLET    Take 1 tablet (10 mg total) by mouth once daily.    FLUTICASONE PROPIONATE (FLONASE) 50 MCG/ACTUATION NASAL SPRAY    1 spray (50 mcg total) by Each Nostril route once daily.   Current Medications    AZELASTINE (ASTELIN) 137 MCG (0.1 %) NASAL SPRAY    1 spray (137 mcg total) by Nasal route 2 (two) times daily.    CHOLECALCIFEROL, VITAMIN D3, (VITAMIN D3) 50 MCG (2,000 UNIT) CAP    Take 1 capsule by mouth once daily.    CYANOCOBALAMIN, VITAMIN B-12, (VITAMIN B-12) 2,500 MCG SUBL    Place 1 tablet under the tongue once a week. Pt taking weekly    CYCLOBENZAPRINE (FLEXERIL) 5 MG TABLET    TAKE 1 TABLET BY MOUTH NIGHTLY AS NEEDED.    DICLOFENAC SODIUM (VOLTAREN) 1 % GEL    Apply 2 g topically 4 (four) times daily. for 10 days    ESTRADIOL (ESTRACE) 0.01 % (0.1 MG/GRAM) VAGINAL CREAM    Place 1 g vaginally twice a week.    FEXOFENADINE (ALLEGRA ALLERGY) 180 MG TABLET    Take 1 tablet (180 mg total) by mouth once daily.    GLATIRAMER (GLATOPA) 40 MG/ML INJECTION    INJECT 1 SYRINGE UNDER THE SKIN 3 TIMES A WEEK AT LEAST 48 HOURS  "APART. ALLOW TO WARM TO ROOM TEMP FOR 20 MINUTES. REFRIGERATE.    HYDROCHLOROTHIAZIDE (HYDRODIURIL) 25 MG TABLET    Take 1 tablet (25 mg total) by mouth once daily.    LOSARTAN (COZAAR) 100 MG TABLET    Take 1 tablet (100 mg total) by mouth once daily.    METFORMIN (GLUCOPHAGE) 1000 MG TABLET    TAKE 1/2 TABLETS (500 MG TOTAL) BY MOUTH DAILY WITH BREAKFAST.    MULTIVITAMIN ORAL    Take 1 tablet by mouth once daily. Centrum adult chews ( flavor burst)    PREGABALIN (LYRICA) 100 MG CAPSULE    Take 1 capsule (100 mg total) by mouth 3 (three) times daily.    ROSUVASTATIN (CRESTOR) 20 MG TABLET    Take 1 tablet (20 mg total) by mouth once daily.    SODIUM CHLORIDE (OCEAN) 0.65 % NASAL SPRAY    1 spray by Nasal route as needed for Congestion.    TIRZEPATIDE (MOUNJARO) 7.5 MG/0.5 ML PNIJ    INJECT 7.5MG SUBCUTANEOUSLYEVERY 7 DAYS    TIRZEPATIDE 7.5 MG/0.5 ML PNIJ    Inject 7.5 mg into the skin every 7 days.     Medical, social and surgical history has been reviewed with the patient.     Review of Systems   Constitutional:  Negative for chills and fever.   HENT:  Positive for postnasal drip, sinus pressure/congestion and voice change. Negative for nasal congestion and rhinorrhea.    Respiratory:  Negative for cough and shortness of breath.    Cardiovascular:  Negative for chest pain.   Neurological:  Negative for dizziness and headaches.      Objective:   /62 (BP Location: Right arm, Patient Position: Sitting, BP Method: Medium (Manual))   Pulse (!) 46   Temp 97.9 °F (36.6 °C) (Temporal)   Resp 16   Ht 5' 6" (1.676 m)   Wt 109.9 kg (242 lb 4.6 oz)   LMP 04/03/2019 (Approximate)   SpO2 97%   BMI 39.11 kg/m²     Physical Exam  Vitals reviewed.   Constitutional:       Appearance: Normal appearance.   HENT:      Head: Normocephalic and atraumatic.      Right Ear: There is impacted cerumen.      Left Ear: Tympanic membrane is bulging.      Nose:      Right Turbinates: Swollen and pale.      Left Turbinates: Swollen " and pale.      Right Sinus: No maxillary sinus tenderness or frontal sinus tenderness.      Left Sinus: No maxillary sinus tenderness or frontal sinus tenderness.      Mouth/Throat:      Pharynx: Posterior oropharyngeal erythema present.   Cardiovascular:      Rate and Rhythm: Normal rate and regular rhythm.      Heart sounds: Normal heart sounds. No murmur heard.  Pulmonary:      Effort: Pulmonary effort is normal.      Breath sounds: Normal breath sounds. No wheezing.   Lymphadenopathy:      Head:      Right side of head: No submental, submandibular or tonsillar adenopathy.      Left side of head: No submental, submandibular or tonsillar adenopathy.   Skin:     General: Skin is warm and dry.   Neurological:      Mental Status: She is alert and oriented to person, place, and time.       Assessment and Plan:     1. Acute pansinusitis, recurrence not specified    - fluticasone propionate (FLONASE) 50 mcg/actuation nasal spray; 1 spray (50 mcg total) by Each Nostril route once daily.  Dispense: 16 g; Refill: 0  - cetirizine (ZYRTEC) 10 MG tablet; Take 1 tablet (10 mg total) by mouth once daily.  Dispense: 30 tablet; Refill: 0  - amoxicillin-clavulanate 875-125mg (AUGMENTIN) 875-125 mg per tablet; Take 1 tablet by mouth every 12 (twelve) hours. for 7 days  Dispense: 14 tablet; Refill: 0    2. Impacted cerumen of right ear    Chronic, stable, follow up with ENT     3. Essential hypertension  4. Controlled type 2 diabetes mellitus without complication, without long-term current use of insulin    Chronic, stable, taken into consideration when choosing treatment plan

## 2024-04-18 ENCOUNTER — PATIENT MESSAGE (OUTPATIENT)
Dept: ADMINISTRATIVE | Facility: OTHER | Age: 59
End: 2024-04-18
Payer: COMMERCIAL

## 2024-04-29 ENCOUNTER — PATIENT MESSAGE (OUTPATIENT)
Dept: INTERNAL MEDICINE | Facility: CLINIC | Age: 59
End: 2024-04-29
Payer: COMMERCIAL

## 2024-04-29 DIAGNOSIS — E11.40 TYPE 2 DIABETES MELLITUS WITH DIABETIC NEUROPATHY, WITHOUT LONG-TERM CURRENT USE OF INSULIN: ICD-10-CM

## 2024-04-29 DIAGNOSIS — I15.2 OBESITY, DIABETES, AND HYPERTENSION SYNDROME: Primary | ICD-10-CM

## 2024-04-29 DIAGNOSIS — E11.69 OBESITY, DIABETES, AND HYPERTENSION SYNDROME: Primary | ICD-10-CM

## 2024-04-29 DIAGNOSIS — E11.59 OBESITY, DIABETES, AND HYPERTENSION SYNDROME: Primary | ICD-10-CM

## 2024-04-29 DIAGNOSIS — E66.9 OBESITY, DIABETES, AND HYPERTENSION SYNDROME: Primary | ICD-10-CM

## 2024-05-02 ENCOUNTER — PATIENT MESSAGE (OUTPATIENT)
Dept: PSYCHIATRY | Facility: CLINIC | Age: 59
End: 2024-05-02
Payer: COMMERCIAL

## 2024-05-08 DIAGNOSIS — J01.40 ACUTE PANSINUSITIS, RECURRENCE NOT SPECIFIED: ICD-10-CM

## 2024-05-09 RX ORDER — CETIRIZINE HYDROCHLORIDE 10 MG/1
10 TABLET ORAL
Qty: 90 TABLET | Refills: 1 | Status: SHIPPED | OUTPATIENT
Start: 2024-05-09

## 2024-05-14 ENCOUNTER — PATIENT MESSAGE (OUTPATIENT)
Dept: BARIATRICS | Facility: CLINIC | Age: 59
End: 2024-05-14
Payer: COMMERCIAL

## 2024-05-15 DIAGNOSIS — G35 MULTIPLE SCLEROSIS: ICD-10-CM

## 2024-05-16 RX ORDER — GLATIRAMER 40 MG/ML
INJECTION, SOLUTION SUBCUTANEOUS
Qty: 36 EACH | Refills: 1 | Status: ACTIVE | OUTPATIENT
Start: 2024-05-16

## 2024-05-19 DIAGNOSIS — E66.9 OBESITY, DIABETES, AND HYPERTENSION SYNDROME: ICD-10-CM

## 2024-05-19 DIAGNOSIS — I15.2 OBESITY, DIABETES, AND HYPERTENSION SYNDROME: ICD-10-CM

## 2024-05-19 DIAGNOSIS — E11.69 OBESITY, DIABETES, AND HYPERTENSION SYNDROME: ICD-10-CM

## 2024-05-19 DIAGNOSIS — E11.40 TYPE 2 DIABETES MELLITUS WITH DIABETIC NEUROPATHY, WITHOUT LONG-TERM CURRENT USE OF INSULIN: ICD-10-CM

## 2024-05-19 DIAGNOSIS — E11.59 OBESITY, DIABETES, AND HYPERTENSION SYNDROME: ICD-10-CM

## 2024-05-24 DIAGNOSIS — M62.838 OTHER MUSCLE SPASM: ICD-10-CM

## 2024-05-24 DIAGNOSIS — G35 MULTIPLE SCLEROSIS: ICD-10-CM

## 2024-05-27 ENCOUNTER — ANESTHESIA EVENT (OUTPATIENT)
Dept: SURGERY | Facility: HOSPITAL | Age: 59
End: 2024-05-27
Payer: COMMERCIAL

## 2024-05-27 RX ORDER — CYCLOBENZAPRINE HCL 5 MG
TABLET ORAL
Qty: 90 TABLET | Refills: 1 | Status: SHIPPED | OUTPATIENT
Start: 2024-05-27

## 2024-06-03 ENCOUNTER — TELEPHONE (OUTPATIENT)
Dept: ORTHOPEDICS | Facility: CLINIC | Age: 59
End: 2024-06-03
Payer: COMMERCIAL

## 2024-06-03 DIAGNOSIS — G56.01 CARPAL TUNNEL SYNDROME OF RIGHT WRIST: Primary | ICD-10-CM

## 2024-06-03 RX ORDER — TRAMADOL HYDROCHLORIDE 50 MG/1
50 TABLET ORAL EVERY 6 HOURS
Qty: 25 TABLET | Refills: 0 | Status: SHIPPED | OUTPATIENT
Start: 2024-06-03

## 2024-06-03 RX ORDER — ONDANSETRON HYDROCHLORIDE 8 MG/1
8 TABLET, FILM COATED ORAL EVERY 8 HOURS PRN
Qty: 25 TABLET | Refills: 0 | Status: SHIPPED | OUTPATIENT
Start: 2024-06-03

## 2024-06-03 NOTE — TELEPHONE ENCOUNTER
Spoke to patient to inform them of their surgery arrival time (5 am), [facility], [address]:  Verbalized understanding of instructions for pre-wash, and reviewed NPO after midnight.   Confirmed call in regards to medication instructions from anesthesia.   Confirmed patient was NOT using a rideshare service for surgery arrival or  transportation.

## 2024-06-03 NOTE — PROGRESS NOTES
Group Topic:  Education    Date: 6/3/2024  Start Time: 1030  End Time: 1115  Facilitators: Caitlyn Lopes    Focus: Education Group  Number in attendance: 2    Caitlyn Lopes LPC-[facility]    Pt was recruited for group but did not attend. Efforts to encourage participation in programming on the unit will continue.     [facility]  Follow Up Patient Visit      Disease Summary     Principle neurological diagnosis: MS    Date of symptom onset: 2018  Date of diagnosis: 2020  Disease type at diagnosis: RR  Disease type currently: RR  Previous therapy: NA  Current therapy: Glatiramer acetate 2020 - present  Last MRI Brain: 3/25/22  Last MRI C-spine: 3/25/22  Last MRI T-spine: 3/25/22  CSF: 11/3/2020: W2, R49, KFLC 0.0299, P20  JCV status: NA  Other relevant labs and tests: Vitamin D 49 6/22    Interval history:     She was diagnosed with trigger finger and underwent injection by hand surgeon and this has resolved.   No more falls since last time.    She is tolerating glatiramer acetate well, now injection her hips instead of her thighs due to local reaction. Last flare was prior to her MS diagnosis. The numbness she experienced back then has resolved.     She's had 3 COVID vaccines and had her flu shot.     She denies fatigue, cognitive changes, heat sensitivity, sensory changes, weakness, vision changes.      ROS:     SOCIAL HISTORY  Social History     Tobacco Use    Smoking status: Never    Smokeless tobacco: Never   Substance Use Topics    Alcohol use: Not Currently     Comment: occassional for Hollidays    Drug use: No     Living arrangements - the patient lives with their family.    Employment - works as     REVIEW OF SYMPTOMS 11/16/2022   Do you feel abnormally tired on most days? No   Do you feel you generally sleep well? Yes   Do you have difficulty controlling your bladder?  No   Do you have difficulty controlling your bowels?  No   Do you have frequent muscle cramps, tightness or spasms in your limbs?  No   Do you have new visual symptoms?  No   Do you have worsening difficulty with your memory or thinking? No   Do you have worsening symptoms of anxiety or depression?  No   For patients who walk, Do you have more difficulty walking?  No   Have you fallen since your last visit?  No   For  patients who use wheelchairs: Do you have any skin wounds or breakdown? Not Applicable   Do you have difficulty using your hands?  No   Do you have shooting or burning pain? No   Do you have difficulty with sexual function?  No   If you are sexually active, are you using birth control? Y/N  N/A Not Applicable   Do you often choke when swallowing liquids or solid food?  No   Do you experience worsening symptoms when overheated? No   Do you need any new equipment such as a wheelchair, walker or shower chair? No   Do you receive co-pay financial assistance for your principal MS medicine? Yes   Would you be interested in participating in an MS research trial in the future? No   For patients on Gilenya, Tecfidera, Aubagio, Rituxan, Ocrevus, Tysabri, Lemtrada or Methotrexate, are you aware that you should NOT receive live virus vaccines?  Not Applicable   Do you feel you have adequate family/friend support?  Yes   Do you have health insurance?   Yes   Are you currently employed? Yes   Do you receive SSDI/SSI?  Not Applicable   Do you use marijuana or cannabis products? No   Have you been diagnosed with a urinary tract infection since your last visit here? No   Have you been diagnosed with a respiratory tract infection since your last visit here? No   Have you been to the emergency room since your last visit here? No   Have you been hospitalized since your last visit here?  No     FSS SCORE & INTERPRETATION 5/19/2022   FSS SCORE  9   FSS SCORE INTERPRETATION May not be suffering from fatigue     MS CONNIE-D SCORE & INTERPRETATION 5/19/2022   CONNIE-D SCORE  0   CONNIE-D INTERPRETATION  No indication of Depression     MS TIMMY-7 SCORE & INTERPRETATION 5/19/2022   TIMMY-7 SCORE  0   TIMMY-7 SCORE INTERPRETATION Normal     PEQ MS MOS PAIN EFFECTS SCORE & INTERPRETATION 5/19/2022   PES SCORE 6   PES SCORE INTERPRETATION Scores can range from 6-30.  Items are scaled so that higher scores indicate a greater impact of pain on a patients mood  "and behavior.     PEQ MS SEXUAL SATISFACTION SCORE & INTERPRETATION 5/19/2022   SSS SCORE  4   SSS SCORE INTERPRETATION Scores can range from 4-24.  Higher scores indicate greater problems with sexual satisfaction.     MS BLADDER CONTROL SCORE & INTERPRETATION 5/19/2022   BLCS SCORE 0   BLCS SCORE INTERPRETATION  Scores can range from 0-22, with higher scores indicating greater bladder control problems.     MS BOWEL CONTROL SCORE & INTERPRETATION 5/19/2022   BWCS SCORE 0   BWCS SCORE INTERPRETATION Scores can range from 0-26, with higher scores indicating greater bowel control problems.     PEQ MS IMPACT OF VISUAL IMPAIRMENT SCORE & INTERPRETATION 5/19/2022   JAMAL SCALE SCORE  0   JAMAL SCORE INTERPRETATION Scores can range from 0-15, with higher scores indicating greater impact of visual problems on daily activites.     MS PDQ SCORE & INTERPRETATION 5/19/2022   PDQ RETROSPECTIVE MEMORY SUBSCALE 0   PDQ ATTENTION/CONCENTRATION SUBSCALE 0   PDQ PROSPECTIVE MEMORY SUBSCALE 0   PDQ PLANNING/ORGANIZATION SUBSCALE 0   PDQ TOTAL SCORE 0   PDQ SCORE INTERPRETATION Scores can range from 0-80, with higher scores indicating greater perceived cognitive impairment.     MSSS SCORE & INTERPRETATION 5/19/2022   MSSS TANGIBLE SUPPORT SUBSCALE 100   MSSS EMOTIONAL/INFORMATIONAL SUPPORT SUBSCALE 100   MSSS AFFECTIONATE SUPPORT SUBSCALE 100   MSSS POSITIVE SOCIAL INTERACTION SUBSCALE 100   MSSS TOTAL SCORE 100   MSSS SCORE INTERPRETATION Scores can range from 0-100, with higher scores indicating greater perceived support.         Exam:     Vitals:    11/23/22 0959   BP: (!) 148/90   Pulse: 92   Weight: 111.7 kg (246 lb 4.1 oz)   Height: 5' 5" (1.651 m)          In general, the patient is well nourished and appears to be in no acute distress.    MENTAL STATUS: language is fluent, normal verbal comprehension, short-term and remote memory is intact, attention is normal, patient is alert and oriented x 3, fund of knowlege is appropriate by " vocabulary.     CRANIAL NERVE EXAM:  There is no intrernuclear ophthalmoplegia.  Extraocular muscles are intact. Pupils are equal, round, and reactive to light. No facial asymmetry. Facial sensation is intact bilaterally. There is no dysarthria. Uvula is midline, and palate moves symmetrically. Shoulder shrug intact bilaterlly. Tongue protrusion is midline. Hearing is intact to finger rub bilaterally. Neck is supple with full ROM    MOTOR EXAM: Normal bulk and tone throughout UE and LE bilaterally.   No pronator drift; rapid sequential movements are normal; Strength is  5/5 in all groups in the lower extremities and upper extremities      Strength:  L deltoid 5/5, R deltoid 5/5  L biceps 5/5, R biceps 5/5  L triceps 5/5, R triceps 5/5  L finger flexors 5/5, R finger flexors 5/5  L finger extensors 5/5, R finger extensors 5/5  L hip flexors 5/5, R hip flexors 5/5  L knee extensors 5/5, R knee extensors 5/5  L knee flexors 5/5, R knee flexors 5/5  L ankle dorsiflexors 5/5, R ankle dorsiflexors 5/5  L ankle plantarflexors 5/5, R ankle plantarflexors 5/5    SENSORY EXAM: Normal to light touch throughout.    COORDINATION: Normal finger-to-nose exam. Normal heel-to-shin exam.    GAIT: Narrow based and stable.     Timed 25 Foot Walk: 6/15/2022 11/23/2022   Did patient wear an AFO? No No   Was assistive device used? No No   Time for 25 Foot Walk (seconds) 4.51 4.48   Time for 25 Foot Walk (seconds) 4.51 4.08         Imaging (personally reviewed):         Results for orders placed during the hospital encounter of 03/25/22    MRI Brain Demyelinating W W/O Contrast    Impression  No significant change from prior.  Stable few scattered small to punctate size regions of T2 FLAIR signal abnormality throughout the brain parenchyma remain concerning for mild degree of prior demyelinating plaque burden in light of history.  No definite new lesion or enhancing lesion to suggest significant interval or active  demyelination.    Clinical correlation and follow-up advised.      Electronically signed by: Huy Villatoro DO  Date:    03/26/2022  Time:    09:40    Results for orders placed during the hospital encounter of 03/25/22    MRI Cervical Spine Demyelinating W W/O Contrast    Impression  Continued short segment regions of T2 stir signal abnormality in the cervical and thoracic cord as detailed above most compatible with prior areas of demyelination in light of history. No new cord signal abnormality or abnormal intrathecal enhancement to suggest significant interval or active demyelination.    Stable ventral positioning of the cervical cord with slight dorsal flattening underlying arachnoid cyst versus web remains in differential.    Continued scattered degenerative change of the cervical spine as detailed above without significant central canal stenosis.    See above for additional details.      Electronically signed by: Huy Villatoro DO  Date:    03/26/2022  Time:    09:40    Results for orders placed during the hospital encounter of 03/25/22    MRI Thoracic Spine Demyelinating W W/O Contrast    Impression  Continued short segment regions of T2 stir signal abnormality in the cervical and thoracic cord as detailed above most compatible with prior areas of demyelination in light of history. No new cord signal abnormality or abnormal intrathecal enhancement to suggest significant interval or active demyelination.    Stable ventral positioning of the cervical cord with slight dorsal flattening underlying arachnoid cyst versus web remains in differential.    Continued scattered degenerative change of the cervical spine as detailed above without significant central canal stenosis.    See above for additional details.      Electronically signed by: Huy Villatoro DO  Date:    03/26/2022  Time:    09:40      Labs:     Lab Results   Component Value Date    PSZFBDOR23UM 49 06/15/2022    IZADGSJU37PD 65 05/28/2021    XGCDBFBX81MR  41 10/20/2020     No results found for: JCVINDEX, JCVANTIBODY  No results found for: TB8YHKRB, ABSOLUTECD3, AK7KTNVG, ABSOLUTECD8, UE2MRQIQ, ABSOLUTECD4, LABCD48  Lab Results   Component Value Date    WBC 7.58 09/20/2021    RBC 4.81 09/20/2021    HGB 12.8 09/20/2021    HCT 41.2 09/20/2021    MCV 86 09/20/2021    MCH 26.6 (L) 09/20/2021    MCHC 31.1 (L) 09/20/2021    RDW 16.2 (H) 09/20/2021     09/20/2021    MPV 12.0 09/20/2021    GRAN 3.9 09/20/2021    GRAN 51.2 09/20/2021    LYMPH 3.0 09/20/2021    LYMPH 38.9 09/20/2021    MONO 0.5 09/20/2021    MONO 7.0 09/20/2021    EOS 0.2 09/20/2021    BASO 0.03 09/20/2021    EOSINOPHIL 2.4 09/20/2021    BASOPHIL 0.4 09/20/2021     Sodium   Date Value Ref Range Status   03/22/2022 145 136 - 145 mmol/L Final   03/22/2022 145 136 - 145 mmol/L Final     Potassium   Date Value Ref Range Status   03/22/2022 4.1 3.5 - 5.1 mmol/L Final   03/22/2022 4.1 3.5 - 5.1 mmol/L Final     Chloride   Date Value Ref Range Status   03/22/2022 106 95 - 110 mmol/L Final   03/22/2022 106 95 - 110 mmol/L Final     CO2   Date Value Ref Range Status   03/22/2022 30 (H) 23 - 29 mmol/L Final   03/22/2022 30 (H) 23 - 29 mmol/L Final     Glucose   Date Value Ref Range Status   03/22/2022 85 70 - 110 mg/dL Final   03/22/2022 85 70 - 110 mg/dL Final     BUN   Date Value Ref Range Status   03/22/2022 14 6 - 20 mg/dL Final   03/22/2022 14 6 - 20 mg/dL Final     Creatinine   Date Value Ref Range Status   03/22/2022 0.8 0.5 - 1.4 mg/dL Final   03/22/2022 0.8 0.5 - 1.4 mg/dL Final     Calcium   Date Value Ref Range Status   03/22/2022 9.7 8.7 - 10.5 mg/dL Final   03/22/2022 9.7 8.7 - 10.5 mg/dL Final     Total Protein   Date Value Ref Range Status   03/22/2022 7.8 6.0 - 8.4 g/dL Final     Albumin   Date Value Ref Range Status   03/22/2022 3.6 3.5 - 5.2 g/dL Final     Total Bilirubin   Date Value Ref Range Status   03/22/2022 0.6 0.1 - 1.0 mg/dL Final     Comment:     For infants and newborns, interpretation  of results should be based  on gestational age, weight and in agreement with clinical  observations.    Premature Infant recommended reference ranges:  Up to 24 hours.............<8.0 mg/dL  Up to 48 hours............<12.0 mg/dL  3-5 days..................<15.0 mg/dL  6-29 days.................<15.0 mg/dL       Alkaline Phosphatase   Date Value Ref Range Status   03/22/2022 90 55 - 135 U/L Final     AST   Date Value Ref Range Status   03/22/2022 22 10 - 40 U/L Final     ALT   Date Value Ref Range Status   03/22/2022 19 10 - 44 U/L Final     Anion Gap   Date Value Ref Range Status   03/22/2022 9 8 - 16 mmol/L Final   03/22/2022 9 8 - 16 mmol/L Final     eGFR if    Date Value Ref Range Status   03/22/2022 >60.0 >60 mL/min/1.73 m^2 Final   03/22/2022 >60.0 >60 mL/min/1.73 m^2 Final     eGFR if non    Date Value Ref Range Status   03/22/2022 >60.0 >60 mL/min/1.73 m^2 Final     Comment:     Calculation used to obtain the estimated glomerular filtration  rate (eGFR) is the CKD-EPI equation.      03/22/2022 >60.0 >60 mL/min/1.73 m^2 Final     Comment:     Calculation used to obtain the estimated glomerular filtration  rate (eGFR) is the CKD-EPI equation.                   Diagnosis/Assessment/Plan:     Multiple Sclerosis  -Assessment:RRMS stable on glatiramer acetate. No active neurologic issues and exam without focal deficits. MRI stable  -Imaging: Updated MRI March 2023  -Disease Modifying Therapies:Continue glatiramer acetate  Symptom management   -Check vitamin D in March 2023  Lifestyle modifications for brain health discussed.  Return to clinic after MRI in March 2023.                 Total time spent with the patient: 40 minutes, including face to face consultation, chart review and coordination of care, on the day of the visit. This includes face to face time and non-face to face time preparing to see the patient (eg, review of tests), obtaining and/or reviewing separately obtained  history, documenting clinical information in the electronic or other health record, independently interpreting resultsand communicating results to the patient/family/caregiver, or care coordination.         Renetta Mejía MD, MSc  Attending neurologist

## 2024-06-04 ENCOUNTER — ANESTHESIA (OUTPATIENT)
Dept: SURGERY | Facility: HOSPITAL | Age: 59
End: 2024-06-04
Payer: COMMERCIAL

## 2024-06-04 ENCOUNTER — HOSPITAL ENCOUNTER (OUTPATIENT)
Facility: HOSPITAL | Age: 59
Discharge: HOME | End: 2024-06-04
Attending: ORTHOPAEDIC SURGERY | Admitting: ORTHOPAEDIC SURGERY
Payer: COMMERCIAL

## 2024-06-04 VITALS
OXYGEN SATURATION: 98 % | HEIGHT: 66 IN | SYSTOLIC BLOOD PRESSURE: 144 MMHG | RESPIRATION RATE: 21 BRPM | HEART RATE: 76 BPM | DIASTOLIC BLOOD PRESSURE: 72 MMHG | BODY MASS INDEX: 38.89 KG/M2 | WEIGHT: 242 LBS | TEMPERATURE: 98 F

## 2024-06-04 DIAGNOSIS — G56.01 CARPAL TUNNEL SYNDROME OF RIGHT WRIST: Primary | ICD-10-CM

## 2024-06-04 DIAGNOSIS — M65.331 ACQUIRED TRIGGER FINGER OF RIGHT MIDDLE FINGER: ICD-10-CM

## 2024-06-04 LAB
POCT GLUCOSE: 86 MG/DL (ref 70–110)
POCT GLUCOSE: 86 MG/DL (ref 70–110)

## 2024-06-04 PROCEDURE — 36000706: Performed by: ORTHOPAEDIC SURGERY

## 2024-06-04 PROCEDURE — 63600175 PHARM REV CODE 636 W HCPCS: Mod: JZ,JG | Performed by: ORTHOPAEDIC SURGERY

## 2024-06-04 PROCEDURE — 64721 CARPAL TUNNEL SURGERY: CPT | Mod: RT,,, | Performed by: ORTHOPAEDIC SURGERY

## 2024-06-04 PROCEDURE — 82962 GLUCOSE BLOOD TEST: CPT | Performed by: ORTHOPAEDIC SURGERY

## 2024-06-04 PROCEDURE — 26055 INCISE FINGER TENDON SHEATH: CPT | Mod: 51,F7,, | Performed by: ORTHOPAEDIC SURGERY

## 2024-06-04 PROCEDURE — 36000707: Performed by: ORTHOPAEDIC SURGERY

## 2024-06-04 PROCEDURE — 99900035 HC TECH TIME PER 15 MIN (STAT)

## 2024-06-04 PROCEDURE — 63600175 PHARM REV CODE 636 W HCPCS: Performed by: NURSE ANESTHETIST, CERTIFIED REGISTERED

## 2024-06-04 PROCEDURE — 71000015 HC POSTOP RECOV 1ST HR: Performed by: ORTHOPAEDIC SURGERY

## 2024-06-04 PROCEDURE — 37000008 HC ANESTHESIA 1ST 15 MINUTES: Performed by: ORTHOPAEDIC SURGERY

## 2024-06-04 PROCEDURE — 71000033 HC RECOVERY, INTIAL HOUR: Performed by: ORTHOPAEDIC SURGERY

## 2024-06-04 PROCEDURE — D9220A PRA ANESTHESIA: Mod: ANES,,, | Performed by: ANESTHESIOLOGY

## 2024-06-04 PROCEDURE — 25000003 PHARM REV CODE 250: Performed by: ORTHOPAEDIC SURGERY

## 2024-06-04 PROCEDURE — 37000009 HC ANESTHESIA EA ADD 15 MINS: Performed by: ORTHOPAEDIC SURGERY

## 2024-06-04 PROCEDURE — 27201423 OPTIME MED/SURG SUP & DEVICES STERILE SUPPLY: Performed by: ORTHOPAEDIC SURGERY

## 2024-06-04 PROCEDURE — 25000003 PHARM REV CODE 250: Performed by: NURSE ANESTHETIST, CERTIFIED REGISTERED

## 2024-06-04 PROCEDURE — D9220A PRA ANESTHESIA: Mod: CRNA,,, | Performed by: NURSE ANESTHETIST, CERTIFIED REGISTERED

## 2024-06-04 PROCEDURE — 94761 N-INVAS EAR/PLS OXIMETRY MLT: CPT

## 2024-06-04 PROCEDURE — 25000003 PHARM REV CODE 250: Performed by: PHYSICIAN ASSISTANT

## 2024-06-04 RX ORDER — HYDROCODONE BITARTRATE AND ACETAMINOPHEN 5; 325 MG/1; MG/1
1 TABLET ORAL EVERY 4 HOURS PRN
Status: DISCONTINUED | OUTPATIENT
Start: 2024-06-04 | End: 2024-06-04 | Stop reason: HOSPADM

## 2024-06-04 RX ORDER — LIDOCAINE HYDROCHLORIDE 10 MG/ML
INJECTION, SOLUTION EPIDURAL; INFILTRATION; INTRACAUDAL; PERINEURAL
Status: DISCONTINUED | OUTPATIENT
Start: 2024-06-04 | End: 2024-06-04 | Stop reason: HOSPADM

## 2024-06-04 RX ORDER — MUPIROCIN 20 MG/G
OINTMENT TOPICAL
Status: DISCONTINUED | OUTPATIENT
Start: 2024-06-04 | End: 2024-06-04 | Stop reason: HOSPADM

## 2024-06-04 RX ORDER — ONDANSETRON HYDROCHLORIDE 2 MG/ML
INJECTION, SOLUTION INTRAVENOUS
Status: DISCONTINUED | OUTPATIENT
Start: 2024-06-04 | End: 2024-06-04

## 2024-06-04 RX ORDER — MUPIROCIN 20 MG/G
OINTMENT TOPICAL 2 TIMES DAILY
Status: DISCONTINUED | OUTPATIENT
Start: 2024-06-04 | End: 2024-06-04 | Stop reason: HOSPADM

## 2024-06-04 RX ORDER — PROPOFOL 10 MG/ML
VIAL (ML) INTRAVENOUS
Status: DISCONTINUED | OUTPATIENT
Start: 2024-06-04 | End: 2024-06-04

## 2024-06-04 RX ORDER — BACITRACIN ZINC 500 UNIT/G
OINTMENT (GRAM) TOPICAL
Status: DISCONTINUED | OUTPATIENT
Start: 2024-06-04 | End: 2024-06-04 | Stop reason: HOSPADM

## 2024-06-04 RX ORDER — FENTANYL CITRATE 50 UG/ML
INJECTION, SOLUTION INTRAMUSCULAR; INTRAVENOUS
Status: DISCONTINUED | OUTPATIENT
Start: 2024-06-04 | End: 2024-06-04

## 2024-06-04 RX ORDER — CELECOXIB 200 MG/1
400 CAPSULE ORAL
Status: COMPLETED | OUTPATIENT
Start: 2024-06-04 | End: 2024-06-04

## 2024-06-04 RX ORDER — MIDAZOLAM HYDROCHLORIDE 1 MG/ML
INJECTION INTRAMUSCULAR; INTRAVENOUS
Status: DISCONTINUED | OUTPATIENT
Start: 2024-06-04 | End: 2024-06-04

## 2024-06-04 RX ORDER — FENTANYL CITRATE 50 UG/ML
25 INJECTION, SOLUTION INTRAMUSCULAR; INTRAVENOUS EVERY 5 MIN PRN
Status: DISCONTINUED | OUTPATIENT
Start: 2024-06-04 | End: 2024-06-04 | Stop reason: HOSPADM

## 2024-06-04 RX ORDER — PROPOFOL 10 MG/ML
VIAL (ML) INTRAVENOUS CONTINUOUS PRN
Status: DISCONTINUED | OUTPATIENT
Start: 2024-06-04 | End: 2024-06-04

## 2024-06-04 RX ORDER — HYDROMORPHONE HYDROCHLORIDE 1 MG/ML
0.2 INJECTION, SOLUTION INTRAMUSCULAR; INTRAVENOUS; SUBCUTANEOUS EVERY 5 MIN PRN
Status: DISCONTINUED | OUTPATIENT
Start: 2024-06-04 | End: 2024-06-04 | Stop reason: HOSPADM

## 2024-06-04 RX ORDER — BUPIVACAINE HYDROCHLORIDE 2.5 MG/ML
INJECTION, SOLUTION EPIDURAL; INFILTRATION; INTRACAUDAL
Status: DISCONTINUED | OUTPATIENT
Start: 2024-06-04 | End: 2024-06-04 | Stop reason: HOSPADM

## 2024-06-04 RX ORDER — ONDANSETRON HYDROCHLORIDE 2 MG/ML
4 INJECTION, SOLUTION INTRAVENOUS DAILY PRN
Status: DISCONTINUED | OUTPATIENT
Start: 2024-06-04 | End: 2024-06-04 | Stop reason: HOSPADM

## 2024-06-04 RX ORDER — LIDOCAINE HYDROCHLORIDE 20 MG/ML
INJECTION INTRAVENOUS
Status: DISCONTINUED | OUTPATIENT
Start: 2024-06-04 | End: 2024-06-04

## 2024-06-04 RX ORDER — ACETAMINOPHEN 500 MG
1000 TABLET ORAL
Status: COMPLETED | OUTPATIENT
Start: 2024-06-04 | End: 2024-06-04

## 2024-06-04 RX ADMIN — MIDAZOLAM HYDROCHLORIDE 2 MG: 2 INJECTION, SOLUTION INTRAMUSCULAR; INTRAVENOUS at 06:06

## 2024-06-04 RX ADMIN — LIDOCAINE HYDROCHLORIDE 25 MG: 20 INJECTION INTRAVENOUS at 07:06

## 2024-06-04 RX ADMIN — PROPOFOL 20 MG: 10 INJECTION, EMULSION INTRAVENOUS at 07:06

## 2024-06-04 RX ADMIN — ONDANSETRON 4 MG: 2 INJECTION INTRAMUSCULAR; INTRAVENOUS at 07:06

## 2024-06-04 RX ADMIN — FENTANYL CITRATE 25 MCG: 50 INJECTION, SOLUTION INTRAMUSCULAR; INTRAVENOUS at 07:06

## 2024-06-04 RX ADMIN — SODIUM CHLORIDE: 9 INJECTION, SOLUTION INTRAVENOUS at 06:06

## 2024-06-04 RX ADMIN — CELECOXIB 400 MG: 200 CAPSULE ORAL at 06:06

## 2024-06-04 RX ADMIN — HYDROCODONE BITARTRATE AND ACETAMINOPHEN 1 TABLET: 5; 325 TABLET ORAL at 08:06

## 2024-06-04 RX ADMIN — ACETAMINOPHEN 1000 MG: 500 TABLET ORAL at 06:06

## 2024-06-04 RX ADMIN — LIDOCAINE HYDROCHLORIDE 75 MG: 20 INJECTION INTRAVENOUS at 07:06

## 2024-06-04 RX ADMIN — PROPOFOL 100 MCG/KG/MIN: 10 INJECTION, EMULSION INTRAVENOUS at 07:06

## 2024-06-04 RX ADMIN — MUPIROCIN: 20 OINTMENT TOPICAL at 06:06

## 2024-06-04 NOTE — OP NOTE
[facility])  Surgery Department  Operative Note    SUMMARY     Date of Procedure: 6/4/2024     Procedure:     1. Right carpal tunnel release, cpt 36200  2. Right long finger trigger release,  cpt 38018      Surgeons and Role:     * Claire Richardson MD - Primary    Assisting Surgeon: Harvinder MILLS Assistant: Jose Peraza    Pre-Operative Diagnosis: Carpal tunnel syndrome of right wrist [G56.01]  Right middle trigger finger    Post-Operative Diagnosis: Carpal tunnel syndrome of right wrist [G56.01]  Right middle trigger finger    Anesthesia: Local MAC    Indication for Procedure: 57 yo female with right carpal tunnel syndrome confirmed by EMG/NCS and had failed conservative treatment. Risks and benefits of the procedure were discussed with the patient and informed consent was obtained.    Description of the Findings of the Procedure: The patient was seen in the preoperative holding area and the right hand was marked. The patient was taken to the OR, placed supine on the table, and a padded hand table was used. After monitored anesthesia care was administered without difficulty, a time-out procedure was performed identifying the patient, the operative site and the procedure to be performed.  A tourniquet was placed on the arm and the upper extremity was prepped and draped in standard sterile fashion. The upper extremity was elevated and exsanguinated with an Esmarch bandage, and the tourniquet was inflated to 250 mm Hg. 10cc of 1% lidocaine plain and 0.25% bupivacaine was used for a local block of the carpal tunnel. A 2 cm incision was made over the right carpal tunnel.  The palmar fascia was sharply incised.  Miriam retractors were used to expose the transverse carpal ligament, this was sharply incised.  A carpal tunnel skid was placed underneath the transverse carpal ligament proximally, and the proximal transverse carpal ligament as well as the distal forearm fascia was sharply released. The median  nerve was found to be significantly compressed through the carpal tunnel. The carpal tunnel skid was replaced distally, and the transverse carpal ligament was released under direct visualization.  The median nerve was found to be completely decompressed.     Attention was turned to the long finger, a 1 cm incision was made over the A1 pulley of the right long finger. Littler scissors were used to dissect down to the flexor sheath. The A1 pulley was sharply incised, and released both proximally and distally. The A2 pulley was protected. There was a moderate amount of flexor tenosynovitis on the flexor tendon, this was sharply excised with Littler scissors. The neurovascular bundles were identified and protected throughout the case. The finger were put through a range of motion and there was no longer any catching and locking.     The neurovascular bundles were identified and protected throughout the case. The wounds were then irrigated with normal saline using a bulb syringe. 3-0 prolene was used to close the skin in a horizontal mattress fashion. Adaptic, 4x4, cast padding and coban were used to dress the hand. The tourniquet was deflated and the hand and fingers were pink and well-perfused.  All instrument, sponge and needle counts were correct. The patient tolerated the procedure well.  She was taken awake, alert and in good condition to the recovery room.    Complications: No    Estimated Blood Loss (EBL): none           Specimens: none           Condition: Good    Disposition: PACU - hemodynamically stable.    Attestation: I was present and scrubbed for the entire procedure.

## 2024-06-04 NOTE — DISCHARGE INSTRUCTIONS
HAND SURGERY - Care of the Hand after Surgery       Post-Op Care  It is important to follow your orthopaedic surgeon's instructions carefully after you return home. You should ask   someone to check on you that evening. The protocols described here are general in nature. Every person and   every surgery is different so the information given here is for guidance only. If you have questions you should   contact us.     Day of Surgery    You were place in a soft dressing with coban today to protect the surgical area during healing. Do not remove the soft dressing with coban until you are seen by Occupational Therapy or Dr. Richardson for your first postop visit    The hand and fingers may be numb for quite some time after surgery. This is due to the anesthetic block used at the time of surgery for pain control. If you have an arm sling you may remove the sling at your convenience once you regain control of your arm from the anesthetic block.     Begin taking liquids and food as soon as you can. You should always eat some solid food, a sandwich or light meal, a little while before taking your pain meds.     Day 3  Things are much the same on the third day after your surgery. Usually you have less pain and feel like doing more.    Showering: It is important to keep the incision absolutely dry while showering or bathing (use two plastic bags over your hand) or a shower bag.   If you feel that the pain medication you were given after surgery is stronger than you really need you can reduce the dose, take it less frequently or switch to ibuprofen or Tylenol. If you received antibiotics they should be taken until the entire prescription is completed.    Day 13-14  Occupational Therapy will see you between this time. Please let us know if you are not scheduled [phone]    Day 21  We will see you back after your surgery to review with you what was done in surgery and will talk about rehab and answer any questions you may have.        HAND SURGERY  Driving: You can drive if you are comfortable and have regained full finger movements and if you have sufficient power to control the vehicle.   Return to work: Timing of your return to work is variable according to your occupation and specific surgery. We should discuss this at your follow up visit if not already discussed prior.  Elevation: Hand elevation is important to prevent swelling and stiffness of the fingers. One minute of leaving your hand dangling negates four hours of keeping it elevated. Please remember not to walk with your hand hanging down or to sit with your hand resting in your lap. It is fine, however, to lower your hand for light use and you should get back to normal light activities as soon as possible as guided by common sense.     Post-operative exercises  [x] Bend your fingers  Relax your hand. Start with your fingers straight and close together. Bend the end and middle joints of your fingers. Keep your wrist and knuckles straight. Moving slowly and smoothly, return your hand to the starting position. Repeat with your other hand. If you can, perform multiple repetitions of this exercise on each hand.    [x] Open your hand wide                       Spread your fingers apart as wide as you can and hold that position. Slowly relax your fingers and bring them together. Return to the open-wide position. Repeat with each hand and gradually add to the number of repetitions.    [x] Make a fist  Start with your fingers straight and spread apart. Make a loose, gentle fist and wrap your thumb around the outside of your fingers. Be careful not to squeeze your fingers together too tightly. Moving slowly and smoothly, return to the starting position. Repeat. Perform this exercise on both hands.    [x] Touch your fingertips  Straighten your fingers and thumb. Bend your thumb across your palm, touching the tip of your thumb to the pad of your hand just below your pinky finger. If you  "can't make your thumb touch, just stretch as far as you can. Return your thumb to its starting position, as shown in images 1 through 3.  For the next exercise, form the letter O by touching your thumb to each fingertip, as shown in images 4 through 6. Moving slowly and smoothly, touch your index finger to your thumb, then straighten your fingers. Touch your middle finger to your thumb and straighten. Follow with your ring and pinky fingers.    [x] Walk your fingers  Rest your hand on a flat surface, such as a tabletop, with your palm facing down and your fingers spread slightly apart. Moving one finger at a time, slowly walk your fingers toward your thumb. Start by lifting and moving your index finger toward your thumb. Follow by lifting and moving your middle finger toward your thumb. Proceed with moving your ring finger and then your pinky finger toward your thumb. Don't move your wrist or thumb while doing this exercise. Repeat with your other hand.      HAND SURGERY - Common Concerns and Frequently Asked Questions    When to Call the Doctor  Pain, burning, or numbness of the fingers or the back of the hand not relieved by elevation of the arm  Pale or cold finger; bluish nail beds  Red line or streak going up the arm  Excessive swelling  Fever over 100.3ºF  Pain unrelieved by pain medication    Tips for one armed living  It helps to have...   In the shower   Plastic bags and rubber bands to cover bandages - the bags that newspapers come in are good to cover the hand and wrist. Otherwise small trash can liners will do. Use two at a time.   Bottle sponge (soft sponge on a long stick) - for the armpit of your "good" hand.   Shower brush   A hair brush in the shower will help you to wash your hair.   Cotton josh cloth bathrobe - to dry your back.    In the bathroom   Toothpaste, shampoo, etc. in flip-top or pump (not screw top) dispensers.   Consider an electric razor.   Flossers (dental floss on a "Y" shaped " "handle).    In the kitchen   Dycem mat (rubber jar opener mat) - to help open jars, but also keep things from sliding around while you are working on them.    Double suction cup pads ("little Octopus") - to hold items while you use or wash them.   Electric can opener with a lid magnet strong enough to hold the can in the air - for one handed use.   In the bedroom   Back scratcher.   Large sleeve shirts and tops.   Put away clothing which buttons, fastens or snaps in the back or which uses drawstrings.    Sports bra or a camisole instead of a bra.   [employer] nylons can be pulled on one handed - most others can't.   A "wash and wear" haircut.     "

## 2024-06-04 NOTE — DISCHARGE SUMMARY
[facility] - Surgery (Hospital)  Discharge Note  Short Stay    Procedure(s) (LRB):  RELEASE, CARPAL TUNNEL (Right)  RELEASE, TRIGGER FINGER (Right)      OUTCOME: Patient tolerated treatment/procedure well without complication and is now ready for discharge.    DISPOSITION: Home or Self Care    FINAL DIAGNOSIS:  right carpal tunnel syndrome, right long trigger finger    FOLLOWUP: In clinic    DISCHARGE INSTRUCTIONS:    Discharge Procedure Orders   Diet general     Keep surgical extremity elevated     Ice to affected area     Lifting restrictions     No driving, operating heavy equipment or signing legal documents while taking pain medication.     Leave dressing on - Keep it clean, dry, and intact until clinic visit     Call MD for:  temperature >100.4     Call MD for:  persistent nausea and vomiting     Call MD for:  severe uncontrolled pain     Call MD for:  difficulty breathing, headache or visual disturbances     Call MD for:  redness, tenderness, or signs of infection (pain, swelling, redness, odor or green/yellow discharge around incision site)     Call MD for:  hives     Call MD for:  persistent dizziness or light-headedness     Call MD for:  extreme fatigue

## 2024-06-04 NOTE — INTERVAL H&P NOTE
The patient has been examined and the H&P has been reviewed:    I concur with the findings and changes have been noted since the H&P was written: She states that her right long trigger finger has returned and is catching. Right long trigger finger release added to consent.    Anesthesia/Surgery risks, benefits and alternative options discussed and understood by patient/family.          There are no hospital problems to display for this patient.

## 2024-06-04 NOTE — TRANSFER OF CARE
"Anesthesia Transfer of Care Note    Patient: Miesha Domínguez    Procedure(s) Performed: Procedure(s) (LRB):  RELEASE, CARPAL TUNNEL (Right)  RELEASE, TRIGGER FINGER (Right)    Patient location: PACU    Anesthesia Type: general    Transport from OR: Transported from OR on room air with adequate spontaneous ventilation    Post pain: adequate analgesia    Post assessment: no apparent anesthetic complications and tolerated procedure well    Post vital signs: stable    Level of consciousness: awake and sedated    Nausea/Vomiting: no nausea/vomiting    Complications: none    Transfer of care protocol was followed      Last vitals: Visit Vitals  /75 (BP Location: Left arm, Patient Position: Lying)   Pulse 90   Temp 36.6 °C (97.9 °F) (Oral)   Resp 18   Ht 5' 6" (1.676 m)   Wt 109.8 kg (242 lb)   LMP 04/03/2019 (Approximate)   SpO2 97%   Breastfeeding No   BMI 39.06 kg/m²     "

## 2024-06-04 NOTE — H&P
"Miesha Domínguez presents for follow up evaluation of        Encounter Diagnoses   Name Primary?    Carpal tunnel syndrome of right wrist Yes    Carpal tunnel syndrome of left wrist     The patient has had an EMG/NCS which we reviewed together today and it showed: severe carpal tunnel syndrome on the right and mild carpal tunnel syndrome on the left. She states she continues to have numbness and tingling most significant on the right hand. She sleeps with night braces, these help some.     Vitals       Vitals:     03/27/24 0913   Weight: 112.9 kg (248 lb 14.4 oz)   Height: 5' 7" (1.702 m)   PainSc: 0-No pain   PainLoc: Hand         History of Present Illness     PE:     AA&O x 4.  NAD  HEENT:  NCAT, sclera nonicteric  Lungs:  Respirations are equal and unlabored.  CV:  2+ bilateral upper and lower extremity pulses.  MSK:  Bilateral hand: + median n. compression, + Tinel's + Phalens. Neurovascularly intact. 5/5 thenar and intrinsic musculature strength, otherwise full range of motion hands, wrists and elbows.        Physical Exam        Diagnostic studies and other clinical records review:  EMG/NCS 3.26.24 Impression: There is electrophysiologic evidence of a bilateral (sensory on the left, sensorimotor on the right) median mononeuropathy across the wrist (I.e. Carpal tunnel syndrome).  There is no motor axonal loss.  There is active denervation on the right.  This appears chronic on the right.  This is graded as Severe in severity on the right, and Mild on the left.      Results        ASSESSMENT:  Bilateral carpal tunnel syndrome  Assessment & Plan        PLAN: We have discussed the natural history of carpal tunnel syndrome including treatment options such as splinting, oral and topical anti-inflammatories, cortisone injections and surgery. 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     -I have offered her a selective injection. I have explained the risks, benefits, and alternatives of the procedure " in detail.  The patient voices understanding and all questions have been answered. The patient agrees to proceed as planned. So after a sterile prep of the skin in the normal fashion the right carpal tunnel was injected using a 25 gauge needle with a combination of 1cc 1% plain lidocaine and 40 mg of methylprednisolone.  The patient is cautioned and immediate relief of pain is secondary to the local anesthetic and will be temporary.  After the anesthetic wears off there may be a increase in pain that may last for a few hours or a few days and they should use ice to help alleviate this flair up of pain. Patient tolerated the procedure well.     We have discussed conservative vs. surgical treatment as well as risks, benefits and alternatives for right carpal tunnel syndrome.  Conservative measures have been exhausted and she would like to proceed with surgery. Surgery would include right carpal tunnel release. Consent signed today in clinic. Light use of the hand will be indicated for the first 4-6 weeks.     We have discussed risks of hand surgery which include but are not limited to blood clots in the legs that can travel to the lungs (pulmonary embolism). Pulmonary embolism can cause shortness of breath, chest pain, and even shock. Other risks include urinary tract infection, nausea and vomiting (usually related to pain medication), chronic pain, bleeding, nerve damage, blood vessel injury, scarring and infection of the hand which can require re-operation. Furthermore, the risks of anesthesia include potential heart, lung, kidney, and liver damage.  Informed consent was obtained.  The patient understands and would like to proceed with surgery in the near future.     Call with any questions/concerns in the interim          Claire Richardson MD     Please be aware that this note has been generated with the assistance of [employer] voice-to-text.  Please excuse any spelling or grammatical errors.  This note was generated  with the assistance of ambient listening technology. Verbal consent was obtained by the patient and accompanying visitor(s) for the recording of patient appointment to facilitate this note. I attest to having reviewed and edited the generated note for accuracy, though some syntax or spelling errors may persist. Please contact the author of this note for any clarification.         Electronically signed by Claire Richardson MD at 3/29/2024 12:02 PM

## 2024-06-04 NOTE — ANESTHESIA POSTPROCEDURE EVALUATION
Anesthesia Post Evaluation    Patient: Miesha Domínguez    Procedure(s) Performed: Procedure(s) (LRB):  RELEASE, CARPAL TUNNEL (Right)  RELEASE, TRIGGER FINGER (Right)    Final Anesthesia Type: general      Patient location during evaluation: PACU  Patient participation: Yes- Able to Participate  Level of consciousness: awake and alert  Post-procedure vital signs: reviewed and stable  Pain management: adequate  Airway patency: patent    PONV status at discharge: No PONV  Anesthetic complications: no      Cardiovascular status: hemodynamically stable  Respiratory status: spontaneous ventilation  Follow-up not needed.              Vitals Value Taken Time   /72 06/04/24 0847   Temp 36.6 °C (97.9 °F) 06/04/24 0802   Pulse 76 06/04/24 0847   Resp 13 06/04/24 0840   SpO2 98 % 06/04/24 0847   Vitals shown include unfiled device data.      Event Time   Out of Recovery 08:30:00         Pain/Melisa Score: Pain Rating Prior to Med Admin: 4 (6/4/2024  8:21 AM)  Pain Rating Post Med Admin: 2 (6/4/2024  8:39 AM)  Melisa Score: 10 (6/4/2024  8:39 AM)

## 2024-06-04 NOTE — ANESTHESIA PREPROCEDURE EVALUATION
2024  Miesha Domínguez is a 58 y.o., female.  Pre-operative evaluation for Procedure(s) (LRB):  RELEASE, CARPAL TUNNEL (Right)  INJECTION, STEROID Carpal tunnel (Left)    Miesha Domínguez is a 58 y.o. female       Patient Active Problem List   Diagnosis    Essential hypertension    Hyperlipidemia associated with type 2 diabetes mellitus    Carpal tunnel syndrome    Diabetic peripheral neuropathy    Chronic allergic rhinitis    Thiamine deficiency    Vitamin D deficiency    Obesity, diabetes, and hypertension syndrome    Controlled type 2 diabetes mellitus without complication, without long-term current use of insulin    Multiple sclerosis    Pain of left upper extremity    Stiffness in joint       Past Surgical History:   Procedure Laterality Date     SECTION      COLONOSCOPY N/A 2018    Procedure: COLONOSCOPY;  Surgeon: Ramez Ramírez MD;  Location: [address];  Service: Endoscopy;  Laterality: N/A;    HYSTEROSCOPY WITH DILATION AND CURETTAGE OF UTERUS N/A 2019    Procedure: [id number];  Surgeon: Catrina Olivier MD;  Location: [facility];  Service: OB/GYN;  Laterality: N/A;    SLEEVE GASTROPLASTY         Social History     Socioeconomic History    Marital status:      Spouse name: himanshu    Number of children: 1   Occupational History    Occupation:      Employer: [employer]   Tobacco Use    Smoking status: Never     Passive exposure: Never    Smokeless tobacco: Never   Substance and Sexual Activity    Alcohol use: Not Currently     Comment: occassional for idays    Drug use: No    Sexual activity: Yes     Partners: Male     Birth control/protection: None     Comment:  x 33 years since : Spouse : Himanshu   Social History Narrative    SOCIAL HISTORY:     , spouse is in good health, truck  .     for [employer],    Daughter,  @[employer], major [employer]; working @ [employer]    + exercise, walk, elliptical, Rebecca     Social Determinants of Health     Financial Resource Strain: Low Risk  (2/14/2024)    Overall Financial Resource Strain (CARDIA)     Difficulty of Paying Living Expenses: Not hard at all   Food Insecurity: No Food Insecurity (2/14/2024)    Hunger Vital Sign     Worried About Running Out of Food in the Last Year: Never true     Ran Out of Food in the Last Year: Never true   Transportation Needs: No Transportation Needs (2/14/2024)    PRAPARE - Transportation     Lack of Transportation (Medical): No     Lack of Transportation (Non-Medical): No   Physical Activity: Insufficiently Active (2/14/2024)    Exercise Vital Sign     Days of Exercise per Week: 2 days     Minutes of Exercise per Session: 20 min   Stress: No Stress Concern Present (2/14/2024)    [facility] [place] [facility] - Occupational Stress Questionnaire     Feeling of Stress : Not at all   Housing Stability: Low Risk  (2/14/2024)    Housing Stability Vital Sign     Unable to Pay for Housing in the Last Year: No     Number of Places Lived in the Last Year: 1     Unstable Housing in the Last Year: No       No current facility-administered medications on file prior to encounter.     Current Outpatient Medications on File Prior to Encounter   Medication Sig Dispense Refill    azelastine (ASTELIN) 137 mcg (0.1 %) nasal spray 1 spray (137 mcg total) by Nasal route 2 (two) times daily. 30 mL 3    cholecalciferol, vitamin D3, (VITAMIN D3) 50 mcg (2,000 unit) Cap Take 1 capsule by mouth once daily.      cyanocobalamin, vitamin B-12, (VITAMIN B-12) 2,500 mcg Subl Place 1 tablet under the tongue once a week. Pt taking weekly      estradioL (ESTRACE) 0.01 % (0.1 mg/gram) vaginal cream Place 1 g vaginally twice a week. 42 g 3    hydroCHLOROthiazide (HYDRODIURIL) 25 MG tablet Take 1 tablet (25 mg total)  "by mouth once daily. 90 tablet 3    losartan (COZAAR) 100 MG tablet Take 1 tablet (100 mg total) by mouth once daily. 90 tablet 3    MULTIVITAMIN ORAL Take 1 tablet by mouth once daily. Centrum adult chews ( flavor burst)      rosuvastatin (CRESTOR) 20 MG tablet Take 1 tablet (20 mg total) by mouth once daily. 90 tablet 3    sodium chloride (OCEAN) 0.65 % nasal spray 1 spray by Nasal route as needed for Congestion.      diclofenac sodium (VOLTAREN) 1 % Gel Apply 2 g topically 4 (four) times daily. for 10 days 100 g 1    metFORMIN (GLUCOPHAGE) 1000 MG tablet TAKE 1/2 TABLETS (500 MG TOTAL) BY MOUTH DAILY WITH BREAKFAST. 45 tablet 3       Review of patient's allergies indicates:  No Known Allergies      CBC: No results for input(s): "WBC", "RBC", "HGB", "HCT", "PLT", "MCV", "MCH", "MCHC" in the last 72 hours.    CMP: No results for input(s): "NA", "K", "CL", "CO2", "BUN", "CREATININE", "GLU", "MG", "PHOS", "CALCIUM", "ALBUMIN", "PROT", "ALKPHOS", "ALT", "AST", "BILITOT" in the last 72 hours.    INR  No results for input(s): "PT", "INR", "PROTIME", "APTT" in the last 72 hours.      Diagnostic Studies:    EKG:   None recent    TTE:  No results found. However, due to the size of the patient record, not all encounters were searched. Please check Results Review for a complete set of results.  No results found for: "EF"   No results found. However, due to the size of the patient record, not all encounters were searched. Please check Results Review for a complete set of results.    HIGINIO:  No results found. However, due to the size of the patient record, not all encounters were searched. Please check Results Review for a complete set of results.    Stress Test:  No results found for this or any previous visit.       LHC:  No results found for this or any previous visit.       PFT:  No results found for: "FEV1", "FVC", "VOB4DVD", "TLC", "DLCO"     ALLERGIES:   Review of patient's allergies indicates:  No Known Allergies  LDA: "          Lines/Drains/Airways       None                  Anesthesia Evaluation      Airway   Mallampati: II  TM distance: > 6 cm  Neck ROM: Normal ROM  Dental    (+) Intact    Pulmonary    (+) asthma  Cardiovascular   (+) hypertension    Rate: Normal    Neuro/Psych      GI/Hepatic/Renal      Endo/Other    (+) diabetes mellitus  Abdominal                           Pre-op Assessment    I have reviewed the Patient Summary Reports.     I have reviewed the Nursing Notes. I have reviewed the NPO Status.   I have reviewed the Medications.     Review of Systems  Anesthesia Hx:  No problems with previous Anesthesia             Denies Family Hx of Anesthesia complications.    Denies Personal Hx of Anesthesia complications.                    Cardiovascular:     Hypertension                                        Pulmonary:    Asthma                    Renal/:  Renal/ Normal                 Hepatic/GI:  Hepatic/GI Normal                 Neurological:           Multiple sclerosis                            Endocrine:  Diabetes               Physical Exam  General: Well nourished    Airway:  Mallampati: II   Mouth Opening: Normal  TM Distance: > 6 cm  Tongue: Normal  Neck ROM: Normal ROM    Dental:  Intact    Chest/Lungs:  Normal Respiratory Rate    Heart:  Rate: Normal        Anesthesia Plan  Type of Anesthesia, risks & benefits discussed:    Anesthesia Type: MAC  Intra-op Monitoring Plan: Standard ASA Monitors  Post Op Pain Control Plan: multimodal analgesia and IV/PO Opioids PRN  Induction:  IV  Airway Plan: Direct, Post-Induction  Informed Consent: Informed consent signed with the Patient and all parties understand the risks and agree with anesthesia plan.  All questions answered. Patient consented to blood products? Yes  ASA Score: 2  Day of Surgery Review of History & Physical: H&P Update referred to the surgeon/provider.    Ready For Surgery From Anesthesia Perspective.     .

## 2024-06-04 NOTE — PLAN OF CARE
Nursing pre-op complete. Pt calm, will continue to monitor. Call light within reach. Pt's belongings placed in locker #2, 1 bag.

## 2024-06-04 NOTE — PLAN OF CARE
Discharge instructions reviewed and pt verbalizes understanding. Pain control appropriate. Dressing is clean, dry, and intact. VSS and afebrile.  Meds delivered to bedside. Pt ambulatory. AVS complete.

## 2024-06-07 DIAGNOSIS — J01.40 ACUTE PANSINUSITIS, RECURRENCE NOT SPECIFIED: ICD-10-CM

## 2024-06-07 RX ORDER — FLUTICASONE PROPIONATE 50 MCG
SPRAY, SUSPENSION (ML) NASAL
Qty: 24 ML | Refills: 1 | Status: SHIPPED | OUTPATIENT
Start: 2024-06-07

## 2024-06-10 ENCOUNTER — NURSE TRIAGE (OUTPATIENT)
Dept: ADMINISTRATIVE | Facility: CLINIC | Age: 59
End: 2024-06-10
Payer: COMMERCIAL

## 2024-06-10 ENCOUNTER — OFFICE VISIT (OUTPATIENT)
Dept: ORTHOPEDICS | Facility: CLINIC | Age: 59
End: 2024-06-10
Payer: COMMERCIAL

## 2024-06-10 ENCOUNTER — TELEPHONE (OUTPATIENT)
Dept: ORTHOPEDICS | Facility: CLINIC | Age: 59
End: 2024-06-10
Payer: COMMERCIAL

## 2024-06-10 DIAGNOSIS — G56.02 CARPAL TUNNEL SYNDROME OF LEFT WRIST: ICD-10-CM

## 2024-06-10 DIAGNOSIS — G56.01 CARPAL TUNNEL SYNDROME OF RIGHT WRIST: Primary | ICD-10-CM

## 2024-06-10 PROCEDURE — 99999 PR PBB SHADOW E&M-EST. PATIENT-LVL III: CPT | Mod: PBBFAC,,, | Performed by: ORTHOPAEDIC SURGERY

## 2024-06-10 PROCEDURE — 3066F NEPHROPATHY DOC TX: CPT | Mod: CPTII,S$GLB,, | Performed by: ORTHOPAEDIC SURGERY

## 2024-06-10 PROCEDURE — 99024 POSTOP FOLLOW-UP VISIT: CPT | Mod: S$GLB,,, | Performed by: ORTHOPAEDIC SURGERY

## 2024-06-10 PROCEDURE — 3044F HG A1C LEVEL LT 7.0%: CPT | Mod: CPTII,S$GLB,, | Performed by: ORTHOPAEDIC SURGERY

## 2024-06-10 PROCEDURE — 4010F ACE/ARB THERAPY RXD/TAKEN: CPT | Mod: CPTII,S$GLB,, | Performed by: ORTHOPAEDIC SURGERY

## 2024-06-10 PROCEDURE — 1159F MED LIST DOCD IN RCRD: CPT | Mod: CPTII,S$GLB,, | Performed by: ORTHOPAEDIC SURGERY

## 2024-06-10 PROCEDURE — 1160F RVW MEDS BY RX/DR IN RCRD: CPT | Mod: CPTII,S$GLB,, | Performed by: ORTHOPAEDIC SURGERY

## 2024-06-10 PROCEDURE — 3061F NEG MICROALBUMINURIA REV: CPT | Mod: CPTII,S$GLB,, | Performed by: ORTHOPAEDIC SURGERY

## 2024-06-10 NOTE — PROGRESS NOTES
Miesha Domínguez presents for post-operative evaluation of   Encounter Diagnoses   Name Primary?    Carpal tunnel syndrome of right wrist Yes    Carpal tunnel syndrome of left wrist       History of Present Illness     The patient is now 6 days s/p 6.4.24 Right CTR and RLF trigger finger release. Patient is here for incision check. Today she was buckling her seatbelt and felt a rip/tear near her palm and she was concerned. Dressing taken down for incision check.    Vitals:    06/10/24 1519   PainSc: 0-No pain   PainLoc: Hand        PE:    AA&O x 4.  NAD  HEENT:  NCAT, sclera nonicteric  Lungs:  Respirations are equal and unlabored.  CV:  2+ bilateral upper and lower extremity pulses.  MSK: The wound is healing well with no signs of erythema or warmth.  There is no drainage.  Neurovascularly intact and has 5/5 thenar and intrinsic strength.  Physical Exam         Diagnostic studies and other clinical records review:  Results     Assessment & Plan: Status post above, doing well  Assessment & Plan    Continue with light use of the right hand.  F/U 1-2 weeks  Call with any questions/concerns in the interim        Claire Richardson MD    Please be aware that this note has been generated with the assistance of [employer] voice-to-text.  Please excuse any spelling or grammatical errors.    This note was generated with the assistance of ambient listening technology. Verbal consent was obtained by the patient and accompanying visitor(s) for the recording of patient appointment to facilitate this note. I attest to having reviewed and edited the generated note for accuracy, though some syntax or spelling errors may persist. Please contact the author of this note for any clarification.

## 2024-06-10 NOTE — TELEPHONE ENCOUNTER
I tried to reach office staff and went to Scheduling for ortho [place] and she will send message to nurse for MD. I told her to have them contact pt directly     
pt is calling In and she said that she was trying to buckle seat belt and feels like something popped like she would have popped a stitch or something came loose. Pt is on her way back home at this time. triaged and care advice to transfer to MD office now. I sent a SC to provider and awaiting a response.               Reason for Disposition   Caller has URGENT question and triager unable to answer question    Additional Information   Negative: Sounds like a life-threatening emergency to the triager   Negative: Bright red, wide-spread, sunburn-like rash   Negative: SEVERE headache and after spinal (epidural) anesthesia   Negative: Vomiting and persists > 4 hours   Negative: Vomiting and abdomen looks much more swollen than usual   Negative: Drinking very little and dehydration suspected (e.g., no urine > 12 hours, very dry mouth, very lightheaded)   Negative: Patient sounds very sick or weak to the triager   Negative: Sounds like a serious complication to the triager   Negative: Fever > 100.4 F (38.0 C)    Protocols used: Post-Op Symptoms and [id number]-E-SW    
D&C Hysteroscopy, Laparoscopy

## 2024-06-11 ENCOUNTER — PATIENT MESSAGE (OUTPATIENT)
Dept: BARIATRICS | Facility: CLINIC | Age: 59
End: 2024-06-11
Payer: COMMERCIAL

## 2024-06-13 ENCOUNTER — OFFICE VISIT (OUTPATIENT)
Dept: OTOLARYNGOLOGY | Facility: CLINIC | Age: 59
End: 2024-06-13
Payer: COMMERCIAL

## 2024-06-13 VITALS
DIASTOLIC BLOOD PRESSURE: 76 MMHG | OXYGEN SATURATION: 96 % | BODY MASS INDEX: 39.46 KG/M2 | SYSTOLIC BLOOD PRESSURE: 128 MMHG | WEIGHT: 244.5 LBS | HEART RATE: 63 BPM

## 2024-06-13 DIAGNOSIS — H60.8X3 CHRONIC ECZEMATOUS OTITIS EXTERNA OF BOTH EARS: Primary | ICD-10-CM

## 2024-06-13 DIAGNOSIS — J34.2 NASAL SEPTAL DEVIATION: ICD-10-CM

## 2024-06-13 DIAGNOSIS — H93.293 ABNORMAL AUDITORY PERCEPTION, BILATERAL: ICD-10-CM

## 2024-06-13 DIAGNOSIS — J30.9 CHRONIC ALLERGIC RHINITIS: ICD-10-CM

## 2024-06-13 DIAGNOSIS — H61.23 BILATERAL IMPACTED CERUMEN: ICD-10-CM

## 2024-06-13 DIAGNOSIS — G35 MULTIPLE SCLEROSIS: ICD-10-CM

## 2024-06-13 PROCEDURE — 3044F HG A1C LEVEL LT 7.0%: CPT | Mod: CPTII,S$GLB,, | Performed by: SPECIALIST

## 2024-06-13 PROCEDURE — 69210 REMOVE IMPACTED EAR WAX UNI: CPT | Mod: 50,S$GLB,, | Performed by: SPECIALIST

## 2024-06-13 PROCEDURE — 1159F MED LIST DOCD IN RCRD: CPT | Mod: CPTII,S$GLB,, | Performed by: SPECIALIST

## 2024-06-13 PROCEDURE — 4010F ACE/ARB THERAPY RXD/TAKEN: CPT | Mod: CPTII,S$GLB,, | Performed by: SPECIALIST

## 2024-06-13 PROCEDURE — 99999 PR PBB SHADOW E&M-EST. PATIENT-LVL IV: CPT | Mod: PBBFAC,,, | Performed by: SPECIALIST

## 2024-06-13 PROCEDURE — 3061F NEG MICROALBUMINURIA REV: CPT | Mod: CPTII,S$GLB,, | Performed by: SPECIALIST

## 2024-06-13 PROCEDURE — 1160F RVW MEDS BY RX/DR IN RCRD: CPT | Mod: CPTII,S$GLB,, | Performed by: SPECIALIST

## 2024-06-13 PROCEDURE — 99214 OFFICE O/P EST MOD 30 MIN: CPT | Mod: 25,S$GLB,, | Performed by: SPECIALIST

## 2024-06-13 PROCEDURE — 3078F DIAST BP <80 MM HG: CPT | Mod: CPTII,S$GLB,, | Performed by: SPECIALIST

## 2024-06-13 PROCEDURE — 3074F SYST BP LT 130 MM HG: CPT | Mod: CPTII,S$GLB,, | Performed by: SPECIALIST

## 2024-06-13 PROCEDURE — 3008F BODY MASS INDEX DOCD: CPT | Mod: CPTII,S$GLB,, | Performed by: SPECIALIST

## 2024-06-13 PROCEDURE — 3066F NEPHROPATHY DOC TX: CPT | Mod: CPTII,S$GLB,, | Performed by: SPECIALIST

## 2024-06-13 NOTE — PROCEDURES
"Ear Cerumen Removal    Date/Time: 6/13/2024 8:40 AM    Performed by: RAMESH Cortez MD  Authorized by: RAMESH Cortez MD    Time out: Immediately prior to procedure a "time out" was called to verify the correct patient, procedure, equipment, support staff and site/side marked as required.    Consent Done?:  Yes (Verbal)    Local anesthetic:  None  Location details:  Both ears  Procedure type comment:  8 Fr and 10 Fr suction, wire loop  Cerumen  Removal Results:  Cerumen completely removed  Patient tolerance:  Patient tolerated the procedure well with no immediate complications    "

## 2024-06-13 NOTE — PROGRESS NOTES
Subjective:       Patient ID: Miesha Domínguez is a 58 y.o. female.    Chief Complaint: No chief complaint on file.      The patient is referred by Dr. Milagros Wolf for blockage in the ears.  She is noted to have wax in both ears.  She has not having itching, drainage.  She does not have noticeable hearing loss or ringing in the ears.  She does have allergy/sinus problems.  She typically has nasal congestion and postnasal drip although she occasionally has runny nose and sneezing.  She uses the combination of azelastine and fluticasone sprays twice daily and levo cetirizine.  She irrigates her nose with Ocean nasal spray.  That regimen has kept her symptoms well controlled.    In 2020 she was diagnosed with sclerosis.  She presented with numbness and burning in her right foot.  Symptoms medically controlled using glatiramer.          Review of Systems     Constitutional: Negative for appetite change, chills, fatigue, fever and unexpected weight loss.      HENT: Positive for postnasal drip, runny nose, sinus pressure, sore throat and stuffy nose.  Negative for ear discharge, ear infection, ear pain, facial swelling, hearing loss, mouth sores, nosebleeds, ringing in the ears, sinus infection, tonsil infection, dental problems, trouble swallowing and voice change.      Eyes:  Negative for change in eyesight, eye drainage, eye itching and photophobia.     Respiratory:  Negative for cough, shortness of breath, sleep apnea, snoring and wheezing.      Cardiovascular:  Negative for chest pain, foot swelling, irregular heartbeat and swollen veins.     Gastrointestinal:  Negative for abdominal pain, acid reflux, constipation, diarrhea, heartburn and vomiting.     Genitourinary: Negative for difficulty urinating, sexual problems and frequent urination.     Musc: Negative for aching joints, aching muscles, back pain and neck pain.     Skin: Negative for rash.     Allergy: Positive for seasonal allergies. Negative  for food allergies.     Endocrine: Negative for cold intolerance and heat intolerance.      Neurological: Negative for dizziness, headaches, light-headedness, seizures and tremors.  Multiple sclerosis    Hematologic: Negative for bruises/bleeds easily and swollen glands.      Psychiatric: Negative for decreased concentration, depression, nervous/anxious and sleep disturbance.                Objective:      Physical Exam  Vitals and nursing note reviewed.   Constitutional:       General: She is awake.      Appearance: Normal appearance. She is well-developed and well-groomed. She is obese.   HENT:      Head: Normocephalic.      Jaw: There is normal jaw occlusion.      Salivary Glands: Right salivary gland is not diffusely enlarged or tender. Left salivary gland is not diffusely enlarged or tender.      Right Ear: Hearing, ear canal and external ear normal. There is impacted cerumen. Tympanic membrane is retracted.      Left Ear: Hearing, ear canal and external ear normal. There is impacted cerumen. Tympanic membrane is retracted.      Nose: Septal deviation (Septum deviated high to the right on the floor the nose to the left), mucosal edema (cyanotic, boggy inferior turbinates bilaterally) and rhinorrhea (clear mucus bilaterally) present. No nasal deformity. Rhinorrhea is clear.      Right Turbinates: Enlarged and pale.      Left Turbinates: Enlarged and pale.      Mouth/Throat:      Lips: No lesions.      Mouth: No oral lesions.      Dentition: No gum lesions.      Tongue: No lesions.      Palate: No mass and lesions.      Pharynx: Oropharynx is clear. Uvula midline.      Tonsils: 2+ on the right. 2+ on the left.   Eyes:      General: Lids are normal. Vision grossly intact.         Right eye: No discharge.         Left eye: No discharge.      Extraocular Movements: Extraocular movements intact.      Conjunctiva/sclera: Conjunctivae normal.      Pupils: Pupils are equal, round, and reactive to light.   Neck:       Thyroid: No thyroid mass or thyromegaly.      Trachea: Trachea normal. No tracheal deviation.   Cardiovascular:      Rate and Rhythm: Normal rate and regular rhythm.      Pulses: Normal pulses.      Heart sounds: Normal heart sounds.   Pulmonary:      Effort: Pulmonary effort is normal.      Breath sounds: Normal breath sounds. No stridor. No decreased breath sounds, wheezing, rhonchi or rales.   Abdominal:      General: Bowel sounds are normal.      Palpations: Abdomen is soft.      Tenderness: There is no abdominal tenderness.   Musculoskeletal:      Cervical back: Neck supple. No muscular tenderness. Decreased range of motion.   Lymphadenopathy:      Head:      Right side of head: No submental, submandibular, preauricular, posterior auricular or occipital adenopathy.      Left side of head: No submental, submandibular, preauricular, posterior auricular or occipital adenopathy.      Cervical: No cervical adenopathy.   Skin:     General: Skin is warm and dry.      Findings: No petechiae or rash.      Nails: There is no clubbing.   Neurological:      Mental Status: She is alert and oriented to person, place, and time.      Cranial Nerves: No cranial nerve deficit.      Sensory: No sensory deficit.      Gait: Gait normal.   Psychiatric:         Speech: Speech normal.         Behavior: Behavior normal. Behavior is cooperative.         Thought Content: Thought content normal.         Judgment: Judgment normal.         Procedure-cerumen impactions removed bilaterally using 8 Qatari suction, 10 Qatari suction and wire loop.  The patient tolerated procedure well was discharged post procedure    Review of 03/31/2023 audiogram below        Assessment:       1. Chronic eczematous otitis externa of both ears    2. Bilateral impacted cerumen    3. Chronic allergic rhinitis    4. Multiple sclerosis    5. Abnormal auditory perception, bilateral    6. Nasal septal deviation        Plan:       I will have the patient use Regis  and Regis Baby oil 4 drops in both ears at night before going to bed once or twice weekly.  She has place a cotton ball at the external auditory meatus to catch any excess drops.  I am encouraging in his stopped using ear buds.  I will recheck her in 1 year, or sooner on an as-needed basis.                    DISCLAIMER: This note was prepared with [employer] voice recognition transcription software. Garbled syntax, mangled pronouns, and other bizarre constructions may be attributed to that software system. While efforts were made to correct any mistakes made by this voice recognition program, some errors and/or omissions may remain in the note that were missed when the note was originally created.

## 2024-06-18 ENCOUNTER — CLINICAL SUPPORT (OUTPATIENT)
Dept: REHABILITATION | Facility: HOSPITAL | Age: 59
End: 2024-06-18
Payer: COMMERCIAL

## 2024-06-18 DIAGNOSIS — M25.641 DECREASED RANGE OF MOTION OF FINGER OF RIGHT HAND: Primary | ICD-10-CM

## 2024-06-18 DIAGNOSIS — R52 PAIN: ICD-10-CM

## 2024-06-18 PROCEDURE — 97165 OT EVAL LOW COMPLEX 30 MIN: CPT

## 2024-06-18 PROCEDURE — 97110 THERAPEUTIC EXERCISES: CPT

## 2024-06-18 NOTE — PATIENT INSTRUCTIONS
[facility]  HOME EXERCISE PROGRAM            .     Giovanna Pacheco OTR/PATRICIA SnellT

## 2024-06-19 ENCOUNTER — OFFICE VISIT (OUTPATIENT)
Dept: NEUROLOGY | Facility: CLINIC | Age: 59
End: 2024-06-19
Payer: COMMERCIAL

## 2024-06-19 ENCOUNTER — TELEPHONE (OUTPATIENT)
Dept: NEUROLOGY | Facility: CLINIC | Age: 59
End: 2024-06-19

## 2024-06-19 VITALS
SYSTOLIC BLOOD PRESSURE: 109 MMHG | BODY MASS INDEX: 39.53 KG/M2 | WEIGHT: 245.94 LBS | HEART RATE: 89 BPM | HEIGHT: 66 IN | DIASTOLIC BLOOD PRESSURE: 76 MMHG

## 2024-06-19 DIAGNOSIS — G35 MULTIPLE SCLEROSIS: Primary | ICD-10-CM

## 2024-06-19 PROCEDURE — 99999 PR PBB SHADOW E&M-EST. PATIENT-LVL IV: CPT | Mod: PBBFAC,,, | Performed by: STUDENT IN AN ORGANIZED HEALTH CARE EDUCATION/TRAINING PROGRAM

## 2024-06-19 PROCEDURE — 3078F DIAST BP <80 MM HG: CPT | Mod: CPTII,S$GLB,, | Performed by: STUDENT IN AN ORGANIZED HEALTH CARE EDUCATION/TRAINING PROGRAM

## 2024-06-19 PROCEDURE — G2211 COMPLEX E/M VISIT ADD ON: HCPCS | Mod: S$GLB,,, | Performed by: STUDENT IN AN ORGANIZED HEALTH CARE EDUCATION/TRAINING PROGRAM

## 2024-06-19 PROCEDURE — 4010F ACE/ARB THERAPY RXD/TAKEN: CPT | Mod: CPTII,S$GLB,, | Performed by: STUDENT IN AN ORGANIZED HEALTH CARE EDUCATION/TRAINING PROGRAM

## 2024-06-19 PROCEDURE — 3074F SYST BP LT 130 MM HG: CPT | Mod: CPTII,S$GLB,, | Performed by: STUDENT IN AN ORGANIZED HEALTH CARE EDUCATION/TRAINING PROGRAM

## 2024-06-19 PROCEDURE — 3044F HG A1C LEVEL LT 7.0%: CPT | Mod: CPTII,S$GLB,, | Performed by: STUDENT IN AN ORGANIZED HEALTH CARE EDUCATION/TRAINING PROGRAM

## 2024-06-19 PROCEDURE — 99214 OFFICE O/P EST MOD 30 MIN: CPT | Mod: S$GLB,,, | Performed by: STUDENT IN AN ORGANIZED HEALTH CARE EDUCATION/TRAINING PROGRAM

## 2024-06-19 PROCEDURE — 3061F NEG MICROALBUMINURIA REV: CPT | Mod: CPTII,S$GLB,, | Performed by: STUDENT IN AN ORGANIZED HEALTH CARE EDUCATION/TRAINING PROGRAM

## 2024-06-19 PROCEDURE — 3008F BODY MASS INDEX DOCD: CPT | Mod: CPTII,S$GLB,, | Performed by: STUDENT IN AN ORGANIZED HEALTH CARE EDUCATION/TRAINING PROGRAM

## 2024-06-19 PROCEDURE — 3066F NEPHROPATHY DOC TX: CPT | Mod: CPTII,S$GLB,, | Performed by: STUDENT IN AN ORGANIZED HEALTH CARE EDUCATION/TRAINING PROGRAM

## 2024-06-19 NOTE — TELEPHONE ENCOUNTER
----- Message from Renetta Mejía MD sent at 6/19/2024  3:38 PM CDT -----  Do you think we can change her glatopa to dispense 3 months at a time instead of monthly?

## 2024-06-19 NOTE — PROGRESS NOTES
[facility]  Follow Up Patient Visit      Disease Summary     Principle neurological diagnosis: MS  Date of symptom onset: 2018  Date of diagnosis: 2020  Disease type at diagnosis: RR  Disease type currently: RR  Previous therapy: NA  Current therapy: Glatiramer acetate 2020 - present  Vitamin D Dose: 5000IU daily   Last MRI Brain: 3/2/24 stable  Last MRI C-spine: 3/2/24 stable   Last MRI T-spine: 3/2/24 stable  CSF: 11/3/2020: W2, R49, KFLC 0.0299, P20  Vit D:   Lab Results   Component Value Date    [id number] 89 02/14/2024    EIGTOGEF20HT 120 (H) 03/09/2023    [id number] 49 06/15/2022       Interval history:     Tolerating injections well without side effects, without infectious complications.     No new neurological symptoms.     Updated MRI stable.     She just started on Mounjaro.   She just had carpal tunnel surgery on her R, tingling has improved.     She is on Lyrica 100mg TID (increased last when carpal tunnel symptoms were worsening).     She notices more leg spasms when she exerts herself during the daytime.    Denies fatigue, pain. Denies urinary or bowel dysfunction.     ROS:     SOCIAL HISTORY  Living arrangements - the patient lives with their family.  Social History     Socioeconomic History    Marital status:      Spouse name: justin    Number of children: 1   Occupational History    Occupation:      Employer: [employer]   Tobacco Use    Smoking status: Never     Passive exposure: Never    Smokeless tobacco: Never   Substance and Sexual Activity    Alcohol use: Not Currently     Comment: occassional for Hollidays    Drug use: No    Sexual activity: Yes     Partners: Male     Birth control/protection: None     Comment:  x 33 years since 1991: Spouse : Justin   Social History Narrative    SOCIAL HISTORY:     , spouse is in good health, .     for [employer],    Daughter,  @[employer], [employer];  working @ [employer]    + exercise, walk, elliptical, Rebecca     Social Determinants of Health     Financial Resource Strain: Low Risk  (2/14/2024)    Overall Financial Resource Strain (CARDIA)     Difficulty of Paying Living Expenses: Not hard at all   Food Insecurity: No Food Insecurity (2/14/2024)    Hunger Vital Sign     Worried About Running Out of Food in the Last Year: Never true     Ran Out of Food in the Last Year: Never true   Transportation Needs: No Transportation Needs (2/14/2024)    PRAPARE - Transportation     Lack of Transportation (Medical): No     Lack of Transportation (Non-Medical): No   Physical Activity: Insufficiently Active (2/14/2024)    Exercise Vital Sign     Days of Exercise per Week: 2 days     Minutes of Exercise per Session: 20 min   Stress: No Stress Concern Present (2/14/2024)    [place] [employer] - Occupational Stress Questionnaire     Feeling of Stress : Not at all   Housing Stability: Low Risk  (2/14/2024)    Housing Stability Vital Sign     Unable to Pay for Housing in the Last Year: No     Number of Places Lived in the Last Year: 1     Unstable Housing in the Last Year: No       Patient Employment       Status   Full Time    Employer   [employer]    Address   [address] [place] [address]                   6/12/2024    10:22 PM   REVIEW OF SYMPTOMS   Do you feel abnormally tired on most days? No   Do you feel you generally sleep well? Yes   Do you have difficulty controlling your bladder?  No   Do you have difficulty controlling your bowels?  No   Do you have frequent muscle cramps, tightness or spasms in your limbs?  No   Do you have new visual symptoms?  No   Do you have worsening difficulty with your memory or thinking? No   Do you have worsening symptoms of anxiety or depression?  No   For patients who walk, Do you have more difficulty walking?  No   Have you fallen since your last visit?  No   For patients who use  wheelchairs: Do you have any skin wounds or breakdown? Not Applicable   Do you have difficulty using your hands?  No   Do you have shooting or burning pain? No   Do you have difficulty with sexual function?  No   If you are sexually active, are you using birth control? Y/N  N/A Not Applicable   Do you often choke when swallowing liquids or solid food?  No   Do you experience worsening symptoms when overheated? No   Do you need any new equipment such as a wheelchair, walker or shower chair? No   Do you receive co-pay financial assistance for your principal MS medicine? Yes   Would you be interested in participating in an MS research trial in the future? No   For patients on Gilenya, Tecfidera, Aubagio, Rituxan, Ocrevus, Tysabri, Lemtrada or Methotrexate, are you aware that you should NOT receive live virus vaccines?  Not Applicable   Do you feel you have adequate family/friend support?  Yes   Do you have health insurance?   Yes   Are you currently employed? Yes   Do you receive SSDI/SSI?  Not Applicable   Do you use marijuana or cannabis products? No   Have you been diagnosed with a urinary tract infection since your last visit here? No   Have you been diagnosed with a respiratory tract infection since your last visit here? No   Have you been to the emergency room since your last visit here? No   Have you been hospitalized since your last visit here?  Yes         12/25/2023     9:13 PM 5/19/2022     8:08 PM   FSS SCORE & INTERPRETATION   FSS SCORE  9 9    9    9   FSS SCORE INTERPRETATION May not be suffering from fatigue May not be suffering from fatigue    May not be suffering from fatigue    May not be suffering from fatigue         12/25/2023     9:10 PM 5/19/2022     8:02 PM   MS CONNIE-D SCORE & INTERPRETATION   CONNIE-D SCORE  0 0    0    0   CONNIE-D INTERPRETATION  No indication of Depression No indication of Depression    No indication of Depression    No indication of Depression         12/25/2023     9:07 PM  5/19/2022     9:47 AM   MS TIMMY-7 SCORE & INTERPRETATION   TIMMY-7 SCORE  0 0    0    0   TIMMY-7 SCORE INTERPRETATION Normal Normal    Normal    Normal         12/25/2023     9:15 PM 5/19/2022     8:12 PM   PEQ MS MOS PAIN EFFECTS SCORE & INTERPRETATION   PES SCORE 7 6    6    6   PES SCORE INTERPRETATION Scores can range from 6-30.  Items are scaled so that higher scores indicate a greater impact of pain on a patients mood and behavior. Scores can range from 6-30.  Items are scaled so that higher scores indicate a greater impact of pain on a patients mood and behavior.    Scores can range from 6-30.  Items are scaled so that higher scores indicate a greater impact of pain on a patients mood and behavior.    Scores can range from 6-30.  Items are scaled so that higher scores indicate a greater impact of pain on a patients mood and behavior.         12/25/2023     9:16 PM 5/19/2022     8:13 PM   PEQ MS SEXUAL SATISFACTION SCORE & INTERPRETATION   SSS SCORE  4 4    4    4   SSS SCORE INTERPRETATION Scores can range from 4-24.  Higher scores indicate greater problems with sexual satisfaction. Scores can range from 4-24.  Higher scores indicate greater problems with sexual satisfaction.    Scores can range from 4-24.  Higher scores indicate greater problems with sexual satisfaction.    Scores can range from 4-24.  Higher scores indicate greater problems with sexual satisfaction.         12/25/2023     9:17 PM 5/19/2022     8:14 PM   MS BLADDER CONTROL SCORE & INTERPRETATION   BLCS SCORE 0 0    0    0   BLCS SCORE INTERPRETATION  Scores can range from 0-22, with higher scores indicating greater bladder control problems. Scores can range from 0-22, with higher scores indicating greater bladder control problems.    Scores can range from 0-22, with higher scores indicating greater bladder control problems.    Scores can range from 0-22, with higher scores indicating greater bladder control problems.         12/25/2023      9:22 PM 5/19/2022     8:17 PM   MS BOWEL CONTROL SCORE & INTERPRETATION   BWCS SCORE 0 0    0    0   BWCS SCORE INTERPRETATION Scores can range from 0-26, with higher scores indicating greater bowel control problems. Scores can range from 0-26, with higher scores indicating greater bowel control problems.    Scores can range from 0-26, with higher scores indicating greater bowel control problems.    Scores can range from 0-26, with higher scores indicating greater bowel control problems.         12/25/2023     9:18 PM 5/19/2022     8:15 PM   PEQ MS IMPACT OF VISUAL IMPAIRMENT SCORE & INTERPRETATION   JAMAL SCALE SCORE  0 0    0    0   JAMAL SCORE INTERPRETATION Scores can range from 0-15, with higher scores indicating greater impact of visual problems on daily activites. Scores can range from 0-15, with higher scores indicating greater impact of visual problems on daily activites.    Scores can range from 0-15, with higher scores indicating greater impact of visual problems on daily activites.    Scores can range from 0-15, with higher scores indicating greater impact of visual problems on daily activites.         12/25/2023     9:22 PM 5/19/2022     8:17 PM   MS PDQ SCORE & INTERPRETATION   PDQ RETROSPECTIVE MEMORY SUBSCALE 0 0    0    0   PDQ ATTENTION/CONCENTRATION SUBSCALE 0 0    0    0   PDQ PROSPECTIVE MEMORY SUBSCALE 0 0    0    0   PDQ PLANNING/ORGANIZATION SUBSCALE 0 0    0    0   PDQ TOTAL SCORE 0 0    0    0   PDQ SCORE INTERPRETATION Scores can range from 0-80, with higher scores indicating greater perceived cognitive impairment. Scores can range from 0-80, with higher scores indicating greater perceived cognitive impairment.    Scores can range from 0-80, with higher scores indicating greater perceived cognitive impairment.    Scores can range from 0-80, with higher scores indicating greater perceived cognitive impairment.         12/25/2023     9:27 PM 5/19/2022     8:22 PM   MSSS SCORE & INTERPRETATION  "  MSSS TANGIBLE SUPPORT SUBSCALE 100 100    100    100   MSSS EMOTIONAL/INFORMATIONAL SUPPORT SUBSCALE 100 100    100    100   MSSS AFFECTIONATE SUPPORT SUBSCALE 100 100    100    100   MSSS POSITIVE SOCIAL INTERACTION SUBSCALE 100 100    100    100   MSSS TOTAL SCORE 100 100    100    100   MSSS SCORE INTERPRETATION Scores can range from 0-100, with higher scores indicating greater perceived support. Scores can range from 0-100, with higher scores indicating greater perceived support.    Scores can range from 0-100, with higher scores indicating greater perceived support.    Scores can range from 0-100, with higher scores indicating greater perceived support.       Exam:     Vitals:    06/19/24 1453   BP: 109/76   Pulse: 89   Weight: 111.5 kg (245 lb 14.8 oz)   Height: 5' 6" (1.676 m)          In general, the patient is well nourished and appears to be in no acute distress.    MENTAL STATUS: language is fluent, normal verbal comprehension, short-term and remote memory is intact, attention is normal, patient is alert and oriented x 3, fund of knowlege is appropriate by vocabulary. Behavior and judgment appropriate.     CRANIAL NERVE EXAM:  There is no intrernuclear ophthalmoplegia.  Extraocular muscles are intact. No facial asymmetry. Facial sensation is intact bilaterally. There is no dysarthria. Uvula is midline, and palate moves symmetrically. Shoulder shrug intact bilaterlly. Tongue protrusion is midline. Hearing is intact to finger rub bilaterally. Neck is supple with full ROM    MOTOR EXAM: Normal bulk and tone throughout UE and LE bilaterally.   No pronator drift; rapid sequential movements are normal;    Strength:  R deltoid 5/5, L deltoid 5/5  R biceps 5/5, L biceps 5/5  R triceps 5/5, L triceps 5/5  R finger flexors 4/5 (pain limiting), L finger flexors 5/5  R finger extensors 4/5 (pain limiting), L finger extensors 5/5  R finger abductors 4/5 (pain limiting), L finger abductors 5/5  R  hip flexors 5/5, L " hip flexors 5/5  R  hip extensors 5/5, L hip extensors 5/5  R knee extensors 5/5, L knee extensors 5/5  R knee flexors 5/5, L knee flexors 5/5  R ankle dorsiflexors 5/5, L ankle dorsiflexors 5/5  R ankle plantarflexors 5/5, L ankle plantarflexors 5/5    SENSORY EXAM: Normal to light touch throughout.    COORDINATION: Normal finger-to-nose exam. Normal heel-to-shin exam.    GAIT: Narrow based and stable.     Negative Romberg's        3/9/2023    12:01 AM 12/29/2023    12:01 AM 6/19/2024    12:01 AM   Timed 25 Foot Walk:   Did patient wear an AFO? No No No   Was assistive device used? No No No   Time for 25 Foot Walk (seconds) 4.28 4.05 4.03   Time for 25 Foot Walk (seconds) 4.03 4.18 3.93         Imaging (personally reviewed):     Results for orders placed during the hospital encounter of 03/02/24    MRI Brain Demyelinating Without Contrast    Impression  No significant change from prior with continued several scattered punctate foci of T2 FLAIR hyperintense lesions within the brain parenchyma while nonspecific remain concerning for mild degree of prior demyelinating plaque burden.    No definite new lesion to suggest significant interval or active demyelination.      Electronically signed by: Huy Villatoro DO  Date:    03/03/2024  Time:    13:27    Results for orders placed during the hospital encounter of 03/02/24    MRI Cervical Spine Demyelinating Without Contrast    Impression  Continued short segment regions of T2 stir signal abnormality in the cervical and thoracic cord as detailed above most compatible with prior areas of demyelination in light of history. No new cord signal abnormality to suggest significant interval or active demyelination.    Continued mild degenerative change of the cervical spine as detailed above.    Stable slight ventral positioning thoracic cord T3/T4 superimposed dorsal thoracic canal arachnoid cyst not excluded.  There is no underlying cord signal abnormality.    See above for  additional details.      Electronically signed by: Huy Villatoro DO  Date:    03/02/2024  Time:    12:46    Results for orders placed during the hospital encounter of 03/02/24    MRI Thoracic Spine Demyelinating Without Contrast    Impression  Continued short segment regions of T2 stir signal abnormality in the cervical and thoracic cord as detailed above most compatible with prior areas of demyelination in light of history. No new cord signal abnormality to suggest significant interval or active demyelination.    Continued mild degenerative change of the cervical spine as detailed above.    Stable slight ventral positioning thoracic cord T3/T4 superimposed dorsal thoracic canal arachnoid cyst not excluded.  There is no underlying cord signal abnormality.    See above for additional details.      Electronically signed by: Huy Villatoro DO  Date:    03/02/2024  Time:    12:46    Results for orders placed during the hospital encounter of 03/25/22    MRI Brain Demyelinating W W/O Contrast    Impression  No significant change from prior.  Stable few scattered small to punctate size regions of T2 FLAIR signal abnormality throughout the brain parenchyma remain concerning for mild degree of prior demyelinating plaque burden in light of history.  No definite new lesion or enhancing lesion to suggest significant interval or active demyelination.    Clinical correlation and follow-up advised.      Electronically signed by: Huy Villatoro DO  Date:    03/26/2022  Time:    09:40    Results for orders placed during the hospital encounter of 03/25/22    MRI Cervical Spine Demyelinating W W/O Contrast    Impression  Continued short segment regions of T2 stir signal abnormality in the cervical and thoracic cord as detailed above most compatible with prior areas of demyelination in light of history. No new cord signal abnormality or abnormal intrathecal enhancement to suggest significant interval or active demyelination.    Stable  "ventral positioning of the cervical cord with slight dorsal flattening underlying arachnoid cyst versus web remains in differential.    Continued scattered degenerative change of the cervical spine as detailed above without significant central canal stenosis.    See above for additional details.      Electronically signed by: Huy Villatoro DO  Date:    03/26/2022  Time:    09:40    Results for orders placed during the hospital encounter of 03/25/22    MRI Thoracic Spine Demyelinating W W/O Contrast    Impression  Continued short segment regions of T2 stir signal abnormality in the cervical and thoracic cord as detailed above most compatible with prior areas of demyelination in light of history. No new cord signal abnormality or abnormal intrathecal enhancement to suggest significant interval or active demyelination.    Stable ventral positioning of the cervical cord with slight dorsal flattening underlying arachnoid cyst versus web remains in differential.    Continued scattered degenerative change of the cervical spine as detailed above without significant central canal stenosis.    See above for additional details.      Electronically signed by: Huy Villatoro DO  Date:    03/26/2022  Time:    09:40      Labs:     Lab Results   Component Value Date    GCUGQTKK71WH 89 02/14/2024    XBVYPAQP37FC 120 (H) 03/09/2023    VOZYERRW60CF 49 06/15/2022     No results found for: "JCVINDEX", "JCVANTIBODY"  No results found for: "IC5XTWFS", "ABSOLUTECD3", "SU5LQUGF", "ABSOLUTECD8", "WS5KUOYF", "ABSOLUTECD4", "LABCD48"  Lab Results   Component Value Date    WBC 8.94 02/14/2024    RBC 4.81 02/14/2024    HGB 12.5 02/14/2024    HCT 40.4 02/14/2024    MCV 84 02/14/2024    MCH 26.0 (L) 02/14/2024    MCHC 30.9 (L) 02/14/2024    RDW 15.9 (H) 02/14/2024     02/14/2024    MPV 12.3 02/14/2024    GRAN 4.6 02/14/2024    GRAN 51.7 02/14/2024    LYMPH 3.4 02/14/2024    LYMPH 38.3 02/14/2024    MONO 0.6 02/14/2024    MONO 6.8 02/14/2024 "    EOS 0.2 02/14/2024    BASO 0.05 02/14/2024    EOSINOPHIL 2.5 02/14/2024    BASOPHIL 0.6 02/14/2024     Sodium   Date Value Ref Range Status   02/14/2024 138 136 - 145 mmol/L Final     Potassium   Date Value Ref Range Status   02/14/2024 3.5 3.5 - 5.1 mmol/L Final     Chloride   Date Value Ref Range Status   02/14/2024 102 95 - 110 mmol/L Final     CO2   Date Value Ref Range Status   02/14/2024 28 23 - 29 mmol/L Final     Glucose   Date Value Ref Range Status   02/14/2024 77 70 - 110 mg/dL Final     BUN   Date Value Ref Range Status   02/14/2024 18 6 - 20 mg/dL Final     Creatinine   Date Value Ref Range Status   02/14/2024 0.7 0.5 - 1.4 mg/dL Final     Calcium   Date Value Ref Range Status   02/14/2024 9.7 8.7 - 10.5 mg/dL Final     Total Protein   Date Value Ref Range Status   02/14/2024 7.5 6.0 - 8.4 g/dL Final     Albumin   Date Value Ref Range Status   02/14/2024 3.5 3.5 - 5.2 g/dL Final     Total Bilirubin   Date Value Ref Range Status   02/14/2024 0.5 0.1 - 1.0 mg/dL Final     Comment:     For infants and newborns, interpretation of results should be based  on gestational age, weight and in agreement with clinical  observations.    Premature Infant recommended reference ranges:  Up to 24 hours.............<8.0 mg/dL  Up to 48 hours............<12.0 mg/dL  3-5 days..................<15.0 mg/dL  6-29 days.................<15.0 mg/dL       Alkaline Phosphatase   Date Value Ref Range Status   02/14/2024 65 55 - 135 U/L Final     AST   Date Value Ref Range Status   02/14/2024 25 10 - 40 U/L Final     ALT   Date Value Ref Range Status   02/14/2024 17 10 - 44 U/L Final     Anion Gap   Date Value Ref Range Status   02/14/2024 8 8 - 16 mmol/L Final     eGFR if    Date Value Ref Range Status   03/22/2022 >60.0 >60 mL/min/1.73 m^2 Final   03/22/2022 >60.0 >60 mL/min/1.73 m^2 Final     eGFR if non    Date Value Ref Range Status   03/22/2022 >60.0 >60 mL/min/1.73 m^2 Final     Comment:      Calculation used to obtain the estimated glomerular filtration  rate (eGFR) is the CKD-EPI equation.      03/22/2022 >60.0 >60 mL/min/1.73 m^2 Final     Comment:     Calculation used to obtain the estimated glomerular filtration  rate (eGFR) is the CKD-EPI equation.          Diagnosis/Assessment/Plan:     Multiple Sclerosis  -Assessment:RRMS stable on Glatopa, minimal symptoms on exam.                                        -Imaging: MRI repeat in 3/2025  -Disease Modifying Therapies:Continue glatopa.   Symptom management   -Continue vit D 5000 IU daily   -Discussed brain health lifestyle changes   -Patient able to return to work based on evaluation today  Plan discussed and questions were answered to satisfaction.  Return to clinic in 6 months with CB        NEURO MULTIPLE SCLEROSIS IMPRESSION:   Number of relapses in the past year?:  0  Clinical Progression:  Clinically Stable  MRI Progression:  Stable  MS Classification:  Relapsing-Remitting MS  DMT:  No change in management  Symptom Management:  No change in symptom management  Supplements:  Vit D        Total time spent with the patient: 35 minutes, including face to face consultation, chart review and coordination of care, on the day of the visit. This includes face to face time and non-face to face time preparing to see the patient (eg, review of tests), obtaining and/or reviewing separately obtained history, documenting clinical information in the electronic or other health record, independently interpreting results and communicating results to the patient/family/caregiver, or care coordination.   I performed a neurobehavioral status examination that included a clinical assessment of thinking, reasoning, and judgment. Please see above HPI and ROS for full details.       Renetta Mejía MD, MSc  Attending neurologist

## 2024-06-20 NOTE — TELEPHONE ENCOUNTER
Advised patient prescription is being sent for 90 days but insurance only allows for max of 30 days

## 2024-06-21 ENCOUNTER — PATIENT MESSAGE (OUTPATIENT)
Dept: NEUROLOGY | Facility: CLINIC | Age: 59
End: 2024-06-21
Payer: COMMERCIAL

## 2024-06-21 NOTE — LETTER
[facility] 6th [address]  [address]  [place] [address]  Phone: [phone]         2024      RE: Miesha Domínguez (: 1965)      To Whom It May Concern:    Miesha Domínguez is a patient under my care for the treatment of multiple sclerosis, a chronic neurological condition. Mrs. Domínguez was seen by me in clinic on 2024 and is clinically and radiographically stable.   I have no concerns about her ability to perform her job duties as a .   If you require any additional information, please contact me at [phone].    Sincerely,         Renetta Mejía MD                                JF: ilan

## 2024-06-21 NOTE — LETTER
[facility] 6th [address]  [address]  [facility] [address]  Phone: [phone]           2024      RE: Meisha Domínguez (: 1965)      To Whom It May Concern:    Miesha Domínguez is a patient under my care for the treatment of multiple sclerosis, a chronic neurological condition. She was seen in my clinic on 2024. She is clinically and radiographically stable, and I have no concerns about her ability to perform her job duties as a . If you require any additional information, please contact me at [phone].     Sincerely,        Renetta Mejía MD                                        JF: kb

## 2024-06-24 ENCOUNTER — CLINICAL SUPPORT (OUTPATIENT)
Dept: REHABILITATION | Facility: HOSPITAL | Age: 59
End: 2024-06-24
Payer: COMMERCIAL

## 2024-06-24 DIAGNOSIS — M79.602 PAIN OF LEFT UPPER EXTREMITY: Primary | ICD-10-CM

## 2024-06-24 PROCEDURE — 97022 WHIRLPOOL THERAPY: CPT

## 2024-06-24 PROCEDURE — 97140 MANUAL THERAPY 1/> REGIONS: CPT

## 2024-06-24 PROCEDURE — 97110 THERAPEUTIC EXERCISES: CPT

## 2024-06-24 NOTE — PROGRESS NOTES
Occupational Therapy Daily Treatment Note   Date 6/24/2024    Date: 6/24/2024  [id number]    Therapy Diagnosis: No diagnosis found.  Physician: Claire Richardson MD    Physician Orders: Eval and treat;    Medical Diagnosis: CTR and Trigger finger release  right long finger  Surgical Procedure and Date: 6/04/2024,  CTR and TF    Evaluation Date: 6/24/2024 8/30/24  Insurance Authorization Period Expiration: 12/31/24   Plan of Care Expiration: 8 weeks;   Date of Return to MD:  unknown   Visit # / Visits authorized: 2/ 1  FOTO: (6/18/2024 and 85%)    FOTO Ale Code:     Precautions:  Standard  MS     Subjective     Pt reports: she was compliant with home exercise program given last session.   Response to previous treatment: she reports that does not have too much   Functional change:  reports that  she can move her fingers well     Pain: 3/10  Location: right fingers   and  wrist     Objective   Hand ROM. Measured in degrees.    6/18/2024 6/18/2024     Left Right           Index: MP    46              PIP       76              DIP   37              RANGEL   159           Long:  MP   50              PIP   -30/80              DIP   36              RANGEL   136           Ring:   MP   45              PIP   75              DIP   31              RANGEL   151           Small:  MP   44               PIP   80               DIP   34              RANGEL   158           Thumb: MP 27 29                IP 65 74       Rad ADD/ABD   26       Pal ADD/ABD   37          Opposition MP  PIP       Strength (Dynamometer) and Pinch Strength (Pinch Gauge)  Measured in pounds.    6/18/2024 6/18/2024     Left Right   Rung II       Jefferson Pinch       3pt Pinch       2pt Pinch           Miesha received the following supervised modalities after being cleared for contradictions for  10  minutes:   - jose Kwonrina received the following manual therapy techniques for 10  minutes: scar  massage  with tissue movers     Miesha received therapeutic exercises for 20  minutes including:  -TGE , joint blocking , Wrist AROM   Isopsheres  Pom pom pick ups X 2 trials      FOTO : self care    initial eval      Limitation  Score                     Home Exercises and Education Provided     Education provided:   - TGE  and joint blocking   - Progress towards goals     Written Home Exercises Provided: Patient instructed to cont prior HEP.  Exercises were reviewed and Miesha was able to demonstrate them prior to the end of the session.  Miesha demonstrated good  understanding of the education provided.   .   See EMR under Patient Instructions for exercises provided prior visit.       Assessment     Pt would continue to benefit from skilled OT. Yes      Miesha is progressing well towards her goals and there are no updates to goals at this time. Pt prognosis is Good.     Pt will continue to benefit from skilled outpatient occupational therapy to address the deficits listed in the problem list on initial evaluation provide pt/family education and to maximize pt's level of independence in the home and community environment.     Anticipated barriers to occupational therapy:  none    Pt's spiritual, cultural and educational needs considered and pt agreeable to plan of care and goals.    Goals:   The following goals were discussed with the patient and patient is in agreement with them as to be addressed in the treatment plan.   Long Term Goals (LTGs); to be met by discharge.  Patient will achieve 95% on the FOTO, within 8 weeks.  Assess pinch and  when appropriate.        Short Term Goals (STGs); to be met within 4 weeks.  Patient will demonstrate improved ROM in R hand, to improve functional use, within 4 weeks.  Patient will report no more than 1 out of 10 pain on the Numeric Pain Rating Scale, to decrease patient discomfort, within 4 weeks.   Patient will achieve 90% on the FOTO showing more improvement.      PLAN   Plan of Care Certification: 6/18/2024 to  8/30/24 .      Discussed Plan of Care with patient: Yes  Updates/Grading for next session: FINA Hightower OT

## 2024-06-25 PROBLEM — M25.641 DECREASED RANGE OF MOTION OF FINGER OF RIGHT HAND: Status: ACTIVE | Noted: 2024-06-25

## 2024-06-25 PROBLEM — R52 PAIN: Status: ACTIVE | Noted: 2024-06-25

## 2024-06-26 ENCOUNTER — OFFICE VISIT (OUTPATIENT)
Dept: ORTHOPEDICS | Facility: CLINIC | Age: 59
End: 2024-06-26
Payer: COMMERCIAL

## 2024-06-26 VITALS — WEIGHT: 247.81 LBS | BODY MASS INDEX: 39.82 KG/M2 | HEIGHT: 66 IN

## 2024-06-26 DIAGNOSIS — M65.30 STENOSING TENOSYNOVITIS OF FINGER: ICD-10-CM

## 2024-06-26 DIAGNOSIS — Z98.890 POST-OPERATIVE STATE: Primary | ICD-10-CM

## 2024-06-26 DIAGNOSIS — G56.01 CARPAL TUNNEL SYNDROME OF RIGHT WRIST: ICD-10-CM

## 2024-06-26 PROCEDURE — 3061F NEG MICROALBUMINURIA REV: CPT | Mod: CPTII,S$GLB,, | Performed by: ORTHOPAEDIC SURGERY

## 2024-06-26 PROCEDURE — 3044F HG A1C LEVEL LT 7.0%: CPT | Mod: CPTII,S$GLB,, | Performed by: ORTHOPAEDIC SURGERY

## 2024-06-26 PROCEDURE — 4010F ACE/ARB THERAPY RXD/TAKEN: CPT | Mod: CPTII,S$GLB,, | Performed by: ORTHOPAEDIC SURGERY

## 2024-06-26 PROCEDURE — 99999 PR PBB SHADOW E&M-EST. PATIENT-LVL IV: CPT | Mod: PBBFAC,,, | Performed by: ORTHOPAEDIC SURGERY

## 2024-06-26 PROCEDURE — 99024 POSTOP FOLLOW-UP VISIT: CPT | Mod: S$GLB,,, | Performed by: ORTHOPAEDIC SURGERY

## 2024-06-26 PROCEDURE — 1160F RVW MEDS BY RX/DR IN RCRD: CPT | Mod: CPTII,S$GLB,, | Performed by: ORTHOPAEDIC SURGERY

## 2024-06-26 PROCEDURE — 1159F MED LIST DOCD IN RCRD: CPT | Mod: CPTII,S$GLB,, | Performed by: ORTHOPAEDIC SURGERY

## 2024-06-26 PROCEDURE — 3066F NEPHROPATHY DOC TX: CPT | Mod: CPTII,S$GLB,, | Performed by: ORTHOPAEDIC SURGERY

## 2024-06-26 NOTE — PROGRESS NOTES
Miesha Domínguez presents for post-operative evaluation of   Encounter Diagnoses   Name Primary?    Post-operative state Yes    Carpal tunnel syndrome of right wrist     Stenosing tenosynovitis of finger    .   History of Present Illness     The patient is now 3w 1d s/p 6.4.24 Right CTR and RLF trigger finger release. Overall the patient reports doing well.  The patient reports appropriate postoperative soreness with well controlled overall pain.      Of note: EMG/NCS mild left carpal tunnel; last steroid injection 3.27.24.  Vitals:    06/26/24 1117   PainSc: 0-No pain       PE:    AA&O x 4.  NAD  HEENT:  NCAT, sclera nonicteric  Lungs:  Respirations are equal and unlabored.  CV:  2+ bilateral upper and lower extremity pulses.  MSK: The incision is well healed.  Near full wrist and finger motion.  Neurovascularly intact and has 5/5 thenar and intrinsic musculature strength.    Physical Exam         Diagnostic studies and other clinical records review:  Results    Last xray right hand 7.18.23    EMG/NCS 3.26.24 Impression:   There is electrophysiologic evidence of a bilateral (sensory on the left, sensorimotor on the right) median mononeuropathy across the wrist (I.e. Carpal tunnel syndrome).  There is no motor axonal loss.  There is active denervation on the right.  This appears chronic on the right.  This is graded as Severe in severity on the right, and Mild on the left.       Assessment & Plan: Status post above, doing well     Assessment & Plan       Continue with range of motion; strengthening at 6 weeks; can return to full duty work 07/02/2024  F/U 6 weeks  Call with any questions/concerns in the interim        Claire Richardson MD    Please be aware that this note has been generated with the assistance of [employer] voice-to-text.  Please excuse any spelling or grammatical errors.    This note was generated with the assistance of ambient listening technology. Verbal consent was obtained by the patient and  accompanying visitor(s) for the recording of patient appointment to facilitate this note. I attest to having reviewed and edited the generated note for accuracy, though some syntax or spelling errors may persist. Please contact the author of this note for any clarification.

## 2024-06-26 NOTE — PLAN OF CARE
[facility]  Occupational Therapy Initial Evaluation    Date: 6/18/2024  Name: Miesha Domínguez  [facility] Number: [id number]    Therapy Diagnosis:   Encounter Diagnoses   Name Primary?    Decreased range of motion of finger of right hand Yes    Pain      Physician: Claire Richardson MD    Physician Orders: Eval and treat;    Medical Diagnosis: G56.01 (ICD-10-CM) - Carpal tunnel syndrome of right wrist G56.02 (ICD-10-CM) - Carpal tunnel syndrome of left wrist   Surgical Procedure and Date: 6/04/2024,  CTR and TF    Evaluation Date: 6/18/2024   Insurance Authorization Period Expiration: 12/31/2024  Plan of Care Expiration: 8 weeks;   Date of Return to MD:   Visit # / Visits authorized: 1 / 1  FOTO: (6/18/2024 and 85%)    FOTO Lobby Code:     Precautions:  Standard, Diabetes, and MS    Time In: 2:37 PM  Time Out: 03:33 PM   Total Appointment Time (timed & untimed codes): 56 minutes    SUBJECTIVE     Date of Onset: ~ 2 years    History of Current Condition/Mechanism of Injury: Miesha reports: Injections in LF twice for trigger finger (year apart)    Falls: 0    Involved Side: Right  Dominant Side: Right  Imaging:    Prior Therapy: no  Occupation:  ; "[employer]"   Working presently: employed  Duties:     Functional Limitations/Social History:    Previous functional status includes: Independent with all ADLs.     Current Functional Status   Home/Living environment: lives with her spouse      Limitation of Functional Status as follows:   ADLs/IADLs:     - Feeding: (I)    - Bathing:  helps    - Dressing/Grooming: (I)    - Driving: (I)     Leisure: Talking on the phone, shopping, gathering with family, traveling    Pain:  Functional Pain Scale Rating 0-10: Current 0/10, worst 3/10, best 0/10   Location: Right palm   Description: Dull, tingling in LF initially   Aggravating Factors: pressure on incision sight   Easing Factors: pain medication- Advil and  Ibuprofen    Patient's Goals for Therapy: functional use of right hand     Medical History:   Past Medical History:   Diagnosis Date    Asthma     as a child    Endometrial polyp 2019    HLD (hyperlipidemia)     HTN (hypertension)     MS (multiple sclerosis)     2020    Neuropathy     S/P D&C (status post dilation and curettage) 2019    Type II or unspecified type diabetes mellitus without mention of complication, not stated as uncontrolled      Surgical History:    has a past surgical history that includes  section (); Sleeve Gastroplasty (); Colonoscopy (N/A, 2018); Hysteroscopy with dilation and curettage of uterus (N/A, 2019); Carpal tunnel release (Right, 2024); and Trigger finger release (Right, 2024).    Medications:   has a current medication list which includes the following prescription(s): azelastine, cetirizine, cholecalciferol (vitamin d3), cyanocobalamin (vitamin b-12), cyclobenzaprine, diclofenac sodium, estradiol, fluticasone propionate, glatiramer, hydrochlorothiazide, losartan, metformin, multivitamin, ondansetron, pregabalin, rosuvastatin, sodium chloride, tirzepatide, and tramadol.    Allergies:   Review of patient's allergies indicates:  No Known Allergies     OBJECTIVE     Observation/Appearance: post op dressing in tact; no signs of infection, all sutures intact   Elbow and Wrist ROM. Measured in degrees.   2024    Left Right   Elbow Ext/Flex     Supination/Pronation     Wrist Ext/Flex 66/55 57/46   Wrist RD/UD 18/34 1130     Hand ROM. Measured in degrees.   2024    Left Right        Index: MP   46              PIP      76              DIP  37              RANGEL  159        Long:  MP  50              PIP  -30/80              DIP  36              RANGEL  136        Ring:   MP  45              PIP  75              DIP  31              RANGEL  151        Small:  MP  44               PIP  80               DIP  34               RANGEL  158        Thumb: MP 27 29                IP 65 74       Rad ADD/ABD  26       Pal ADD/ABD  37          Opposition MP  PIP      Strength (Dynamometer) and Pinch Strength (Pinch Gauge)  Measured in pounds.   6/25/2024 6/25/2024    Left Right   Rung II     Jefferson Pinch     3pt Pinch     2pt Pinch       Manual Muscle Test   6/25/2024 6/25/2024    Left Right   Wrist Extension      Wrist Flexion     Radial Deviation     Ulnar Deviation     Supination     Pronation     Elbow Extension     Elbow Flexion       Limitation/Restriction for FOTO Initial Eval- Hand Survey    Therapist reviewed FOTO scores for Miesha Domínguez on 6/18/2024.   FOTO documents entered into [employer] - see Media section.    Limitation Score: 85%       Treatment   Total Treatment time (time-based codes) separate from Evaluation: 20 minutes    Miesha received the treatments listed below:     Supervised modalities after being cleared for contradictions: Hot Pack to right hand for 10 minutes      -     Therapeutic exercises to develop ROM for 10 minutes, including:  - HEP         Patient Education and Home Exercises      Education provided:   - Plan of care  - HEP   - Avoid soaking for 3-5 days    Written Home Exercises Provided: yes.  Exercises were reviewed and Miesha was able to demonstrate them prior to the end of the session.  Miesha demonstrated good  understanding of the education provided. See EMR under Patient Instructions for exercises provided during therapy sessions.     Pt was advised to perform these exercises free of pain, and to stop performing them if pain occurs.    Patient/Family Education: role of OT, goals for OT, scheduling/cancellations - pt verbalized understanding. Discussed insurance limitations with patient.    ASSESSMENT     Miesha Domínguez is a 58 y.o. female referred to outpatient occupational therapy and presents with a medical diagnosis of carpal tunnel .  Patient presents with the following therapy deficits:  Decreased ROM, Decreased  strength, Decreased pinch strength, Decreased muscle strength, Decreased functional hand use, Increased pain, and Joint Stiffness and demonstrates limitations as described in the chart below. Following medical record review it is determined that pt will benefit from occupational therapy services in order to maximize pain free and/or functional use of bilateral hand. The following goals were discussed with the patient and patient is in agreement with them as to be addressed in the treatment plan. The patient's rehab potential is Good.     Anticipated barriers to occupational therapy:   Pt has no cultural, educational or language barriers to learning provided.  Medical Necessity is demonstrated by the following  Occupational Profile/History  Co-morbidities and personal factors that may impact the plan of care [] LOW: Brief chart review  [x] MODERATE: Expanded chart review   [] HIGH: Extensive chart review    Moderate / High Support Documentation:      Examination  Performance deficits relating to physical, cognitive or psychosocial skills that result in activity limitations and/or participation restrictions  [] LOW: addressing 1-3 Performance deficits  [x] MODERATE: 3-5 Performance deficits  [] HIGH: 5+ Performance deficits (please support below)    Moderate / High Support Documentation:    Physical:  Muscle Power/Strength  Skin Integrity/Scar Formation   Strength  Pinch Strength  Pain    Cognitive:  No Deficits    Psychosocial:    Habits  Routines  Rituals     Treatment Options [] LOW: Limited options  [] MODERATE: Several options  [] HIGH: Multiple options      Decision Making/ Complexity Score: low       Goals:   The following goals were discussed with the patient and patient is in agreement with them as to be addressed in the treatment plan.   Long Term Goals (LTGs); to be met by discharge.  Patient will achieve 95% on the FOTO, within 8 weeks.  Assess pinch and  when  appropriate.       Short Term Goals (STGs); to be met within 4 weeks.  Patient will demonstrate improved ROM in R hand, to improve functional use, within 4 weeks.  Patient will report no more than 1 out of 10 pain on the Numeric Pain Rating Scale, to decrease patient discomfort, within 4 weeks.   Patient will achieve 90% on the FOTO showing more improvement.    PLAN   Plan of Care Certification: 6/18/2024 to 9/16/2024.     Outpatient Occupational Therapy 2 times weekly for 8 weeks to include the following interventions: Paraffin, Fluidotherapy, Manual therapy/joint mobilizations, Modalities for pain management, Therapeutic exercises/activities., Strengthening, Orthotic Fabrication/Fit/Training, Edema Control, Scar Management, and Energy Conservation.    AIMEE Rosario, OT        I CERTIFY THE NEED FOR THESE SERVICES FURNISHED UNDER THIS PLAN OF TREATMENT AND WHILE UNDER MY CARE  Physician's comments:      Physician's Signature: ___________________________________________________

## 2024-06-26 NOTE — LETTER
[facility]  [address], [address]  [facility] [address]  Phone: [phone]     06/26/2024    Patient: Miesha Domínguez   YOB: 1965   Date of Visit: 06/26/2024        To Whom It May Concern:    This is to inform you that Miesha Domínguez is a patient under my care.  Please allow patient to return to work on 7/2/2024, Full duty with the following restrictions.    Next appointment: 8/14/2024    Please don't hesitate to call or reach out with questions/concerns.    Sincerely,      Claire Richardosn MD  Hand & Wrist Orthopaedic Surgery  06/26/2024

## 2024-06-27 ENCOUNTER — CLINICAL SUPPORT (OUTPATIENT)
Dept: REHABILITATION | Facility: HOSPITAL | Age: 59
End: 2024-06-27
Payer: COMMERCIAL

## 2024-06-27 DIAGNOSIS — R52 PAIN: ICD-10-CM

## 2024-06-27 DIAGNOSIS — M25.641 DECREASED RANGE OF MOTION OF FINGER OF RIGHT HAND: Primary | ICD-10-CM

## 2024-06-27 PROCEDURE — 97110 THERAPEUTIC EXERCISES: CPT

## 2024-06-27 PROCEDURE — 97140 MANUAL THERAPY 1/> REGIONS: CPT

## 2024-06-27 PROCEDURE — 97018 PARAFFIN BATH THERAPY: CPT

## 2024-06-27 NOTE — PROGRESS NOTES
"                              Occupational Therapy Daily Treatment Note   Date 6/27/2024    Date: 6/24/2024  Name: Miesha LAWRENCE [facility] Number: [id number]    Therapy Diagnosis: No diagnosis found.  Physician: Claire Richardson MD    Physician Orders: Eval and treat;    Medical Diagnosis: CTR and Trigger finger release  right long finger  Surgical Procedure and Date: 6/04/2024,  CTR and TF    Evaluation Date: 6/24/2024 8/30/24  Insurance Authorization Period Expiration: 12/31/24   Plan of Care Expiration: 8 weeks; 9/23/2024  Date of Return to MD:  9/14/2024   Visit # / Visits authorized: 2/ 1  FOTO: (6/18/2024 and 85%)    FOTO Lobby Code:       Precautions:  Standard  MS       Time in: 10:35 AM   Time out:   Total Billed time:         Subjective     Pt reports: "its feeling good"   she was compliant with home exercise program given last session.   Response to previous treatment: she reports that does not have too much   Functional change:  reports that  she can move her fingers well     Pain: 0/10  Location: right fingers   and  wrist     Objective   Hand ROM. Measured in degrees.    6/18/2024 6/18/2024     Left Right           Index: MP    46              PIP       76              DIP   37              RANGEL   159           Long:  MP   50              PIP   -30/80              DIP   36              RANGEL   136           Ring:   MP   45              PIP   75              DIP   31              RANGEL   151           Small:  MP   44               PIP   80               DIP   34              RANGEL   158           Thumb: MP 27 29                IP 65 74       Rad ADD/ABD   26       Pal ADD/ABD   37          Opposition MP  PIP         Strength (Dynamometer) and Pinch Strength (Pinch Gauge)  Measured in pounds.    6/18/2024 6/18/2024     Left Right   Rung II   deferred    Key Pinch       3pt Pinch       2pt Pinch             Miesha received the following supervised modalities after being cleared for contradictions for  10  " minutes:   - paraffin wax with MHP for 10 min to promote tissue extensibility and       Miesha received the following manual therapy techniques for 15  minutes: scar massage with tissue movers       Miesha received therapeutic exercises for 25 minutes including:  Hook grasp x 10 reps   Digit abduction/adduction (1 min)  Digit extension (1 min)   Wrist AROM x 10 reps   Isopsheres (2 min)  Thumb radial/palmar abduction x 15 reps   Small/medium pom pom pick ups X 2 trials        FOTO : self care    initial eval      Limitation  Score                     Home Exercises and Education Provided     Education provided:   - TGE  and joint blocking   - Progress towards goals     Written Home Exercises Provided: Patient instructed to cont prior HEP.  Exercises were reviewed and Miesha was able to demonstrate them prior to the end of the session.  Miesha demonstrated good  understanding of the education provided.   .   See EMR under Patient Instructions for exercises provided prior visit.       Assessment     Pt would continue to benefit from skilled OT. Good dwayne to tx today. Demonstrated decreased extensor lag in PIP     Miesha is progressing well towards her goals and there are no updates to goals at this time. Pt prognosis is Good.     Pt will continue to benefit from skilled outpatient occupational therapy to address the deficits listed in the problem list on initial evaluation provide pt/family education and to maximize pt's level of independence in the home and community environment.     Anticipated barriers to occupational therapy:  none    Pt's spiritual, cultural and educational needs considered and pt agreeable to plan of care and goals.    Goals:   The following goals were discussed with the patient and patient is in agreement with them as to be addressed in the treatment plan.   Long Term Goals (LTGs); to be met by discharge.  Patient will achieve 95% on the FOTO, within 8 weeks.  Assess pinch and  when  appropriate.        Short Term Goals (STGs); to be met within 4 weeks.  Patient will demonstrate improved ROM in R hand, to improve functional use, within 4 weeks.  Patient will report no more than 1 out of 10 pain on the Numeric Pain Rating Scale, to decrease patient discomfort, within 4 weeks.   Patient will achieve 90% on the FOTO showing more improvement.     PLAN   Plan of Care Certification: 6/18/2024 to  8/30/24 .      Discussed Plan of Care with patient: Yes  Updates/Grading for next session: FINA Pacheco OT

## 2024-07-02 ENCOUNTER — CLINICAL SUPPORT (OUTPATIENT)
Dept: REHABILITATION | Facility: HOSPITAL | Age: 59
End: 2024-07-02
Payer: COMMERCIAL

## 2024-07-02 DIAGNOSIS — M25.641 DECREASED RANGE OF MOTION OF FINGER OF RIGHT HAND: Primary | ICD-10-CM

## 2024-07-02 DIAGNOSIS — R52 PAIN: ICD-10-CM

## 2024-07-02 PROCEDURE — 97018 PARAFFIN BATH THERAPY: CPT

## 2024-07-02 PROCEDURE — 97140 MANUAL THERAPY 1/> REGIONS: CPT

## 2024-07-02 PROCEDURE — 97110 THERAPEUTIC EXERCISES: CPT

## 2024-07-02 NOTE — PROGRESS NOTES
"                            Occupational Therapy Daily Treatment Note   Date 7/2/2024    Date: 6/24/2024  [id number]    Therapy Diagnosis: No diagnosis found.  Physician: Claire Richardson MD    Physician Orders: Eval and treat;    Medical Diagnosis: CTR and Trigger finger release  right long finger  Surgical Procedure and Date: 6/04/2024, CTR and TF    Evaluation Date: 6/24/2024 8/30/24  Insurance Authorization Period Expiration: 12/31/24   Plan of Care Expiration: 8 weeks; 9/23/2024  Date of Return to MD:  9/14/2024   Visit # / Visits authorized: 4 / 20  FOTO: (6/18/2024 and 85%)    FOTO Lobby Code: [email or web address]    Precautions: Standard  MS     Time in: 08:45 AM   Time out: 09:50 AM  Total Billed time: 65 minutes    Subjective     Pt reports: "Been feeling really good."  she was compliant with home exercise program given last session.   Response to previous treatment: she reports not too much   Functional change: reports that she can move her fingers well     Pain: 0 / 10  Location: right finger and wrist     Objective   Hand ROM. Measured in degrees.    6/18/2024 6/18/2024     Left Right           Index: MP    46              PIP       76              DIP   37              RANGEL   159           Long:  MP   50              PIP   -30/80              DIP   36              RANGEL   136           Ring:   MP   45              PIP   75              DIP   31              RANGEL   151           Small:  MP   44               PIP   80               DIP   34              RANGEL   158           Thumb: MP 27 29                IP 65 74       Rad ADD/ABD   26       Pal ADD/ABD   37          Opposition MP  PIP       Strength (Dynamometer) and Pinch Strength (Pinch Gauge)  Measured in pounds.    6/18/2024 6/18/2024     Left Right   Rung II   deferred    Key Pinch       3pt Pinch       2pt Pinch         Miesha received the following supervised modalities after being cleared for contradictions for 10 " minutes:   - paraffin wax with MHP to promote tissue extensibility     Miesha received the following manual therapy techniques for 10 minutes: scar massage with tissue movers     Miesha received therapeutic exercises for 45 minutes including:  - hook grasp x 10 reps   - digit abduction/adduction (1 min)  - digit extension (1 min)   - thumb radial/palmar abduction x 15 reps   - wrist AROM 3-ways x 10 each   - isopsheres on towel (2 min)  - in hand manipulation with small/medium poms (2 sets)  - reverse joint blocking for PIP extension x 10  - ORL stretch 10 sec holds x 10  - LF MP joint mobilization to improve biomechanics for composite flexion     FOTO : self care    initial eval      Limitation  Score   85%       Home Exercises and Education Provided     Education provided:   - TGE and joint blocking   - Progress towards goals     Written Home Exercises Provided: Patient instructed to cont prior HEP.  Exercises were reviewed and Miesha was able to demonstrate them prior to the end of the session. Miesha demonstrated good  understanding of the education provided.   .   See EMR under Patient Instructions for exercises provided prior visit.     Assessment     Pt would continue to benefit from skilled OT. Good dwayne to tx today. Able to decrease PIP ext lag following heat, manual, and exercise.     Miesha is progressing well towards her goals and there are no updates to goals at this time. Pt prognosis is Good.     Pt will continue to benefit from skilled outpatient occupational therapy to address the deficits listed in the problem list on initial evaluation provide pt/family education and to maximize pt's level of independence in the home and community environment.     Anticipated barriers to occupational therapy:  none    Pt's spiritual, cultural and educational needs considered and pt agreeable to plan of care and goals.    Goals:   The following goals were discussed with the patient and patient is in agreement  with them as to be addressed in the treatment plan.   Long Term Goals (LTGs); to be met by discharge.  Patient will achieve 95% on the FOTO, within 8 weeks.  Assess pinch and  when appropriate.     Short Term Goals (STGs); to be met within 4 weeks.  Patient will demonstrate improved ROM in R hand, to improve functional use, within 4 weeks.  Patient will report no more than 1 out of 10 pain on the Numeric Pain Rating Scale, to decrease patient discomfort, within 4 weeks.   Patient will achieve 90% on the FOTO showing more improvement.     PLAN   Plan of Care Certification: 6/18/2024 to  8/30/24 .      Discussed Plan of Care with patient: Yes  Updates/Grading for next session: ROM     Marisol Funez, PATRICIAT  7/2/2024    Giovanna Pacheco OT

## 2024-07-03 ENCOUNTER — PATIENT MESSAGE (OUTPATIENT)
Dept: BARIATRICS | Facility: CLINIC | Age: 59
End: 2024-07-03
Payer: COMMERCIAL

## 2024-07-03 ENCOUNTER — OCCUPATIONAL HEALTH (OUTPATIENT)
Dept: URGENT CARE | Facility: CLINIC | Age: 59
End: 2024-07-03

## 2024-07-03 DIAGNOSIS — Z02.89 ENCOUNTER FOR EXAMINATION REQUIRED BY DEPARTMENT OF TRANSPORTATION (DOT): Primary | ICD-10-CM

## 2024-07-05 ENCOUNTER — CLINICAL SUPPORT (OUTPATIENT)
Dept: REHABILITATION | Facility: HOSPITAL | Age: 59
End: 2024-07-05
Payer: COMMERCIAL

## 2024-07-05 DIAGNOSIS — R52 PAIN: ICD-10-CM

## 2024-07-05 DIAGNOSIS — M25.641 DECREASED RANGE OF MOTION OF FINGER OF RIGHT HAND: Primary | ICD-10-CM

## 2024-07-05 PROCEDURE — 97110 THERAPEUTIC EXERCISES: CPT

## 2024-07-05 PROCEDURE — 97140 MANUAL THERAPY 1/> REGIONS: CPT

## 2024-07-09 ENCOUNTER — PATIENT MESSAGE (OUTPATIENT)
Dept: BARIATRICS | Facility: CLINIC | Age: 59
End: 2024-07-09
Payer: COMMERCIAL

## 2024-07-09 ENCOUNTER — CLINICAL SUPPORT (OUTPATIENT)
Dept: REHABILITATION | Facility: HOSPITAL | Age: 59
End: 2024-07-09
Payer: COMMERCIAL

## 2024-07-09 DIAGNOSIS — M25.641 DECREASED RANGE OF MOTION OF FINGER OF RIGHT HAND: Primary | ICD-10-CM

## 2024-07-09 DIAGNOSIS — R52 PAIN: ICD-10-CM

## 2024-07-09 PROCEDURE — 97140 MANUAL THERAPY 1/> REGIONS: CPT

## 2024-07-09 PROCEDURE — 97110 THERAPEUTIC EXERCISES: CPT

## 2024-07-09 PROCEDURE — 97018 PARAFFIN BATH THERAPY: CPT

## 2024-07-09 NOTE — PROGRESS NOTES
"                            Occupational Therapy Daily Treatment Note   Date 7/9/2024    Date: 6/24/2024  Name: Miesha Domínguez  Clinic Number: [id number]    Therapy Diagnosis:  Encounter Diagnoses   Name Primary?    Decreased range of motion of finger of right hand Yes    Pain        Physician: Claire Richardson MD    Physician Orders: Eval and treat  Medical Diagnosis: CTR and Trigger finger release  right long finger  Surgical Procedure and Date: 6/04/2024, CTR and TF    Evaluation Date: 6/24/2024; 8/30/24  Insurance Authorization Period Expiration: 12/31/24   Plan of Care Expiration: 8 weeks; 9/23/2024  Date of Return to MD: 9/14/2024   Visit # / Visits authorized: 6 / 20  FOTO: (6/18/2024 and 85%)    FOTO Lobby Code: [email or web address]    Precautions: Standard  MS     Time in: 07:05 AM   Time out: 08:00 AM  Total Billed time: 55 minutes    Subjective     Pt reports: "Feeling good, just having that sticking in the trigger finger when I make a fist."  she was compliant with home exercise program given last session.   Response to previous treatment: she reports not too much   Functional change: reports that she can move her fingers well     Pain: 0 / 10  Location: right finger and wrist     Objective   Hand ROM. Measured in degrees.    6/18/2024 6/18/2024     Left Right           Index: MP    46              PIP       76              DIP   37              RANGEL   159           Long:  MP   50              PIP   -30/80              DIP   36              RANGEL   136           Ring:   MP   45              PIP   75              DIP   31              RANGEL   151           Small:  MP   44               PIP   80               DIP   34              RANGEL   158           Thumb: MP 27 29                IP 65 74       Rad ADD/ABD   26       Pal ADD/ABD   37          Opposition MP  PIP       Strength (Dynamometer) and Pinch Strength (Pinch Gauge)  Measured in pounds.    6/18/2024 6/18/2024     Left Right   Rung II   deferred    [place] Pinch     "   3pt Pinch       2pt Pinch           Objective     Miesha received the following:    supervised modalities after being cleared for contradictions, received the following for 10 minutes:   - paraffin wax with MHP to right hand to promote tissue extensibility     manual therapy techniques for 15 minutes:   - scar massage with tissue movers   - vibration tool for desensitization     therapeutic exercises for 25 minutes including:  - hook grasp x 10 reps   - digit abduction/adduction (1 min)  - digit extension (1 min)   - thumb radial/palmar abduction x 15 each   - wrist AROM 3-ways x 10 each   - isopsheres on towel (2 min)  - in hand manipulation with small/medium poms (2 sets)  - reverse joint blocking for PIP extension x 10  - ORL stretch 10 sec holds x 10  - LF MP joint mobilization to improve biomechanics for composite flexion NT    FOTO: self care    initial eval  7/9/2024    Limitation  Score   85% 77%      Home Exercises and Education Provided     Education provided:   - TGE and joint blocking review   - Progress towards goals     Written Home Exercises Provided: Patient instructed to cont prior HEP.  Exercises were reviewed and Miesha was able to demonstrate them prior to the end of the session. Miesha demonstrated good  understanding of the education provided.   .   See EMR under Patient Instructions for exercises provided prior visit.     Assessment     Pt would continue to benefit from skilled OT. Good dwayne to tx today. Decreased extensor lag in PIP.     Miesha is progressing well towards her goals and there are no updates to goals at this time. Pt prognosis is Good.     Pt will continue to benefit from skilled outpatient occupational therapy to address the deficits listed in the problem list on initial evaluation provide pt/family education and to maximize pt's level of independence in the home and community environment.     Anticipated barriers to occupational therapy: none    Pt's spiritual, cultural  and educational needs considered and pt agreeable to plan of care and goals.    Goals:   The following goals were discussed with the patient and patient is in agreement with them as to be addressed in the treatment plan.   Long Term Goals (LTGs); to be met by discharge.  Patient will achieve 95% on the FOTO, within 8 weeks.  Assess pinch and  when appropriate.     Short Term Goals (STGs); to be met within 4 weeks.  Patient will demonstrate improved ROM in R hand, to improve functional use, within 4 weeks.  Patient will report no more than 1 out of 10 pain on the Numeric Pain Rating Scale, to decrease patient discomfort, within 4 weeks.   Patient will achieve 90% on the FOTO showing more improvement.     PLAN   Plan of Care Certification: 6/18/2024 to  8/30/24 .      Discussed Plan of Care with patient: Yes  Updates/Grading for next session: FINA Funez, SOT  7/9/2024    Co-sign: Giovanna Pacheco, OT 7/9/2024

## 2024-07-10 ENCOUNTER — CLINICAL SUPPORT (OUTPATIENT)
Dept: REHABILITATION | Facility: HOSPITAL | Age: 59
End: 2024-07-10
Payer: COMMERCIAL

## 2024-07-10 DIAGNOSIS — M25.641 DECREASED RANGE OF MOTION OF FINGER OF RIGHT HAND: Primary | ICD-10-CM

## 2024-07-10 DIAGNOSIS — R52 PAIN: ICD-10-CM

## 2024-07-10 PROCEDURE — 97018 PARAFFIN BATH THERAPY: CPT

## 2024-07-10 PROCEDURE — 97110 THERAPEUTIC EXERCISES: CPT

## 2024-07-10 PROCEDURE — 97140 MANUAL THERAPY 1/> REGIONS: CPT

## 2024-07-10 NOTE — PROGRESS NOTES
"                            Occupational Therapy Daily Treatment Note   Date 7/5/2024    Date: 6/24/2024  Name: Miesha Domínguez  [facility] Number: [id number]    Therapy Diagnosis: No diagnosis found.   Encounter Diagnoses   Name Primary?    Decreased range of motion of finger of right hand Yes    Pain        Physician: Claire Richardson MD    Physician Orders: Eval and treat;    Medical Diagnosis: CTR and Trigger finger release  right long finger  Surgical Procedure and Date: 6/04/2024, CTR and TF    Evaluation Date: 6/24/2024 8/30/24  Insurance Authorization Period Expiration: 12/31/24   Plan of Care Expiration: 8 weeks; 9/23/2024  Date of Return to MD:  9/14/2024   Visit # / Visits authorized: 4 / 20  FOTO: (6/18/2024 and 85%)    FOTO Lobby Code: [id number]    Precautions: Standard  MS     Time in: 08:45 AM   Time out: 09:50 AM  Total Billed time: 65 minutes    Subjective     Pt reports: "Been feeling really good."  she was compliant with home exercise program given last session.   Response to previous treatment: she reports not too much   Functional change: reports that she can move her fingers well     Pain: 0 / 10  Location: right finger and wrist     Objective   Hand ROM. Measured in degrees.    6/18/2024 6/18/2024     Left Right           Index: MP    46              PIP       76              DIP   37              RANGEL   159           Long:  MP   50              PIP   -30/80              DIP   36              RANGEL   136           Ring:   MP   45              PIP   75              DIP   31              RANGEL   151           Small:  MP   44               PIP   80               DIP   34              RANGEL   158           Thumb: MP 27 29                IP 65 74       Rad ADD/ABD   26       Pal ADD/ABD   37          Opposition MP  PIP       Strength (Dynamometer) and Pinch Strength (Pinch Gauge)  Measured in pounds.    6/18/2024 6/18/2024     Left Right   Rung II   deferred    Key Pinch       3pt Pinch       2pt Pinch     "     Miesha received the following supervised modalities after being cleared for contradictions for 10 minutes:   - paraffin wax with MHP to promote tissue extensibility     Miesha received the following manual therapy techniques for 10 minutes: scar massage with tissue movers     Miesha received therapeutic exercises for 45 minutes including:  - hook grasp x 10 reps   - digit abduction/adduction (1 min)  - digit extension (1 min)   - thumb radial/palmar abduction x 15 reps   - wrist AROM 3-ways x 10 each   - isopsheres on towel (2 min)  - in hand manipulation with small/medium poms (2 sets)  - reverse joint blocking for PIP extension x 10  - ORL stretch 10 sec holds x 10  - LF MP joint mobilization to improve biomechanics for composite flexion     FOTO : self care    initial eval      Limitation  Score   85%       Home Exercises and Education Provided     Education provided:   - TGE and joint blocking   - Progress towards goals     Written Home Exercises Provided: Patient instructed to cont prior HEP.  Exercises were reviewed and Miesha was able to demonstrate them prior to the end of the session. Miesha demonstrated good  understanding of the education provided.   .   See EMR under Patient Instructions for exercises provided prior visit.     Assessment     Pt would continue to benefit from skilled OT. Good dwayne to tx today. Able to decrease PIP ext lag following heat, manual, and exercise.     Miesha is progressing well towards her goals and there are no updates to goals at this time. Pt prognosis is Good.     Pt will continue to benefit from skilled outpatient occupational therapy to address the deficits listed in the problem list on initial evaluation provide pt/family education and to maximize pt's level of independence in the home and community environment.     Anticipated barriers to occupational therapy:  none    Pt's spiritual, cultural and educational needs considered and pt agreeable to plan of care  and goals.    Goals:   The following goals were discussed with the patient and patient is in agreement with them as to be addressed in the treatment plan.   Long Term Goals (LTGs); to be met by discharge.  Patient will achieve 95% on the FOTO, within 8 weeks.  Assess pinch and  when appropriate.     Short Term Goals (STGs); to be met within 4 weeks.  Patient will demonstrate improved ROM in R hand, to improve functional use, within 4 weeks.  Patient will report no more than 1 out of 10 pain on the Numeric Pain Rating Scale, to decrease patient discomfort, within 4 weeks.   Patient will achieve 90% on the FOTO showing more improvement.     PLAN   Plan of Care Certification: 6/18/2024 to  8/30/24 .      Discussed Plan of Care with patient: Yes  Updates/Grading for next session: AIMEE Michelle  7/2/2024    Giovanna Pacheco, OT

## 2024-07-10 NOTE — PROGRESS NOTES
"                            Occupational Therapy Daily Treatment Note   Date 7/10/2024    Date: 6/24/2024  Name: Miesha Domínguez  Clinic Number: [id number]    Therapy Diagnosis:  Encounter Diagnoses   Name Primary?    Decreased range of motion of finger of right hand Yes    Pain          Physician: Claire Richardson MD    Physician Orders: Eval and treat  Medical Diagnosis: CTR and Trigger finger release  right long finger  Surgical Procedure and Date: 6/04/2024, CTR and TF    Evaluation Date: 6/24/2024; 8/30/24  Insurance Authorization Period Expiration: 12/31/24   Plan of Care Expiration: 8 weeks; 9/23/2024  Date of Return to MD: 9/14/2024   Visit # / Visits authorized: 6 / 20  FOTO: (6/18/2024 and 85%)    FOTO Lobby Code: [id number]    Precautions: Standard  MS     Time in: 09:00 AM   Time out: 10:00  AM  Total Billed time: 60 minutes    Subjective     Pt reports: "Feeling good, just having that sticking in the trigger finger when I make a fist."  she was compliant with home exercise program given last session.   Response to previous treatment: she reports not too much   Functional change: reports that she can move her fingers well     Pain: 0 / 10  Location: right finger and wrist     Objective   Hand ROM. Measured in degrees.    6/18/2024 6/18/2024     Left Right           Index: MP    46              PIP       76              DIP   37              RANGEL   159           Long:  MP   50              PIP   -30/80              DIP   36              RANGEL   136           Ring:   MP   45              PIP   75              DIP   31              RANGEL   151           Small:  MP   44               PIP   80               DIP   34              RANGEL   158           Thumb: MP 27 29                IP 65 74       Rad ADD/ABD   26       Pal ADD/ABD   37          Opposition MP  PIP       Strength (Dynamometer) and Pinch Strength (Pinch Gauge)  Measured in pounds.    6/18/2024 6/18/2024     Left Right   Rung II   deferred    [place] Pinch    "    3pt Pinch       2pt Pinch           Objective     Miesha received the following:    supervised modalities after being cleared for contradictions, received the following for 10 minutes:   - paraffin wax with MHP to right hand to promote tissue extensibility     manual therapy techniques for 15 minutes:   - scar massage with tissue movers   - vibration tool for desensitization   - passive PIP extension     therapeutic exercises for 30 minutes including:  - hook grasp x 10 reps   - digit abduction/adduction (1 min)  - digit extension (1 min)   - thumb radial/palmar abduction x 15 each   - wrist AROM 3-ways x 10 each   - isopsheres on towel (2 min)  - in hand manipulation with small/medium poms (2 sets)  - reverse joint blocking for PIP extension x 10  - ORL stretch 10 sec holds x 10  - LF MP joint mobilization to improve biomechanics for composite flexion NT    FOTO: self care    initial eval  7/9/2024    Limitation  Score   85% 77%      Home Exercises and Education Provided     Education provided:   - TGE and joint blocking review   - Progress towards goals     Written Home Exercises Provided: Patient instructed to cont prior HEP.  Exercises were reviewed and Miesha was able to demonstrate them prior to the end of the session. Miesha demonstrated good  understanding of the education provided.   .   See EMR under Patient Instructions for exercises provided prior visit.     Assessment     Pt would continue to benefit from skilled OT. Good dwayne to tx today. Decreased extensor lag in PIP. Added prayer stretch.     Miesha is progressing well towards her goals and there are no updates to goals at this time. Pt prognosis is Good.     Pt will continue to benefit from skilled outpatient occupational therapy to address the deficits listed in the problem list on initial evaluation provide pt/family education and to maximize pt's level of independence in the home and community environment.     Anticipated barriers to  occupational therapy: none    Pt's spiritual, cultural and educational needs considered and pt agreeable to plan of care and goals.    Goals:   The following goals were discussed with the patient and patient is in agreement with them as to be addressed in the treatment plan.   Long Term Goals (LTGs); to be met by discharge.  Patient will achieve 95% on the FOTO, within 8 weeks.  Assess pinch and  when appropriate.     Short Term Goals (STGs); to be met within 4 weeks.  Patient will demonstrate improved ROM in R hand, to improve functional use, within 4 weeks.  Patient will report no more than 1 out of 10 pain on the Numeric Pain Rating Scale, to decrease patient discomfort, within 4 weeks.   Patient will achieve 90% on the FOTO showing more improvement.     PLAN   Plan of Care Certification: 6/18/2024 to  8/30/24 .      Discussed Plan of Care with patient: Yes  Updates/Grading for next session: ROM     Marisol Funez, SOT  7/9/2024    Co-sign: Giovanna Pacheco, OT 7/9/2024

## 2024-07-11 DIAGNOSIS — G35 MULTIPLE SCLEROSIS: ICD-10-CM

## 2024-07-12 ENCOUNTER — PATIENT MESSAGE (OUTPATIENT)
Dept: ADMINISTRATIVE | Facility: OTHER | Age: 59
End: 2024-07-12
Payer: COMMERCIAL

## 2024-07-12 RX ORDER — PREGABALIN 100 MG/1
CAPSULE ORAL
Qty: 270 CAPSULE | Refills: 1 | Status: SHIPPED | OUTPATIENT
Start: 2024-07-12

## 2024-07-16 ENCOUNTER — CLINICAL SUPPORT (OUTPATIENT)
Dept: REHABILITATION | Facility: HOSPITAL | Age: 59
End: 2024-07-16
Payer: COMMERCIAL

## 2024-07-16 DIAGNOSIS — R52 PAIN: ICD-10-CM

## 2024-07-16 DIAGNOSIS — M25.641 DECREASED RANGE OF MOTION OF FINGER OF RIGHT HAND: Primary | ICD-10-CM

## 2024-07-16 PROCEDURE — 97110 THERAPEUTIC EXERCISES: CPT

## 2024-07-16 PROCEDURE — 97140 MANUAL THERAPY 1/> REGIONS: CPT

## 2024-07-16 PROCEDURE — 97022 WHIRLPOOL THERAPY: CPT

## 2024-07-16 NOTE — PROGRESS NOTES
"                            Occupational Therapy Daily Treatment Note   Date 7/16/2024    Date: 6/24/2024  Name: Miesha Domínguez  Clinic Number: [id number]    Therapy Diagnosis:  Encounter Diagnoses   Name Primary?    Decreased range of motion of finger of right hand Yes    Pain        Physician: Claire Richardson MD    Physician Orders: Eval and treat  Medical Diagnosis: CTR and Trigger finger release  right long finger  Surgical Procedure and Date: 6/04/2024, CTR and TF    Evaluation Date: 6/24/2024; 8/30/24  Insurance Authorization Period Expiration: 12/31/24   Plan of Care Expiration: 8 weeks; 9/23/2024  Date of Return to MD: 9/14/2024   Visit # / Visits authorized: 8 / 20  FOTO: (7/09/2024 and 77%)    FOTO Lobby Code: [email or web address]    Precautions: Standard  MS     Time in: 09:00 AM   Time out: 10:15 AM  Total Billed time: 75 minutes    Subjective     Pt reports: "Had some throbbing last night, but it went away with Tylenol."  she was compliant with home exercise program given last session.   Response to previous treatment: she reports not too much   Functional change: reports that she can move her fingers well     Pain: 0 / 10  Location: right finger and wrist     Objective   Hand ROM. Measured in degrees.    6/18/2024 6/18/2024     Left Right           Index: MP    46              PIP       76              DIP   37              RANGEL   159           Long:  MP   50              PIP   -30/80              DIP   36              RANGEL   136           Ring:   MP   45              PIP   75              DIP   31              RANGEL   151           Small:  MP   44               PIP   80               DIP   34              RANGEL   158           Thumb: MP 27 29                IP 65 74       Rad ADD/ABD   26       Pal ADD/ABD   37          Opposition MP  PIP       Strength (Dynamometer) and Pinch Strength (Pinch Gauge)  Measured in pounds.    6/18/2024 6/18/2024     Left Right   Rung II   deferred    Key Pinch       3pt Pinch       2pt " Raeann TREVIÑOTO: self care    initial eval  7/9/2024    Limitation  Score   85% 77%      Objective     Miesha received the following:    supervised modalities after being cleared for contradictions, received the following for 10 minutes:   - paraffin wax with MHP to right hand to promote tissue extensibility     manual therapy techniques for 15 minutes:   - scar massage with tissue movers   - vibration tool for desensitization     Orthotic fit and train: 10 minutes   - relative motion splint for LF    therapeutic exercises for 35 minutes including:  - reverse joint blocking for PIP extension x 10  - hook grasp x 10 reps   - digit abduction/adduction (1 min)  - digit extension (1 min)   - thumb radial/palmar abduction x 15 each    - wrist AROM 3-ways x 10 each (2 sets)  - isopsheres on towel (2 min)  - in-hand manipulation with small/medium poms (2 sets)  - ORL of LF stretch 10 sec holds x 10  - PIP joint mobilization     - prayer stretch 30 sec hold x 3 NT  - LF MP joint mobilization to improve biomechanics for composite flexion NT    Home Exercises and Education Provided     Education provided:   - LUIS wear schedule   - Progress towards goals     Written Home Exercises Provided: Patient instructed to cont prior HEP.  Exercises were reviewed and Miesha was able to demonstrate them prior to the end of the session. Miesha demonstrated good  understanding of the education provided.      See EMR under Patient Instructions for exercises provided prior visit.     Assessment     Pt would continue to benefit from skilled OT. Good dwayne to tx today. Cont to have minimal extensor lag at start of session however able to decrease with heat, manual, and passive stretch.     Miesha is progressing well towards her goals and there are no updates to goals at this time. Pt prognosis is Good.     Pt will continue to benefit from skilled outpatient occupational therapy to address the deficits listed in the problem list on initial  evaluation provide pt/family education and to maximize pt's level of independence in the home and community environment.     Anticipated barriers to occupational therapy: none    Pt's spiritual, cultural and educational needs considered and pt agreeable to plan of care and goals.    Goals:   The following goals were discussed with the patient and patient is in agreement with them as to be addressed in the treatment plan.   Long Term Goals (LTGs); to be met by discharge.  Patient will achieve 95% on the FOTO, within 8 weeks.  Assess pinch and  when appropriate.     Short Term Goals (STGs); to be met within 4 weeks.  Patient will demonstrate improved ROM in R hand, to improve functional use, within 4 weeks.  Patient will report no more than 1 out of 10 pain on the Numeric Pain Rating Scale, to decrease patient discomfort, within 4 weeks.   Patient will achieve 90% on the FOTO showing more improvement.     PLAN   Plan of Care Certification: 6/18/2024 to  8/30/24 .      Discussed Plan of Care with patient: Yes  Updates/Grading for next session: ROM     AIMEE Rosario  7/16/2024    Co-sign: Giovanna Pacheco OT 7/16/2024

## 2024-07-18 ENCOUNTER — CLINICAL SUPPORT (OUTPATIENT)
Dept: REHABILITATION | Facility: HOSPITAL | Age: 59
End: 2024-07-18
Payer: COMMERCIAL

## 2024-07-18 DIAGNOSIS — R52 PAIN: ICD-10-CM

## 2024-07-18 DIAGNOSIS — M25.641 DECREASED RANGE OF MOTION OF FINGER OF RIGHT HAND: Primary | ICD-10-CM

## 2024-07-18 PROCEDURE — 97022 WHIRLPOOL THERAPY: CPT

## 2024-07-18 PROCEDURE — 97110 THERAPEUTIC EXERCISES: CPT

## 2024-07-18 PROCEDURE — 97140 MANUAL THERAPY 1/> REGIONS: CPT

## 2024-07-18 NOTE — PATIENT INSTRUCTIONS
[facility]  OCCUPATIONAL THERAPY  HOME EXERCISE PROGRAM     Complete the following strengthening program 1x/day.                       _                        Giovanna Pacheco OTR/L   Occupational Therapist

## 2024-07-18 NOTE — PROGRESS NOTES
"                            Occupational Therapy Daily Treatment Note   Date 7/18/2024    Date: 6/24/2024  Name: Miesha Domínguez  Clinic Number: [id number]    Therapy Diagnosis:  Encounter Diagnoses   Name Primary?    Decreased range of motion of finger of right hand Yes    Pain        Physician: Claire Richardson MD    Physician Orders: Eval and treat  Medical Diagnosis: CTR and Trigger finger release  right long finger  Surgical Procedure and Date: 6/04/2024, CTR and TF    Evaluation Date: 6/24/2024; 8/30/24  Insurance Authorization Period Expiration: 12/31/24   Plan of Care Expiration: 8 weeks; 9/23/2024  Date of Return to MD: 9/14/2024   Visit # / Visits authorized: 9 / 20  FOTO: (7/09/2024 and 77%)    FOTO Lobby Code: [email or web address]    Precautions: Standard  MS     Time in: 09:05 AM   Time out:  10:00   AM  Total Billed time: 55 minutes    Subjective     Pt reports: "Had some throbbing last night, but it went away with Tylenol."  she was compliant with home exercise program given last session.   Response to previous treatment: she reports not too much   Functional change: reports that she can move her fingers well     Pain: 0 / 10  Location: right finger and wrist     Objective   Hand ROM. Measured in degrees.    6/18/2024 6/18/2024 7/18/2024      Left Right Right             Index: MP    46 55              PIP       76 95              DIP   37 55              RANGEL   159             Long:  MP   50 65              PIP   -30/80 -20/92              DIP   36 60              RANGEL   136             Ring:   MP   45 65              PIP   75 95              DIP   31 59              RANGEL   151             Small:  MP   44 76               PIP   80 95               DIP   34 50              RANGEL   158             Thumb: MP 27 29 40                IP 65 74 75       Rad ADD/ABD   26 41       Pal ADD/ABD   37 46          Opposition MP  PIP PIP          Strength (Dynamometer) and Pinch Strength (Pinch Gauge)  Measured in pounds.    " 7/18/2024 7/18/2024     Left Right   Rung II  45/40/41  25/23/28   Jefferson Pinch  14.5  14   3pt Pinch  12.5  9   2pt Pinch  11  9.5          FOTO: self care    initial eval  7/9/2024    Limitation  Score   85% 77%      Objective     Miesha received the following:    supervised modalities after being cleared for contradictions, received the following for 10 minutes:   - paraffin wax with MHP to right hand to promote tissue extensibility NT   - Fluidotherapy: To R hand for 10 min, continuous air, 115 deg, air speed 50 to decrease pain, edema & scar tissue, sensory re- education, and increased tissue extensibility prior to therex      manual therapy techniques for 15 minutes:   - scar massage with tissue movers   - vibration tool for desensitization       therapeutic exercises for 35 minutes including:  - updated objective measurements, please see above   - updated HEP: putty (squeezing, spreads, pinches, scrapes)   - reverse joint blocking for PIP extension x 10  - hook grasp x 10 reps   - digit abduction/adduction (1 min)  - digit extension (1 min)   - thumb radial/palmar abduction x 15 each    - wrist AROM 3-ways x 10 each (2 sets)  - isopsheres on towel (2 min)  - in-hand manipulation with small/medium poms (2 sets)  - ORL of LF stretch 10 sec holds x 10  - PIP joint mobilization     - prayer stretch 30 sec hold x 3 NT  - LF MP joint mobilization to improve biomechanics for composite flexion NT    Home Exercises and Education Provided     Education provided:   - LUIS wear schedule   - Progress towards goals     Written Home Exercises Provided: Patient instructed to cont prior HEP.  Exercises were reviewed and Miesha was able to demonstrate them prior to the end of the session. Miesha demonstrated good  understanding of the education provided.      See EMR under Patient Instructions for exercises provided prior visit.     Assessment     Pt would continue to benefit from skilled OT. Good dwayne to tx today. Demonstrated  proper ORL stretch. Cont to have lag however decreased following heat, manual, and exercise.     Miesha is progressing well towards her goals and there are no updates to goals at this time. Pt prognosis is Good.     Pt will continue to benefit from skilled outpatient occupational therapy to address the deficits listed in the problem list on initial evaluation provide pt/family education and to maximize pt's level of independence in the home and community environment.     Anticipated barriers to occupational therapy: none    Pt's spiritual, cultural and educational needs considered and pt agreeable to plan of care and goals.    Goals:   The following goals were discussed with the patient and patient is in agreement with them as to be addressed in the treatment plan.   Long Term Goals (LTGs); to be met by discharge.  Patient will achieve 95% on the FOTO, within 8 weeks.  Assess pinch and  when appropriate.     Short Term Goals (STGs); to be met within 4 weeks.  Patient will demonstrate improved ROM in R hand, to improve functional use, within 4 weeks.  Patient will report no more than 1 out of 10 pain on the Numeric Pain Rating Scale, to decrease patient discomfort, within 4 weeks.   Patient will achieve 90% on the FOTO showing more improvement.     PLAN   Plan of Care Certification: 6/18/2024 to  8/30/24 .      Discussed Plan of Care with patient: Yes  Updates/Grading for next session: AIMEE Michelle  7/16/2024    Co-sign: Giovanna Pacheco, SUJATA 7/16/2024

## 2024-07-22 ENCOUNTER — CLINICAL SUPPORT (OUTPATIENT)
Dept: REHABILITATION | Facility: HOSPITAL | Age: 59
End: 2024-07-22
Payer: COMMERCIAL

## 2024-07-22 DIAGNOSIS — M25.641 DECREASED RANGE OF MOTION OF FINGER OF RIGHT HAND: Primary | ICD-10-CM

## 2024-07-22 DIAGNOSIS — R52 PAIN: ICD-10-CM

## 2024-07-22 PROCEDURE — 97110 THERAPEUTIC EXERCISES: CPT

## 2024-07-22 PROCEDURE — 97140 MANUAL THERAPY 1/> REGIONS: CPT

## 2024-07-22 PROCEDURE — 97022 WHIRLPOOL THERAPY: CPT

## 2024-07-22 NOTE — PROGRESS NOTES
"                            Occupational Therapy Daily Treatment Note   Date 7/22/2024    Date: 6/24/2024  Name: Miesha Domínguez  Clinic Number: [id number]    Therapy Diagnosis:  Encounter Diagnoses   Name Primary?    Decreased range of motion of finger of right hand Yes    Pain        Physician: Claire Richardson MD    Physician Orders: Eval and treat  Medical Diagnosis: CTR and Trigger finger release right long finger  Surgical Procedure and Date: 6/04/2024, CTR and TF    Evaluation Date: 6/24/2024; 8/30/24  Insurance Authorization Period Expiration: 12/31/24   Plan of Care Expiration: 8 weeks; 9/23/2024  Date of Return to MD: 9/14/2024   Visit # / Visits authorized: 10 / 20  FOTO: (7/09/2024 and 77%)    FOTO Lobby Code: [email or web address]    Precautions: Standard  MS     Time in: 09:05 AM   Time out: 10:02 AM  Total Billed time: 57 minutes    Subjective     Pt reports: "Feeling good, just still having that stiffness in my LF."  she was compliant with home exercise program given last session.   Response to previous treatment: she reports not too much   Functional change: reports that she can move her fingers well     Pain: 0 / 10  Location: right finger and wrist     Objective   Hand ROM. Measured in degrees.    6/18/2024 6/18/2024 7/18/2024      Left Right Right             Index: MP    46 55              PIP       76 95              DIP   37 55              RANGEL   159             Long:  MP   50 65              PIP   -30/80 -20/92              DIP   36 60              RANEGL   136             Ring:   MP   45 65              PIP   75 95              DIP   31 59              RANGEL   151             Small:  MP   44 76               PIP   80 95               DIP   34 50              RANGEL   158             Thumb: MP 27 29 40                IP 65 74 75       Rad ADD/ABD   26 41       Pal ADD/ABD   37 46          Opposition MP  PIP PIP        Strength (Dynamometer) and Pinch Strength (Pinch Gauge)  Measured in pounds.    7/18/2024 " 7/18/2024     Left Right   Rung II  45/40/41  25/23/28   Jefferson Pinch  14.5  14   3pt Pinch  12.5  9   2pt Pinch  11  9.5      FOTO: self care    initial eval  7/9/2024    Limitation  Score   85% 77%      Objective     Miesha received the following:    supervised modalities after being cleared for contradictions, received the following for 10 minutes:   - paraffin wax with MHP to right hand to promote tissue extensibility NT   - fluidotherapy to R hand, continuous air, 115 deg, air speed 50 to decrease pain, edema & scar tissue, sensory re- education, and increased tissue extensibility prior to therex    manual therapy techniques for 15 minutes:   - scar massage with tissue movers   - vibration tool for desensitization NT    therapeutic exercises for 27 minutes including:   - ORL of LF stretch 10 sec holds x 10  - reverse joint blocking for PIP extension x 10   - prayer stretch 30 sec hold x 3   - hook grasp x 10 reps   - digit abduction/adduction (1 min)  - digit extension (1 min)   - thumb radial/palmar abduction x 15 each    - wrist AROM 3-ways x 10 each (2 sets)  - isopsheres on towel (2 min)  - in-hand manipulation with small poms (3 sets)    NT:  - PIP joint mobilization   - LF MP joint mobilization to improve biomechanics for composite flexion     Home Exercises and Education Provided     Education provided:   - LUIS wear schedule   - Progress towards goals     Written Home Exercises Provided: Patient instructed to cont prior HEP.  Exercises were reviewed and Miesha was able to demonstrate them prior to the end of the session. Miesha demonstrated good  understanding of the education provided.      See EMR under Patient Instructions for exercises provided prior visit.     Assessment     Pt would continue to benefit from skilled OT. Good dwayne to tx today. Demonstrated proper ORL stretch. Cont to have lag however decreased following heat, manual, and exercise.     Miesha is progressing well towards her goals and  there are no updates to goals at this time. Pt prognosis is Good.     Pt will continue to benefit from skilled outpatient occupational therapy to address the deficits listed in the problem list on initial evaluation provide pt/family education and to maximize pt's level of independence in the home and community environment.     Anticipated barriers to occupational therapy: none    Pt's spiritual, cultural and educational needs considered and pt agreeable to plan of care and goals.    Goals:   The following goals were discussed with the patient and patient is in agreement with them as to be addressed in the treatment plan.   Long Term Goals (LTGs); to be met by discharge.  Patient will achieve 95% on the FOTO, within 8 weeks.  Assess pinch and  when appropriate.     Short Term Goals (STGs); to be met within 4 weeks.  Patient will demonstrate improved ROM in R hand, to improve functional use, within 4 weeks.  Patient will report no more than 1 out of 10 pain on the Numeric Pain Rating Scale, to decrease patient discomfort, within 4 weeks.   Patient will achieve 90% on the FOTO showing more improvement.     PLAN   Plan of Care Certification: 6/18/2024 to 8/30/24 .      Discussed Plan of Care with patient: Yes  Updates/Grading for next session: ROM     Marisol Funez, SOT  7/22/2024    Co-sign: Giovanna Pacheco, SUJATA 7/22/2024

## 2024-07-24 ENCOUNTER — OFFICE VISIT (OUTPATIENT)
Dept: INTERNAL MEDICINE | Facility: CLINIC | Age: 59
End: 2024-07-24
Payer: COMMERCIAL

## 2024-07-24 ENCOUNTER — HOSPITAL ENCOUNTER (OUTPATIENT)
Dept: RADIOLOGY | Facility: HOSPITAL | Age: 59
Discharge: HOME | End: 2024-07-24
Attending: HOSPITALIST
Payer: COMMERCIAL

## 2024-07-24 ENCOUNTER — LAB VISIT (OUTPATIENT)
Dept: LAB | Facility: HOSPITAL | Age: 59
End: 2024-07-24
Attending: HOSPITALIST
Payer: COMMERCIAL

## 2024-07-24 VITALS
SYSTOLIC BLOOD PRESSURE: 120 MMHG | BODY MASS INDEX: 39.46 KG/M2 | WEIGHT: 245.56 LBS | HEART RATE: 90 BPM | HEIGHT: 66 IN | OXYGEN SATURATION: 97 % | TEMPERATURE: 98 F | DIASTOLIC BLOOD PRESSURE: 72 MMHG | RESPIRATION RATE: 15 BRPM

## 2024-07-24 DIAGNOSIS — R10.9 FLANK PAIN: ICD-10-CM

## 2024-07-24 DIAGNOSIS — R10.9 FLANK PAIN: Primary | ICD-10-CM

## 2024-07-24 LAB
BILIRUB UR QL STRIP: NEGATIVE
CLARITY UR REFRACT.AUTO: CLEAR
COLOR UR AUTO: YELLOW
GLUCOSE UR QL STRIP: NEGATIVE
HGB UR QL STRIP: NEGATIVE
KETONES UR QL STRIP: NEGATIVE
LEUKOCYTE ESTERASE UR QL STRIP: NEGATIVE
NITRITE UR QL STRIP: NEGATIVE
PH UR STRIP: 6 [PH] (ref 5–8)
PROT UR QL STRIP: NEGATIVE
SP GR UR STRIP: 1.01 (ref 1–1.03)
URN SPEC COLLECT METH UR: NORMAL

## 2024-07-24 PROCEDURE — 99999 PR PBB SHADOW E&M-EST. PATIENT-LVL V: CPT | Mod: PBBFAC,,, | Performed by: HOSPITALIST

## 2024-07-24 PROCEDURE — 99214 OFFICE O/P EST MOD 30 MIN: CPT | Mod: S$GLB,,, | Performed by: HOSPITALIST

## 2024-07-24 PROCEDURE — 3078F DIAST BP <80 MM HG: CPT | Mod: CPTII,S$GLB,, | Performed by: HOSPITALIST

## 2024-07-24 PROCEDURE — 3008F BODY MASS INDEX DOCD: CPT | Mod: CPTII,S$GLB,, | Performed by: HOSPITALIST

## 2024-07-24 PROCEDURE — 74176 CT ABD & PELVIS W/O CONTRAST: CPT | Mod: 26,,, | Performed by: RADIOLOGY

## 2024-07-24 PROCEDURE — 3044F HG A1C LEVEL LT 7.0%: CPT | Mod: CPTII,S$GLB,, | Performed by: HOSPITALIST

## 2024-07-24 PROCEDURE — 1159F MED LIST DOCD IN RCRD: CPT | Mod: CPTII,S$GLB,, | Performed by: HOSPITALIST

## 2024-07-24 PROCEDURE — 2023F DILAT RTA XM W/O RTNOPTHY: CPT | Mod: CPTII,S$GLB,, | Performed by: HOSPITALIST

## 2024-07-24 PROCEDURE — 4010F ACE/ARB THERAPY RXD/TAKEN: CPT | Mod: CPTII,S$GLB,, | Performed by: HOSPITALIST

## 2024-07-24 PROCEDURE — 1160F RVW MEDS BY RX/DR IN RCRD: CPT | Mod: CPTII,S$GLB,, | Performed by: HOSPITALIST

## 2024-07-24 PROCEDURE — 81003 URINALYSIS AUTO W/O SCOPE: CPT | Performed by: HOSPITALIST

## 2024-07-24 PROCEDURE — 3074F SYST BP LT 130 MM HG: CPT | Mod: CPTII,S$GLB,, | Performed by: HOSPITALIST

## 2024-07-24 PROCEDURE — 87086 URINE CULTURE/COLONY COUNT: CPT | Performed by: HOSPITALIST

## 2024-07-24 PROCEDURE — 3061F NEG MICROALBUMINURIA REV: CPT | Mod: CPTII,S$GLB,, | Performed by: HOSPITALIST

## 2024-07-24 PROCEDURE — 3066F NEPHROPATHY DOC TX: CPT | Mod: CPTII,S$GLB,, | Performed by: HOSPITALIST

## 2024-07-24 PROCEDURE — 74176 CT ABD & PELVIS W/O CONTRAST: CPT | Mod: TC

## 2024-07-25 ENCOUNTER — PATIENT MESSAGE (OUTPATIENT)
Dept: PSYCHIATRY | Facility: CLINIC | Age: 59
End: 2024-07-25
Payer: COMMERCIAL

## 2024-07-25 LAB
BACTERIA UR CULT: NORMAL
BACTERIA UR CULT: NORMAL

## 2024-07-26 ENCOUNTER — CLINICAL SUPPORT (OUTPATIENT)
Dept: REHABILITATION | Facility: HOSPITAL | Age: 59
End: 2024-07-26
Payer: COMMERCIAL

## 2024-07-26 DIAGNOSIS — M25.641 DECREASED RANGE OF MOTION OF FINGER OF RIGHT HAND: Primary | ICD-10-CM

## 2024-07-26 DIAGNOSIS — R52 PAIN: ICD-10-CM

## 2024-07-26 PROCEDURE — 97110 THERAPEUTIC EXERCISES: CPT

## 2024-07-26 PROCEDURE — 97022 WHIRLPOOL THERAPY: CPT

## 2024-07-26 PROCEDURE — 97140 MANUAL THERAPY 1/> REGIONS: CPT

## 2024-07-26 NOTE — PROGRESS NOTES
"                              Occupational Therapy Daily Treatment Note   Date 7/26/2024    Date: 6/24/2024  Name: Miesha Domínguez  Clinic Number: [id number]    Therapy Diagnosis:  Encounter Diagnoses   Name Primary?    Decreased range of motion of finger of right hand Yes    Pain          Physician: Claire Richardson MD    Physician Orders: Eval and treat  Medical Diagnosis: CTR and Trigger finger release right long finger  Surgical Procedure and Date: 6/04/2024, CTR and TF    Evaluation Date: 6/24/2024; 8/30/24  Insurance Authorization Period Expiration: 12/31/24   Plan of Care Expiration: 8 weeks; 9/23/2024  Date of Return to MD: 9/14/2024       Visit # / Visits authorized: 11 / 20  FOTO: (7/09/2024 and 77%)    FOTO Lobby Code: [id number]    Precautions: Standard  MS       Time in: 09:45    AM   Time out: 10:45       AM   Total Billed time: 60 minutes      Subjective     Pt reports: "After I do my exercises it straightens but then it goes back bent."   she was compliant with home exercise program given last session.   Response to previous treatment: she reports not too much   Functional change: reports that she can move her fingers well     Pain: 0 / 10  Location: right finger and wrist     Objective   Hand ROM. Measured in degrees.    6/18/2024 6/18/2024 7/18/2024      Left Right Right             Index: MP    46 55              PIP       76 95              DIP   37 55              RANGEL   159             Long:  MP   50 65              PIP   -30/80 -20/92              DIP   36 60              RANGEL   136             Ring:   MP   45 65              PIP   75 95              DIP   31 59              RANGEL   151             Small:  MP   44 76               PIP   80 95               DIP   34 50              RANGEL   158             Thumb: MP 27 29 40                IP 65 74 75       Rad ADD/ABD   26 41       Pal ADD/ABD   37 46          Opposition MP  PIP PIP        Strength (Dynamometer) and Pinch Strength (Pinch " Gauge)  Measured in pounds.    7/18/2024 7/18/2024     Left Right   Rung II  45/40/41  25/23/28   Jefferson Pinch  14.5  14   3pt Pinch  12.5  9   2pt Pinch  11  9.5      FOTO: self care    initial eval  7/9/2024    Limitation  Score   85% 77%      Objective     Miesha received the following:    supervised modalities after being cleared for contradictions, received the following for 10 minutes:   - paraffin wax with MHP to right hand to promote tissue extensibility NT   - fluidotherapy to R hand, continuous air, 115 deg, air speed 50 to decrease pain, edema & scar tissue, sensory re- education, and increased tissue extensibility prior to therex    manual therapy techniques for 15 minutes:   - scar massage with tissue movers   - vibration tool for desensitization NT    therapeutic exercises for 27 minutes including:   - ORL of LF stretch 10 sec holds x 10  - reverse joint blocking for PIP extension x 10   - prayer stretch 30 sec hold x 3   - hook grasp x 10 reps   - digit abduction/adduction (1 min) with yellow rubberband   - digit extension with yellow rubberband x 10 reps (3 sets)  - wrist maze (3 min)   - hook pull/push red power web   - thumb radial/palmar abduction x 15 each    - wrist AROM 3-ways x 10 each (2 sets)  - isopsheres on towel (2 min)  - in-hand manipulation with small poms (3 sets)    NT:  - PIP joint mobilization   - LF MP joint mobilization to improve biomechanics for composite flexion     Home Exercises and Education Provided     Education provided:   - LUIS wear schedule   - Progress towards goals     Written Home Exercises Provided: Patient instructed to cont prior HEP.  Exercises were reviewed and Miesha was able to demonstrate them prior to the end of the session. Miesha demonstrated good  understanding of the education provided.      See EMR under Patient Instructions for exercises provided prior visit.     Assessment     Pt would continue to benefit from skilled OT. Good dwayne to tx today.  Demonstrated proper ORL stretch. Cont to have lag however decreased following heat, manual, and exercise.     Miesha is progressing well towards her goals and there are no updates to goals at this time. Pt prognosis is Good.     Pt will continue to benefit from skilled outpatient occupational therapy to address the deficits listed in the problem list on initial evaluation provide pt/family education and to maximize pt's level of independence in the home and community environment.     Anticipated barriers to occupational therapy: none    Pt's spiritual, cultural and educational needs considered and pt agreeable to plan of care and goals.    Goals:   The following goals were discussed with the patient and patient is in agreement with them as to be addressed in the treatment plan.   Long Term Goals (LTGs); to be met by discharge.  Patient will achieve 95% on the FOTO, within 8 weeks.  Assess pinch and  when appropriate.     Short Term Goals (STGs); to be met within 4 weeks.  Patient will demonstrate improved ROM in R hand, to improve functional use, within 4 weeks.  Patient will report no more than 1 out of 10 pain on the Numeric Pain Rating Scale, to decrease patient discomfort, within 4 weeks.   Patient will achieve 90% on the FOTO showing more improvement.     PLAN   Plan of Care Certification: 6/18/2024 to 8/30/24 .      Discussed Plan of Care with patient: Yes  Updates/Grading for next session: AIMEE Michelle  7/22/2024    Co-sign: Giovanna Pacheco, SUJATA 7/22/2024

## 2024-07-30 ENCOUNTER — CLINICAL SUPPORT (OUTPATIENT)
Dept: REHABILITATION | Facility: HOSPITAL | Age: 59
End: 2024-07-30
Payer: COMMERCIAL

## 2024-07-30 DIAGNOSIS — R52 PAIN: ICD-10-CM

## 2024-07-30 DIAGNOSIS — M25.641 DECREASED RANGE OF MOTION OF FINGER OF RIGHT HAND: Primary | ICD-10-CM

## 2024-07-30 PROCEDURE — 97110 THERAPEUTIC EXERCISES: CPT

## 2024-07-30 PROCEDURE — 97140 MANUAL THERAPY 1/> REGIONS: CPT

## 2024-07-30 PROCEDURE — 97022 WHIRLPOOL THERAPY: CPT

## 2024-07-30 NOTE — PROGRESS NOTES
Occupational Therapy Daily Treatment Note   Date 7/30/2024    Date: 6/24/2024  Name: Miesha Domínguez  Clinic Number: [id number]    Therapy Diagnosis:  Encounter Diagnoses   Name Primary?    Decreased range of motion of finger of right hand Yes    Pain        Physician: Claire Richardson MD    Physician Orders: Eval and treat  Medical Diagnosis: CTR and Trigger finger release right long finger  Surgical Procedure and Date: 6/04/2024, CTR and TF    Evaluation Date: 6/24/2024; 8/30/24  Insurance Authorization Period Expiration: 12/31/24   Plan of Care Expiration: 8 weeks; 9/23/2024  Date of Return to MD: 9/14/2024       Visit # / Visits authorized: 12 / 20  FOTO: (7/09/2024 and 77%)    FOTO Lobby Code: [email or web address]    Precautions: Standard  MS     Time in: 08:55 AM   Time out: 09:55 AM   Total Billed time: 60 minutes    Subjective     Pt reports: trigger finger is a little sore, but noticed increased ROM in composite fist.   she was compliant with home exercise program given last session.   Response to previous treatment: no change    Functional change: reports that she can move her fingers well     Pain: 0 / 10  Location: right finger and wrist     Objective   Hand ROM. Measured in degrees.    6/18/2024 6/18/2024 7/18/2024      Left Right Right             Index: MP    46 55              PIP       76 95              DIP   37 55              RANGEL   159             Long:  MP   50 65              PIP   -30/80 -20/92              DIP   36 60              RANGEL   136             Ring:   MP   45 65              PIP   75 95              DIP   31 59              RANGEL   151             Small:  MP   44 76               PIP   80 95               DIP   34 50              RANGEL   158             Thumb: MP 27 29 40                IP 65 74 75       Rad ADD/ABD   26 41       Pal ADD/ABD   37 46          Opposition MP  PIP PIP        Strength (Dynamometer) and Pinch Strength (Pinch Gauge)  Measured in pounds.     7/18/2024 7/18/2024     Left Right   Rung II  45/40/41  25/23/28   Jefferson Pinch  14.5  14   3pt Pinch  12.5  9   2pt Pinch  11  9.5      FOTO: self care    initial eval  7/9/2024 7/30/2024   Limitation  Score   85%  77%   %     Objective     Miesha received the following:    supervised modalities after being cleared for contradictions, received the following for 10 minutes:   - paraffin wax with MHP to right hand to promote tissue extensibility (NT)   - fluidotherapy to R hand, continuous air, 115 deg, air speed 50 to decrease pain, edema & scar tissue, sensory re- education, and increased tissue extensibility prior to therex    manual therapy techniques for 20 minutes:   - scar massage with tissue movers   - vibration tool for desensitization (NT)    therapeutic exercises for 25 minutes including:   - ORL of LF stretch 10 sec holds x 10  - reverse joint blocking for PIP extension x 10   - prayer stretch 30 sec hold x 3   - hook grasp x 10 reps   - digit abduction/adduction (1 min) with yellow rubberband   - digit extension with yellow rubberband x 1 min (2 sets)  - thumb radial/palmar abduction x 15 each  - wrist maze (3 min)   - hook pull/push red power web x 1 min   - wrist AROM 3-ways x 10 each (3 sets)  - isopsheres on towel (2 min)  - in-hand manipulation with small poms (2 sets)    Home Exercises and Education Provided     Education provided:   - cont HEP   - Progress towards goals     Written Home Exercises Provided: Patient instructed to cont prior HEP.  Exercises were reviewed and Miesha was able to demonstrate them prior to the end of the session. Miesha demonstrated good  understanding of the education provided.      See EMR under Patient Instructions for exercises provided prior visit.     Assessment     Reported decreased tightness in trigger finger following manual. Good tolerance of therex.     Miesha is progressing well towards her goals and there are no updates to goals at this time. Pt prognosis  is Good.     Pt will continue to benefit from skilled outpatient occupational therapy to address the deficits listed in the problem list on initial evaluation provide pt/family education and to maximize pt's level of independence in the home and community environment.     Anticipated barriers to occupational therapy: none    Pt's spiritual, cultural and educational needs considered and pt agreeable to plan of care and goals.    Goals:   The following goals were discussed with the patient and patient is in agreement with them as to be addressed in the treatment plan.   Long Term Goals (LTGs); to be met by discharge.  Patient will achieve 95% on the FOTO, within 8 weeks.  Assess pinch and  when appropriate.     Short Term Goals (STGs); to be met within 4 weeks.  Patient will demonstrate improved ROM in R hand, to improve functional use, within 4 weeks.  Patient will report no more than 1 out of 10 pain on the Numeric Pain Rating Scale, to decrease patient discomfort, within 4 weeks.   Patient will achieve 90% on the FOTO showing more improvement.     PLAN   Plan of Care Certification: 6/18/2024 to 8/30/24 .      Discussed Plan of Care with patient: Yes  Updates/Grading for next session: AIMEE Michelle 7/29/2024

## 2024-08-01 ENCOUNTER — CLINICAL SUPPORT (OUTPATIENT)
Dept: REHABILITATION | Facility: HOSPITAL | Age: 59
End: 2024-08-01
Payer: COMMERCIAL

## 2024-08-01 DIAGNOSIS — R52 PAIN: ICD-10-CM

## 2024-08-01 DIAGNOSIS — M25.641 DECREASED RANGE OF MOTION OF FINGER OF RIGHT HAND: Primary | ICD-10-CM

## 2024-08-01 PROCEDURE — 97140 MANUAL THERAPY 1/> REGIONS: CPT

## 2024-08-01 PROCEDURE — 97110 THERAPEUTIC EXERCISES: CPT

## 2024-08-01 PROCEDURE — 97018 PARAFFIN BATH THERAPY: CPT

## 2024-08-05 ENCOUNTER — PATIENT MESSAGE (OUTPATIENT)
Dept: PSYCHIATRY | Facility: CLINIC | Age: 59
End: 2024-08-05
Payer: COMMERCIAL

## 2024-08-06 ENCOUNTER — CLINICAL SUPPORT (OUTPATIENT)
Dept: REHABILITATION | Facility: HOSPITAL | Age: 59
End: 2024-08-06
Payer: COMMERCIAL

## 2024-08-06 DIAGNOSIS — M25.641 DECREASED RANGE OF MOTION OF FINGER OF RIGHT HAND: Primary | ICD-10-CM

## 2024-08-06 DIAGNOSIS — R52 PAIN: ICD-10-CM

## 2024-08-06 PROCEDURE — 97140 MANUAL THERAPY 1/> REGIONS: CPT

## 2024-08-06 PROCEDURE — 97110 THERAPEUTIC EXERCISES: CPT

## 2024-08-06 PROCEDURE — 97022 WHIRLPOOL THERAPY: CPT

## 2024-08-07 DIAGNOSIS — J01.40 ACUTE PANSINUSITIS, RECURRENCE NOT SPECIFIED: ICD-10-CM

## 2024-08-07 RX ORDER — FLUTICASONE PROPIONATE 50 MCG
SPRAY, SUSPENSION (ML) NASAL
Qty: 16 ML | Refills: 1 | Status: SHIPPED | OUTPATIENT
Start: 2024-08-07

## 2024-08-08 ENCOUNTER — CLINICAL SUPPORT (OUTPATIENT)
Dept: REHABILITATION | Facility: HOSPITAL | Age: 59
End: 2024-08-08
Payer: COMMERCIAL

## 2024-08-08 DIAGNOSIS — R52 PAIN: ICD-10-CM

## 2024-08-08 DIAGNOSIS — M25.641 DECREASED RANGE OF MOTION OF FINGER OF RIGHT HAND: Primary | ICD-10-CM

## 2024-08-08 PROCEDURE — 97140 MANUAL THERAPY 1/> REGIONS: CPT

## 2024-08-08 PROCEDURE — 97018 PARAFFIN BATH THERAPY: CPT

## 2024-08-08 PROCEDURE — 97110 THERAPEUTIC EXERCISES: CPT

## 2024-08-08 NOTE — PROGRESS NOTES
58 y.o. femalepresents in f/u to diabetes, hypertension, and dyslipidemia  DM tx w/ tirzepatide 10mg weekly and metformin 1000 mg 1/2 qd  Denied increased thirst, urination, or hypoglycemia episodes  A1C ==>    Lab Results   Component Value Date    HGBA1C 5.5 07/24/2024         HTN assoc DM ,tx w/ HCTZ 25mg , and losartan 100mg  Denied HA or dizziness  BP today==>112/78    Dyslipidemia assoc DM, tx w/rosuvastatin 20mg  Denied unusual muscle pain or chest pain  LDL==>  Lab Results   Component Value Date    CHOL 132 02/14/2024    CHOL 141 10/08/2022    CHOL 151 09/20/2021     Lab Results   Component Value Date    HDL 61 02/14/2024    HDL 64 10/08/2022    HDL 71 09/20/2021     Lab Results   Component Value Date    LDLCALC 61.4 (L) 02/14/2024    LDLCALC 62.2 (L) 10/08/2022    LDLCALC 69.2 09/20/2021     Lab Results   Component Value Date    TRIG 48 02/14/2024    TRIG 74 10/08/2022    TRIG 54 09/20/2021     Lab Results   Component Value Date    CHOLHDL 46.2 02/14/2024    CHOLHDL 45.4 10/08/2022    CHOLHDL 47.0 09/20/2021         ROS:  No fever, chills, or night sweats  No blurry vision or visual disturbance  No dysphagia or early satiety  No palpitations or tachycardia  No cough or wheezing  No GERD or abdominal pain  No numbness or focal deficits  Remainder of review negative except as previously noted    PAST MEDICAL HX: Reviewed  PAST SURGICAL HX: Reviewed  SOCIAL HX: Reviewed  FAMILY HX: Reviewed    PHYSICAL EXAM:  VS: As noted  GENERAL: WDWN, A&O, NAD, conversant and co-operative  EYES: Conj/lids unremarkable, sclera anicteric, PERRL, EOMI  ENT: Hearing intact. Canals w/o cerumen, TM's unremarkable  Nasal mucosa/turbinates/septum w/o inflammation or exudate  Oropharynx w/o lesions or exudate, no stridor; Sinus nontender  NECK: Supple w/o lymphadenopathy or thyromegaly  RESPIRATORY: Efforts are unlabored. Lungs CTAP  CARDIOVASCULAR: Heart RRR. No carotid bruits noted. 1+ pedal pulses. No LE edema  GASTROINTESTINAL:  BS+, soft , NT/ND, no HSM noted  MUSCULOSKELETAL: Gait normal. No CCE  SPINE: NT, SI -NT, left lateral hip tenderness, neg SLR  NEUROLOGIC: WILDER. No tremor noted  SKIN: Warm and dry  Foot Exam:  Neuro: monofilament - intact  CV: pulses 1+  Skin; no nail dystrophy, or ulcers or calluses noted  MSK: no foot deformities noted       IMPRESSION/PLAN:  DM w/ neuropathy, stable  -continue tirzepatide 10mg q wk  -continue metformin 1K 1/2 qd  -continue diabetic diet  HTN assoc w/ DM  -continue HCTZ 25mg qd  -continue losartan 100mg qd  -continue low salt diet  HLD assoc w/ DM  -continue rosuvastatin 20mg qd  -continue low fat diet  MS, stable  -continue glatiramer under the care of Neurology  Hypokalemia  -recheck BMP and Mg today  Left hip pain, 4+ weeks  -xray left  hip  -consider PT/Ortho    -reviewed vaccine recs, pt to review w/ Neurology- Shingrix, Prevnar 20 and Tetanus  -rtc 6 mos w/ lab

## 2024-08-12 ENCOUNTER — PATIENT MESSAGE (OUTPATIENT)
Dept: BARIATRICS | Facility: CLINIC | Age: 59
End: 2024-08-12
Payer: COMMERCIAL

## 2024-08-12 ENCOUNTER — PATIENT MESSAGE (OUTPATIENT)
Dept: INTERNAL MEDICINE | Facility: CLINIC | Age: 59
End: 2024-08-12

## 2024-08-12 ENCOUNTER — CLINICAL SUPPORT (OUTPATIENT)
Dept: REHABILITATION | Facility: HOSPITAL | Age: 59
End: 2024-08-12
Payer: COMMERCIAL

## 2024-08-12 ENCOUNTER — TELEPHONE (OUTPATIENT)
Dept: INTERNAL MEDICINE | Facility: CLINIC | Age: 59
End: 2024-08-12

## 2024-08-12 ENCOUNTER — HOSPITAL ENCOUNTER (OUTPATIENT)
Dept: RADIOLOGY | Facility: HOSPITAL | Age: 59
Discharge: HOME | End: 2024-08-12
Attending: INTERNAL MEDICINE
Payer: COMMERCIAL

## 2024-08-12 ENCOUNTER — OFFICE VISIT (OUTPATIENT)
Dept: INTERNAL MEDICINE | Facility: CLINIC | Age: 59
End: 2024-08-12
Payer: COMMERCIAL

## 2024-08-12 VITALS
TEMPERATURE: 97 F | SYSTOLIC BLOOD PRESSURE: 112 MMHG | DIASTOLIC BLOOD PRESSURE: 78 MMHG | OXYGEN SATURATION: 99 % | RESPIRATION RATE: 20 BRPM | HEIGHT: 66 IN | BODY MASS INDEX: 38.83 KG/M2 | WEIGHT: 241.63 LBS | HEART RATE: 81 BPM

## 2024-08-12 DIAGNOSIS — E11.59 HYPERTENSION ASSOCIATED WITH TYPE 2 DIABETES MELLITUS: ICD-10-CM

## 2024-08-12 DIAGNOSIS — G35 MULTIPLE SCLEROSIS: ICD-10-CM

## 2024-08-12 DIAGNOSIS — M25.552 LEFT HIP PAIN: ICD-10-CM

## 2024-08-12 DIAGNOSIS — I15.2 HYPERTENSION ASSOCIATED WITH TYPE 2 DIABETES MELLITUS: ICD-10-CM

## 2024-08-12 DIAGNOSIS — E87.6 HYPOKALEMIA: ICD-10-CM

## 2024-08-12 DIAGNOSIS — Z00.00 ANNUAL PHYSICAL EXAM: Primary | ICD-10-CM

## 2024-08-12 DIAGNOSIS — M25.559 HIP PAIN, UNSPECIFIED LATERALITY: ICD-10-CM

## 2024-08-12 DIAGNOSIS — E78.5 HYPERLIPIDEMIA ASSOCIATED WITH TYPE 2 DIABETES MELLITUS: ICD-10-CM

## 2024-08-12 DIAGNOSIS — E11.69 HYPERLIPIDEMIA ASSOCIATED WITH TYPE 2 DIABETES MELLITUS: ICD-10-CM

## 2024-08-12 DIAGNOSIS — J01.40 ACUTE PANSINUSITIS, RECURRENCE NOT SPECIFIED: ICD-10-CM

## 2024-08-12 DIAGNOSIS — R52 PAIN: ICD-10-CM

## 2024-08-12 DIAGNOSIS — E11.40 TYPE 2 DIABETES MELLITUS WITH DIABETIC NEUROPATHY, WITHOUT LONG-TERM CURRENT USE OF INSULIN: Primary | ICD-10-CM

## 2024-08-12 DIAGNOSIS — M25.641 DECREASED RANGE OF MOTION OF FINGER OF RIGHT HAND: Primary | ICD-10-CM

## 2024-08-12 PROCEDURE — 3066F NEPHROPATHY DOC TX: CPT | Mod: CPTII,S$GLB,, | Performed by: INTERNAL MEDICINE

## 2024-08-12 PROCEDURE — 99999 PR PBB SHADOW E&M-EST. PATIENT-LVL V: CPT | Mod: PBBFAC,,, | Performed by: INTERNAL MEDICINE

## 2024-08-12 PROCEDURE — 73502 X-RAY EXAM HIP UNI 2-3 VIEWS: CPT | Mod: 26,LT,, | Performed by: RADIOLOGY

## 2024-08-12 PROCEDURE — 3044F HG A1C LEVEL LT 7.0%: CPT | Mod: CPTII,S$GLB,, | Performed by: INTERNAL MEDICINE

## 2024-08-12 PROCEDURE — 97140 MANUAL THERAPY 1/> REGIONS: CPT

## 2024-08-12 PROCEDURE — 97018 PARAFFIN BATH THERAPY: CPT

## 2024-08-12 PROCEDURE — 3061F NEG MICROALBUMINURIA REV: CPT | Mod: CPTII,S$GLB,, | Performed by: INTERNAL MEDICINE

## 2024-08-12 PROCEDURE — G2211 COMPLEX E/M VISIT ADD ON: HCPCS | Mod: S$GLB,,, | Performed by: INTERNAL MEDICINE

## 2024-08-12 PROCEDURE — 4010F ACE/ARB THERAPY RXD/TAKEN: CPT | Mod: CPTII,S$GLB,, | Performed by: INTERNAL MEDICINE

## 2024-08-12 PROCEDURE — 73502 X-RAY EXAM HIP UNI 2-3 VIEWS: CPT | Mod: TC,PO,LT

## 2024-08-12 PROCEDURE — 97110 THERAPEUTIC EXERCISES: CPT

## 2024-08-12 PROCEDURE — 99214 OFFICE O/P EST MOD 30 MIN: CPT | Mod: S$GLB,,, | Performed by: INTERNAL MEDICINE

## 2024-08-12 PROCEDURE — 3078F DIAST BP <80 MM HG: CPT | Mod: CPTII,S$GLB,, | Performed by: INTERNAL MEDICINE

## 2024-08-12 PROCEDURE — 3008F BODY MASS INDEX DOCD: CPT | Mod: CPTII,S$GLB,, | Performed by: INTERNAL MEDICINE

## 2024-08-12 PROCEDURE — 2023F DILAT RTA XM W/O RTNOPTHY: CPT | Mod: CPTII,S$GLB,, | Performed by: INTERNAL MEDICINE

## 2024-08-12 PROCEDURE — 3074F SYST BP LT 130 MM HG: CPT | Mod: CPTII,S$GLB,, | Performed by: INTERNAL MEDICINE

## 2024-08-12 PROCEDURE — 1159F MED LIST DOCD IN RCRD: CPT | Mod: CPTII,S$GLB,, | Performed by: INTERNAL MEDICINE

## 2024-08-12 RX ORDER — MAGNESIUM HYDROXIDE 400 MG/5ML
1 SUSPENSION, ORAL (FINAL DOSE FORM) ORAL ONCE
COMMUNITY

## 2024-08-12 NOTE — PROGRESS NOTES
"                            Occupational Therapy Daily Treatment Note   Date 8/12/2024    Date: 6/24/2024  Name: Miesha Domínguez  Clinic Number: [id number]    Therapy Diagnosis:  Encounter Diagnoses   Name Primary?    Decreased range of motion of finger of right hand Yes    Pain          Physician: Claire Richardson MD    Physician Orders: Evaluate and treat  Medical Diagnosis: CTR and Trigger finger release right long finger  Surgical Procedure and Date: 6/04/2024, CTR and TF    Evaluation Date: 6/24/2024; 8/30/24  Insurance Authorization Period Expiration: 12/31/24   Plan of Care Expiration: 8 weeks; 9/23/2024  Date of Return to MD: 9/14/2024       Visit # / Visits authorized: 16 / 20  FOTO: (7/09/2024 and 77%)    FOTO Lobby Code: [email or web address]    Precautions: Standard and MS     Time in: 10:00 AM   Time out: 10:55 AM   Total Billed time: 55 minutes    Subjective     Pt reports: "its straight"  she was compliant with home exercise program given last session.   Response to previous treatment: no change    Functional change: reports that she can move her fingers well     Pain: 0 / 10  Location: right finger    Objective       Wrist ROM:   8/12/2024  Ext/flex: 75/52  RD/UD: 15/35    Hand ROM. Measured in degrees.    6/18/2024 6/18/2024 7/18/2024 8/12/2024     Left Right Right  Right             Index: MP    46 55               PIP       76 95               DIP   37 55               RANGEL   159               Long:  MP   50 65 75              PIP   -30/80 -20/92 -9/95              DIP   36 60 62              RANGEL   136               Ring:   MP   45 65               PIP   75 95               DIP   31 59               RANGEL   151               Small:  MP   44 76                PIP   80 95                DIP   34 50               RANGEL   158               Thumb: MP 27 29 40                 IP 65 74 75        Rad ADD/ABD   26 41        Pal ADD/ABD   37 46           Opposition MP  PIP PIP           Strength (Dynamometer) and Pinch " Strength (Pinch Gauge)  Measured in pounds.    7/18/2024 7/18/2024 8/12/2024     Left Right Right   Rung II  45/40/41  25 38.5   Jefferson Pinch  14.5  14 14   3pt Pinch  12.5  9 12   2pt Pinch  11  9.5  10.5       FOTO: self care    initial eval   07/09/2024     Limitation  Score   85%  77%       Objective     Miesha received the following:    supervised modalities after being cleared for contradictions, received the following for 10 minutes:   - paraffin wax with MHP to right hand to promote tissue extensibility     (NT)  - fluidotherapy to R hand, continuous air, 115 deg, air speed 50 to decrease pain, edema & scar tissue, sensory re- education, and increased tissue extensibility prior to therex     \  manual therapy techniques for 10 minutes:   - scar massage with tissue movers   - vibration tool for desensitization over proximal scar      therapeutic exercises for 35 minutes including:   - ORL of LF stretch 10 sec holds x 10  - reverse joint blocking for PIP extension x 10   - prayer stretch 30 sec hold x 3   - hook grasp with unweighted dowel x 10 (2 sets)  - digit abduction/adduction (1 min) with yellow rubber band   - thumb radial/palmar abduction x 15 with yellow rubber band   - hook pull/push green power web x 1 min   - isopsheres on towel (2 min)  - 3pt and later pinch using red clothespin and med poms x 1 set each   - in-hand manipulation with small poms (1 set)  - twisting green flexbar (x 30 reps)   -     (NT)  - wrist maze (3 min)   - wrist AROM 3-ways x 10 each (3 sets)   - LF extension kicks with medium poms x 10     Home Exercises and Education Provided     Education provided:    - cont HEP with green theraputty (upgraded)  - Progress towards goals     Written Home Exercises Provided: Patient instructed to cont prior HEP.  Exercises were reviewed and Miesha was able to demonstrate them prior to the end of the session. Miesha demonstrated good  understanding of the education provided.      See EMR  under Patient Instructions for exercises provided prior visit.     Assessment       Improvement in PIP extension. Pt given another putty to add for strengthening.     Miesha is progressing well towards her goals and there are no updates to goals at this time. Pt prognosis is Good.     Pt will continue to benefit from skilled outpatient occupational therapy to address the deficits listed in the problem list on initial evaluation provide pt/family education and to maximize pt's level of independence in the home and community environment.     Anticipated barriers to occupational therapy: none    Pt's spiritual, cultural and educational needs considered and pt agreeable to plan of care and goals.    Goals:   The following goals were discussed with the patient and patient is in agreement with them as to be addressed in the treatment plan.   Long Term Goals (LTGs); to be met by discharge.  Patient will achieve 95% on the FOTO, within 8 weeks.  Assess pinch and  when appropriate.     Short Term Goals (STGs); to be met within 4 weeks.  Patient will demonstrate improved ROM in R hand, to improve functional use, within 4 weeks.  Patient will report no more than 1 out of 10 pain on the Numeric Pain Rating Scale, to decrease patient discomfort, within 4 weeks.   Patient will achieve 90% on the FOTO showing more improvement.     PLAN   Plan of Care Certification: 6/18/2024 to 8/30/24 .      Discussed Plan of Care with patient: Yes  Updates/Grading for next session: FNIA Funez, SONORMAN  08/08/2024    Co-sign: Giovanna Pacheco, SUJATA 08/08/2024

## 2024-08-13 RX ORDER — CETIRIZINE HYDROCHLORIDE 10 MG/1
10 TABLET ORAL
Qty: 90 TABLET | Refills: 3 | Status: SHIPPED | OUTPATIENT
Start: 2024-08-13

## 2024-08-13 NOTE — TELEPHONE ENCOUNTER
----- Message from Milagros Wolf MD sent at 8/12/2024  3:53 PM CDT -----  Your hip x-ray did not reveal extensive arthritis making your symptoms more likely to be in the tissue surrounding the hip joint.  Please advise if you are agreeable to seeing an orthopedist and a consult will be placed to further investigate your complaint.  zainab

## 2024-08-14 ENCOUNTER — TELEPHONE (OUTPATIENT)
Dept: INTERNAL MEDICINE | Facility: CLINIC | Age: 59
End: 2024-08-14
Payer: COMMERCIAL

## 2024-08-14 ENCOUNTER — CLINICAL SUPPORT (OUTPATIENT)
Dept: REHABILITATION | Facility: HOSPITAL | Age: 59
End: 2024-08-14
Payer: COMMERCIAL

## 2024-08-14 ENCOUNTER — OFFICE VISIT (OUTPATIENT)
Dept: ORTHOPEDICS | Facility: CLINIC | Age: 59
End: 2024-08-14
Payer: COMMERCIAL

## 2024-08-14 DIAGNOSIS — G56.01 CARPAL TUNNEL SYNDROME OF RIGHT WRIST: Primary | ICD-10-CM

## 2024-08-14 DIAGNOSIS — R52 PAIN: ICD-10-CM

## 2024-08-14 DIAGNOSIS — M25.641 DECREASED RANGE OF MOTION OF FINGER OF RIGHT HAND: Primary | ICD-10-CM

## 2024-08-14 DIAGNOSIS — M65.30 STENOSING TENOSYNOVITIS OF FINGER: ICD-10-CM

## 2024-08-14 PROCEDURE — 3044F HG A1C LEVEL LT 7.0%: CPT | Mod: CPTII,S$GLB,, | Performed by: ORTHOPAEDIC SURGERY

## 2024-08-14 PROCEDURE — 1160F RVW MEDS BY RX/DR IN RCRD: CPT | Mod: CPTII,S$GLB,, | Performed by: ORTHOPAEDIC SURGERY

## 2024-08-14 PROCEDURE — 99999 PR PBB SHADOW E&M-EST. PATIENT-LVL III: CPT | Mod: PBBFAC,,, | Performed by: ORTHOPAEDIC SURGERY

## 2024-08-14 PROCEDURE — 97140 MANUAL THERAPY 1/> REGIONS: CPT

## 2024-08-14 PROCEDURE — 97022 WHIRLPOOL THERAPY: CPT

## 2024-08-14 PROCEDURE — 3061F NEG MICROALBUMINURIA REV: CPT | Mod: CPTII,S$GLB,, | Performed by: ORTHOPAEDIC SURGERY

## 2024-08-14 PROCEDURE — 1159F MED LIST DOCD IN RCRD: CPT | Mod: CPTII,S$GLB,, | Performed by: ORTHOPAEDIC SURGERY

## 2024-08-14 PROCEDURE — 97110 THERAPEUTIC EXERCISES: CPT

## 2024-08-14 PROCEDURE — 99024 POSTOP FOLLOW-UP VISIT: CPT | Mod: S$GLB,,, | Performed by: ORTHOPAEDIC SURGERY

## 2024-08-14 PROCEDURE — 3066F NEPHROPATHY DOC TX: CPT | Mod: CPTII,S$GLB,, | Performed by: ORTHOPAEDIC SURGERY

## 2024-08-14 PROCEDURE — 4010F ACE/ARB THERAPY RXD/TAKEN: CPT | Mod: CPTII,S$GLB,, | Performed by: ORTHOPAEDIC SURGERY

## 2024-08-14 NOTE — PROGRESS NOTES
Miesha Domínguez presents for post-operative evaluation of   No diagnosis found.  .   History of Present Illness     The patient is now 10 weeks s/p 6.4.24 Right CTR and RLF trigger finger release. Overall the patient reports doing well.  The patient has no complaints of any numbness, tingling, or pain. Still attending hand therapy as well as doing her at home exercises.      Of note: EMG/NCS mild left carpal tunnel; last steroid injection 3.27.24.  Vitals:    08/14/24 1145   PainSc: 0-No pain   PainLoc: Hand       PE:    AA&O x 4.  NAD  HEENT:  NCAT, sclera nonicteric  Lungs:  Respirations are equal and unlabored.  CV:  2+ bilateral upper and lower extremity pulses.  MSK: The incision is well healed.  Near full wrist and finger motion.  Neurovascularly intact and has 5/5 thenar and intrinsic musculature strength.  Able to make composite fist.  Physical Exam         Diagnostic studies and other clinical records review:  Results    Last xray right hand 7.18.23    EMG/NCS 3.26.24 Impression:   There is electrophysiologic evidence of a bilateral (sensory on the left, sensorimotor on the right) median mononeuropathy across the wrist (I.e. Carpal tunnel syndrome).  There is no motor axonal loss.  There is active denervation on the right.  This appears chronic on the right.  This is graded as Severe in severity on the right, and Mild on the left.       Assessment & Plan: Status post above, doing well     Assessment & Plan       Will write for spring splint for PIP joint  Progressing well, can follow up PRN  Call with any questions/concerns in the interim        Claire Richardson MD    Please be aware that this note has been generated with the assistance of [employer] voice-to-text.  Please excuse any spelling or grammatical errors.    This note was generated with the assistance of ambient listening technology. Verbal consent was obtained by the patient and accompanying visitor(s) for the recording of patient appointment to  facilitate this note. I attest to having reviewed and edited the generated note for accuracy, though some syntax or spelling errors may persist. Please contact the author of this note for any clarification.

## 2024-08-19 ENCOUNTER — TELEPHONE (OUTPATIENT)
Dept: ORTHOPEDICS | Facility: CLINIC | Age: 59
End: 2024-08-19
Payer: COMMERCIAL

## 2024-08-19 NOTE — TELEPHONE ENCOUNTER
Spoke with patient. Informed her that Shelley is out of the office. Rescheduled her appointment to Tuesday, Aug 27. Verbalized understanding.

## 2024-08-26 ENCOUNTER — OFFICE VISIT (OUTPATIENT)
Dept: ORTHOPEDICS | Facility: CLINIC | Age: 59
End: 2024-08-26
Payer: COMMERCIAL

## 2024-08-26 VITALS — WEIGHT: 242.94 LBS | HEIGHT: 66 IN | BODY MASS INDEX: 39.04 KG/M2

## 2024-08-26 DIAGNOSIS — M16.12 PRIMARY OSTEOARTHRITIS OF LEFT HIP: Primary | ICD-10-CM

## 2024-08-26 DIAGNOSIS — M25.552 LEFT HIP PAIN: ICD-10-CM

## 2024-08-26 PROCEDURE — 99999 PR PBB SHADOW E&M-EST. PATIENT-LVL IV: CPT | Mod: PBBFAC,,, | Performed by: PHYSICIAN ASSISTANT

## 2024-08-26 PROCEDURE — 3044F HG A1C LEVEL LT 7.0%: CPT | Mod: CPTII,S$GLB,, | Performed by: PHYSICIAN ASSISTANT

## 2024-08-26 PROCEDURE — 3066F NEPHROPATHY DOC TX: CPT | Mod: CPTII,S$GLB,, | Performed by: PHYSICIAN ASSISTANT

## 2024-08-26 PROCEDURE — 3061F NEG MICROALBUMINURIA REV: CPT | Mod: CPTII,S$GLB,, | Performed by: PHYSICIAN ASSISTANT

## 2024-08-26 PROCEDURE — 3008F BODY MASS INDEX DOCD: CPT | Mod: CPTII,S$GLB,, | Performed by: PHYSICIAN ASSISTANT

## 2024-08-26 PROCEDURE — 4010F ACE/ARB THERAPY RXD/TAKEN: CPT | Mod: CPTII,S$GLB,, | Performed by: PHYSICIAN ASSISTANT

## 2024-08-26 PROCEDURE — 99213 OFFICE O/P EST LOW 20 MIN: CPT | Mod: S$GLB,,, | Performed by: PHYSICIAN ASSISTANT

## 2024-08-26 PROCEDURE — 1159F MED LIST DOCD IN RCRD: CPT | Mod: CPTII,S$GLB,, | Performed by: PHYSICIAN ASSISTANT

## 2024-08-26 RX ORDER — DICLOFENAC SODIUM 50 MG/1
50 TABLET, DELAYED RELEASE ORAL 2 TIMES DAILY
Qty: 60 TABLET | Refills: 0 | Status: SHIPPED | OUTPATIENT
Start: 2024-08-26

## 2024-08-26 NOTE — PROGRESS NOTES
Subjective:      Chief Complaint: Pain of the Left Hip    Patient ID: Miesha Domínguez is a 58 y.o. female.  57yo F phmx DM2, MS presents with one month history of left hip pain.  Her pain is anterior lateral.  She noticed pain after walking on an incline on the treadmill.  Symptoms are worse with sitting to standing and getting in and out of the car.  She notes stiffness but denies radicular symptoms, paresthesias, instability of the hip.  She does endorse back pain but is localized to the midline back.  She has been using heat, ice, ibuprofen with minimal relief.        Past Medical History:   Diagnosis Date    Asthma     as a child    Endometrial polyp 2019    HLD (hyperlipidemia)     HTN (hypertension)     MS (multiple sclerosis)     2020    Neuropathy     S/P D&C (status post dilation and curettage) 2019    Type II or unspecified type diabetes mellitus without mention of complication, not stated as uncontrolled         Past Surgical History:   Procedure Laterality Date    CARPAL TUNNEL RELEASE Right 2024    Procedure: RELEASE, CARPAL TUNNEL;  Surgeon: Claire Richardson MD;  Location: [facility] OR;  Service: Orthopedics;  Laterality: Right;  Local injection prior to prep     SECTION      COLONOSCOPY N/A 2018    Procedure: COLONOSCOPY;  Surgeon: Ramez Ramírez MD;  Location: [address]);  Service: Endoscopy;  Laterality: N/A;    HYSTEROSCOPY WITH DILATION AND CURETTAGE OF UTERUS N/A 2019    Procedure: [id number];  Surgeon: Catrina Olivier MD;  Location: [facility] OR;  Service: OB/GYN;  Laterality: N/A;    SLEEVE GASTROPLASTY      TRIGGER FINGER RELEASE Right 2024    Procedure: RELEASE, TRIGGER FINGER;  Surgeon: Claire Richardson MD;  Location: [facility] OR;  Service: Orthopedics;  Laterality: Right;        Current Outpatient Medications   Medication Instructions    azelastine (ASTELIN) 137 mcg, Nasal, 2 times daily    cetirizine (ZYRTEC)  10 mg, Oral    cholecalciferol, vitamin D3, (VITAMIN D3) 50 mcg (2,000 unit) Cap 1 capsule, Oral, Daily    cyanocobalamin, vitamin B-12, (VITAMIN B-12) 2,500 mcg Subl 1 tablet, Sublingual, Weekly, Pt taking weekly    cyclobenzaprine (FLEXERIL) 5 MG tablet TAKE 1 TABLET BY MOUTH EVERY DAY AT NIGHT AS NEEDED    diclofenac (VOLTAREN) 50 mg, Oral, 2 times daily    estradioL (ESTRACE) 1 g, Vaginal, Twice weekly    fluticasone propionate (FLONASE) 50 mcg/actuation nasal spray SPRAY 1 SPRAY (50 MCG TOTAL) INTO INTO EACH NOSTRIL EVERY DAY    glatiramer (GLATOPA) 40 mg/mL injection INJECT 1 SYRINGE UNDER THE SKIN 3 TIMES A WEEK AT LEAST 48 HOURS APART. ALLOW TO WARM TO ROOM TEMP FOR 20 MINUTES. REFRIGERATE.    hydroCHLOROthiazide (HYDRODIURIL) 25 mg, Oral, Daily    losartan (COZAAR) 100 mg, Oral, Daily    metFORMIN (GLUCOPHAGE) 1000 MG tablet TAKE 1/2 TABLETS (500 MG TOTAL) BY MOUTH DAILY WITH BREAKFAST.    MULTIVITAMIN ORAL 1 tablet, Oral, Daily, Centrum adult chews ( flavor burst)     potassium gluconate 595 mg (99 mg) Tab 1 tablet, Oral, Once    pregabalin (LYRICA) 100 MG capsule TAKE 1 CAPSULE (100 MG TOTAL) BY MOUTH THREE TIMES DAILY.    rosuvastatin (CRESTOR) 20 mg, Oral, Daily    sodium chloride (OCEAN) 0.65 % nasal spray 1 spray, Nasal, As needed (PRN)    tirzepatide 10 mg, Subcutaneous, Every 7 days        Review of patient's allergies indicates:  No Known Allergies    Review of Systems   Constitutional: Negative for fever and malaise/fatigue.   Eyes:  Negative for blurred vision.   Cardiovascular:  Negative for chest pain.   Respiratory:  Negative for shortness of breath.    Skin:  Negative for poor wound healing.   Musculoskeletal:  Positive for back pain, joint pain and myalgias.   Genitourinary:  Negative for bladder incontinence.   Neurological:  Negative for dizziness, numbness and paresthesias.   Psychiatric/Behavioral:  Negative for altered mental status.        The patient's relevant past medical, surgical,  "and social history was reviewed in Epic.       Objective:      VITAL SIGNS: Ht 5' 6" (1.676 m)   Wt 110.2 kg (242 lb 15.2 oz)   LMP 04/03/2019 (Approximate)   BMI 39.21 kg/m²     General    Nursing note and vitals reviewed.  Constitutional: She is oriented to person, place, and time. She appears well-developed and well-nourished.   Neurological: She is alert and oriented to person, place, and time.         Left Hip Exam     Range of Motion   Abduction:  40   Extension:  20   Flexion:  110   External rotation:  70   Internal rotation: 25     Tests   Pain w/ forced internal rotation (KAEL): absent  Pain w/ forced external rotation (FADIR): present  Stinchfield test: negative    Other   Sensation: normal    Comments:  -SLR           Muscle Strength   Left Lower Extremity   Hip Abduction: 5/5   Hip Adduction: 5/5   Hip Flexion: 5/5   Ankle Dorsiflexion:  5/5     Vascular Exam       Left Pulses  Dorsalis Pedis:      2+  Posterior Tibial:      1+           Imaging  X-Ray Hip 2 or 3 views Left with Pelvis when performed  Narrative: EXAMINATION:  XR HIP WITH PELVIS WHEN PERFORMED 2 OR 3 VIEWS LEFT    CLINICAL HISTORY:  Pain in left hip    TECHNIQUE:  AP view of the pelvis and frog leg lateral view of the left hip were performed.    COMPARISON:  None    FINDINGS:  No fracture or dislocation.  No bone destruction identified.  The hip joint spaces are slightly narrowed bilaterally  Impression: See above    Electronically signed by: Rigoberto Crain MD  Date:    08/12/2024  Time:    12:59    I have reviewed the above radiograph and agree with the findings stated by the radiologist.           Assessment:       Miesha Domínguez is a 58 y.o. female seen in the office today for   1. Primary osteoarthritis of left hip    2. Left hip pain     Patient does have some mild arthritis noted on x-rays.  Go to med exacerbated with her inclined treadmill exercise.  She has great hip strength and range of motion.  We will try a course of " anti-inflammatories to see if this helps resolve her symptoms.  Diclofenac 50 mg b.i.d. for 2 weeks.  If no significant relief from this, consider a course of formal physical therapy.      Plan:       Miesha was seen today for pain.    Diagnoses and all orders for this visit:    Primary osteoarthritis of left hip    Left hip pain  -     Ambulatory referral/consult to Orthopedics  -     diclofenac (VOLTAREN) 50 MG EC tablet; Take 1 tablet (50 mg total) by mouth 2 (two) times daily.    Diclofenac 50mg BID x 2 weeks  Consider PT if no improvement       Diagnoses and plan discussed with the patient, as well as the expected course and duration of his symptoms.  All questions and concerns were addressed prior to the end of the visit.   Instructed patient to call office if they have any future questions/concerns or to schedule apt. Patient will return to see me if symptoms worsen or fail to improve    Note dictated with voice recognition software, please excuse any grammatical errors.        Sarah Lim PA-C      Department of Orthopedic Surgery  [facility]  Office: [phone]  08/26/2024

## 2024-09-03 ENCOUNTER — PATIENT MESSAGE (OUTPATIENT)
Dept: BARIATRICS | Facility: CLINIC | Age: 59
End: 2024-09-03
Payer: COMMERCIAL

## 2024-09-04 ENCOUNTER — PATIENT MESSAGE (OUTPATIENT)
Dept: ADMINISTRATIVE | Facility: OTHER | Age: 59
End: 2024-09-04
Payer: COMMERCIAL

## 2024-09-10 ENCOUNTER — PATIENT MESSAGE (OUTPATIENT)
Dept: BARIATRICS | Facility: CLINIC | Age: 59
End: 2024-09-10
Payer: COMMERCIAL

## 2024-09-12 NOTE — PROGRESS NOTES
"Subjective:     @Patient ID: Miesha Domínguez is a 58 y.o. female.    Chief Complaint: Flank Pain    HPI  57 yo F presents for urgent visit of side/flank pain.  Patient reports that started 3 days ago.  Reports that it is on both sides.  Denies dysuria.  Concern for possible kidney stone.    Review of Systems   Constitutional:  Negative for chills and fever.   Genitourinary:  Positive for flank pain. Negative for dysuria.   Psychiatric/Behavioral:  Negative for agitation and confusion.      Past medical history, surgical history, and family medical history reviewed and updated as appropriate.    Medications and allergies reviewed.     Objective:     Vitals:    07/24/24 1017   BP: 120/72   BP Location: Right arm   Patient Position: Sitting   BP Method: Large (Manual)   Pulse: 90   Resp: 15   Temp: 97.5 °F (36.4 °C)   TempSrc: Temporal   SpO2: 97%   Weight: 111.4 kg (245 lb 9.5 oz)   Height: 5' 6" (1.676 m)     Body mass index is 39.64 kg/m².  Physical Exam  Constitutional:       Appearance: Normal appearance.   HENT:      Head: Normocephalic and atraumatic.   Eyes:      General:         Right eye: No discharge.         Left eye: No discharge.      Conjunctiva/sclera: Conjunctivae normal.   Cardiovascular:      Rate and Rhythm: Normal rate and regular rhythm.      Heart sounds: No murmur heard.  Pulmonary:      Effort: Pulmonary effort is normal.      Breath sounds: Normal breath sounds.   Abdominal:      Tenderness: There is right CVA tenderness and left CVA tenderness.   Musculoskeletal:      Cervical back: Normal range of motion and neck supple.   Skin:     General: Skin is warm and dry.   Neurological:      Mental Status: She is alert and oriented to person, place, and time.   Psychiatric:         Mood and Affect: Mood normal.         Behavior: Behavior normal.         Lab Results   Component Value Date    WBC 9.40 07/24/2024    HGB 13.1 07/24/2024    HCT 41.3 07/24/2024     07/24/2024    CHOL 132 " 02/14/2024    TRIG 48 02/14/2024    HDL 61 02/14/2024    ALT 19 07/24/2024    AST 26 07/24/2024     08/12/2024    K 3.9 08/12/2024     08/12/2024    CREATININE 0.8 08/12/2024    BUN 15 08/12/2024    CO2 30 (H) 08/12/2024    TSH 2.834 02/14/2024    INR 0.9 07/08/2013    HGBA1C 5.5 07/24/2024       Assessment:     1. Flank pain      Plan:   Miesha was seen today for hip pain.    Diagnoses and all orders for this visit:    Flank pain  -     CT Renal Stone Study ABD Pelvis WO; Future  -     Urine culture; Future  -     Urinalysis; Future             Marla Rice MD  Internal Medicine

## 2024-09-15 ENCOUNTER — PATIENT MESSAGE (OUTPATIENT)
Dept: INTERNAL MEDICINE | Facility: CLINIC | Age: 59
End: 2024-09-15
Payer: COMMERCIAL

## 2024-09-30 ENCOUNTER — PATIENT MESSAGE (OUTPATIENT)
Dept: ADMINISTRATIVE | Facility: OTHER | Age: 59
End: 2024-09-30
Payer: COMMERCIAL

## 2024-10-01 ENCOUNTER — PATIENT MESSAGE (OUTPATIENT)
Dept: BARIATRICS | Facility: CLINIC | Age: 59
End: 2024-10-01
Payer: COMMERCIAL

## 2024-10-08 ENCOUNTER — PATIENT MESSAGE (OUTPATIENT)
Dept: BARIATRICS | Facility: CLINIC | Age: 59
End: 2024-10-08
Payer: COMMERCIAL

## 2024-10-12 DIAGNOSIS — J01.40 ACUTE PANSINUSITIS, RECURRENCE NOT SPECIFIED: ICD-10-CM

## 2024-10-14 RX ORDER — FLUTICASONE PROPIONATE 50 MCG
SPRAY, SUSPENSION (ML) NASAL
Qty: 16 ML | Refills: 1 | Status: SHIPPED | OUTPATIENT
Start: 2024-10-14

## 2024-10-16 DIAGNOSIS — M62.838 OTHER MUSCLE SPASM: ICD-10-CM

## 2024-10-16 DIAGNOSIS — G35 MULTIPLE SCLEROSIS: ICD-10-CM

## 2024-10-16 RX ORDER — CYCLOBENZAPRINE HCL 5 MG
TABLET ORAL
Qty: 90 TABLET | Refills: 1 | Status: SHIPPED | OUTPATIENT
Start: 2024-10-16

## 2024-10-17 RX ORDER — HYDROCHLOROTHIAZIDE 25 MG/1
25 TABLET ORAL
Qty: 90 TABLET | Refills: 3 | Status: SHIPPED | OUTPATIENT
Start: 2024-10-17

## 2024-10-17 RX ORDER — METFORMIN HYDROCHLORIDE 1000 MG/1
TABLET ORAL
Qty: 45 TABLET | Refills: 3 | Status: SHIPPED | OUTPATIENT
Start: 2024-10-17

## 2024-10-17 RX ORDER — ROSUVASTATIN CALCIUM 20 MG/1
20 TABLET, COATED ORAL
Qty: 90 TABLET | Refills: 3 | Status: SHIPPED | OUTPATIENT
Start: 2024-10-17

## 2024-10-28 DIAGNOSIS — G35 MULTIPLE SCLEROSIS: ICD-10-CM

## 2024-10-29 RX ORDER — GLATIRAMER 40 MG/ML
INJECTION, SOLUTION SUBCUTANEOUS
Qty: 36 EACH | Refills: 1 | Status: ACTIVE | OUTPATIENT
Start: 2024-10-29

## 2024-11-02 ENCOUNTER — PATIENT MESSAGE (OUTPATIENT)
Dept: BARIATRICS | Facility: CLINIC | Age: 59
End: 2024-11-02
Payer: COMMERCIAL

## 2024-11-04 RX ORDER — LOSARTAN POTASSIUM 100 MG/1
100 TABLET ORAL DAILY
Qty: 90 TABLET | Refills: 3 | Status: SHIPPED | OUTPATIENT
Start: 2024-11-04

## 2024-11-12 ENCOUNTER — PATIENT MESSAGE (OUTPATIENT)
Dept: BARIATRICS | Facility: CLINIC | Age: 59
End: 2024-11-12
Payer: COMMERCIAL

## 2024-11-20 ENCOUNTER — OFFICE VISIT (OUTPATIENT)
Dept: NEUROLOGY | Facility: CLINIC | Age: 59
End: 2024-11-20
Payer: COMMERCIAL

## 2024-11-20 DIAGNOSIS — G35 MULTIPLE SCLEROSIS: Primary | ICD-10-CM

## 2024-11-20 PROCEDURE — G2211 COMPLEX E/M VISIT ADD ON: HCPCS | Mod: 95,,,

## 2024-11-20 PROCEDURE — 1160F RVW MEDS BY RX/DR IN RCRD: CPT | Mod: CPTII,95,,

## 2024-11-20 PROCEDURE — 3061F NEG MICROALBUMINURIA REV: CPT | Mod: CPTII,95,,

## 2024-11-20 PROCEDURE — 1159F MED LIST DOCD IN RCRD: CPT | Mod: CPTII,95,,

## 2024-11-20 PROCEDURE — 3044F HG A1C LEVEL LT 7.0%: CPT | Mod: CPTII,95,,

## 2024-11-20 PROCEDURE — 99214 OFFICE O/P EST MOD 30 MIN: CPT | Mod: 95,,,

## 2024-11-20 PROCEDURE — 4010F ACE/ARB THERAPY RXD/TAKEN: CPT | Mod: CPTII,95,,

## 2024-11-20 PROCEDURE — 3066F NEPHROPATHY DOC TX: CPT | Mod: CPTII,95,,

## 2024-11-20 NOTE — PROGRESS NOTES
Patient ID: Miesha Domínguez is a 58 y.o. female who presents today for a routine clinic visit for MS.  She was last seen by Dr. Mejía on 6/19/2024.  The history was provided by the patient.     The patient location is: in her car in [place], [place]  The chief complaint leading to consultation is: MS    Visit type: audiovisual    Face to Face time with patient: 20 minutes     Each patient to whom he or she provides medical services by telemedicine is:  (1) informed of the relationship between the physician and patient and the respective role of any other health care provider with respect to management of the patient; and (2) notified that he or she may decline to receive medical services by telemedicine and may withdraw from such care at any time.    Principle neurological diagnosis: MS  Date of symptom onset: 2018  Date of diagnosis: 2020  Disease type at diagnosis: RR  Disease type currently: RR  Previous therapy: NA  Current therapy: Glatiramer acetate 2020 - present  Vitamin D Dose: 5000IU daily   Last MRI Brain: 3/2/24 - stable  Last MRI C-spine: 3/2/24 - stable   Last MRI T-spine: 3/2/24 - stable  CSF: 11/3/2020: W2, R49, KFLC 0.0299, P20  JCV status: NA  Other relevant labs and tests: 2/14/2024: vitamin D - 89    Subjective:     She states that she is feeling stable neurologically.      She will still have issues with CTS and trigger finger.  She will take Lyrical 100mg BID or TID depending on when needed.     She will also have muscle spasms at night, which she will take a Flexeril when needed, but she is usually able to threat with making sure she takes her potassium supplement.     She says about 2 months ago she was having some hip pain.  She saw her PCP who sent her to an orthopedist.  She was given diclofenac for a month for arthritis and it helped. She will now take ibuprofen as needed.     She did see a podiatrist for her arch issues and it was recommended for her to be fitted for insoles, which are  james.      ROS:      6/12/2024    10:22 PM   REVIEW OF SYMPTOMS   Do you feel abnormally tired on most days? No    Do you feel you generally sleep well? Yes    Do you have difficulty controlling your bladder?  No    Do you have difficulty controlling your bowels?  No    Do you have frequent muscle cramps, tightness or spasms in your limbs?  No    Do you have new visual symptoms?  No    Do you have worsening difficulty with your memory or thinking? No    Do you have worsening symptoms of anxiety or depression?  No    For patients who walk, Do you have more difficulty walking?  No    Have you fallen since your last visit?  No    For patients who use wheelchairs: Do you have any skin wounds or breakdown? Not Applicable    Do you have difficulty using your hands?  No    Do you have shooting or burning pain? No    Do you have difficulty with sexual function?  No    If you are sexually active, are you using birth control? Y/N  N/A Not Applicable    Do you often choke when swallowing liquids or solid food?  No    Do you experience worsening symptoms when overheated? No    Do you need any new equipment such as a wheelchair, walker or shower chair? No    Do you receive co-pay financial assistance for your principal MS medicine? Yes    Would you be interested in participating in an MS research trial in the future? No    For patients on Gilenya, Tecfidera, Aubagio, Rituxan, Ocrevus, Tysabri, Lemtrada or Methotrexate, are you aware that you should NOT receive live virus vaccines?  Not Applicable    Do you feel you have adequate family/friend support?  Yes    Do you have health insurance?   Yes    Are you currently employed? Yes    Do you receive SSDI/SSI?  Not Applicable    Do you use marijuana or cannabis products? No    Have you been diagnosed with a urinary tract infection since your last visit here? No    Have you been diagnosed with a respiratory tract infection since your last visit here? No    Have you been to the  emergency room since your last visit here? No    Have you been hospitalized since your last visit here?  Yes        Patient-reported            12/25/2023     9:13 PM   FSS SCORE & INTERPRETATION   FSS SCORE  9   FSS SCORE INTERPRETATION May not be suffering from fatigue         12/25/2023     9:10 PM   MS CONNIE-D SCORE & INTERPRETATION   CONNIE-D SCORE  0   CONNIE-D INTERPRETATION  No indication of Depression         12/25/2023     9:07 PM   MS TIMMY-7 SCORE & INTERPRETATION   TIMMY-7 SCORE  0   TIMMY-7 SCORE INTERPRETATION Normal         12/25/2023     9:15 PM   PEQ MS MOS PAIN EFFECTS SCORE & INTERPRETATION   PES SCORE 7   PES SCORE INTERPRETATION Scores can range from 6-30.  Items are scaled so that higher scores indicate a greater impact of pain on a patients mood and behavior.         12/25/2023     9:16 PM   PEQ MS SEXUAL SATISFACTION SCORE & INTERPRETATION   SSS SCORE  4   SSS SCORE INTERPRETATION Scores can range from 4-24.  Higher scores indicate greater problems with sexual satisfaction.         12/25/2023     9:17 PM   MS BLADDER CONTROL SCORE & INTERPRETATION   BLCS SCORE 0   BLCS SCORE INTERPRETATION  Scores can range from 0-22, with higher scores indicating greater bladder control problems.         12/25/2023     9:22 PM   MS BOWEL CONTROL SCORE & INTERPRETATION   BWCS SCORE 0   BWCS SCORE INTERPRETATION Scores can range from 0-26, with higher scores indicating greater bowel control problems.         12/25/2023     9:18 PM   PEQ MS IMPACT OF VISUAL IMPAIRMENT SCORE & INTERPRETATION   JAMAL SCALE SCORE  0   JAMAL SCORE INTERPRETATION Scores can range from 0-15, with higher scores indicating greater impact of visual problems on daily activites.         12/25/2023     9:22 PM   MS PDQ SCORE & INTERPRETATION   PDQ RETROSPECTIVE MEMORY SUBSCALE 0   PDQ ATTENTION/CONCENTRATION SUBSCALE 0   PDQ PROSPECTIVE MEMORY SUBSCALE 0   PDQ PLANNING/ORGANIZATION SUBSCALE 0   PDQ TOTAL SCORE 0   PDQ SCORE INTERPRETATION Scores can  range from 0-80, with higher scores indicating greater perceived cognitive impairment.         12/25/2023     9:27 PM   MSSS SCORE & INTERPRETATION   MSSS TANGIBLE SUPPORT SUBSCALE 100   MSSS EMOTIONAL/INFORMATIONAL SUPPORT SUBSCALE 100   MSSS AFFECTIONATE SUPPORT SUBSCALE 100   MSSS POSITIVE SOCIAL INTERACTION SUBSCALE 100   MSSS TOTAL SCORE 100   MSSS SCORE INTERPRETATION Scores can range from 0-100, with higher scores indicating greater perceived support.        SOCIAL HISTORY  Living arrangements - the patient lives with their family.  Social History     Socioeconomic History    Marital status:      Spouse name: justin    Number of children: 1   Occupational History    Occupation:      Employer: [employer]   Tobacco Use    Smoking status: Never     Passive exposure: Never    Smokeless tobacco: Never   Substance and Sexual Activity    Alcohol use: Not Currently     Comment: occassional for Hollidays    Drug use: No    Sexual activity: Yes     Partners: Male     Birth control/protection: None     Comment:  x 33 years since 1991: Spouse : Justin   Social History Narrative    SOCIAL HISTORY:     , spouse is in good health, .     for [employer],    Daughter,  @[employer], major [employer]; working @ [employer]    + exercise, walk, elliptical, Rebecca     Social Drivers of Health     Financial Resource Strain: Low Risk  (2/14/2024)    Overall Financial Resource Strain (CARDIA)     Difficulty of Paying Living Expenses: Not hard at all   Food Insecurity: No Food Insecurity (2/14/2024)    Hunger Vital Sign     Worried About Running Out of Food in the Last Year: Never true     Ran Out of Food in the Last Year: Never true   Transportation Needs: No Transportation Needs (2/14/2024)    PRAPARE - Transportation     Lack of Transportation (Medical): No     Lack of Transportation (Non-Medical): No   Physical Activity: Insufficiently Active (2/14/2024)     Exercise Vital Sign     Days of Exercise per Week: 2 days     Minutes of Exercise per Session: 20 min   Stress: No Stress Concern Present (2/14/2024)    [employer] - Occupational Stress Questionnaire     Feeling of Stress : Not at all   Housing Stability: Low Risk  (2/14/2024)    Housing Stability Vital Sign     Unable to Pay for Housing in the Last Year: No     Number of Places Lived in the Last Year: 1     Unstable Housing in the Last Year: No       Current Outpatient Medications on File Prior to Visit   Medication Sig Dispense Refill    azelastine (ASTELIN) 137 mcg (0.1 %) nasal spray 1 spray (137 mcg total) by Nasal route 2 (two) times daily. 30 mL 3    cetirizine (ZYRTEC) 10 MG tablet TAKE 1 TABLET BY MOUTH EVERY DAY 90 tablet 3    cholecalciferol, vitamin D3, (VITAMIN D3) 50 mcg (2,000 unit) Cap Take 1 capsule by mouth once daily.      cyanocobalamin, vitamin B-12, (VITAMIN B-12) 2,500 mcg Subl Place 1 tablet under the tongue once a week. Pt taking weekly      cyclobenzaprine (FLEXERIL) 5 MG tablet TAKE 1 TABLET BY MOUTH EVERY DAY AT NIGHT AS NEEDED 90 tablet 1    diclofenac (VOLTAREN) 50 MG EC tablet Take 1 tablet (50 mg total) by mouth 2 (two) times daily. 60 tablet 0    estradioL (ESTRACE) 0.01 % (0.1 mg/gram) vaginal cream Place 1 g vaginally twice a week. 42 g 3    fluticasone propionate (FLONASE) 50 mcg/actuation nasal spray SPRAY 1 SPRAY (50 MCG TOTAL) INTO INTO EACH NOSTRIL EVERY DAY 16 mL 1    glatiramer (GLATOPA) 40 mg/mL injection INJECT 1 SYRINGE UNDER THE SKIN 3 TIMES A WEEK AT LEAST 48 HOURS APART. ALLOW TO WARM TO ROOM TEMP FOR 20 MINUTES. REFRIGERATE. 36 each 1    hydroCHLOROthiazide (HYDRODIURIL) 25 MG tablet TAKE 1 TABLET BY MOUTH EVERY DAY 90 tablet 3    losartan (COZAAR) 100 MG tablet Take 1 tablet (100 mg total) by mouth once daily. 90 tablet 3    metFORMIN (GLUCOPHAGE) 1000 MG tablet TAKE 1/2 TABLETS (500 MG TOTAL) BY MOUTH DAILY WITH BREAKFAST. 45 tablet 3     MULTIVITAMIN ORAL Take 1 tablet by mouth once daily. Centrum adult chews ( flavor burst)      potassium gluconate 595 mg (99 mg) Tab Take 1 tablet by mouth once.      pregabalin (LYRICA) 100 MG capsule TAKE 1 CAPSULE (100 MG TOTAL) BY MOUTH THREE TIMES DAILY. 270 capsule 1    rosuvastatin (CRESTOR) 20 MG tablet TAKE 1 TABLET BY MOUTH EVERY DAY 90 tablet 3    sodium chloride (OCEAN) 0.65 % nasal spray 1 spray by Nasal route as needed for Congestion.      tirzepatide 10 mg/0.5 mL PnIj Inject 10 mg into the skin every 7 days. 4 Pen 12     No current facility-administered medications on file prior to visit.       Objective:     1. 25 foot timed walk:      12/29/2023     9:30 AM 6/19/2024     3:20 PM   Timed 25 Foot Walk:   Did patient wear an AFO? No No   Was assistive device used? No No   Time for 25 Foot Walk (seconds) 4.05 4.03   Time for 25 Foot Walk (seconds) 4.18 3.93           NEURO EXAM    In general, the patient is well nourished and appears to be in no acute distress.    MENTAL STATUS: language is fluent, normal verbal comprehension, short-term and remote memory is intact, attention is normal, patient is alert and oriented x 3, fund of knowlege is appropriate by vocabulary.       Imaging: personally reviewed.      Results for orders placed during the hospital encounter of 03/02/24    MRI Brain Demyelinating Without Contrast    Impression  No significant change from prior with continued several scattered punctate foci of T2 FLAIR hyperintense lesions within the brain parenchyma while nonspecific remain concerning for mild degree of prior demyelinating plaque burden.    No definite new lesion to suggest significant interval or active demyelination.      Electronically signed by: Huy Villatoro DO  Date:    03/03/2024  Time:    13:27    Results for orders placed during the hospital encounter of 03/02/24    MRI Cervical Spine Demyelinating Without Contrast    Impression  Continued short segment regions of T2  stir signal abnormality in the cervical and thoracic cord as detailed above most compatible with prior areas of demyelination in light of history. No new cord signal abnormality to suggest significant interval or active demyelination.    Continued mild degenerative change of the cervical spine as detailed above.    Stable slight ventral positioning thoracic cord T3/T4 superimposed dorsal thoracic canal arachnoid cyst not excluded.  There is no underlying cord signal abnormality.    See above for additional details.      Electronically signed by: Huy Villatoro DO  Date:    03/02/2024  Time:    12:46    Results for orders placed during the hospital encounter of 03/02/24    MRI Thoracic Spine Demyelinating Without Contrast    Impression  Continued short segment regions of T2 stir signal abnormality in the cervical and thoracic cord as detailed above most compatible with prior areas of demyelination in light of history. No new cord signal abnormality to suggest significant interval or active demyelination.    Continued mild degenerative change of the cervical spine as detailed above.    Stable slight ventral positioning thoracic cord T3/T4 superimposed dorsal thoracic canal arachnoid cyst not excluded.  There is no underlying cord signal abnormality.    See above for additional details.      Electronically signed by: Huy Villatoro DO  Date:    03/02/2024  Time:    12:46    Results for orders placed during the hospital encounter of 03/25/22    MRI Brain Demyelinating W W/O Contrast    Impression  No significant change from prior.  Stable few scattered small to punctate size regions of T2 FLAIR signal abnormality throughout the brain parenchyma remain concerning for mild degree of prior demyelinating plaque burden in light of history.  No definite new lesion or enhancing lesion to suggest significant interval or active demyelination.    Clinical correlation and follow-up advised.      Electronically signed by: Huy  "DO Irving  Date:    03/26/2022  Time:    09:40    Results for orders placed during the hospital encounter of 03/25/22    MRI Cervical Spine Demyelinating W W/O Contrast    Impression  Continued short segment regions of T2 stir signal abnormality in the cervical and thoracic cord as detailed above most compatible with prior areas of demyelination in light of history. No new cord signal abnormality or abnormal intrathecal enhancement to suggest significant interval or active demyelination.    Stable ventral positioning of the cervical cord with slight dorsal flattening underlying arachnoid cyst versus web remains in differential.    Continued scattered degenerative change of the cervical spine as detailed above without significant central canal stenosis.    See above for additional details.      Electronically signed by: Huy Villatoro DO  Date:    03/26/2022  Time:    09:40    Results for orders placed during the hospital encounter of 03/25/22    MRI Thoracic Spine Demyelinating W W/O Contrast    Impression  Continued short segment regions of T2 stir signal abnormality in the cervical and thoracic cord as detailed above most compatible with prior areas of demyelination in light of history. No new cord signal abnormality or abnormal intrathecal enhancement to suggest significant interval or active demyelination.    Stable ventral positioning of the cervical cord with slight dorsal flattening underlying arachnoid cyst versus web remains in differential.    Continued scattered degenerative change of the cervical spine as detailed above without significant central canal stenosis.    See above for additional details.      Electronically signed by: Hyu Villatoro DO  Date:    03/26/2022  Time:    09:40        Labs:     Lab Results   Component Value Date    VQPJSMTU71BV 89 02/14/2024    TUBWDZMO02YN 120 (H) 03/09/2023    SFCTFNJO39QC 49 06/15/2022     No results found for: "JCVINDEX", "JCVANTIBODY"  No results found for: " ""GO5SDGNZ", "ABSOLUTECD3", "WO4PQKTX", "ABSOLUTECD8", "ZM8BIQLC", "ABSOLUTECD4", "LABCD48"  Lab Results   Component Value Date    WBC 9.40 07/24/2024    RBC 4.96 07/24/2024    HGB 13.1 07/24/2024    HCT 41.3 07/24/2024    MCV 83 07/24/2024    MCH 26.4 (L) 07/24/2024    MCHC 31.7 (L) 07/24/2024    RDW 16.1 (H) 07/24/2024     07/24/2024    MPV 12.1 07/24/2024    GRAN 4.7 07/24/2024    GRAN 49.5 07/24/2024    LYMPH 3.7 07/24/2024    LYMPH 39.8 07/24/2024    MONO 0.7 07/24/2024    MONO 7.2 07/24/2024    EOS 0.3 07/24/2024    BASO 0.04 07/24/2024    EOSINOPHIL 2.8 07/24/2024    BASOPHIL 0.4 07/24/2024     Sodium   Date Value Ref Range Status   08/12/2024 144 136 - 145 mmol/L Final     Potassium   Date Value Ref Range Status   08/12/2024 3.9 3.5 - 5.1 mmol/L Final     Chloride   Date Value Ref Range Status   08/12/2024 104 95 - 110 mmol/L Final     CO2   Date Value Ref Range Status   08/12/2024 30 (H) 23 - 29 mmol/L Final     Glucose   Date Value Ref Range Status   08/12/2024 86 70 - 110 mg/dL Final     BUN   Date Value Ref Range Status   08/12/2024 15 6 - 20 mg/dL Final     Creatinine   Date Value Ref Range Status   08/12/2024 0.8 0.5 - 1.4 mg/dL Final     Calcium   Date Value Ref Range Status   08/12/2024 9.9 8.7 - 10.5 mg/dL Final     Total Protein   Date Value Ref Range Status   07/24/2024 7.5 6.0 - 8.4 g/dL Final     Albumin   Date Value Ref Range Status   07/24/2024 3.5 3.5 - 5.2 g/dL Final     Total Bilirubin   Date Value Ref Range Status   07/24/2024 0.5 0.1 - 1.0 mg/dL Final     Comment:     For infants and newborns, interpretation of results should be based  on gestational age, weight and in agreement with clinical  observations.    Premature Infant recommended reference ranges:  Up to 24 hours.............<8.0 mg/dL  Up to 48 hours............<12.0 mg/dL  3-5 days..................<15.0 mg/dL  6-29 days.................<15.0 mg/dL       Alkaline Phosphatase   Date Value Ref Range Status   07/24/2024 66 " "55 - 135 U/L Final     AST   Date Value Ref Range Status   07/24/2024 26 10 - 40 U/L Final     ALT   Date Value Ref Range Status   07/24/2024 19 10 - 44 U/L Final     Anion Gap   Date Value Ref Range Status   08/12/2024 10 8 - 16 mmol/L Final     eGFR if    Date Value Ref Range Status   03/22/2022 >60.0 >60 mL/min/1.73 m^2 Final   03/22/2022 >60.0 >60 mL/min/1.73 m^2 Final     eGFR if non    Date Value Ref Range Status   03/22/2022 >60.0 >60 mL/min/1.73 m^2 Final     Comment:     Calculation used to obtain the estimated glomerular filtration  rate (eGFR) is the CKD-EPI equation.      03/22/2022 >60.0 >60 mL/min/1.73 m^2 Final     Comment:     Calculation used to obtain the estimated glomerular filtration  rate (eGFR) is the CKD-EPI equation.        No results found for: "HEPBSAG", "HEPBSAB", "HEPBCAB"        MS Impression and Plan:     NEURO MULTIPLE SCLEROSIS IMPRESSION:   Number of relapses in the past year?:  0  Clinical Progression:  Clinically Stable  MRI Progression:  Stable  MS Classification:  Relapsing-Remitting MS  Current DMT: glatiramer acetate  DMT:  No change in management  DMT comment:  Will see about changing back to brand Copaxone than brand Glatopa  Symptom Management:  No change in symptom management  Additional Impressions: Patient is remaining stable on glatiramer acetate.   Routine MRIs due in March 2025  Follow up in 6 months  Plan discussed and questions were answered to satisfaction.     Problem List Items Addressed This Visit       Multiple sclerosis - Primary    Relevant Orders    MRI Brain Demyelinating Without Contrast    MRI Cervical Spine Demyelinating Without Contrast    MRI Thoracic Spine Demyelinating Without Contrast       I spent a total of 30 minutes on the day of the visit.This includes face to face time and non-face to face time preparing to see the patient (eg, review of tests), obtaining and/or reviewing separately obtained history, documenting " clinical information in the electronic or other health record, independently interpreting results and communicating results to the patient/family/caregiver, or care coordinator.       ALDAIR McdowellC

## 2024-11-20 NOTE — Clinical Note
She will need MRIs in march and a follow up with me in 6 months (in person).  She is getting new insurance so she may want to wait to schedule MRIs until after, so we can hold off on scheduling MRIs if she wants.

## 2024-11-20 NOTE — Clinical Note
She switched to Glatopa from Copaxone due to insurance issues, but she states that she feels the needle is thicker and hurts more than Copaxone did.  I did explain to her about rotating sight and the changes in skin that could happen with repeated injections which could be more so the reason, but she would like to see if she could go back to Copaxone. She says she is possibly getting new insurance soon if her  gets a new job, so we may want to wait to run the new insurance unless you think the new insurance may allow copaxone now? What ever you think is best.

## 2024-11-26 ENCOUNTER — TELEPHONE (OUTPATIENT)
Dept: NEUROLOGY | Facility: CLINIC | Age: 59
End: 2024-11-26
Payer: COMMERCIAL

## 2024-11-26 DIAGNOSIS — G35 MULTIPLE SCLEROSIS: Primary | ICD-10-CM

## 2024-11-26 RX ORDER — GLATIRAMER ACETATE 40 MG/ML
40 INJECTION, SOLUTION SUBCUTANEOUS
Qty: 36 ML | Refills: 1 | Status: ACTIVE | OUTPATIENT
Start: 2024-11-27

## 2024-11-26 NOTE — TELEPHONE ENCOUNTER
----- Message from Karen Lainez NP sent at 11/20/2024 10:11 AM CST -----  She switched to Glatopa from Copaxone due to insurance issues, but she states that she feels the needle is thicker and hurts more than Copaxone did.  I did explain to her about rotating sight and the changes in skin that could happen with repeated injections which could be more so the reason, but she would like to see if she could go back to Copaxone. She says she is possibly getting new insurance soon if her  gets a new job, so we may want to wait to run the new insurance unless you think the new insurance may allow copaxone now? What ever you think is best.

## 2024-11-27 ENCOUNTER — TELEPHONE (OUTPATIENT)
Dept: NEUROLOGY | Facility: CLINIC | Age: 59
End: 2024-11-27
Payer: COMMERCIAL

## 2024-11-27 NOTE — TELEPHONE ENCOUNTER
Spoke with pt to schedule her MRI's and follow up. MRI's are scheduled for 3/8 and follow up will be scheduled next week when Karen's schedule open's up. Pt understood

## 2024-12-03 ENCOUNTER — PATIENT MESSAGE (OUTPATIENT)
Dept: BARIATRICS | Facility: CLINIC | Age: 59
End: 2024-12-03
Payer: COMMERCIAL

## 2024-12-06 DIAGNOSIS — J01.40 ACUTE PANSINUSITIS, RECURRENCE NOT SPECIFIED: ICD-10-CM

## 2024-12-06 RX ORDER — FLUTICASONE PROPIONATE 50 MCG
SPRAY, SUSPENSION (ML) NASAL
Qty: 16 ML | Refills: 1 | Status: SHIPPED | OUTPATIENT
Start: 2024-12-06

## 2024-12-10 ENCOUNTER — PATIENT MESSAGE (OUTPATIENT)
Dept: BARIATRICS | Facility: CLINIC | Age: 59
End: 2024-12-10
Payer: COMMERCIAL

## 2024-12-16 ENCOUNTER — PATIENT MESSAGE (OUTPATIENT)
Dept: NEUROLOGY | Facility: CLINIC | Age: 59
End: 2024-12-16
Payer: COMMERCIAL

## 2024-12-16 ENCOUNTER — PATIENT MESSAGE (OUTPATIENT)
Dept: PSYCHIATRY | Facility: CLINIC | Age: 59
End: 2024-12-16
Payer: COMMERCIAL

## 2024-12-23 ENCOUNTER — OFFICE VISIT (OUTPATIENT)
Dept: OBSTETRICS AND GYNECOLOGY | Facility: CLINIC | Age: 59
End: 2024-12-23
Payer: COMMERCIAL

## 2024-12-23 ENCOUNTER — HOSPITAL ENCOUNTER (OUTPATIENT)
Dept: RADIOLOGY | Facility: OTHER | Age: 59
Discharge: HOME | End: 2024-12-23
Attending: INTERNAL MEDICINE
Payer: COMMERCIAL

## 2024-12-23 VITALS
BODY MASS INDEX: 38.62 KG/M2 | WEIGHT: 240.31 LBS | HEART RATE: 79 BPM | DIASTOLIC BLOOD PRESSURE: 88 MMHG | SYSTOLIC BLOOD PRESSURE: 125 MMHG | HEIGHT: 66 IN

## 2024-12-23 DIAGNOSIS — Z12.31 ENCOUNTER FOR SCREENING MAMMOGRAM FOR BREAST CANCER: ICD-10-CM

## 2024-12-23 DIAGNOSIS — N95.2 POSTMENOPAUSAL ATROPHIC VAGINITIS: ICD-10-CM

## 2024-12-23 DIAGNOSIS — Z01.419 ENCOUNTER FOR GYNECOLOGICAL EXAMINATION WITHOUT ABNORMAL FINDING: Primary | ICD-10-CM

## 2024-12-23 DIAGNOSIS — L30.4 INTERTRIGO: ICD-10-CM

## 2024-12-23 PROCEDURE — 3044F HG A1C LEVEL LT 7.0%: CPT | Mod: CPTII,S$GLB,, | Performed by: OBSTETRICS & GYNECOLOGY

## 2024-12-23 PROCEDURE — 3066F NEPHROPATHY DOC TX: CPT | Mod: CPTII,S$GLB,, | Performed by: OBSTETRICS & GYNECOLOGY

## 2024-12-23 PROCEDURE — 77067 SCR MAMMO BI INCL CAD: CPT | Mod: 26,,, | Performed by: RADIOLOGY

## 2024-12-23 PROCEDURE — 3074F SYST BP LT 130 MM HG: CPT | Mod: CPTII,S$GLB,, | Performed by: OBSTETRICS & GYNECOLOGY

## 2024-12-23 PROCEDURE — 4010F ACE/ARB THERAPY RXD/TAKEN: CPT | Mod: CPTII,S$GLB,, | Performed by: OBSTETRICS & GYNECOLOGY

## 2024-12-23 PROCEDURE — 77063 BREAST TOMOSYNTHESIS BI: CPT | Mod: 26,,, | Performed by: RADIOLOGY

## 2024-12-23 PROCEDURE — 3008F BODY MASS INDEX DOCD: CPT | Mod: CPTII,S$GLB,, | Performed by: OBSTETRICS & GYNECOLOGY

## 2024-12-23 PROCEDURE — 3079F DIAST BP 80-89 MM HG: CPT | Mod: CPTII,S$GLB,, | Performed by: OBSTETRICS & GYNECOLOGY

## 2024-12-23 PROCEDURE — 1159F MED LIST DOCD IN RCRD: CPT | Mod: CPTII,S$GLB,, | Performed by: OBSTETRICS & GYNECOLOGY

## 2024-12-23 PROCEDURE — 99999 PR PBB SHADOW E&M-EST. PATIENT-LVL IV: CPT | Mod: PBBFAC,,, | Performed by: OBSTETRICS & GYNECOLOGY

## 2024-12-23 PROCEDURE — 77067 SCR MAMMO BI INCL CAD: CPT | Mod: TC

## 2024-12-23 PROCEDURE — 3061F NEG MICROALBUMINURIA REV: CPT | Mod: CPTII,S$GLB,, | Performed by: OBSTETRICS & GYNECOLOGY

## 2024-12-23 PROCEDURE — 99396 PREV VISIT EST AGE 40-64: CPT | Mod: S$GLB,,, | Performed by: OBSTETRICS & GYNECOLOGY

## 2024-12-23 RX ORDER — CLOTRIMAZOLE AND BETAMETHASONE DIPROPIONATE 10; .64 MG/G; MG/G
CREAM TOPICAL 2 TIMES DAILY
Qty: 45 G | Refills: 1 | Status: SHIPPED | OUTPATIENT
Start: 2024-12-23

## 2024-12-23 RX ORDER — ESTRADIOL 0.1 MG/G
1 CREAM VAGINAL
Qty: 42 G | Refills: 3 | Status: SHIPPED | OUTPATIENT
Start: 2024-12-23

## 2024-12-23 NOTE — PROGRESS NOTES
Subjective:       Patient ID: Miesha Domínguez is a 59 y.o. female.    Chief Complaint:  Well Woman and Gynecologic Exam      History of Present Illness  Gynecologic Exam  Pertinent negatives include no abdominal pain, back pain or headaches.       Miesha Domínguez is a 59 y.o. female  here for her annual GYN exam.    She is menopausal since age 54 and is not on HRT.  She is up to date with her PCP and labs, not currently exercising regularly. She does report thinning hair at the temples.   denies break through bleeding.   reports vaginal itching or irritation.(External in creases of groin)  Denies vaginal discharge.  She is sexually active. She has had1 partner for 36 years .     History of abnormal pap: No  Last Pap: approximate date 2021 and was normal  Last MMG: normal-2023: BI-RADS Category: Overall: 1 - Negative-routine follow-up in 12 months(repeated today)  Last Colonoscopy:  2017: normal  denies domestic violence. She does feel safe at home.     Past Medical History:   Diagnosis Date    Asthma     as a child    Endometrial polyp 2019    HLD (hyperlipidemia)     HTN (hypertension)     MS (multiple sclerosis)     2020    Neuropathy     S/P D&C (status post dilation and curettage) 2019    Type II or unspecified type diabetes mellitus without mention of complication, not stated as uncontrolled      Past Surgical History:   Procedure Laterality Date    CARPAL TUNNEL RELEASE Right 2024    Procedure: RELEASE, CARPAL TUNNEL;  Surgeon: Claire Richardson MD;  Location: [facility];  Service: Orthopedics;  Laterality: Right;  Local injection prior to prep     SECTION      COLONOSCOPY N/A 2018    Procedure: COLONOSCOPY;  Surgeon: Ramez Ramírez MD;  Location: [address]);  Service: Endoscopy;  Laterality: N/A;    HYSTEROSCOPY WITH DILATION AND CURETTAGE OF UTERUS N/A 2019    Procedure: [id number];  Surgeon: Catrina Olivier MD;   Location: [facility] OR;  Service: OB/GYN;  Laterality: N/A;    SLEEVE GASTROPLASTY  July 29,2013    TRIGGER FINGER RELEASE Right 6/4/2024    Procedure: RELEASE, TRIGGER FINGER;  Surgeon: Claire Richardson MD;  Location: [facility] OR;  Service: Orthopedics;  Laterality: Right;     Social History     Socioeconomic History    Marital status:      Spouse name: himanshu    Number of children: 1   Occupational History    Occupation:      Employer: [employer]   Tobacco Use    Smoking status: Never     Passive exposure: Never    Smokeless tobacco: Never   Substance and Sexual Activity    Alcohol use: Not Currently     Comment: occassional for Hollidays    Drug use: No    Sexual activity: Yes     Partners: Male     Birth control/protection: None     Comment:  x 33 years since 1991: Spouse : Himanshu   Social History Narrative    SOCIAL HISTORY:     , spouse is in good health, .     for [employer],    Daughter,  @[employer], major [employer]; working @ [employer]    + exercise, walk, elliptical, Rebecca     Social Drivers of Health     Financial Resource Strain: Low Risk  (2/14/2024)    Overall Financial Resource Strain (CARDIA)     Difficulty of Paying Living Expenses: Not hard at all   Food Insecurity: No Food Insecurity (2/14/2024)    Hunger Vital Sign     Worried About Running Out of Food in the Last Year: Never true     Ran Out of Food in the Last Year: Never true   Transportation Needs: No Transportation Needs (2/14/2024)    PRAPARE - Transportation     Lack of Transportation (Medical): No     Lack of Transportation (Non-Medical): No   Physical Activity: Insufficiently Active (2/14/2024)    Exercise Vital Sign     Days of Exercise per Week: 2 days     Minutes of Exercise per Session: 20 min   Stress: No Stress Concern Present (2/14/2024)    [employer] - Occupational Stress Questionnaire     Feeling of Stress : Not at all   Housing  "Stability: Low Risk  (2024)    Housing Stability Vital Sign     Unable to Pay for Housing in the Last Year: No     Number of Places Lived in the Last Year: 1     Unstable Housing in the Last Year: No     Family History   Problem Relation Name Age of Onset    Cancer Father          d.64 lung -smoker    Hypertension Mother      Diabetes Mother      Ovarian cancer Mother  74    Hypertension Brother Adeel         1/2 brother - same Mom    Diabetes Brother Adeel 73    Diabetes Sister Celena 71        1/2 sister- same Mom    Hypertension Sister Celena     No Known Problems Daughter Agustina          1991    Colon cancer Other nephew 46    Breast cancer Neg Hx      Stroke Neg Hx      Heart attack Neg Hx      Heart disease Neg Hx      Heart failure Neg Hx      Hyperlipidemia Neg Hx      Cervical cancer Neg Hx      Uterine cancer Neg Hx       OB History          1    Para   1    Term   1            AB        Living   1         SAB        IAB        Ectopic        Multiple        Live Births   1                 /88   Pulse 79   Ht 5' 6" (1.676 m)   Wt 109 kg (240 lb 4.8 oz)   LMP 2019 (Approximate)   BMI 38.79 kg/m²         GYN & OB History  Patient's last menstrual period was 2019 (approximate).   Date of Last Pap: 2022    OB History    Para Term  AB Living   1 1 1     1   SAB IAB Ectopic Multiple Live Births           1      # Outcome Date GA Lbr Jason/2nd Weight Sex Type Anes PTL Lv   1 Term      CS-LTranv Spinal N SHIREEN       Review of Systems  Review of Systems   Constitutional:  Negative for activity change, appetite change, fatigue and unexpected weight change.   HENT: Negative.     Eyes:  Negative for visual disturbance.   Respiratory:  Negative for shortness of breath and wheezing.    Cardiovascular:  Negative for chest pain, palpitations and leg swelling.   Gastrointestinal:  Negative for abdominal pain, bloating and blood in stool.   Endocrine: " Positive for diabetes and hair loss.   Genitourinary:  Negative for decreased libido, dyspareunia, hot flashes and postmenopausal bleeding.   Musculoskeletal:  Negative for back pain and joint swelling.   Integumentary:  Positive for hair changes. Negative for acne and nipple discharge.   Neurological:  Negative for headaches.   Hematological:  Does not bruise/bleed easily.   Psychiatric/Behavioral:  Negative for depression and sleep disturbance. The patient is not nervous/anxious.    Breast: Negative for mastodynia and nipple discharge          Objective:      Physical Exam:   Constitutional: She is oriented to person, place, and time. She appears well-developed and well-nourished.    HENT:   Head: Normocephalic and atraumatic.    Eyes: Pupils are equal, round, and reactive to light. EOM are normal.     Cardiovascular:  Normal rate and regular rhythm.             Pulmonary/Chest: Effort normal and breath sounds normal.   BREASTS:  no mass, no tenderness, no deformity and no retraction. Right breast exhibits no inverted nipple, no mass, no nipple discharge, no skin change, no tenderness, no bleeding and no swelling. Left breast exhibits no inverted nipple, no mass, no nipple discharge, no skin change, no tenderness, no bleeding and no swelling. Breasts are symmetrical.              Abdominal: Soft. Bowel sounds are normal.     Genitourinary:    Pelvic exam was performed with patient supine.      Genitourinary Comments: PELVIC: Normal external genitalia without lesions.  Normal hair distribution.  Adequate perineal body, normal urethral meatus.  Vagina  Moist and moderately rugated, Minimally atrophic, without lesions or discharge.  Cervix pink, without lesions, discharge or tenderness.  No significant cystocele or rectocele.  Bimanual exam shows uterus to be normal size, regular, mobile and nontender.  Adnexa without masses or tenderness.    RECTAL:Deferred                 Musculoskeletal: Normal range of motion and  moves all extremeties.       Neurological: She is alert and oriented to person, place, and time.    Skin: Skin is warm and dry.    Psychiatric: She has a normal mood and affect.              Assessment:        1. Encounter for gynecological examination without abnormal finding    2. Postmenopausal atrophic vaginitis    3. Intertrigo                Plan:        Problem List Items Addressed This Visit    None  Visit Diagnoses       Encounter for gynecological examination without abnormal finding    -  Primary  COUNSELING:  The patient was counseled today on regular weight bearing exercise. Patient was counseled today on the new ACS guidelines for cervical cytology screening as well as the current recommendations for breast cancer screening. Counseling session lasted approximately 10 minutes, and all her questions were answered. She was advised to see her primary care physician for all other health maintenance.   FOLLOW-UP with me for next routine visit.   I recommend 30 minutes 5 days a week of moderate intensity aerobics (fast walking, treadmill, biking, etc.) and weight-bearing exercise (free weights, machines, tension bands, etc.) at least twice weekly for overall health benefits and the prevention and/or treatment of osteoporosis.      Postmenopausal atrophic vaginitis        Relevant Medications    estradioL (ESTRACE) 0.01 % (0.1 mg/gram) vaginal cream    Intertrigo        Relevant Medications    clotrimazole-betamethasone 1-0.05% (LOTRISONE) cream             Follow up in about 1 year (around 12/23/2025).

## 2024-12-25 ENCOUNTER — PATIENT MESSAGE (OUTPATIENT)
Dept: ADMINISTRATIVE | Facility: OTHER | Age: 59
End: 2024-12-25
Payer: COMMERCIAL

## 2024-12-27 ENCOUNTER — TELEPHONE (OUTPATIENT)
Dept: INTERNAL MEDICINE | Facility: CLINIC | Age: 59
End: 2024-12-27
Payer: COMMERCIAL

## 2024-12-27 NOTE — TELEPHONE ENCOUNTER
----- Message from Milargos Wolf MD sent at 12/26/2024  8:45 PM CST -----  Please note that your mammogram was read as follows  Impression:  There is no mammographic evidence of malignancy.   zainab

## 2024-12-27 NOTE — TELEPHONE ENCOUNTER
----- Message from Milagros Wolf MD sent at 12/26/2024  8:45 PM CST -----  Please note that your mammogram was read as follows  Impression:  There is no mammographic evidence of malignancy.   zainab

## 2025-01-06 ENCOUNTER — PATIENT MESSAGE (OUTPATIENT)
Dept: BARIATRICS | Facility: CLINIC | Age: 60
End: 2025-01-06

## 2025-01-14 ENCOUNTER — PATIENT MESSAGE (OUTPATIENT)
Dept: BARIATRICS | Facility: CLINIC | Age: 60
End: 2025-01-14

## 2025-01-15 ENCOUNTER — TELEPHONE (OUTPATIENT)
Dept: INTERNAL MEDICINE | Facility: CLINIC | Age: 60
End: 2025-01-15

## 2025-01-15 NOTE — TELEPHONE ENCOUNTER
----- Message from Jr sent at 1/15/2025  8:33 AM CST -----  Contact: [phone]  Pt is requesting a call about insurance needs a call as soon as possible.    Please call and advise.

## 2025-01-15 NOTE — TELEPHONE ENCOUNTER
Called and spoke to pt and she stated that she did not do zoom visit with Dietician due to her insurance doesn't cover 1/14/25.    Pt stated she wanted to inform us that she will have new insurance with [employer] and should start by next month prior to visit

## 2025-01-17 ENCOUNTER — PATIENT MESSAGE (OUTPATIENT)
Dept: ADMINISTRATIVE | Facility: OTHER | Age: 60
End: 2025-01-17

## 2025-01-17 DIAGNOSIS — G35 MULTIPLE SCLEROSIS: ICD-10-CM

## 2025-01-20 ENCOUNTER — PATIENT MESSAGE (OUTPATIENT)
Dept: NEUROLOGY | Facility: CLINIC | Age: 60
End: 2025-01-20

## 2025-01-21 RX ORDER — PREGABALIN 100 MG/1
100 CAPSULE ORAL 3 TIMES DAILY
Qty: 270 CAPSULE | Refills: 1 | Status: SHIPPED | OUTPATIENT
Start: 2025-01-21

## 2025-01-28 LAB
LEFT EYE DM RETINOPATHY: NEGATIVE
RIGHT EYE DM RETINOPATHY: NEGATIVE

## 2025-01-29 ENCOUNTER — PATIENT MESSAGE (OUTPATIENT)
Dept: INTERNAL MEDICINE | Facility: CLINIC | Age: 60
End: 2025-01-29

## 2025-01-30 ENCOUNTER — TELEPHONE (OUTPATIENT)
Dept: NEUROLOGY | Facility: CLINIC | Age: 60
End: 2025-01-30

## 2025-01-30 DIAGNOSIS — G35 MULTIPLE SCLEROSIS: ICD-10-CM

## 2025-01-30 RX ORDER — GLATIRAMER 40 MG/ML
40 INJECTION, SOLUTION SUBCUTANEOUS
Qty: 12 ML | Refills: 0 | Status: ACTIVE | OUTPATIENT
Start: 2025-01-31

## 2025-01-30 NOTE — TELEPHONE ENCOUNTER
----- Message from Pharmacist Thania sent at 1/30/2025 10:22 AM CST -----  Regarding: Glatiramer Rx needed  Good morning Karen and staff,    Ms. Domínguez had a lapse in insurance coverage and will need to pay out of pocket for one month of glatiramer. She normally gets brand Copaxone but would like to get generic this one time for cost savings. Can you please send a prescription for generic glatiramer to [facility]?      Thank you,  Thania Serna, PharmD  Clinical Pharmacist   [facility]  [address] [place] [id number]  Ph: [phone]  Fx: [phone]

## 2025-02-03 ENCOUNTER — PATIENT OUTREACH (OUTPATIENT)
Dept: ADMINISTRATIVE | Facility: HOSPITAL | Age: 60
End: 2025-02-03
Payer: COMMERCIAL

## 2025-02-04 ENCOUNTER — PATIENT OUTREACH (OUTPATIENT)
Dept: ADMINISTRATIVE | Facility: HOSPITAL | Age: 60
End: 2025-02-04
Payer: COMMERCIAL

## 2025-02-04 NOTE — LETTER
AUTHORIZATION FOR RELEASE OF   CONFIDENTIAL INFORMATION        We are seeing Miesha Domínguez, date of birth 1965, in the clinic at [facility]. Milagros Wolf MD is the patient's PCP. Miesha Domínguez has an outstanding lab/procedure at the time we reviewed her chart. In order to help keep her health information updated, she has authorized us to request the following medical record(s):        (  )  MAMMOGRAM                                      (  )  COLONOSCOPY      (  )  PAP SMEAR                                          (  )  OUTSIDE LAB RESULTS     (  )  DEXA SCAN                                          (  x)  EYE EXAM            (  )  FOOT EXAM                                          (  )  ENTIRE RECORD     (  )  OUTSIDE IMMUNIZATIONS                 (  )  _______________         Please fax records to [facility], Plunkett-Kasparek, Jo Ellen, MD, [phone]     If you have any questions, please contact Michelle at [phone].           Patient Name: Miesha Domínguez  : 1965  Patient Phone #: [phone]

## 2025-02-10 ENCOUNTER — PATIENT MESSAGE (OUTPATIENT)
Dept: BARIATRICS | Facility: CLINIC | Age: 60
End: 2025-02-10

## 2025-02-18 ENCOUNTER — PATIENT OUTREACH (OUTPATIENT)
Dept: ADMINISTRATIVE | Facility: HOSPITAL | Age: 60
End: 2025-02-18

## 2025-02-18 NOTE — PROGRESS NOTES
HM updated with diabetic eye exam dated 1/28/25 from [facility]. Chart reviewed.    Kassandra Esquivel LPN  Care Coordinator  [facility]

## 2025-02-20 ENCOUNTER — TELEPHONE (OUTPATIENT)
Dept: INTERNAL MEDICINE | Facility: CLINIC | Age: 60
End: 2025-02-20

## 2025-02-20 NOTE — TELEPHONE ENCOUNTER
----- Message from Med Assistant Osman sent at 2/19/2025 12:57 PM CST -----  Contact: [phone]    ----- Message -----  From: Radha Marquez MA  Sent: 2/19/2025  12:45 PM CST  To: Luciano Chu Staff    Type: Appointment Rescheduling.Caller:Miesha (patient)Reason For call:pt is requesting a call back to get upcoming Appt on 02/25 to be Rescheduled the middle of March if possible.Best Call Back:[phone]

## 2025-02-20 NOTE — TELEPHONE ENCOUNTER
I returned her call.    She may not have ins for 2/25 appt.  Not sure if will have in march.    We booked 4/1 w/ resident and dr edwards

## 2025-02-21 DIAGNOSIS — G35 MULTIPLE SCLEROSIS: ICD-10-CM

## 2025-02-24 RX ORDER — GLATIRAMER 40 MG/ML
40 INJECTION, SOLUTION SUBCUTANEOUS
Qty: 36 ML | Refills: 1 | Status: ACTIVE | OUTPATIENT
Start: 2025-02-24

## 2025-03-01 DIAGNOSIS — E11.59 OBESITY, DIABETES, AND HYPERTENSION SYNDROME: ICD-10-CM

## 2025-03-01 DIAGNOSIS — E11.40 TYPE 2 DIABETES MELLITUS WITH DIABETIC NEUROPATHY, WITHOUT LONG-TERM CURRENT USE OF INSULIN: ICD-10-CM

## 2025-03-01 DIAGNOSIS — E66.9 OBESITY, DIABETES, AND HYPERTENSION SYNDROME: ICD-10-CM

## 2025-03-01 DIAGNOSIS — E11.69 OBESITY, DIABETES, AND HYPERTENSION SYNDROME: ICD-10-CM

## 2025-03-01 DIAGNOSIS — I15.2 OBESITY, DIABETES, AND HYPERTENSION SYNDROME: ICD-10-CM

## 2025-03-03 NOTE — TELEPHONE ENCOUNTER
Refill Routing Note   Medication(s) are not appropriate for processing by [facility] for the following reason(s):        Non-participating provider    [facility] action(s):  Route             Appointments  past 12m or future 3m with PCP    Date Provider   Last Visit   8/12/2024 Milagros Wolf MD   Next Visit   Visit date not found Milagros Wolf MD   ED visits in past 90 days: 0        Note composed:12:19 PM 03/03/2025

## 2025-03-07 ENCOUNTER — PATIENT MESSAGE (OUTPATIENT)
Dept: PSYCHIATRY | Facility: CLINIC | Age: 60
End: 2025-03-07
Payer: COMMERCIAL

## 2025-03-10 ENCOUNTER — PATIENT MESSAGE (OUTPATIENT)
Dept: BARIATRICS | Facility: CLINIC | Age: 60
End: 2025-03-10
Payer: COMMERCIAL

## 2025-03-19 ENCOUNTER — OFFICE VISIT (OUTPATIENT)
Dept: PODIATRY | Facility: CLINIC | Age: 60
End: 2025-03-19
Payer: COMMERCIAL

## 2025-03-19 VITALS
HEART RATE: 87 BPM | HEIGHT: 66 IN | BODY MASS INDEX: 38.62 KG/M2 | SYSTOLIC BLOOD PRESSURE: 135 MMHG | DIASTOLIC BLOOD PRESSURE: 79 MMHG | WEIGHT: 240.31 LBS

## 2025-03-19 DIAGNOSIS — E11.40 CONTROLLED TYPE 2 DIABETES MELLITUS WITH DIABETIC NEUROPATHY, WITHOUT LONG-TERM CURRENT USE OF INSULIN: ICD-10-CM

## 2025-03-19 DIAGNOSIS — E11.9 ENCOUNTER FOR DIABETIC FOOT EXAM: Primary | ICD-10-CM

## 2025-03-19 DIAGNOSIS — M77.9 BONE SPUR: ICD-10-CM

## 2025-03-19 DIAGNOSIS — M19.079 ARTHRITIS OF MIDFOOT, UNSPECIFIED LATERALITY: ICD-10-CM

## 2025-03-19 PROCEDURE — 99999 PR PBB SHADOW E&M-EST. PATIENT-LVL IV: CPT | Mod: PBBFAC,,, | Performed by: STUDENT IN AN ORGANIZED HEALTH CARE EDUCATION/TRAINING PROGRAM

## 2025-03-19 NOTE — PROGRESS NOTES
Subjective:     Patient ID: Miesha Domínguez is a 59 y.o. female.    Chief Complaint: Foot Pain (Bilateral arch pain)    Miesha is a 59 y.o. female who presents to the clinic upon referral from Dr. Janet wright. provider found  for evaluation and treatment of diabetic feet. Miesha has a past medical history of Asthma, Endometrial polyp (08/02/2019), HLD (hyperlipidemia), HTN (hypertension), MS (multiple sclerosis), Neuropathy, S/P D&C (status post dilation and curettage) (08/02/2019), and Type II or unspecified type diabetes mellitus without mention of complication, not stated as uncontrolled. Patient relates no major problem with feet. Only complaints today consist of occasional shooting pain to right foot across medial plantar arch. Relates it has improved when she started taking vitamin b12 supplements. No further pedal complaints.     3/19/25: Seen today for annual diabetic foot exam. Relates to bilateral midfoot arch pain. Relates pain is worse at the end of the day.     PCP: Milagros Wolf MD    Date Last Seen by PCP: per above    Current shoe gear: Tennis shoes    Hemoglobin A1C   Date Value Ref Range Status   07/24/2024 5.5 4.0 - 5.6 % Final     Comment:     ADA Screening Guidelines:  5.7-6.4%  Consistent with prediabetes  >or=6.5%  Consistent with diabetes    High levels of fetal hemoglobin interfere with the HbA1C  assay. Heterozygous hemoglobin variants (HbS, HgC, etc)do  not significantly interfere with this assay.   However, presence of multiple variants may affect accuracy.     02/14/2024 5.9 (H) 4.0 - 5.6 % Final     Comment:     ADA Screening Guidelines:  5.7-6.4%  Consistent with prediabetes  >or=6.5%  Consistent with diabetes    High levels of fetal hemoglobin interfere with the HbA1C  assay. Heterozygous hemoglobin variants (HbS, HgC, etc)do  not significantly interfere with this assay.   However, presence of multiple variants may affect accuracy.     10/08/2022 5.5 4.0 - 5.6 % Final      Comment:     ADA Screening Guidelines:  5.7-6.4%  Consistent with prediabetes  >or=6.5%  Consistent with diabetes    High levels of fetal hemoglobin interfere with the HbA1C  assay. Heterozygous hemoglobin variants (HbS, HgC, etc)do  not significantly interfere with this assay.   However, presence of multiple variants may affect accuracy.           Review of Systems   Constitutional: Negative for chills, decreased appetite, diaphoresis and fever.   HENT:  Negative for congestion and hearing loss.    Cardiovascular:  Negative for chest pain, claudication, leg swelling and syncope.   Respiratory:  Negative for cough and shortness of breath.    Skin:  Negative for color change, dry skin, flushing, itching, nail changes, poor wound healing and rash.   Musculoskeletal:  Positive for arthritis and joint pain. Negative for back pain and joint swelling.   Gastrointestinal:  Negative for nausea and vomiting.   Neurological:  Positive for paresthesias. Negative for focal weakness and weakness.   Psychiatric/Behavioral:  Negative for altered mental status. The patient is not nervous/anxious.         Objective:     Physical Exam  Constitutional:       General: She is not in acute distress.     Appearance: She is well-developed. She is not diaphoretic.   Cardiovascular:      Comments: Dorsalis pedis and posterior tibial pulses are within normal limits. Skin temperature is within normal limits. Toes are cool to touch and feet are warm proximally. Hair growth is within normal limits. Skin is normotrophic and without hyperpigmentation. No edema noted. No spider veins or varicosities noted, bilaterally.   Musculoskeletal:         General: No tenderness.      Comments: Adequate joint range of motion without pain, limitation, nor crepitation to bilateral feet and ankle joints. Muscle strength is 5/5 in all groups bilaterally.    Dorsomedial bone spur to right foot overlying 1st and 2nd TMTJ. No tenderness on palpation or pinpoint  tenderness on exam today    Neutral arches, mild reduction with weightbearing, bilaterally        Lymphadenopathy:      Comments: Negative lymphangitic streaking    Skin:     General: Skin is warm and dry.      Findings: No lesion.      Comments: Skin is warm and dry, no acute signs of infection noted. No open wounds, macerations or hyperkeratotic lesions, bilaterally.     Toenails are well trimmed and of normal morphology, bilaterally.      Neurological:      Mental Status: She is alert and oriented to person, place, and time.      Sensory: Sensory deficit present.      Motor: No abnormal muscle tone.      Comments: Light touch within normal limits. [place] 5.07 monofilamant testing is within normal limits. Vibratory sensation within normal limits, bilaterally.      Psychiatric:         Behavior: Behavior normal.         Thought Content: Thought content normal.         Judgment: Judgment normal.           Assessment:      Encounter Diagnoses   Name Primary?    Arthritis of midfoot, unspecified laterality     Bone spur     Controlled type 2 diabetes mellitus with diabetic neuropathy, without long-term current use of insulin     Encounter for diabetic foot exam Yes       Plan:     Miesha was seen today for foot pain.    Diagnoses and all orders for this visit:    Encounter for diabetic foot exam  -     Foot Exam Performed    Arthritis of midfoot, unspecified laterality  -     DIABETIC SHOES FOR HOME USE    Bone spur  -     DIABETIC SHOES FOR HOME USE    Controlled type 2 diabetes mellitus with diabetic neuropathy, without long-term current use of insulin        I counseled the patient on her conditions, their implications and medical management.  Previous xray independently reviewed of right foot with patient noting DJD to midfoot, consider MRI in future if needed  Discussed patient with bone spur to dorsomedial foot, and likely with midfoot arthritis and neuritis. Recommend continue vitamin b12. Supportive  and protective tennis/diabetic shoes at all times while ambulating. Rx diabetic shoes  RICE PRN  Shoe inspection. Diabetic Foot Education. Patient reminded of the importance of good nutrition and blood sugar control to help prevent podiatric complications of diabetes. Patient instructed on proper foot hygeine. We discussed wearing proper shoe gear, daily foot inspections, never walking without protective shoe gear, never putting sharp instruments to feet, she is considered low risk for diabetic foot complication   Return to clinic in 1 year, sooner PRN

## 2025-03-22 ENCOUNTER — HOSPITAL ENCOUNTER (OUTPATIENT)
Dept: RADIOLOGY | Facility: HOSPITAL | Age: 60
Discharge: HOME | End: 2025-03-22
Payer: COMMERCIAL

## 2025-03-22 DIAGNOSIS — G35 MULTIPLE SCLEROSIS: ICD-10-CM

## 2025-03-22 PROCEDURE — 72146 MRI CHEST SPINE W/O DYE: CPT | Mod: TC

## 2025-03-22 PROCEDURE — 72146 MRI CHEST SPINE W/O DYE: CPT | Mod: 26,,, | Performed by: RADIOLOGY

## 2025-03-22 PROCEDURE — 70551 MRI BRAIN STEM W/O DYE: CPT | Mod: TC

## 2025-03-22 PROCEDURE — 70551 MRI BRAIN STEM W/O DYE: CPT | Mod: 26,,, | Performed by: RADIOLOGY

## 2025-03-22 PROCEDURE — 72141 MRI NECK SPINE W/O DYE: CPT | Mod: TC

## 2025-03-22 PROCEDURE — 72141 MRI NECK SPINE W/O DYE: CPT | Mod: 26,,, | Performed by: RADIOLOGY

## 2025-03-24 ENCOUNTER — RESULTS FOLLOW-UP (OUTPATIENT)
Dept: NEUROLOGY | Facility: CLINIC | Age: 60
End: 2025-03-24

## 2025-03-31 ENCOUNTER — OFFICE VISIT (OUTPATIENT)
Dept: NEUROLOGY | Facility: CLINIC | Age: 60
End: 2025-03-31
Payer: COMMERCIAL

## 2025-03-31 VITALS — WEIGHT: 237.63 LBS | HEIGHT: 66 IN | BODY MASS INDEX: 38.19 KG/M2

## 2025-03-31 DIAGNOSIS — G35 MULTIPLE SCLEROSIS: ICD-10-CM

## 2025-03-31 DIAGNOSIS — M62.838 OTHER MUSCLE SPASM: ICD-10-CM

## 2025-03-31 PROCEDURE — 99215 OFFICE O/P EST HI 40 MIN: CPT | Mod: S$GLB,,,

## 2025-03-31 PROCEDURE — 99999 PR PBB SHADOW E&M-EST. PATIENT-LVL III: CPT | Mod: PBBFAC,,,

## 2025-03-31 PROCEDURE — G2211 COMPLEX E/M VISIT ADD ON: HCPCS | Mod: S$GLB,,,

## 2025-03-31 RX ORDER — CYCLOBENZAPRINE HCL 5 MG
TABLET ORAL
Qty: 90 TABLET | Refills: 1 | Status: SHIPPED | OUTPATIENT
Start: 2025-03-31

## 2025-03-31 NOTE — Clinical Note
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  I believe we have done this letter for her in the past. I agree that she is remaining stable with no concerning symptoms that would interfere with her driving abilities.

## 2025-03-31 NOTE — PROGRESS NOTES
Patient ID: Miesha Domínguez is a 59 y.o. female who presents today for a routine clinic visit for MS.  She was last seen on 2024.  The history was provided by the patient.     NEURO MULTIPLE SCLEROISIS SUMMARY:   Principle neurological diagnosis:  MS  Date of Symptom Onset:    Date of Diagnosis:    Disease type at diagnosis:  Relapsing-Remitting MS  Disease type currently:  Relapsing-Remitting MS  Previous Therapy:  First DMT:  None  Current Therapy:  Glatiramer acetate -  - present   Last MRI Brain:  3/22/2025 - stable  Last MRI C-Spine:  3/22/2025 - stable  Last MRI T-Spine:  3/22/2025 - stable  Labs   No JCV completed  Date CSF Completed:  11/3/2020  WBC:  2   RBC:  49  Protein:  20  Glucose:  59  KF  Lab Notes:  KFLC - 0.0299 - did not reflex to OCB, NMO and ACE - negative   Other relevant labs and studies:    10/20/2020: NMO and MOG - negative, interlukin 2 - 118.5, other mimics - negative   2024: vitamin D - 89      Subjective:     She did have surgery on CTS and trigger finger on her left hand and those symptoms are better now.      She does have a bone spur on her right heel right now that is causing her some pain, but not limiting her.     She continues to feel stable neurologically and tolerates Glatopa.     ROS:      3/27/2025     8:41 PM   REVIEW OF SYMPTOMS   Do you feel abnormally tired on most days? No   Do you feel you generally sleep well? Yes   Do you have difficulty controlling your bladder?  No   Do you have difficulty controlling your bowels?  No   Do you have frequent muscle cramps, tightness or spasms in your limbs?  No   Do you have new visual symptoms?  No   Do you have worsening difficulty with your memory or thinking? No   Do you have worsening symptoms of anxiety or depression?  No   For patients who walk, Do you have more difficulty walking?  No   Have you fallen since your last visit?  No   For patients who use wheelchairs: Do you have any skin wounds or  breakdown? Not Applicable   Do you have difficulty using your hands?  No   Do you have shooting or burning pain? No   Do you have difficulty with sexual function?  No   If you are sexually active, are you using birth control? Y/N  N/A Not Applicable   Do you often choke when swallowing liquids or solid food?  No   Do you experience worsening symptoms when overheated? No   Do you need any new equipment such as a wheelchair, walker or shower chair? No   Do you receive co-pay financial assistance for your principal MS medicine? Yes   Would you be interested in participating in an MS research trial in the future? No   For patients on Gilenya, Tecfidera, Aubagio, Rituxan, Ocrevus, Tysabri, Lemtrada or Methotrexate, are you aware that you should NOT receive live virus vaccines?  Not Applicable   Do you feel you have adequate family/friend support?  Yes   Do you have health insurance?   Yes   Are you currently employed? Yes   Do you receive SSDI/SSI?  Not Applicable   Do you use marijuana or cannabis products? No   Have you been diagnosed with a urinary tract infection since your last visit here? No   Have you been diagnosed with a respiratory tract infection since your last visit here? No   Have you been to the emergency room since your last visit here? No   Have you been hospitalized since your last visit here?  Yes            3/27/2025     8:48 PM   FSS SCORE & INTERPRETATION   FSS SCORE  9    FSS SCORE INTERPRETATION May not be suffering from fatigue        Patient-reported         3/27/2025     8:46 PM   MS CONNIE-D SCORE & INTERPRETATION   CONNIE-D SCORE  0    CONNIE-D INTERPRETATION  No indication of Depression        Patient-reported         3/27/2025     8:41 PM   MS TIMMY-7 SCORE & INTERPRETATION   TIMMY-7 SCORE  0    TIMMY-7 SCORE INTERPRETATION Normal        Patient-reported         3/27/2025     8:50 PM   PEQ MS MOS PAIN EFFECTS SCORE & INTERPRETATION   PES SCORE 6    PES SCORE INTERPRETATION Scores can range from 6-30.   Items are scaled so that higher scores indicate a greater impact of pain on a patients mood and behavior.        Patient-reported         3/27/2025     8:51 PM   PEQ MS SEXUAL SATISFACTION SCORE & INTERPRETATION   SSS SCORE  4    SSS SCORE INTERPRETATION Scores can range from 4-24.  Higher scores indicate greater problems with sexual satisfaction.        Patient-reported         3/27/2025     8:52 PM   MS BLADDER CONTROL SCORE & INTERPRETATION   BLCS SCORE 0    BLCS SCORE INTERPRETATION  Scores can range from 0-22, with higher scores indicating greater bladder control problems.        Patient-reported         3/27/2025     8:56 PM   MS BOWEL CONTROL SCORE & INTERPRETATION   BWCS SCORE 1    BWCS SCORE INTERPRETATION Scores can range from 0-26, with higher scores indicating greater bowel control problems.        Patient-reported         3/27/2025     8:52 PM   PEQ MS IMPACT OF VISUAL IMPAIRMENT SCORE & INTERPRETATION   JAMAL SCALE SCORE  0    JAMAL SCORE INTERPRETATION Scores can range from 0-15, with higher scores indicating greater impact of visual problems on daily activites.        Patient-reported         3/27/2025     8:55 PM   MS PDQ SCORE & INTERPRETATION   PDQ RETROSPECTIVE MEMORY SUBSCALE 3    PDQ ATTENTION/CONCENTRATION SUBSCALE 3    PDQ PROSPECTIVE MEMORY SUBSCALE 1    PDQ PLANNING/ORGANIZATION SUBSCALE 2    PDQ TOTAL SCORE 9    PDQ SCORE INTERPRETATION Scores can range from 0-80, with higher scores indicating greater perceived cognitive impairment.        Patient-reported         3/27/2025     8:58 PM   MSSS SCORE & INTERPRETATION   MSSS TANGIBLE SUPPORT SUBSCALE 100    MSSS EMOTIONAL/INFORMATIONAL SUPPORT SUBSCALE 100    MSSS AFFECTIONATE SUPPORT SUBSCALE 100    MSSS POSITIVE SOCIAL INTERACTION SUBSCALE 100    MSSS TOTAL SCORE 100    MSSS SCORE INTERPRETATION Scores can range from 0-100, with higher scores indicating greater perceived support.        Patient-reported        SOCIAL HISTORY  Living  arrangements - the patient lives with their family.  Social History     Socioeconomic History    Marital status:      Spouse name: justin    Number of children: 1   Occupational History    Occupation:      Employer: [employer]   Tobacco Use    Smoking status: Never     Passive exposure: Never    Smokeless tobacco: Never   Substance and Sexual Activity    Alcohol use: Not Currently     Comment: occassional for Hollidays    Drug use: No    Sexual activity: Yes     Partners: Male     Birth control/protection: None     Comment:  x 33 years since 1991: Spouse : Justin   Social History Narrative    SOCIAL HISTORY:     , spouse is in good health, .     for [employer],    Daughter,  @[employer], major [employer]; working @ [employer]    + exercise, walk, elliptical, Rebecca     Social Drivers of Health     Financial Resource Strain: Low Risk  (3/27/2025)    Overall Financial Resource Strain (CARDIA)     Difficulty of Paying Living Expenses: Not hard at all   Food Insecurity: No Food Insecurity (3/27/2025)    Hunger Vital Sign     Worried About Running Out of Food in the Last Year: Never true     Ran Out of Food in the Last Year: Never true   Transportation Needs: No Transportation Needs (3/27/2025)    PRAPARE - Transportation     Lack of Transportation (Medical): No     Lack of Transportation (Non-Medical): No   Physical Activity: Insufficiently Active (3/27/2025)    Exercise Vital Sign     Days of Exercise per Week: 3 days     Minutes of Exercise per Session: 30 min   Stress: No Stress Concern Present (3/27/2025)    [place] [place] [employer] - Occupational Stress Questionnaire     Feeling of Stress : Not at all   Housing Stability: Low Risk  (3/27/2025)    Housing Stability Vital Sign     Unable to Pay for Housing in the Last Year: No     Number of Times Moved in the Last Year: 0     Homeless in the Last Year: No       Medications Ordered  Prior to Encounter[1]    Objective:     1. 25 foot timed walk:      6/19/2024     3:20 PM 3/31/2025     9:30 AM   Timed 25 Foot Walk:   Did patient wear an AFO? No No   Was assistive device used? No No   Time for 25 Foot Walk (seconds) 4.03 4.93   Time for 25 Foot Walk (seconds) 3.93 3.91       2. SDMT       No data to display                       NEURO EXAM    In general, the patient is well nourished and appears to be in no acute distress.    MENTAL STATUS: language is fluent, normal verbal comprehension, short-term and remote memory is intact, attention is normal, patient is alert and oriented x 3, fund of knowlege is appropriate by vocabulary.     CRANIAL NERVE EXAM:  There is no internuclear ophthalmoplegia.  Extraocular muscles are intact. No facial asymmetry. Facial sensation is intact bilaterally. There is no dysarthria. Uvula is midline, and palate moves symmetrically. Shoulder shrug intact bilaterally. Tongue protrusion is midline. Hearing is intact to finger rub bilaterally. Neck is supple with full ROM    MOTOR EXAM: Normal bulk and tone throughout UE and LE bilaterally. Rapid sequential movements are normal; Strength is  5/5 in all groups in the lower extremities and upper extremities    REFLEXES: 2+ and symmetric throughout in all four extremities    SENSORY EXAM: Normal to light touch throughout and slightly decreased vibration throughout more on BUE than BLE    COORDINATION: Normal finger-to-nose exam. Normal heel-to-shin exam.    GAIT: Narrow based and stable. Able to heel walk, toe walk, and perform tandem gait.     Negative Romberg's    Imaging: personally reviewed     Results for orders placed during the hospital encounter of 03/22/25    MRI Brain Demyelinating Without Contrast    Impression  Stable appearance of the brain and white matter lesions intracranially.  No new lesion is identified.      Electronically signed by: Alfonzo Naranjo  Date:    03/23/2025  Time:    08:41    Results for orders  placed during the hospital encounter of 03/22/25    MRI Cervical Spine Demyelinating Without Contrast    Impression  Stable appearance of the cervical cord.  No new lesions.    Stable mild degenerative changes.      Electronically signed by: Alfonzo Naranjo  Date:    03/23/2025  Time:    08:45    Results for orders placed during the hospital encounter of 03/22/25    MRI Thoracic Spine Demyelinating Without Contrast    Impression  Stable lesions within the thoracic cord with no new lesion identified.    Mild flattening of the dorsal cord at T3-4 similar to the prior studies.  Underlying arachnoid web to be considered.      Electronically signed by: Alfonzo Naranjo  Date:    03/23/2025  Time:    08:51    Results for orders placed during the hospital encounter of 03/25/22    MRI Brain Demyelinating W W/O Contrast    Impression  No significant change from prior.  Stable few scattered small to punctate size regions of T2 FLAIR signal abnormality throughout the brain parenchyma remain concerning for mild degree of prior demyelinating plaque burden in light of history.  No definite new lesion or enhancing lesion to suggest significant interval or active demyelination.    Clinical correlation and follow-up advised.      Electronically signed by: Huy Villatoro DO  Date:    03/26/2022  Time:    09:40    Results for orders placed during the hospital encounter of 03/25/22    MRI Cervical Spine Demyelinating W W/O Contrast    Impression  Continued short segment regions of T2 stir signal abnormality in the cervical and thoracic cord as detailed above most compatible with prior areas of demyelination in light of history. No new cord signal abnormality or abnormal intrathecal enhancement to suggest significant interval or active demyelination.    Stable ventral positioning of the cervical cord with slight dorsal flattening underlying arachnoid cyst versus web remains in differential.    Continued scattered degenerative change of the  "cervical spine as detailed above without significant central canal stenosis.    See above for additional details.      Electronically signed by: Huy Villatoro DO  Date:    03/26/2022  Time:    09:40    Results for orders placed during the hospital encounter of 03/25/22    MRI Thoracic Spine Demyelinating W W/O Contrast    Impression  Continued short segment regions of T2 stir signal abnormality in the cervical and thoracic cord as detailed above most compatible with prior areas of demyelination in light of history. No new cord signal abnormality or abnormal intrathecal enhancement to suggest significant interval or active demyelination.    Stable ventral positioning of the cervical cord with slight dorsal flattening underlying arachnoid cyst versus web remains in differential.    Continued scattered degenerative change of the cervical spine as detailed above without significant central canal stenosis.    See above for additional details.      Electronically signed by: Huy Villatoro DO  Date:    03/26/2022  Time:    09:40        Labs: personally reviewed      Lab Results   Component Value Date    [id number] 89 02/14/2024    [id number] 120 (H) 03/09/2023    [id number] 49 06/15/2022     No results found for: "JCVINDEX", "JCVANTIBODY"  No results found for: "GN7TAFFC", "ABSOLUTECD3", "FO1EUWPQ", "ABSOLUTECD8", "PU0JRBXD", "ABSOLUTECD4", "LABCD48"  Lab Results   Component Value Date    WBC 9.40 07/24/2024    RBC 4.96 07/24/2024    HGB 13.1 07/24/2024    HCT 41.3 07/24/2024    MCV 83 07/24/2024    MCH 26.4 (L) 07/24/2024    MCHC 31.7 (L) 07/24/2024    RDW 16.1 (H) 07/24/2024     07/24/2024    MPV 12.1 07/24/2024    GRAN 4.7 07/24/2024    GRAN 49.5 07/24/2024    LYMPH 3.7 07/24/2024    LYMPH 39.8 07/24/2024    MONO 0.7 07/24/2024    MONO 7.2 07/24/2024    EOS 0.3 07/24/2024    BASO 0.04 07/24/2024    EOSINOPHIL 2.8 07/24/2024    BASOPHIL 0.4 07/24/2024     Sodium   Date Value Ref Range Status   08/12/2024 144 " 136 - 145 mmol/L Final     Potassium   Date Value Ref Range Status   08/12/2024 3.9 3.5 - 5.1 mmol/L Final     Chloride   Date Value Ref Range Status   08/12/2024 104 95 - 110 mmol/L Final     CO2   Date Value Ref Range Status   08/12/2024 30 (H) 23 - 29 mmol/L Final     Glucose   Date Value Ref Range Status   08/12/2024 86 70 - 110 mg/dL Final     BUN   Date Value Ref Range Status   08/12/2024 15 6 - 20 mg/dL Final     Creatinine   Date Value Ref Range Status   08/12/2024 0.8 0.5 - 1.4 mg/dL Final     Calcium   Date Value Ref Range Status   08/12/2024 9.9 8.7 - 10.5 mg/dL Final     Total Protein   Date Value Ref Range Status   07/24/2024 7.5 6.0 - 8.4 g/dL Final     Albumin   Date Value Ref Range Status   07/24/2024 3.5 3.5 - 5.2 g/dL Final     Total Bilirubin   Date Value Ref Range Status   07/24/2024 0.5 0.1 - 1.0 mg/dL Final     Comment:     For infants and newborns, interpretation of results should be based  on gestational age, weight and in agreement with clinical  observations.    Premature Infant recommended reference ranges:  Up to 24 hours.............<8.0 mg/dL  Up to 48 hours............<12.0 mg/dL  3-5 days..................<15.0 mg/dL  6-29 days.................<15.0 mg/dL       Alkaline Phosphatase   Date Value Ref Range Status   07/24/2024 66 55 - 135 U/L Final     AST   Date Value Ref Range Status   07/24/2024 26 10 - 40 U/L Final     ALT   Date Value Ref Range Status   07/24/2024 19 10 - 44 U/L Final     Anion Gap   Date Value Ref Range Status   08/12/2024 10 8 - 16 mmol/L Final     eGFR if    Date Value Ref Range Status   03/22/2022 >60.0 >60 mL/min/1.73 m^2 Final   03/22/2022 >60.0 >60 mL/min/1.73 m^2 Final     eGFR if non    Date Value Ref Range Status   03/22/2022 >60.0 >60 mL/min/1.73 m^2 Final     Comment:     Calculation used to obtain the estimated glomerular filtration  rate (eGFR) is the CKD-EPI equation.      03/22/2022 >60.0 >60 mL/min/1.73 m^2 Final      "Comment:     Calculation used to obtain the estimated glomerular filtration  rate (eGFR) is the CKD-EPI equation.        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   MRIs yearly (3/2026)  Follow up with Dr. Mejía in 6 months.   Plan discussed and questions were answered to satisfaction.     Problem List Items Addressed This Visit       Multiple sclerosis    Relevant Medications    cyclobenzaprine (FLEXERIL) 5 MG tablet     Other Visit Diagnoses         Other muscle spasm        Relevant Medications    cyclobenzaprine (FLEXERIL) 5 MG tablet               I spent a total of 52 minutes on the day of the visit.This includes face to face time and non-face to face time preparing to see the patient (eg, review of tests), obtaining and/or reviewing separately obtained history, documenting clinical information in the electronic or other health record, independently interpreting results and communicating results to the patient/family/caregiver, or care coordinator.       ISMAEL Mcdowell         [1]   Current Outpatient Medications on File Prior to Visit   Medication Sig Dispense Refill    cetirizine (ZYRTEC) 10 MG tablet TAKE 1 TABLET BY MOUTH EVERY DAY 90 tablet 3    cholecalciferol, vitamin D3, (VITAMIN D3) 50 mcg (2,000 unit) Cap Take 1 capsule by mouth once daily.      clotrimazole-betamethasone 1-0.05% (LOTRISONE) cream Apply " topically 2 (two) times daily. 45 g 1    cyanocobalamin, vitamin B-12, (VITAMIN B-12) 2,500 mcg Subl Place 1 tablet under the tongue once a week. Pt taking weekly      estradioL (ESTRACE) 0.01 % (0.1 mg/gram) vaginal cream Place 1 g vaginally twice a week. 42 g 3    glatiramer (COPAXONE) 40 mg/mL injection Inject 40 mg into the skin 3 (three) times a week. 36 mL 1    MULTIVITAMIN ORAL Take 1 tablet by mouth once daily. Centrum adult chews ( flavor burst)      potassium gluconate 595 mg (99 mg) Tab Take 1 tablet by mouth once.      pregabalin (LYRICA) 100 MG capsule Take 1 capsule (100 mg total) by mouth 3 (three) times daily. 270 capsule 1    sodium chloride (OCEAN) 0.65 % nasal spray 1 spray by Nasal route as needed for Congestion.      azelastine (ASTELIN) 137 mcg (0.1 %) nasal spray 1 spray (137 mcg total) by Nasal route 2 (two) times daily. 30 mL 3     No current facility-administered medications on file prior to visit.

## 2025-04-03 DIAGNOSIS — J01.40 ACUTE PANSINUSITIS, RECURRENCE NOT SPECIFIED: ICD-10-CM

## 2025-04-04 ENCOUNTER — PATIENT MESSAGE (OUTPATIENT)
Dept: INTERNAL MEDICINE | Facility: CLINIC | Age: 60
End: 2025-04-04
Payer: COMMERCIAL

## 2025-04-04 DIAGNOSIS — E66.9 OBESITY, DIABETES, AND HYPERTENSION SYNDROME: ICD-10-CM

## 2025-04-04 DIAGNOSIS — E11.69 OBESITY, DIABETES, AND HYPERTENSION SYNDROME: ICD-10-CM

## 2025-04-04 DIAGNOSIS — E11.40 TYPE 2 DIABETES MELLITUS WITH DIABETIC NEUROPATHY, WITHOUT LONG-TERM CURRENT USE OF INSULIN: ICD-10-CM

## 2025-04-04 DIAGNOSIS — I15.2 OBESITY, DIABETES, AND HYPERTENSION SYNDROME: ICD-10-CM

## 2025-04-04 DIAGNOSIS — E11.59 OBESITY, DIABETES, AND HYPERTENSION SYNDROME: ICD-10-CM

## 2025-04-04 RX ORDER — FLUTICASONE PROPIONATE 50 MCG
1 SPRAY, SUSPENSION (ML) NASAL DAILY
Qty: 16 G | Refills: 0 | Status: SHIPPED | OUTPATIENT
Start: 2025-04-04

## 2025-04-04 RX ORDER — METFORMIN HYDROCHLORIDE 1000 MG/1
TABLET ORAL
Refills: 0 | OUTPATIENT
Start: 2025-04-04

## 2025-04-04 RX ORDER — METFORMIN HYDROCHLORIDE 1000 MG/1
TABLET ORAL
Qty: 45 TABLET | Refills: 0 | Status: SHIPPED | OUTPATIENT
Start: 2025-04-04

## 2025-04-04 RX ORDER — FLUTICASONE PROPIONATE 50 MCG
SPRAY, SUSPENSION (ML) NASAL
Refills: 0 | OUTPATIENT
Start: 2025-04-04

## 2025-04-04 NOTE — TELEPHONE ENCOUNTER
Refill Routing Note   Medication(s) are not appropriate for processing by [facility] for the following reason(s):        Non-participating provider    [facility] action(s):  Route      Medication Therapy Plan: Medications refused due to error in request.  Pended for provider review      Appointments  past 12m or future 3m with PCP    Date Provider   Last Visit   8/12/2024 Milagros Wolf MD   Next Visit   Visit date not found Milagros Wolf MD   ED visits in past 90 days: 0        Note composed:10:52 AM 04/04/2025

## 2025-04-07 RX ORDER — HYDROCHLOROTHIAZIDE 25 MG/1
25 TABLET ORAL DAILY
Qty: 90 TABLET | Refills: 3 | Status: SHIPPED | OUTPATIENT
Start: 2025-04-07

## 2025-04-07 RX ORDER — METFORMIN HYDROCHLORIDE 1000 MG/1
TABLET ORAL
Qty: 45 TABLET | Refills: 3 | Status: SHIPPED | OUTPATIENT
Start: 2025-04-07

## 2025-04-07 RX ORDER — LOSARTAN POTASSIUM 100 MG/1
100 TABLET ORAL DAILY
Qty: 90 TABLET | Refills: 3 | Status: SHIPPED | OUTPATIENT
Start: 2025-04-07

## 2025-04-07 RX ORDER — ROSUVASTATIN CALCIUM 20 MG/1
20 TABLET, COATED ORAL DAILY
Qty: 90 TABLET | Refills: 3 | Status: SHIPPED | OUTPATIENT
Start: 2025-04-07

## 2025-04-07 NOTE — TELEPHONE ENCOUNTER
Sent 4 oral chronic meds 90 days w/ 3 refills to [facility]    And sent Mounjaro to [facility]

## 2025-04-08 ENCOUNTER — PATIENT MESSAGE (OUTPATIENT)
Dept: BARIATRICS | Facility: CLINIC | Age: 60
End: 2025-04-08
Payer: COMMERCIAL

## 2025-04-12 ENCOUNTER — LAB VISIT (OUTPATIENT)
Dept: LAB | Facility: HOSPITAL | Age: 60
End: 2025-04-12
Attending: INTERNAL MEDICINE
Payer: COMMERCIAL

## 2025-04-12 DIAGNOSIS — Z00.00 ANNUAL PHYSICAL EXAM: ICD-10-CM

## 2025-04-12 LAB
ABSOLUTE EOSINOPHIL (OHS): 0.16 K/UL
ABSOLUTE MONOCYTE (OHS): 0.69 K/UL (ref 0.3–1)
ABSOLUTE NEUTROPHIL COUNT (OHS): 4.65 K/UL (ref 1.8–7.7)
ALBUMIN SERPL BCP-MCNC: 3.4 G/DL (ref 3.5–5.2)
ALP SERPL-CCNC: 72 UNIT/L (ref 40–150)
ALT SERPL W/O P-5'-P-CCNC: 17 UNIT/L (ref 10–44)
ANION GAP (OHS): 8 MMOL/L (ref 8–16)
AST SERPL-CCNC: 24 UNIT/L (ref 11–45)
BASOPHILS # BLD AUTO: 0.04 K/UL
BASOPHILS NFR BLD AUTO: 0.5 %
BILIRUB SERPL-MCNC: 0.6 MG/DL (ref 0.1–1)
BUN SERPL-MCNC: 20 MG/DL (ref 6–20)
CALCIUM SERPL-MCNC: 9.2 MG/DL (ref 8.7–10.5)
CHLORIDE SERPL-SCNC: 104 MMOL/L (ref 95–110)
CHOLEST SERPL-MCNC: 138 MG/DL (ref 120–199)
CHOLEST/HDLC SERPL: 2.2 {RATIO} (ref 2–5)
CO2 SERPL-SCNC: 31 MMOL/L (ref 23–29)
CREAT SERPL-MCNC: 0.7 MG/DL (ref 0.5–1.4)
EAG (OHS): 111 MG/DL (ref 68–131)
ERYTHROCYTE [DISTWIDTH] IN BLOOD BY AUTOMATED COUNT: 16 % (ref 11.5–14.5)
GFR SERPLBLD CREATININE-BSD FMLA CKD-EPI: >60 ML/MIN/1.73/M2
GLUCOSE SERPL-MCNC: 86 MG/DL (ref 70–110)
HBA1C MFR BLD: 5.5 % (ref 4–5.6)
HCT VFR BLD AUTO: 39.7 % (ref 37–48.5)
HDLC SERPL-MCNC: 62 MG/DL (ref 40–75)
HDLC SERPL: 44.9 % (ref 20–50)
HGB BLD-MCNC: 12.4 GM/DL (ref 12–16)
IMM GRANULOCYTES # BLD AUTO: 0.02 K/UL (ref 0–0.04)
IMM GRANULOCYTES NFR BLD AUTO: 0.2 % (ref 0–0.5)
LDLC SERPL CALC-MCNC: 65.2 MG/DL (ref 63–159)
LYMPHOCYTES # BLD AUTO: 2.92 K/UL (ref 1–4.8)
MCH RBC QN AUTO: 25.7 PG (ref 27–31)
MCHC RBC AUTO-ENTMCNC: 31.2 G/DL (ref 32–36)
MCV RBC AUTO: 82 FL (ref 82–98)
NONHDLC SERPL-MCNC: 76 MG/DL
NUCLEATED RBC (/100WBC) (OHS): 0 /100 WBC
PLATELET # BLD AUTO: 228 K/UL (ref 150–450)
PMV BLD AUTO: 12 FL (ref 9.2–12.9)
POTASSIUM SERPL-SCNC: 3.4 MMOL/L (ref 3.5–5.1)
PROT SERPL-MCNC: 7.4 GM/DL (ref 6–8.4)
RBC # BLD AUTO: 4.83 M/UL (ref 4–5.4)
RELATIVE EOSINOPHIL (OHS): 1.9 %
RELATIVE LYMPHOCYTE (OHS): 34.4 % (ref 18–48)
RELATIVE MONOCYTE (OHS): 8.1 % (ref 4–15)
RELATIVE NEUTROPHIL (OHS): 54.9 % (ref 38–73)
SODIUM SERPL-SCNC: 143 MMOL/L (ref 136–145)
TRIGL SERPL-MCNC: 54 MG/DL (ref 30–150)
TSH SERPL-ACNC: 1.28 UIU/ML (ref 0.4–4)
WBC # BLD AUTO: 8.48 K/UL (ref 3.9–12.7)

## 2025-04-12 PROCEDURE — 84443 ASSAY THYROID STIM HORMONE: CPT

## 2025-04-12 PROCEDURE — 80053 COMPREHEN METABOLIC PANEL: CPT

## 2025-04-12 PROCEDURE — 83036 HEMOGLOBIN GLYCOSYLATED A1C: CPT

## 2025-04-12 PROCEDURE — 85025 COMPLETE CBC W/AUTO DIFF WBC: CPT

## 2025-04-12 PROCEDURE — 82465 ASSAY BLD/SERUM CHOLESTEROL: CPT

## 2025-04-12 PROCEDURE — 36415 COLL VENOUS BLD VENIPUNCTURE: CPT

## 2025-04-14 ENCOUNTER — RESULTS FOLLOW-UP (OUTPATIENT)
Dept: INTERNAL MEDICINE | Facility: CLINIC | Age: 60
End: 2025-04-14
Payer: COMMERCIAL

## 2025-04-16 ENCOUNTER — TELEPHONE (OUTPATIENT)
Dept: PSYCHIATRY | Facility: CLINIC | Age: 60
End: 2025-04-16
Payer: COMMERCIAL

## 2025-04-16 NOTE — TELEPHONE ENCOUNTER
SW received referral from JESSICA Caraballo to assist with letter for Pt's employer. Phoned Pt to discuss portal vs email preference, and provided via portal.

## 2025-04-21 ENCOUNTER — LAB VISIT (OUTPATIENT)
Dept: LAB | Facility: HOSPITAL | Age: 60
End: 2025-04-21
Payer: COMMERCIAL

## 2025-04-21 ENCOUNTER — OFFICE VISIT (OUTPATIENT)
Dept: INTERNAL MEDICINE | Facility: CLINIC | Age: 60
End: 2025-04-21
Payer: COMMERCIAL

## 2025-04-21 VITALS
DIASTOLIC BLOOD PRESSURE: 70 MMHG | WEIGHT: 237 LBS | HEART RATE: 67 BPM | OXYGEN SATURATION: 99 % | HEIGHT: 66 IN | SYSTOLIC BLOOD PRESSURE: 115 MMHG | BODY MASS INDEX: 38.09 KG/M2 | TEMPERATURE: 97 F | RESPIRATION RATE: 18 BRPM

## 2025-04-21 DIAGNOSIS — E11.9 CONTROLLED TYPE 2 DIABETES MELLITUS WITHOUT COMPLICATION, WITHOUT LONG-TERM CURRENT USE OF INSULIN: ICD-10-CM

## 2025-04-21 DIAGNOSIS — E11.69 HYPERLIPIDEMIA ASSOCIATED WITH TYPE 2 DIABETES MELLITUS: ICD-10-CM

## 2025-04-21 DIAGNOSIS — I10 ESSENTIAL HYPERTENSION: Chronic | ICD-10-CM

## 2025-04-21 DIAGNOSIS — E78.5 HYPERLIPIDEMIA ASSOCIATED WITH TYPE 2 DIABETES MELLITUS: ICD-10-CM

## 2025-04-21 DIAGNOSIS — E87.6 HYPOKALEMIA: ICD-10-CM

## 2025-04-21 DIAGNOSIS — Z00.00 ANNUAL PHYSICAL EXAM: Primary | ICD-10-CM

## 2025-04-21 LAB
ANION GAP (OHS): 11 MMOL/L (ref 8–16)
BUN SERPL-MCNC: 20 MG/DL (ref 6–20)
CALCIUM SERPL-MCNC: 9.5 MG/DL (ref 8.7–10.5)
CHLORIDE SERPL-SCNC: 107 MMOL/L (ref 95–110)
CO2 SERPL-SCNC: 26 MMOL/L (ref 23–29)
CREAT SERPL-MCNC: 0.7 MG/DL (ref 0.5–1.4)
GFR SERPLBLD CREATININE-BSD FMLA CKD-EPI: >60 ML/MIN/1.73/M2
GLUCOSE SERPL-MCNC: 57 MG/DL (ref 70–110)
MAGNESIUM SERPL-MCNC: 1.9 MG/DL (ref 1.6–2.6)
POTASSIUM SERPL-SCNC: 3.6 MMOL/L (ref 3.5–5.1)
SODIUM SERPL-SCNC: 144 MMOL/L (ref 136–145)

## 2025-04-21 PROCEDURE — 36415 COLL VENOUS BLD VENIPUNCTURE: CPT | Mod: PO

## 2025-04-21 PROCEDURE — 99999 PR PBB SHADOW E&M-EST. PATIENT-LVL V: CPT | Mod: PBBFAC,,,

## 2025-04-21 PROCEDURE — 82310 ASSAY OF CALCIUM: CPT

## 2025-04-21 PROCEDURE — 83735 ASSAY OF MAGNESIUM: CPT

## 2025-04-21 PROCEDURE — 99396 PREV VISIT EST AGE 40-64: CPT | Mod: S$GLB,,,

## 2025-04-21 RX ORDER — POTASSIUM CHLORIDE 750 MG/1
10 CAPSULE, EXTENDED RELEASE ORAL DAILY
Qty: 30 CAPSULE | Refills: 0 | Status: SHIPPED | OUTPATIENT
Start: 2025-04-21

## 2025-04-21 NOTE — PROGRESS NOTES
Subjective:       Patient ID: [id number]    Chief Complaint: Annual     Miesha Domínguez is a 59 y.o. female with HTN, HLD, T2DM, MS that presents for annual. Patient's PCP is Dr. Mcgill. Patient is alone during this visit.    HTN: taking HCTZ, losartan.  - BP at goal, 115/70 in clinic today, checks roughly 2x weekly    HLD: taking rosuvastatin  - lipids at goal    T2DM: metformin, tirzepatide. Urine screening due. UTD w podiatry checks.  - A1c stable at 5.5%    MS: follows with Neurology, on glatiramer.     HM: TSH, CBC, lipid panel largely wnl  Hypokalemic to 3.4     The 10-year ASCVD risk score (Maryan BERMUDEZ, et al., 2019) is: 10%    Values used to calculate the score:      Age: 59 years      Sex: Female      Is Non- : Yes      Diabetic: Yes      Tobacco smoker: No      Systolic Blood Pressure: 135 mmHg      Is BP treated: Yes      HDL Cholesterol: 62 mg/dL      Total Cholesterol: 138 mg/dL      Review of Systems   Constitutional:  Negative for chills, diaphoresis, fever and malaise/fatigue.   HENT:  Negative for congestion, sinus pain and sore throat.    Eyes:  Negative for pain, discharge and redness.   Respiratory:  Negative for cough, shortness of breath and wheezing.    Cardiovascular:  Negative for chest pain, palpitations and leg swelling.   Gastrointestinal:  Negative for abdominal pain, blood in stool, constipation, diarrhea, nausea and vomiting.   Genitourinary:  Negative for dysuria, frequency, hematuria and urgency.   Musculoskeletal:  Negative for back pain, joint pain, myalgias and neck pain.   Skin:  Negative for itching and rash.   Neurological:  Negative for dizziness, tremors, focal weakness and weakness.         Objective:      Vitals:    04/21/25 1517   BP: 115/70   Pulse:    Resp:    Temp:        Physical Exam  Constitutional:       Appearance: Normal appearance. She is normal weight. She is not ill-appearing or diaphoretic.   HENT:      Head: Normocephalic and  atraumatic.      Right Ear: External ear normal.      Left Ear: External ear normal.   Eyes:      General: No scleral icterus.     Extraocular Movements: Extraocular movements intact.   Cardiovascular:      Rate and Rhythm: Normal rate and regular rhythm.      Pulses: Normal pulses.      Heart sounds: Normal heart sounds.   Pulmonary:      Effort: Pulmonary effort is normal.      Breath sounds: Normal breath sounds.   Abdominal:      Palpations: Abdomen is soft.   Musculoskeletal:         General: No swelling or tenderness. Normal range of motion.      Cervical back: Normal range of motion.   Skin:     General: Skin is warm and dry.      Capillary Refill: Capillary refill takes less than 2 seconds.      Coloration: Skin is not jaundiced.      Findings: No lesion or rash.   Neurological:      General: No focal deficit present.      Mental Status: She is alert and oriented to person, place, and time.   Psychiatric:         Mood and Affect: Mood normal.         Behavior: Behavior normal.         Thought Content: Thought content normal.         Judgment: Judgment normal.         Medications Ordered Prior to Encounter[1]      Assessment and Plan:   Annual Physical      Essential hypertension  - continue losartan-HCTZ    Hyperlipidemia associated with type 2 diabetes mellitus  - continue crestor    Controlled type 2 diabetes mellitus without complication, without long-term current use of insulin  - continue metformin, tirzepitide, lifestyle modifications  - A1c at goal  - UTD w optho, foot checks  - urine screening due  -     Basic Metabolic Panel; Future; Expected date: 05/05/2025  -     Microalbumin/Creatinine Ratio, Urine    Hypokalemia  - likely due to HCTZ vs hypomagnesemia. No improvement with potassium gluconate (contains 2.5 mEq of K per daily dose, negligible effect on serum K, given for prevention of calcium stone formation)   - adding KCL 10meq supplements qd.     - checking magnesium.    - repeat BMP in 2  weeks.  -     Magnesium; Future; Expected date: 04/21/2025  -     Basic Metabolic Panel; Future; Expected date: 05/05/2025  -     potassium chloride (MICRO-K) 10 MEQ CpSR; Take 1 capsule (10 mEq total) by mouth once daily.  Dispense: 30 capsule; Refill: 0        Follow-up, labs. 6 months     Case discussed with Dr. Nano Vidal MD  Medical Resident  [facility]             [1]   Current Outpatient Medications on File Prior to Visit   Medication Sig Dispense Refill    azelastine (ASTELIN) 137 mcg (0.1 %) nasal spray 1 spray (137 mcg total) by Nasal route 2 (two) times daily. 30 mL 3    cetirizine (ZYRTEC) 10 MG tablet TAKE 1 TABLET BY MOUTH EVERY DAY 90 tablet 3    cholecalciferol, vitamin D3, (VITAMIN D3) 50 mcg (2,000 unit) Cap Take 1 capsule by mouth once daily.      clotrimazole-betamethasone 1-0.05% (LOTRISONE) cream Apply topically 2 (two) times daily. 45 g 1    cyanocobalamin, vitamin B-12, (VITAMIN B-12) 2,500 mcg Subl Place 1 tablet under the tongue once a week. Pt taking weekly      cyclobenzaprine (FLEXERIL) 5 MG tablet TAKE 1 TABLET BY MOUTH EVERY DAY AT NIGHT AS NEEDED 90 tablet 1    estradioL (ESTRACE) 0.01 % (0.1 mg/gram) vaginal cream Place 1 g vaginally twice a week. 42 g 3    fluticasone propionate (FLONASE) 50 mcg/actuation nasal spray 1 spray (50 mcg total) by Each Nostril route once daily. 16 g 0    glatiramer (COPAXONE) 40 mg/mL injection Inject 40 mg into the skin 3 (three) times a week. 36 mL 1    hydroCHLOROthiazide (HYDRODIURIL) 25 MG tablet Take 1 tablet (25 mg total) by mouth once daily. 90 tablet 3    losartan (COZAAR) 100 MG tablet Take 1 tablet (100 mg total) by mouth once daily. 90 tablet 3    metFORMIN (GLUCOPHAGE) 1000 MG tablet TAKE 1/2 TABLETS (500 MG TOTAL) BY MOUTH DAILY WITH BREAKFAST. 45 tablet 3    MULTIVITAMIN ORAL Take 1 tablet by mouth once daily. Centrum adult chews ( flavor burst)      potassium gluconate 595 mg (99 mg) Tab Take 1  tablet by mouth once.      pregabalin (LYRICA) 100 MG capsule Take 1 capsule (100 mg total) by mouth 3 (three) times daily. 270 capsule 1    rosuvastatin (CRESTOR) 20 MG tablet Take 1 tablet (20 mg total) by mouth once daily. 90 tablet 3    sodium chloride (OCEAN) 0.65 % nasal spray 1 spray by Nasal route as needed for Congestion.      tirzepatide 10 mg/0.5 mL PnIj Inject 10 mg into the skin every 7 days. 12 Pen 3     No current facility-administered medications on file prior to visit.

## 2025-04-22 NOTE — PROGRESS NOTES
I have interviewed and examined the patient w/ the resident,   I agree w/ the impression and plan as outlined above.     Milagros Mcgill MD- Staff

## 2025-05-02 ENCOUNTER — TELEPHONE (OUTPATIENT)
Dept: INTERNAL MEDICINE | Facility: CLINIC | Age: 60
End: 2025-05-02
Payer: COMMERCIAL

## 2025-05-02 ENCOUNTER — LAB VISIT (OUTPATIENT)
Dept: LAB | Facility: HOSPITAL | Age: 60
End: 2025-05-02
Payer: COMMERCIAL

## 2025-05-02 DIAGNOSIS — I10 ESSENTIAL HYPERTENSION: Chronic | ICD-10-CM

## 2025-05-02 DIAGNOSIS — I10 ESSENTIAL HYPERTENSION: Primary | Chronic | ICD-10-CM

## 2025-05-02 LAB
ANION GAP (OHS): 9 MMOL/L (ref 8–16)
BUN SERPL-MCNC: 20 MG/DL (ref 6–20)
CALCIUM SERPL-MCNC: 9.4 MG/DL (ref 8.7–10.5)
CHLORIDE SERPL-SCNC: 106 MMOL/L (ref 95–110)
CO2 SERPL-SCNC: 27 MMOL/L (ref 23–29)
CREAT SERPL-MCNC: 0.7 MG/DL (ref 0.5–1.4)
GFR SERPLBLD CREATININE-BSD FMLA CKD-EPI: >60 ML/MIN/1.73/M2
GLUCOSE SERPL-MCNC: 79 MG/DL (ref 70–110)
POTASSIUM SERPL-SCNC: 3.6 MMOL/L (ref 3.5–5.1)
SODIUM SERPL-SCNC: 142 MMOL/L (ref 136–145)

## 2025-05-02 PROCEDURE — 82374 ASSAY BLOOD CARBON DIOXIDE: CPT

## 2025-05-02 PROCEDURE — 36415 COLL VENOUS BLD VENIPUNCTURE: CPT | Mod: PO

## 2025-05-05 ENCOUNTER — TELEPHONE (OUTPATIENT)
Dept: INTERNAL MEDICINE | Facility: CLINIC | Age: 60
End: 2025-05-05
Payer: COMMERCIAL

## 2025-05-05 NOTE — TELEPHONE ENCOUNTER
----- Message from Gamal sent at 5/5/2025  2:30 PM CDT -----  Contact: Annalisa with [employer]  fax # [phone]  [email or web address] Patient is calling for Medical Advice regarding: Annalisa checking to see if SWO has been received she would like it signed and faxed back to office.How long has patient had these symptoms:n/aPharmacy name and phone#:n/aPatient wants a call back or thru [facility]:CallbackComments:Please advise patient replies from provider may take up to 48 hours.

## 2025-05-06 ENCOUNTER — RESULTS FOLLOW-UP (OUTPATIENT)
Dept: INTERNAL MEDICINE | Facility: CLINIC | Age: 60
End: 2025-05-06
Payer: COMMERCIAL

## 2025-05-08 ENCOUNTER — TELEPHONE (OUTPATIENT)
Dept: INTERNAL MEDICINE | Facility: CLINIC | Age: 60
End: 2025-05-08
Payer: COMMERCIAL

## 2025-05-12 ENCOUNTER — PATIENT MESSAGE (OUTPATIENT)
Dept: INTERNAL MEDICINE | Facility: CLINIC | Age: 60
End: 2025-05-12
Payer: COMMERCIAL

## 2025-05-12 DIAGNOSIS — E11.40 TYPE 2 DIABETES MELLITUS WITH DIABETIC NEUROPATHY, WITHOUT LONG-TERM CURRENT USE OF INSULIN: ICD-10-CM

## 2025-05-12 DIAGNOSIS — E66.9 OBESITY, DIABETES, AND HYPERTENSION SYNDROME: ICD-10-CM

## 2025-05-12 DIAGNOSIS — I15.2 OBESITY, DIABETES, AND HYPERTENSION SYNDROME: ICD-10-CM

## 2025-05-12 DIAGNOSIS — E11.59 OBESITY, DIABETES, AND HYPERTENSION SYNDROME: ICD-10-CM

## 2025-05-12 DIAGNOSIS — E11.69 OBESITY, DIABETES, AND HYPERTENSION SYNDROME: ICD-10-CM

## 2025-05-12 NOTE — TELEPHONE ENCOUNTER
LOV 04/21/25    Pt would like next dosage of mounjaro.      Disp Refills Start End DENYS   tirzepatide 10 mg/0.5 mL PnIj 12 Pen 3 4/8/2025 -- No   Sig - Route: Inject 10 mg into the skin every 7 days. - Subcutaneous   Sent to pharmacy as: tirzepatide 10 mg/0.5 mL PnIj   Class: Normal   Order: [id number]   Date/Time Signed: 4/8/2025 15:05

## 2025-05-13 ENCOUNTER — TELEPHONE (OUTPATIENT)
Dept: INTERNAL MEDICINE | Facility: CLINIC | Age: 60
End: 2025-05-13
Payer: COMMERCIAL

## 2025-05-13 ENCOUNTER — PATIENT MESSAGE (OUTPATIENT)
Dept: BARIATRICS | Facility: CLINIC | Age: 60
End: 2025-05-13
Payer: COMMERCIAL

## 2025-05-13 ENCOUNTER — PATIENT MESSAGE (OUTPATIENT)
Dept: PSYCHIATRY | Facility: CLINIC | Age: 60
End: 2025-05-13
Payer: COMMERCIAL

## 2025-05-13 NOTE — TELEPHONE ENCOUNTER
The patient is requesting that the prescription be sent to [facility].  The pharmacist at [facility] informed.

## 2025-05-13 NOTE — TELEPHONE ENCOUNTER
----- Message from Jose sent at 5/12/2025  3:40 PM CDT -----  Contact: [facility]ickie [phone]  Pharmacy is calling to clarify an RX.RX name:  tirzepatide 12.5 mg/0.5 mL PnIjWhat do they need to clarify:  mounjor or zepboundComments: [facility] [address] [place], [place] [id number] [place] [id number]: [phone] Fax: [phone]

## 2025-05-13 NOTE — TELEPHONE ENCOUNTER
The prescription, tirzepatide, was sent to [facility].  The patient is requesting that to be sent to [facility].  Please advise

## 2025-05-23 ENCOUNTER — PATIENT MESSAGE (OUTPATIENT)
Dept: INTERNAL MEDICINE | Facility: CLINIC | Age: 60
End: 2025-05-23
Payer: COMMERCIAL

## 2025-05-23 DIAGNOSIS — E11.69 OBESITY, DIABETES, AND HYPERTENSION SYNDROME: ICD-10-CM

## 2025-05-23 DIAGNOSIS — E11.59 OBESITY, DIABETES, AND HYPERTENSION SYNDROME: ICD-10-CM

## 2025-05-23 DIAGNOSIS — E11.40 TYPE 2 DIABETES MELLITUS WITH DIABETIC NEUROPATHY, WITHOUT LONG-TERM CURRENT USE OF INSULIN: ICD-10-CM

## 2025-05-23 DIAGNOSIS — I15.2 OBESITY, DIABETES, AND HYPERTENSION SYNDROME: ICD-10-CM

## 2025-05-23 DIAGNOSIS — E66.9 OBESITY, DIABETES, AND HYPERTENSION SYNDROME: ICD-10-CM

## 2025-06-06 RX ORDER — POTASSIUM CHLORIDE 750 MG/1
10 CAPSULE, EXTENDED RELEASE ORAL
Qty: 30 CAPSULE | Refills: 0 | Status: SHIPPED | OUTPATIENT
Start: 2025-06-06

## 2025-06-19 ENCOUNTER — PATIENT MESSAGE (OUTPATIENT)
Dept: PSYCHIATRY | Facility: CLINIC | Age: 60
End: 2025-06-19
Payer: COMMERCIAL

## 2025-06-30 DIAGNOSIS — L30.4 INTERTRIGO: ICD-10-CM

## 2025-06-30 RX ORDER — CLOTRIMAZOLE AND BETAMETHASONE DIPROPIONATE 10; .64 MG/G; MG/G
CREAM TOPICAL 2 TIMES DAILY
Qty: 45 G | Refills: 1 | Status: SHIPPED | OUTPATIENT
Start: 2025-06-30

## 2025-06-30 NOTE — TELEPHONE ENCOUNTER
Refill Routing Note   Medication(s) are not appropriate for processing by [facility] for the following reason(s):        Outside of protocol    [facility] action(s):  Route               Appointments  past 12m or future 3m with PCP    Date Provider   Last Visit   12/23/2024 Catrina Olivier MD   Next Visit   Visit date not found Catrina Olivier MD   ED visits in past 90 days: 0        Note composed:8:29 AM 06/30/2025

## 2025-07-03 ENCOUNTER — OCCUPATIONAL HEALTH (OUTPATIENT)
Dept: URGENT CARE | Facility: CLINIC | Age: 60
End: 2025-07-03

## 2025-07-03 DIAGNOSIS — Z02.89 ENCOUNTER FOR EXAMINATION REQUIRED BY DEPARTMENT OF TRANSPORTATION (DOT): Primary | ICD-10-CM

## 2025-07-10 RX ORDER — POTASSIUM CHLORIDE 750 MG/1
10 CAPSULE, EXTENDED RELEASE ORAL
Qty: 30 CAPSULE | Refills: 3 | Status: SHIPPED | OUTPATIENT
Start: 2025-07-10

## 2025-07-10 RX ORDER — HYDROCHLOROTHIAZIDE 25 MG/1
25 TABLET ORAL DAILY
Qty: 90 TABLET | Refills: 3 | Status: SHIPPED | OUTPATIENT
Start: 2025-07-10

## 2025-07-10 RX ORDER — LOSARTAN POTASSIUM 100 MG/1
100 TABLET ORAL DAILY
Qty: 90 TABLET | Refills: 3 | Status: SHIPPED | OUTPATIENT
Start: 2025-07-10

## 2025-07-15 ENCOUNTER — OFFICE VISIT (OUTPATIENT)
Dept: PODIATRY | Facility: CLINIC | Age: 60
End: 2025-07-15
Payer: COMMERCIAL

## 2025-07-15 ENCOUNTER — HOSPITAL ENCOUNTER (OUTPATIENT)
Dept: RADIOLOGY | Facility: HOSPITAL | Age: 60
Discharge: HOME | End: 2025-07-15
Attending: STUDENT IN AN ORGANIZED HEALTH CARE EDUCATION/TRAINING PROGRAM
Payer: COMMERCIAL

## 2025-07-15 VITALS
HEART RATE: 89 BPM | WEIGHT: 237 LBS | DIASTOLIC BLOOD PRESSURE: 79 MMHG | BODY MASS INDEX: 38.09 KG/M2 | HEIGHT: 66 IN | SYSTOLIC BLOOD PRESSURE: 123 MMHG

## 2025-07-15 DIAGNOSIS — M72.2 PLANTAR FASCIITIS: Primary | ICD-10-CM

## 2025-07-15 DIAGNOSIS — M72.2 PLANTAR FASCIITIS: ICD-10-CM

## 2025-07-15 PROCEDURE — 99214 OFFICE O/P EST MOD 30 MIN: CPT | Mod: 25,S$GLB,, | Performed by: STUDENT IN AN ORGANIZED HEALTH CARE EDUCATION/TRAINING PROGRAM

## 2025-07-15 PROCEDURE — 99999 PR PBB SHADOW E&M-EST. PATIENT-LVL V: CPT | Mod: PBBFAC,,, | Performed by: STUDENT IN AN ORGANIZED HEALTH CARE EDUCATION/TRAINING PROGRAM

## 2025-07-15 PROCEDURE — 20550 NJX 1 TENDON SHEATH/LIGAMENT: CPT | Mod: RT,S$GLB,, | Performed by: STUDENT IN AN ORGANIZED HEALTH CARE EDUCATION/TRAINING PROGRAM

## 2025-07-15 PROCEDURE — 73630 X-RAY EXAM OF FOOT: CPT | Mod: 26,RT,, | Performed by: RADIOLOGY

## 2025-07-15 PROCEDURE — 73630 X-RAY EXAM OF FOOT: CPT | Mod: TC,FY,RT

## 2025-07-15 RX ORDER — METHYLPREDNISOLONE ACETATE 40 MG/ML
40 INJECTION, SUSPENSION INTRA-ARTICULAR; INTRALESIONAL; INTRAMUSCULAR; SOFT TISSUE
Status: COMPLETED | OUTPATIENT
Start: 2025-07-15 | End: 2025-07-15

## 2025-07-15 RX ADMIN — METHYLPREDNISOLONE ACETATE 40 MG: 40 INJECTION, SUSPENSION INTRA-ARTICULAR; INTRALESIONAL; INTRAMUSCULAR; SOFT TISSUE at 11:07

## 2025-07-15 NOTE — PATIENT INSTRUCTIONS
[employer] shoe company, arch supports. [employer] shoes, [employer]. Consider [employer] tennis shoes    [id number] [address] [place] [address]:  [address], [place] [address]      Fleet Feet, [employer]

## 2025-07-15 NOTE — PROCEDURES
Tendon Sheath: R plantar fascia    Date/Time: 7/15/2025 11:15 AM    Performed by: Tawana Elias DPM  Authorized by: Tawana Elias DPM    Consent Done?:  Yes (Verbal)  Indications:  Pain  Location:  Foot  Site:  R plantar fascia  Needle size:  25 G  Approach:  Plantar  Medications:  40 mg methylPREDNISolone acetate (DEPO-MEDROL) injection 40 mg/mL  Patient tolerance:  Patient tolerated the procedure well with no immediate complications

## 2025-07-15 NOTE — PROGRESS NOTES
Subjective:     Patient ID: Miesha Domínguez is a 59 y.o. female.    Chief Complaint: Diabetes and Follow-up (Heel pain, right foot)    Miesha is a 59 y.o. female who presents to the clinic upon referral from Dr. Elias  for evaluation and treatment of diabetic feet. Miesha has a past medical history of Asthma, Endometrial polyp (08/02/2019), HLD (hyperlipidemia), HTN (hypertension), MS (multiple sclerosis), Neuropathy, S/P D&C (status post dilation and curettage) (08/02/2019), and Type II or unspecified type diabetes mellitus without mention of complication, not stated as uncontrolled. Patient relates no major problem with feet. Only complaints today consist of occasional shooting pain to right foot across medial plantar arch. Relates it has improved when she started taking vitamin b12 supplements. No further pedal complaints.     3/19/25: Seen today for annual diabetic foot exam. Relates to bilateral midfoot arch pain. Relates pain is worse at the end of the day.     7/15/25: Seen today for new right heel pain, relates has new diabetic shoes she obtained from vendor in [place], relates it is helping with her midfoot arthritis. No further pedal complaints.     PCP: Milagros Wolf MD    Date Last Seen by PCP: per above    Current shoe gear: Tennis shoes    Hemoglobin A1C   Date Value Ref Range Status   07/24/2024 5.5 4.0 - 5.6 % Final     Comment:     ADA Screening Guidelines:  5.7-6.4%  Consistent with prediabetes  >or=6.5%  Consistent with diabetes    High levels of fetal hemoglobin interfere with the HbA1C  assay. Heterozygous hemoglobin variants (HbS, HgC, etc)do  not significantly interfere with this assay.   However, presence of multiple variants may affect accuracy.     02/14/2024 5.9 (H) 4.0 - 5.6 % Final     Comment:     ADA Screening Guidelines:  5.7-6.4%  Consistent with prediabetes  >or=6.5%  Consistent with diabetes    High levels of fetal hemoglobin interfere with the  HbA1C  assay. Heterozygous hemoglobin variants (HbS, HgC, etc)do  not significantly interfere with this assay.   However, presence of multiple variants may affect accuracy.     10/08/2022 5.5 4.0 - 5.6 % Final     Comment:     ADA Screening Guidelines:  5.7-6.4%  Consistent with prediabetes  >or=6.5%  Consistent with diabetes    High levels of fetal hemoglobin interfere with the HbA1C  assay. Heterozygous hemoglobin variants (HbS, HgC, etc)do  not significantly interfere with this assay.   However, presence of multiple variants may affect accuracy.       Hemoglobin A1c   Date Value Ref Range Status   04/12/2025 5.5 4.0 - 5.6 % Final     Comment:     ADA Screening Guidelines:  5.7-6.4%  Consistent with prediabetes  >=6.5%  Consistent with diabetes    High levels of fetal hemoglobin interfere with the HbA1C  assay. Heterozygous hemoglobin variants (HbS, HgC, etc)do  not significantly interfere with this assay.   However, presence of multiple variants may affect accuracy.         Review of Systems   Constitutional: Negative for chills, decreased appetite, diaphoresis and fever.   HENT:  Negative for congestion and hearing loss.    Cardiovascular:  Negative for chest pain, claudication, leg swelling and syncope.   Respiratory:  Negative for cough and shortness of breath.    Skin:  Negative for color change, dry skin, flushing, itching, nail changes, poor wound healing and rash.   Musculoskeletal:  Positive for arthritis and joint pain. Negative for back pain and joint swelling.   Gastrointestinal:  Negative for nausea and vomiting.   Neurological:  Positive for paresthesias. Negative for focal weakness and weakness.   Psychiatric/Behavioral:  Negative for altered mental status. The patient is not nervous/anxious.         Objective:     Physical Exam  Constitutional:       General: She is not in acute distress.     Appearance: She is well-developed. She is not diaphoretic.   Cardiovascular:      Comments: Dorsalis pedis  and posterior tibial pulses are within normal limits. Skin temperature is within normal limits. Toes are cool to touch and feet are warm proximally. Hair growth is within normal limits. Skin is normotrophic and without hyperpigmentation. No edema noted. No spider veins or varicosities noted, bilaterally.   Musculoskeletal:         General: Tenderness present.      Comments: Adequate joint range of motion without pain, limitation, nor crepitation to bilateral feet and ankle joints. Muscle strength is 5/5 in all groups bilaterally.    Dorsomedial bone spur to right foot overlying 1st and 2nd TMTJ. No tenderness on palpation or pinpoint tenderness on exam today    Tenderness right heel at insertion of plantar fascia to medial tuberosity of calcaneus    Neutral arches, mild reduction with weightbearing, bilaterally        Lymphadenopathy:      Comments: Negative lymphangitic streaking    Skin:     General: Skin is warm and dry.      Findings: No lesion.      Comments: Skin is warm and dry, no acute signs of infection noted. No open wounds, macerations or hyperkeratotic lesions, bilaterally.     Toenails are well trimmed and of normal morphology, bilaterally.      Neurological:      Mental Status: She is alert and oriented to person, place, and time.      Sensory: Sensory deficit present.      Motor: No abnormal muscle tone.      Comments: Light touch within normal limits. [place] 5.07 monofilamant testing is within normal limits. Vibratory sensation within normal limits, bilaterally.      Psychiatric:         Behavior: Behavior normal.         Thought Content: Thought content normal.         Judgment: Judgment normal.           Assessment:      Encounter Diagnosis   Name Primary?    Plantar fasciitis Yes         Plan:     Miesha was seen today for diabetes and follow-up.    Diagnoses and all orders for this visit:    Plantar fasciitis  -     X-Ray Foot Complete Right; Future  -     Ambulatory Referral/Consult  to Physical Therapy  -     Ambulatory referral/consult to Orthopedics; Future  -     Tendon Sheath: R plantar fascia    Other orders  -     methylPREDNISolone acetate injection 40 mg          I counseled the patient on her conditions, their implications and medical management.  Previous xray reviewed independently with patient noting plantar heel spur, new xray ordered today  RICE, NSAIDs PRN pain  Supportive tennis/diabetic shoes with arch supports at all times while ambulating  Stretching and strengthening exercises dispensed. Rx PT  She elects for corticosteroid injection to right heel. Discussed possible side effects from injection including but NOT limited to discoloration of skin, erosion of soft tissue, and increased likelihood of rupture of a soft tissue structure (ie. Plantar fascia, muscle, tendon, ligament, or capsule in the area of injection). Patient indicates understanding of my explanation, and patient gives verbal consent for injection of affected area. A time out was performed to verify the  correct  patient and site, prior to initiation of procedure.  Injection administered, see procedure note  Return to clinic 6-8 weeks, sooner PRN

## 2025-07-22 ENCOUNTER — TELEPHONE (OUTPATIENT)
Dept: INTERNAL MEDICINE | Facility: CLINIC | Age: 60
End: 2025-07-22
Payer: COMMERCIAL

## 2025-07-22 DIAGNOSIS — E78.5 HYPERLIPIDEMIA ASSOCIATED WITH TYPE 2 DIABETES MELLITUS: ICD-10-CM

## 2025-07-22 DIAGNOSIS — E11.69 HYPERLIPIDEMIA ASSOCIATED WITH TYPE 2 DIABETES MELLITUS: ICD-10-CM

## 2025-07-22 DIAGNOSIS — E87.6 HYPOKALEMIA: ICD-10-CM

## 2025-07-22 DIAGNOSIS — E11.40 TYPE 2 DIABETES MELLITUS WITH DIABETIC NEUROPATHY, WITHOUT LONG-TERM CURRENT USE OF INSULIN: Primary | ICD-10-CM

## 2025-07-22 DIAGNOSIS — G35 MULTIPLE SCLEROSIS: ICD-10-CM

## 2025-07-22 DIAGNOSIS — I10 ESSENTIAL HYPERTENSION: ICD-10-CM

## 2025-07-23 RX ORDER — GLATIRAMER 40 MG/ML
40 INJECTION, SOLUTION SUBCUTANEOUS
Qty: 36 ML | Refills: 1 | Status: SHIPPED | OUTPATIENT
Start: 2025-07-23

## 2025-07-29 ENCOUNTER — TELEPHONE (OUTPATIENT)
Dept: PODIATRY | Facility: CLINIC | Age: 60
End: 2025-07-29
Payer: COMMERCIAL

## 2025-07-29 NOTE — TELEPHONE ENCOUNTER
Called and left VM reminding Ms Domínguez that she has an appt with Dr Elias on 7/31/25 at 8:15 am. Call back encouraged if needed.

## 2025-07-30 ENCOUNTER — PATIENT MESSAGE (OUTPATIENT)
Dept: PSYCHIATRY | Facility: CLINIC | Age: 60
End: 2025-07-30
Payer: COMMERCIAL

## 2025-07-30 ENCOUNTER — CLINICAL SUPPORT (OUTPATIENT)
Dept: REHABILITATION | Facility: HOSPITAL | Age: 60
End: 2025-07-30
Attending: STUDENT IN AN ORGANIZED HEALTH CARE EDUCATION/TRAINING PROGRAM
Payer: COMMERCIAL

## 2025-07-30 DIAGNOSIS — M25.671 DECREASED RANGE OF MOTION OF RIGHT ANKLE: Primary | ICD-10-CM

## 2025-07-30 DIAGNOSIS — R53.1 DECREASED STRENGTH: ICD-10-CM

## 2025-07-30 DIAGNOSIS — M79.671 PAIN OF RIGHT HEEL: ICD-10-CM

## 2025-07-30 PROCEDURE — 97161 PT EVAL LOW COMPLEX 20 MIN: CPT | Mod: PN

## 2025-07-30 PROCEDURE — 97530 THERAPEUTIC ACTIVITIES: CPT | Mod: PN

## 2025-08-01 ENCOUNTER — CLINICAL SUPPORT (OUTPATIENT)
Dept: REHABILITATION | Facility: HOSPITAL | Age: 60
End: 2025-08-01
Payer: COMMERCIAL

## 2025-08-01 ENCOUNTER — PATIENT MESSAGE (OUTPATIENT)
Dept: PSYCHIATRY | Facility: CLINIC | Age: 60
End: 2025-08-01
Payer: COMMERCIAL

## 2025-08-01 DIAGNOSIS — R53.1 DECREASED STRENGTH: ICD-10-CM

## 2025-08-01 DIAGNOSIS — M25.671 DECREASED RANGE OF MOTION OF RIGHT ANKLE: Primary | ICD-10-CM

## 2025-08-01 DIAGNOSIS — M79.671 PAIN OF RIGHT HEEL: ICD-10-CM

## 2025-08-01 PROCEDURE — 97140 MANUAL THERAPY 1/> REGIONS: CPT | Mod: PN

## 2025-08-01 PROCEDURE — 97530 THERAPEUTIC ACTIVITIES: CPT | Mod: PN

## 2025-08-01 PROCEDURE — 97112 NEUROMUSCULAR REEDUCATION: CPT | Mod: PN

## 2025-08-05 ENCOUNTER — CLINICAL SUPPORT (OUTPATIENT)
Dept: REHABILITATION | Facility: HOSPITAL | Age: 60
End: 2025-08-05
Payer: COMMERCIAL

## 2025-08-05 ENCOUNTER — PATIENT MESSAGE (OUTPATIENT)
Dept: INTERNAL MEDICINE | Facility: CLINIC | Age: 60
End: 2025-08-05
Payer: COMMERCIAL

## 2025-08-05 DIAGNOSIS — M79.671 PAIN OF RIGHT HEEL: ICD-10-CM

## 2025-08-05 DIAGNOSIS — R53.1 DECREASED STRENGTH: ICD-10-CM

## 2025-08-05 DIAGNOSIS — M25.671 DECREASED RANGE OF MOTION OF RIGHT ANKLE: Primary | ICD-10-CM

## 2025-08-05 DIAGNOSIS — G35 MULTIPLE SCLEROSIS: ICD-10-CM

## 2025-08-05 PROCEDURE — 97112 NEUROMUSCULAR REEDUCATION: CPT | Mod: PN,CQ

## 2025-08-05 PROCEDURE — 97530 THERAPEUTIC ACTIVITIES: CPT | Mod: PN,CQ

## 2025-08-06 RX ORDER — POTASSIUM CHLORIDE 750 MG/1
10 CAPSULE, EXTENDED RELEASE ORAL DAILY
Qty: 90 CAPSULE | Refills: 3 | Status: SHIPPED | OUTPATIENT
Start: 2025-08-06

## 2025-08-06 RX ORDER — PREGABALIN 100 MG/1
CAPSULE ORAL
Qty: 270 CAPSULE | Refills: 1 | Status: SHIPPED | OUTPATIENT
Start: 2025-08-06

## 2025-08-06 NOTE — TELEPHONE ENCOUNTER
potassium chloride (MICRO-K) 10 MEQ CpSR   Dispense: 30 capsule       Pt would like medication dispensed as 90 day due to insurance.

## 2025-08-07 ENCOUNTER — CLINICAL SUPPORT (OUTPATIENT)
Dept: REHABILITATION | Facility: HOSPITAL | Age: 60
End: 2025-08-07
Payer: COMMERCIAL

## 2025-08-07 DIAGNOSIS — M79.671 PAIN OF RIGHT HEEL: ICD-10-CM

## 2025-08-07 DIAGNOSIS — R53.1 DECREASED STRENGTH: ICD-10-CM

## 2025-08-07 DIAGNOSIS — M25.671 DECREASED RANGE OF MOTION OF RIGHT ANKLE: Primary | ICD-10-CM

## 2025-08-07 PROCEDURE — 97530 THERAPEUTIC ACTIVITIES: CPT | Mod: PN

## 2025-08-07 PROCEDURE — 97112 NEUROMUSCULAR REEDUCATION: CPT | Mod: PN

## 2025-08-07 NOTE — PROGRESS NOTES
Outpatient Rehab    Physical Therapy Evaluation    Patient Name: Miesha Domínguez  MRN: [id number]  YOB: 1965  Encounter Date: 7/30/2025    Therapy Diagnosis:   Encounter Diagnoses   Name Primary?    Decreased range of motion of right ankle Yes    Pain of right heel     Decreased strength      Physician: Tawana Elias DPM    Physician Orders: Eval and Treat  Medical Diagnosis: Plantar fasciitis  Surgical Diagnosis: Not applicable for this Episode   Surgical Date: Not applicable for this Episode  Days Since Last Surgery: Not applicable for this Episode    Visit # / Visits Authorized:  1 / 1  Insurance Authorization Period: 7/15/2025 to 12/31/2025  Date of Evaluation: 7/30/2025  Plan of Care Certification: 7/30/2025 to 9/26/25    Time In: 0910   Time Out: 1000  Total Time (in minutes): 50   Total Billable Time (in minutes): 50    Intake Outcome Measure for FOTO Survey    Therapist reviewed FOTO scores for Miesha Domínguez on 7/30/2025.   FOTO report - see Media section or FOTO account episode details.     Intake Score (%): 82    Precautions:       Subjective   History of Present Illness  Miesha is a 59 y.o. female who reports to physical therapy with a chief concern of Right plantar and dorsal foot pain, Plantarfasciitis.     The patient reports a medical diagnosis of M72.2 (ICD-10-CM) - Plantar fasciitis.            Diagnostic tests related to this condition: X-ray.     X-Ray Details: Mild hallux valgus.  Small bony spur seen arising from the calcaneus.  No fracture or dislocation.  No bone destruction identified    Dominant Hand: Right  History of Present Condition/Illness: Right heel pain off and on for past couple of months that has been slowly worsening. Pain more present in the morning with first step or standing up after prolonged sitting. Works as a school bus driving, no pain during the job, and mainly after. Denies any numbness or tingling in right leg and no complaints of back pain.  Denies any work duty changes or new hobbies to spur along new pain. Does have a strong history of weight gain/loss fluctuations.          Activities of Daily Living  Social history was obtained from Patient.          Patient Responsibilities: Driving, Personal ADL, Health management, Home management, Community mobility    Previously independent with activities of daily living? Yes     Currently independent with activities of daily living? Yes              Pain          Location: right plantar surface  Clinical Progression (since onset): Stable  Pain Qualities: Aching, Sharp, Throbbing, Dull, Discomfort  Pain-Relieving Factors: Activity modification, Elevation, Medications - over-the-counter, Lying down, Sitting, Support, Rest  Pain-Aggravating Factors: Running, Squatting, Stair climbing, Standing, Walking, Other (Comment)  Other Pain-Aggravating Factors: First step in the morning         Living Arrangements  Living Situation  Housing: Home independently  Support Systems: Family members    Home Setup  Type of Structure: House        Employment  Does the patient's condition impact their ability to work?: No  Employment Status: Employed full-time   for past 18 years      Past Medical History/Physical Systems Review:   Miesha Domínguez  has a past medical history of Asthma, Endometrial polyp, HLD (hyperlipidemia), HTN (hypertension), MS (multiple sclerosis), Neuropathy, S/P D&C (status post dilation and curettage), and Type II or unspecified type diabetes mellitus without mention of complication, not stated as uncontrolled.    Miesha Domínguez  has a past surgical history that includes  section (); Sleeve Gastroplasty (); Colonoscopy (N/A, 2018); Hysteroscopy with dilation and curettage of uterus (N/A, 2019); Carpal tunnel release (Right, 2024); and Trigger finger release (Right, 2024).    Miesha has a current medication list which includes the following  prescription(s): azelastine, cetirizine, cholecalciferol (vitamin d3), clotrimazole-betamethasone 1-0.05%, cyanocobalamin (vitamin b-12), cyclobenzaprine, estradiol, fluticasone propionate, glatiramer, hydrochlorothiazide, losartan, metformin, multivitamin, potassium chloride, pregabalin, rosuvastatin, sodium chloride, and tirzepatide.    Review of patient's allergies indicates:  No Known Allergies     Objective   Posture                 Right ankle/foot exhibits: Foot Pes Planus and Toe Out       Ankle/Foot Observations  Right Ankle/Foot Observations  Not Present: Atrophy, Deformity, and Edema            Lower Extremity Sensation  General Lumbar/Lower Extremity Sensation  Intact: Right and Left  Right Lumbar/Lower Extremity Sensation  Intact: Light Touch, Sharp/Dull Discrimination, Static Two Point Discrimination, Dynamic Two Point Discrimination, Kinesthesia, and Proprioception  Right Lumbar/Lower Extremity Sensation Stocking Glove Pattern: No    Left Lumbar/Lower Extremity Sensation  Intact: Light Touch, Static Two Point Discrimination, Dynamic Two Point Discrimination, Sharp/Dull Discrimination, Kinesthesia, and Proprioception  Left Lumbar/Lower Extremity Sensation Stocking Glove Pattern: No             Ankle/Foot Palpation     +tender to palpation at right heel distally                 Ankle/Foot Range of Motion   Right Ankle/Foot   Active (deg) Passive (deg) Pain   Dorsiflexion (KE) 2 3     Dorsiflexion (KF) 3 4     Plantar Flexion 40 41     Ankle Inversion 30 31     Ankle Eversion 15 16     Subtalar Inversion         Subtalar Eversion         Great Toe MTP Flexion         Great Toe MTP Extension 40       Great Toe IP Flexion             Left Ankle/Foot   Active (deg) Passive (deg) Pain   Dorsiflexion (KE) 4 5     Dorsiflexion (KF) 4 5     Plantar Flexion 40 41     Ankle Inversion 30 31     Ankle Eversion 15 16     Subtalar Inversion         Subtalar Eversion         Great Toe MTP Flexion         Great Toe MTP  Extension 60       Great Toe IP Flexion                            Ankle/Foot Strength - Planes of Motion   Right Strength Right Pain Left Strength Left  Pain   Dorsiflexion (L4) 4   4+     Plantar Flexion (S1) 4- Yes 4+     Inversion 4   5     Eversion 4+   5     Great Toe Flexion 4-   4+     Great Toe Extension (L5) 5   5     Lesser Toes Flexion           Lesser Toes Extension                  Ankle/Foot Special Tests  Ankle/Foot Ligamentous Tests  Negative: Right Ankle Anterior Drawer, Left Ankle Anterior Drawer, Right Navicular Drop, and Left Navicular Drop  Ankle/Foot Neurological Tests  Negative: Right Metatarsal Squeeze, Left Metatarsal Squeeze, Right Tinel's Sign (Tarsal Tunnel), and Left Tinel's Sign (Tarsal Tunnel)  Achilles Tests  Negative: Right Achilles Tendon Palpation, Right Arc Sign, Right , and Left   Other Ankle/Foot Tests  Negative: Right Windlass and Left Windlass           Ankle/Foot Joint Mobility  Right Ankle/Foot Joint Mobility  Hypomobile: Talocrural Joint, Midfoot, and Forefoot           Transfers Assessment  Sit to Stand Assistance: Independent  Chair to Bed Assistance: Independent  Bed to Chair Assistance: Independent  Car Transfer Assistance: Independent    Bed Mobility Assessment  Rolling Assistance: Independent  Sidelying to Sit Assistance: Independent  Sit to Sidelying Assistance: Independent  Scooting to Edge of Bed Assistance: Independent      Ambulation Assistance Required  Surface With  Assistive Device Without Assistive Device Details   Level   Independent      Uneven   Independent     Curb           Gait Analysis  Base of Support: Wide  Walking Speed: Decreased    Right Foot Contact Pattern: Flat foot    Left Foot Contact Pattern: Flat foot         Treatment:  Therapeutic Activity  TA 4: Education  TA 5: ankle DF stretch on stairs: 2x20'' holds    Time Entry(in minutes):  PT Evaluation (Low) Time Entry: 40  Therapeutic Activity Time Entry: 10    Assessment & Plan    Assessment  Miesha presents with a condition of Low complexity.   Presentation of Symptoms: Stable  Will Comorbidities Impact Care: Yes       Functional Limitations: Activity tolerance, Participating in leisure activities, Pain with ADLs/IADLs, Participating in sports, Performing household chores, Range of motion, Functional mobility, Completing work/school activities, Decreased ambulation distance/endurance, Gait limitations, Getting off the floor  Impairments: Abnormal gait, Abnormal or restricted range of motion, Activity intolerance, Impaired physical strength, Lack of appropriate home exercise program, Pain with functional activity    Patient Goal for Therapy (PT): To have less foot pain  Prognosis: Good  Assessment Details: Pt is a 59-year-old female diagnosed with right heel spur with symptoms of heel pain for the past couple of months worsening recently.  Increased pain with standing walking activities especially after prolonged sitting and with 1st step in the morning.  Patient presents with decreased right greater than left ankle dorsiflexion with talocrural joint hypomobility, decreased bilateral lower extremity strength primarily right calf strength and posterior tibialis, and limitations in prolonged standing and walking activities.  Symptoms consistent with plantar fascial pain and does have imaging showing a small heel spur on x-ray.    Plan  From a physical therapy perspective, the patient would benefit from: Skilled Rehab Services    Planned therapy interventions include: Therapeutic exercise, Therapeutic activities, Neuromuscular re-education, Manual therapy, ADLs/IADLs, Gait training, Orthotic management and training, and Wound care.    Planned modalities to include: Electrical stimulation - attended, Electrical stimulation - passive/unattended, Mechanical traction, Cryotherapy (cold pack), and Thermotherapy (hot pack).        Visit Frequency: 2 times Per Week for 8 Weeks.       This plan was  discussed with Patient.   Discussion participants: Agreed Upon Plan of Care             The patient's spiritual, cultural, and educational needs were considered, and the patient is agreeable to the plan of care and goals.     Education  Education was done with Patient. The patient's learning style includes Listening. The patient Demonstrates understanding.         Rehab process and prognosis, Importance of home exercise program, Avoidance of concordant pain(s), and Rest as needed       Goals:   Active       Ambulation/movement       Patient will walk without gait deviations and no limitations in distances.        Start:  08/07/25    Expected End:  09/26/25               Functional outcome       Patient will show a significant change in FOTO patient-reported outcome tool to demonstrate subjective improvement       Start:  08/07/25    Expected End:  09/26/25            Patient stated goal:   to have less foot pain overall       Start:  08/07/25    Expected End:  09/26/25            Patient will demonstrate independence in home program for support of progression       Start:  08/07/25    Expected End:  09/26/25               Pain       Patient will report pain of 1/10 demonstrating a reduction of overall pain throughout the day.       Start:  08/07/25    Expected End:  09/26/25               Range of Motion       Patient will achieve right ankle dorsiflexion ROM 5+ degrees       Start:  08/07/25    Expected End:  09/26/25               Strength       Patient will achieve right ankle dorsiflexion strength of 5/5       Start:  08/07/25    Expected End:  09/26/25            Patient will achieve right ankle plantar flexion strength of 4+/5       Start:  08/07/25    Expected End:  09/26/25                Catalino Claudio, PT, DPT

## 2025-08-07 NOTE — PROGRESS NOTES
"  Outpatient Rehab    Physical Therapy Visit    Patient Name: Miesha Domínguez  MRN: [id number]  YOB: 1965  Encounter Date: 8/7/2025    Therapy Diagnosis:   Encounter Diagnoses   Name Primary?    Decreased range of motion of right ankle Yes    Pain of right heel     Decreased strength      Physician: Tawana Elias DPM    Physician Orders: Eval and Treat  Medical Diagnosis: Plantar fasciitis  Surgical Diagnosis: Not applicable for this Episode   Surgical Date: Not applicable for this Episode  Days Since Last Surgery: Not applicable for this Episode    Visit # / Visits Authorized:  3 / 20  Insurance Authorization Period: 7/30/2025 to 12/31/2025  Date of Evaluation: 7/30/2025  Plan of Care Certification: 8/7/2025 to 9/26/2025      PT/PTA: PT   Number of PTA visits since last PT visit:0  Time In: 1005   Time Out: 1100  Total Time (in minutes): 55   Total Billable Time (in minutes): 55    FOTO:  Intake Score (%): 82  Survey Score 2 (%): Not applicable for this Episode  Survey Score 3 (%): Not applicable for this Episode    Precautions:         Subjective   Mild right heel pain after last visit, back to her baseline today..  Pain reported as 2/10. right heel    Objective            Treatment:  Balance/Neuromuscular Re-Education  NMR 1: SLR 2x15 2# B  NMR 2: SL hip abduction 2x15 2# B  NMR 3: Isomtric GTF 45"x6  NMR 4: seated crossed legged ankle inversion: 3x10 red TB (gentle)  Therapeutic Activity  TA 1: ankle DF stretch at stairs: 12x10'' holds  TA 2: shuttle DL press: 2x15 2.5 cords  TA 3: shuttle SL heel raise: 2x10 0.5 cords --> 4x30'' holds with 30'' rest in between each rep    Time Entry(in minutes):  Neuromuscular Re-Education Time Entry: 30  Therapeutic Activity Time Entry: 25    Assessment & Plan   Assessment: Decreased weight-bearing here raise activities today and focused more on unloaded calf strengthening on shuttle press.  No pain today but mild discomfort if performing standing heel " raises after 5 reps.  Can consider future insole use while at therapy.        The patient will continue to benefit from skilled outpatient physical therapy in order to address the deficits listed in the problem list on the initial evaluation, provide patient and family education, and maximize the patients level of independence in the home and community environments.     The patient's spiritual, cultural, and educational needs were considered, and the patient is agreeable to the plan of care and goals.           Plan:      Goals:   Active       Ambulation/movement       Patient will walk without gait deviations and no limitations in distances.        Start:  08/07/25    Expected End:  09/26/25               Functional outcome       Patient will show a significant change in FOTO patient-reported outcome tool to demonstrate subjective improvement       Start:  08/07/25    Expected End:  09/26/25            Patient stated goal:   to have less foot pain overall       Start:  08/07/25    Expected End:  09/26/25            Patient will demonstrate independence in home program for support of progression       Start:  08/07/25    Expected End:  09/26/25               Pain       Patient will report pain of 1/10 demonstrating a reduction of overall pain throughout the day.       Start:  08/07/25    Expected End:  09/26/25               Range of Motion       Patient will achieve right ankle dorsiflexion ROM 5+ degrees       Start:  08/07/25    Expected End:  09/26/25               Strength       Patient will achieve right ankle dorsiflexion strength of 5/5       Start:  08/07/25    Expected End:  09/26/25            Patient will achieve right ankle plantar flexion strength of 4+/5       Start:  08/07/25    Expected End:  09/26/25                Catalino Claudio, PT, DPT

## 2025-08-13 ENCOUNTER — CLINICAL SUPPORT (OUTPATIENT)
Dept: REHABILITATION | Facility: HOSPITAL | Age: 60
End: 2025-08-13
Payer: COMMERCIAL

## 2025-08-13 DIAGNOSIS — M25.671 DECREASED RANGE OF MOTION OF RIGHT ANKLE: Primary | ICD-10-CM

## 2025-08-13 DIAGNOSIS — R53.1 DECREASED STRENGTH: ICD-10-CM

## 2025-08-13 DIAGNOSIS — M79.671 PAIN OF RIGHT HEEL: ICD-10-CM

## 2025-08-13 PROCEDURE — 97140 MANUAL THERAPY 1/> REGIONS: CPT | Mod: PN

## 2025-08-13 PROCEDURE — 97112 NEUROMUSCULAR REEDUCATION: CPT | Mod: PN

## 2025-08-13 PROCEDURE — 97530 THERAPEUTIC ACTIVITIES: CPT | Mod: PN

## 2025-08-14 ENCOUNTER — CLINICAL SUPPORT (OUTPATIENT)
Dept: REHABILITATION | Facility: HOSPITAL | Age: 60
End: 2025-08-14
Payer: COMMERCIAL

## 2025-08-14 DIAGNOSIS — R53.1 DECREASED STRENGTH: ICD-10-CM

## 2025-08-14 DIAGNOSIS — M25.671 DECREASED RANGE OF MOTION OF RIGHT ANKLE: Primary | ICD-10-CM

## 2025-08-14 DIAGNOSIS — M79.671 PAIN OF RIGHT HEEL: ICD-10-CM

## 2025-08-14 PROCEDURE — 97140 MANUAL THERAPY 1/> REGIONS: CPT | Mod: PN

## 2025-08-14 PROCEDURE — 97530 THERAPEUTIC ACTIVITIES: CPT | Mod: PN

## 2025-08-14 PROCEDURE — 97112 NEUROMUSCULAR REEDUCATION: CPT | Mod: PN

## 2025-08-19 ENCOUNTER — CLINICAL SUPPORT (OUTPATIENT)
Dept: REHABILITATION | Facility: HOSPITAL | Age: 60
End: 2025-08-19
Payer: COMMERCIAL

## 2025-08-19 DIAGNOSIS — M25.671 DECREASED RANGE OF MOTION OF RIGHT ANKLE: Primary | ICD-10-CM

## 2025-08-19 DIAGNOSIS — R53.1 DECREASED STRENGTH: ICD-10-CM

## 2025-08-19 DIAGNOSIS — M79.671 PAIN OF RIGHT HEEL: ICD-10-CM

## 2025-08-19 PROCEDURE — 97112 NEUROMUSCULAR REEDUCATION: CPT | Mod: PN,CQ

## 2025-08-19 PROCEDURE — 97530 THERAPEUTIC ACTIVITIES: CPT | Mod: PN,CQ

## 2025-08-19 PROCEDURE — 97140 MANUAL THERAPY 1/> REGIONS: CPT | Mod: PN,CQ

## 2025-08-22 DIAGNOSIS — L30.4 INTERTRIGO: ICD-10-CM

## 2025-08-25 RX ORDER — CLOTRIMAZOLE AND BETAMETHASONE DIPROPIONATE 10; .64 MG/G; MG/G
CREAM TOPICAL 2 TIMES DAILY
Qty: 45 G | Refills: 1 | Status: SHIPPED | OUTPATIENT
Start: 2025-08-25

## 2025-08-26 ENCOUNTER — OFFICE VISIT (OUTPATIENT)
Dept: PODIATRY | Facility: CLINIC | Age: 60
End: 2025-08-26
Payer: COMMERCIAL

## 2025-08-26 ENCOUNTER — PATIENT MESSAGE (OUTPATIENT)
Dept: INTERNAL MEDICINE | Facility: CLINIC | Age: 60
End: 2025-08-26
Payer: COMMERCIAL

## 2025-08-26 VITALS
WEIGHT: 237 LBS | DIASTOLIC BLOOD PRESSURE: 80 MMHG | HEART RATE: 76 BPM | SYSTOLIC BLOOD PRESSURE: 136 MMHG | HEIGHT: 66 IN | BODY MASS INDEX: 38.09 KG/M2

## 2025-08-26 DIAGNOSIS — M72.2 PLANTAR FASCIITIS: Primary | ICD-10-CM

## 2025-08-26 PROCEDURE — 99999 PR PBB SHADOW E&M-EST. PATIENT-LVL IV: CPT | Mod: PBBFAC,,, | Performed by: STUDENT IN AN ORGANIZED HEALTH CARE EDUCATION/TRAINING PROGRAM

## 2025-08-26 RX ORDER — METHYLPREDNISOLONE ACETATE 40 MG/ML
40 INJECTION, SUSPENSION INTRA-ARTICULAR; INTRALESIONAL; INTRAMUSCULAR; SOFT TISSUE
Status: COMPLETED | OUTPATIENT
Start: 2025-08-26 | End: 2025-08-26

## 2025-08-26 RX ADMIN — METHYLPREDNISOLONE ACETATE 40 MG: 40 INJECTION, SUSPENSION INTRA-ARTICULAR; INTRALESIONAL; INTRAMUSCULAR; SOFT TISSUE at 09:08

## 2025-08-28 ENCOUNTER — PATIENT MESSAGE (OUTPATIENT)
Dept: INTERNAL MEDICINE | Facility: CLINIC | Age: 60
End: 2025-08-28
Payer: COMMERCIAL

## 2025-08-28 DIAGNOSIS — E11.59 OBESITY, DIABETES, AND HYPERTENSION SYNDROME: ICD-10-CM

## 2025-08-28 DIAGNOSIS — E11.40 TYPE 2 DIABETES MELLITUS WITH DIABETIC NEUROPATHY, WITHOUT LONG-TERM CURRENT USE OF INSULIN: ICD-10-CM

## 2025-08-28 DIAGNOSIS — E11.69 OBESITY, DIABETES, AND HYPERTENSION SYNDROME: ICD-10-CM

## 2025-08-28 DIAGNOSIS — I15.2 OBESITY, DIABETES, AND HYPERTENSION SYNDROME: ICD-10-CM

## 2025-08-28 DIAGNOSIS — E66.9 OBESITY, DIABETES, AND HYPERTENSION SYNDROME: ICD-10-CM

## 2025-08-28 RX ORDER — TIRZEPATIDE 12.5 MG/.5ML
INJECTION, SOLUTION SUBCUTANEOUS
Qty: 4 PEN | Refills: 6 | Status: SHIPPED | OUTPATIENT
Start: 2025-08-28 | End: 2025-08-28 | Stop reason: SDUPTHER

## 2025-08-29 RX ORDER — TIRZEPATIDE 12.5 MG/.5ML
12.5 INJECTION, SOLUTION SUBCUTANEOUS WEEKLY
Qty: 2 ML | Refills: 6 | Status: SHIPPED | OUTPATIENT
Start: 2025-08-29

## 2025-09-02 ENCOUNTER — IMMUNIZATION (OUTPATIENT)
Dept: INTERNAL MEDICINE | Facility: CLINIC | Age: 60
End: 2025-09-02
Payer: COMMERCIAL

## 2025-09-02 DIAGNOSIS — Z23 NEED FOR VACCINATION: Primary | ICD-10-CM

## 2025-09-02 PROCEDURE — 90661 CCIIV3 VAC ABX FR 0.5 ML IM: CPT | Mod: S$GLB,,, | Performed by: INTERNAL MEDICINE

## 2025-09-02 PROCEDURE — 90480 ADMN SARSCOV2 VAC 1/ONLY CMP: CPT | Mod: S$GLB,,, | Performed by: INTERNAL MEDICINE

## 2025-09-02 PROCEDURE — 91320 SARSCV2 VAC 30MCG TRS-SUC IM: CPT | Mod: S$GLB,,, | Performed by: INTERNAL MEDICINE

## 2025-09-02 PROCEDURE — 90471 IMMUNIZATION ADMIN: CPT | Mod: S$GLB,,, | Performed by: INTERNAL MEDICINE

## 2025-09-03 ENCOUNTER — CLINICAL SUPPORT (OUTPATIENT)
Dept: REHABILITATION | Facility: HOSPITAL | Age: 60
End: 2025-09-03
Payer: COMMERCIAL

## 2025-09-03 DIAGNOSIS — M79.671 PAIN OF RIGHT HEEL: ICD-10-CM

## 2025-09-03 DIAGNOSIS — R53.1 DECREASED STRENGTH: ICD-10-CM

## 2025-09-03 DIAGNOSIS — M25.671 DECREASED RANGE OF MOTION OF RIGHT ANKLE: Primary | ICD-10-CM

## 2025-09-03 DIAGNOSIS — E11.69 OBESITY, DIABETES, AND HYPERTENSION SYNDROME: ICD-10-CM

## 2025-09-03 DIAGNOSIS — E11.59 OBESITY, DIABETES, AND HYPERTENSION SYNDROME: ICD-10-CM

## 2025-09-03 DIAGNOSIS — I15.2 OBESITY, DIABETES, AND HYPERTENSION SYNDROME: ICD-10-CM

## 2025-09-03 DIAGNOSIS — E11.40 TYPE 2 DIABETES MELLITUS WITH DIABETIC NEUROPATHY, WITHOUT LONG-TERM CURRENT USE OF INSULIN: ICD-10-CM

## 2025-09-03 DIAGNOSIS — E66.9 OBESITY, DIABETES, AND HYPERTENSION SYNDROME: ICD-10-CM

## 2025-09-03 PROCEDURE — 97530 THERAPEUTIC ACTIVITIES: CPT | Mod: PN,CQ

## 2025-09-03 PROCEDURE — 97112 NEUROMUSCULAR REEDUCATION: CPT | Mod: PN,CQ

## 2025-09-04 RX ORDER — TIRZEPATIDE 12.5 MG/.5ML
12.5 INJECTION, SOLUTION SUBCUTANEOUS WEEKLY
Qty: 2 ML | Refills: 6 | Status: SHIPPED | OUTPATIENT
Start: 2025-09-04

## (undated) DEVICE — BNDG COFLEX FOAM LF2 ST 3X5YD

## (undated) DEVICE — SYR 3CC LUER LOC

## (undated) DEVICE — SET INFLOW TUBE HYSTER

## (undated) DEVICE — GOWN ECLIPSE REINF LVL4 TWL XL

## (undated) DEVICE — SOUND DISPOSABLE UTERINE W/NOT

## (undated) DEVICE — NDL HYPO STD REG BVL 18GX1.5IN

## (undated) DEVICE — PAD PERI POST REPLACEMNT

## (undated) DEVICE — SOL IRR NACL .9% 3000ML

## (undated) DEVICE — TOWEL OR DISP STRL BLUE 4/PK

## (undated) DEVICE — PAD ALOCOHOL PREP MD STRL 2PLY

## (undated) DEVICE — KNIFE CARPAL TUNNEL

## (undated) DEVICE — DRESSING LEUKOPLAST FLEX 1X3IN

## (undated) DEVICE — NDL ECLIPSE SAFETY 23G 1.5IN

## (undated) DEVICE — GLOVE BIOGEL SKINSENSE PI 6.5

## (undated) DEVICE — BLADE SURG #15 CARBON STEEL

## (undated) DEVICE — Device

## (undated) DEVICE — PAD CAST 2 IN X 4YDS STERILE

## (undated) DEVICE — SEE MEDLINE ITEM 146313

## (undated) DEVICE — SOL 9P NACL IRR PIC IL

## (undated) DEVICE — DEVICE TRUECLEAR INCISOR 2.9

## (undated) DEVICE — COVER CAMERA OPERATING ROOM

## (undated) DEVICE — SPONGE COTTON TRAY 4X4IN

## (undated) DEVICE — SYR ONLY LUER LOCK 20CC

## (undated) DEVICE — TOURNIQUET SB QC DP 18X4IN

## (undated) DEVICE — SUT PROLENE 3-0 FS-2 18

## (undated) DEVICE — SET BASIN 48X48IN 6000ML RING

## (undated) DEVICE — GLOVE BIOGEL PI MICRO SZ 7

## (undated) DEVICE — DRESSING N ADH OIL EMUL 3X3

## (undated) DEVICE — GLOVE SENSICARE PI SURG 7.5

## (undated) DEVICE — HYSTEROSCOPE TRUCLEAR ELITE